# Patient Record
Sex: MALE | Race: WHITE | NOT HISPANIC OR LATINO | Employment: OTHER | URBAN - METROPOLITAN AREA
[De-identification: names, ages, dates, MRNs, and addresses within clinical notes are randomized per-mention and may not be internally consistent; named-entity substitution may affect disease eponyms.]

---

## 2017-01-18 RX ORDER — ATORVASTATIN CALCIUM 40 MG/1
TABLET, FILM COATED ORAL
Qty: 90 TABLET | Refills: 1 | Status: SHIPPED | OUTPATIENT
Start: 2017-01-18 | End: 2017-06-03 | Stop reason: SDUPTHER

## 2017-03-16 DIAGNOSIS — I10 HYPERTENSION GOAL BP (BLOOD PRESSURE) < 140/90: Chronic | ICD-10-CM

## 2017-03-16 RX ORDER — AMLODIPINE BESYLATE 5 MG/1
TABLET ORAL
Qty: 90 TABLET | Refills: 3 | Status: SHIPPED | OUTPATIENT
Start: 2017-03-16 | End: 2018-02-01 | Stop reason: SDUPTHER

## 2017-06-03 DIAGNOSIS — E78.5 HYPERLIPIDEMIA LDL GOAL <100: ICD-10-CM

## 2017-06-07 RX ORDER — ATORVASTATIN CALCIUM 40 MG/1
TABLET, FILM COATED ORAL
Qty: 90 TABLET | Refills: 1 | Status: SHIPPED | OUTPATIENT
Start: 2017-06-07 | End: 2017-11-30 | Stop reason: SDUPTHER

## 2017-06-07 RX ORDER — FENOFIBRATE 160 MG/1
TABLET ORAL
Qty: 45 TABLET | Refills: 1 | Status: SHIPPED | OUTPATIENT
Start: 2017-06-07 | End: 2017-11-30 | Stop reason: SDUPTHER

## 2017-06-20 ENCOUNTER — LAB VISIT (OUTPATIENT)
Dept: LAB | Facility: HOSPITAL | Age: 72
End: 2017-06-20
Attending: INTERNAL MEDICINE
Payer: MEDICARE

## 2017-06-20 DIAGNOSIS — E78.5 HYPERLIPIDEMIA: ICD-10-CM

## 2017-06-20 DIAGNOSIS — I25.10 CORONARY ARTERY DISEASE INVOLVING NATIVE CORONARY ARTERY OF NATIVE HEART WITHOUT ANGINA PECTORIS: Chronic | ICD-10-CM

## 2017-06-20 DIAGNOSIS — I10 HYPERTENSION GOAL BP (BLOOD PRESSURE) < 140/90: Chronic | ICD-10-CM

## 2017-06-20 DIAGNOSIS — N18.30 CKD (CHRONIC KIDNEY DISEASE) STAGE 3, GFR 30-59 ML/MIN: ICD-10-CM

## 2017-06-20 LAB
ALBUMIN SERPL BCP-MCNC: 3.7 G/DL
ALP SERPL-CCNC: 33 U/L
ALT SERPL W/O P-5'-P-CCNC: 26 U/L
ANION GAP SERPL CALC-SCNC: 7 MMOL/L
AST SERPL-CCNC: 22 U/L
BILIRUB SERPL-MCNC: 0.7 MG/DL
BUN SERPL-MCNC: 15 MG/DL
CALCIUM SERPL-MCNC: 9.3 MG/DL
CHLORIDE SERPL-SCNC: 105 MMOL/L
CHOLEST/HDLC SERPL: 3.1 {RATIO}
CO2 SERPL-SCNC: 28 MMOL/L
CREAT SERPL-MCNC: 1.4 MG/DL
EST. GFR  (AFRICAN AMERICAN): 58.4 ML/MIN/1.73 M^2
EST. GFR  (NON AFRICAN AMERICAN): 50.5 ML/MIN/1.73 M^2
GLUCOSE SERPL-MCNC: 93 MG/DL
HDL/CHOLESTEROL RATIO: 32.5 %
HDLC SERPL-MCNC: 151 MG/DL
HDLC SERPL-MCNC: 49 MG/DL
LDLC SERPL CALC-MCNC: 85 MG/DL
NONHDLC SERPL-MCNC: 102 MG/DL
POTASSIUM SERPL-SCNC: 4.5 MMOL/L
PROT SERPL-MCNC: 7.2 G/DL
SODIUM SERPL-SCNC: 140 MMOL/L
TRIGL SERPL-MCNC: 85 MG/DL

## 2017-06-20 PROCEDURE — 36415 COLL VENOUS BLD VENIPUNCTURE: CPT

## 2017-06-20 PROCEDURE — 80053 COMPREHEN METABOLIC PANEL: CPT

## 2017-06-20 PROCEDURE — 80061 LIPID PANEL: CPT

## 2017-06-27 ENCOUNTER — OFFICE VISIT (OUTPATIENT)
Dept: INTERNAL MEDICINE | Facility: CLINIC | Age: 72
End: 2017-06-27
Payer: MEDICARE

## 2017-06-27 ENCOUNTER — LAB VISIT (OUTPATIENT)
Dept: LAB | Facility: HOSPITAL | Age: 72
End: 2017-06-27
Attending: INTERNAL MEDICINE
Payer: MEDICARE

## 2017-06-27 VITALS
HEIGHT: 69 IN | WEIGHT: 242.5 LBS | DIASTOLIC BLOOD PRESSURE: 70 MMHG | SYSTOLIC BLOOD PRESSURE: 122 MMHG | HEART RATE: 62 BPM | TEMPERATURE: 98 F | BODY MASS INDEX: 35.92 KG/M2

## 2017-06-27 DIAGNOSIS — I10 HYPERTENSION GOAL BP (BLOOD PRESSURE) < 140/90: Primary | Chronic | ICD-10-CM

## 2017-06-27 DIAGNOSIS — Z12.5 SCREENING FOR PROSTATE CANCER: ICD-10-CM

## 2017-06-27 DIAGNOSIS — E66.01 SEVERE OBESITY (BMI 35.0-35.9 WITH COMORBIDITY): ICD-10-CM

## 2017-06-27 DIAGNOSIS — E78.5 HYPERLIPIDEMIA LDL GOAL <70: Chronic | ICD-10-CM

## 2017-06-27 DIAGNOSIS — I25.10 CORONARY ARTERY DISEASE INVOLVING NATIVE CORONARY ARTERY OF NATIVE HEART WITHOUT ANGINA PECTORIS: Chronic | ICD-10-CM

## 2017-06-27 DIAGNOSIS — R97.20 ELEVATED PSA: ICD-10-CM

## 2017-06-27 DIAGNOSIS — N18.30 CKD (CHRONIC KIDNEY DISEASE) STAGE 3, GFR 30-59 ML/MIN: Chronic | ICD-10-CM

## 2017-06-27 LAB — COMPLEXED PSA SERPL-MCNC: 6.7 NG/ML

## 2017-06-27 PROCEDURE — 99999 PR PBB SHADOW E&M-EST. PATIENT-LVL III: CPT | Mod: PBBFAC,,, | Performed by: INTERNAL MEDICINE

## 2017-06-27 PROCEDURE — 99499 UNLISTED E&M SERVICE: CPT | Mod: S$GLB,,, | Performed by: INTERNAL MEDICINE

## 2017-06-27 PROCEDURE — 1159F MED LIST DOCD IN RCRD: CPT | Mod: S$GLB,,, | Performed by: INTERNAL MEDICINE

## 2017-06-27 PROCEDURE — 84153 ASSAY OF PSA TOTAL: CPT

## 2017-06-27 PROCEDURE — 36415 COLL VENOUS BLD VENIPUNCTURE: CPT

## 2017-06-27 PROCEDURE — 99214 OFFICE O/P EST MOD 30 MIN: CPT | Mod: S$GLB,,, | Performed by: INTERNAL MEDICINE

## 2017-06-27 NOTE — PROGRESS NOTES
Subjective:       Patient ID: Filipe Agustin Jr. is a 71 y.o. male.    Chief Complaint: Follow-up    Filipe Agustin Jr.  71 y.o. White male    Patient presents with:  Follow-up    HPI: Here today to follow up on chronic conditions.  HTN--stable on amlodipine and metoprolol. He denies symptoms.   HLD--compliant with atorvastatin and fenofibrate.                  CHOL                     151                 06/20/2017                 HDL                      49                  06/20/2017                 LDLCALC                  85.0                06/20/2017                 TRIG                     85                  06/20/2017            CKD III--stable. Asymptomatic.   CAD--asymptomatic. Compliant with aspirin, atorvastatin, fenofibrate and metoprolol. He has not seen cardiology in several years and would like to.     His PSA was elevated in December. He was referred to urology but did not go. He denies symptoms.                      PSA                      6.3 (H)             12/20/2016              Past Medical History:  Acute coronary syndrome  9/24/2015: CKD (chronic kidney disease) stage 3, GFR 30-5*  Coronary artery disease  9/19/2014: History of coronary angioplasty  Hyperlipidemia  Hypertension  2008: Myocardial infarction  Tobacco dependence      Comment: resolved    Current Outpatient Prescriptions on File Prior to Visit:  amlodipine (NORVASC) 5 MG tablet, TAKE 1 TABLET ONE TIME DAILY, Disp: 90 tablet, Rfl: 3  aspirin (ECOTRIN) 81 MG EC tablet, Take 81 mg by mouth once daily., Disp: , Rfl:   atorvastatin (LIPITOR) 40 MG tablet, TAKE 1 TABLET EVERY DAY, Disp: 90 tablet, Rfl: 1  fenofibrate 160 MG Tab, TAKE 1 TABLET EVERY OTHER DAY, Disp: 45 tablet, Rfl: 1  furosemide (LASIX) 20 MG tablet, Take 1 tablet (20 mg total) by mouth once daily., Disp: 3 tablet, Rfl: 0  metoprolol tartrate (LOPRESSOR) 50 MG tablet, Take 1 tablet (50 mg total) by mouth 2 (two) times daily., Disp: 180 tablet, Rfl:  3    Allergies:  Review of patient's allergies indicates:   -- Paragoric -- Other (See Comments)    --  sneeze        Review of Systems   Constitutional: Negative for fever and unexpected weight change.   Respiratory: Negative for cough and shortness of breath.    Cardiovascular: Negative for chest pain and leg swelling.   Gastrointestinal: Negative for abdominal pain, constipation and diarrhea.   Genitourinary: Negative for difficulty urinating and dysuria.   Musculoskeletal: Negative for gait problem.   Neurological: Negative for dizziness and headaches.   Psychiatric/Behavioral: Positive for sleep disturbance (snoring).       Objective:      Physical Exam   Constitutional: He is oriented to person, place, and time. He appears well-developed and well-nourished. No distress.   Eyes: No scleral icterus.   Cardiovascular: Normal rate, regular rhythm and normal heart sounds.    Pulmonary/Chest: Effort normal and breath sounds normal. No respiratory distress.   Abdominal: Soft. Bowel sounds are normal.   Musculoskeletal: He exhibits no edema.   Neurological: He is alert and oriented to person, place, and time.   Skin: Skin is warm and dry.   Psychiatric: He has a normal mood and affect.   Vitals reviewed.      Assessment:       1. Hypertension goal BP (blood pressure) < 140/90    2. Hyperlipidemia LDL goal <70    3. CKD (chronic kidney disease) stage 3, GFR 30-59 ml/min    4. Coronary artery disease involving native coronary artery of native heart without angina pectoris    5. Elevated PSA    6. Screening for prostate cancer    7. Severe obesity (BMI 35.0-35.9 with comorbidity)        Plan:       Filipe was seen today for follow-up.    Diagnoses and all orders for this visit:    Hypertension goal BP (blood pressure) < 140/90  -     Continue current management    Hyperlipidemia LDL goal <70  -     Continue current management    CKD (chronic kidney disease) stage 3, GFR 30-59 ml/min  -     Monitor    Coronary artery  disease involving native coronary artery of native heart without angina pectoris  -     Schedule f/u with cardiology    Elevated PSA  -     Repeat PSA    Screening for prostate cancer  -     PSA, Screening; Future    Severe obesity (BMI 35.0-35.9 with comorbidity)  -     Lifestyle modifications    PSA today.     Labs and f/u in 6 months

## 2017-07-27 DIAGNOSIS — I10 BENIGN ESSENTIAL HTN: ICD-10-CM

## 2017-07-27 DIAGNOSIS — I25.10 CORONARY ARTERY DISEASE INVOLVING NATIVE CORONARY ARTERY OF NATIVE HEART WITHOUT ANGINA PECTORIS: Primary | ICD-10-CM

## 2017-07-28 ENCOUNTER — CLINICAL SUPPORT (OUTPATIENT)
Dept: CARDIOLOGY | Facility: CLINIC | Age: 72
End: 2017-07-28
Payer: MEDICARE

## 2017-07-28 ENCOUNTER — OFFICE VISIT (OUTPATIENT)
Dept: CARDIOLOGY | Facility: CLINIC | Age: 72
End: 2017-07-28
Payer: MEDICARE

## 2017-07-28 VITALS
SYSTOLIC BLOOD PRESSURE: 150 MMHG | HEART RATE: 60 BPM | DIASTOLIC BLOOD PRESSURE: 80 MMHG | WEIGHT: 238 LBS | BODY MASS INDEX: 35.25 KG/M2 | HEIGHT: 69 IN

## 2017-07-28 DIAGNOSIS — I25.10 CORONARY ARTERY DISEASE INVOLVING NATIVE CORONARY ARTERY OF NATIVE HEART WITHOUT ANGINA PECTORIS: Primary | Chronic | ICD-10-CM

## 2017-07-28 DIAGNOSIS — I10 HYPERTENSION GOAL BP (BLOOD PRESSURE) < 140/90: Chronic | ICD-10-CM

## 2017-07-28 DIAGNOSIS — E78.5 HYPERLIPIDEMIA LDL GOAL <70: Chronic | ICD-10-CM

## 2017-07-28 DIAGNOSIS — I25.10 CORONARY ARTERY DISEASE INVOLVING NATIVE CORONARY ARTERY OF NATIVE HEART WITHOUT ANGINA PECTORIS: ICD-10-CM

## 2017-07-28 DIAGNOSIS — I10 BENIGN ESSENTIAL HTN: ICD-10-CM

## 2017-07-28 PROCEDURE — 1126F AMNT PAIN NOTED NONE PRSNT: CPT | Mod: S$GLB,,, | Performed by: NUCLEAR MEDICINE

## 2017-07-28 PROCEDURE — 99999 PR PBB SHADOW E&M-EST. PATIENT-LVL III: CPT | Mod: PBBFAC,,, | Performed by: NUCLEAR MEDICINE

## 2017-07-28 PROCEDURE — 99214 OFFICE O/P EST MOD 30 MIN: CPT | Mod: S$GLB,,, | Performed by: NUCLEAR MEDICINE

## 2017-07-28 PROCEDURE — 1159F MED LIST DOCD IN RCRD: CPT | Mod: S$GLB,,, | Performed by: NUCLEAR MEDICINE

## 2017-07-28 PROCEDURE — 93000 ELECTROCARDIOGRAM COMPLETE: CPT | Mod: S$GLB,,, | Performed by: NUCLEAR MEDICINE

## 2017-07-28 PROCEDURE — 99499 UNLISTED E&M SERVICE: CPT | Mod: S$GLB,,, | Performed by: NUCLEAR MEDICINE

## 2017-07-28 RX ORDER — PNV NO.95/FERROUS FUM/FOLIC AC 28MG-0.8MG
100 TABLET ORAL DAILY
COMMUNITY

## 2017-07-28 RX ORDER — CHOLECALCIFEROL (VITAMIN D3) 25 MCG
2000 TABLET ORAL DAILY
COMMUNITY

## 2017-07-28 NOTE — PROGRESS NOTES
Subjective:   Patient ID:  Filipe Agustin Jr. is a 71 y.o. male who presents for follow-up of Coronary Artery Disease (re- establish care); Hypertension; and Hyperlipidemia      HPI 1- CHRONIC CAD- OLD MI  AND POST PCI RCA  2- ESSENTIAL HTN, AND  3- DYSLIPIDEMIA  DOING WELL. NO RECENT HOSPITALIZATIONS OR ED VISITS FOR ACS OR ADHF  NO RECURRENT ANGINA OR EQUIVALENT  NO UNUSUAL ALVARADO. NO ORTHOPNEA OR PND  NO EDEMA. NO CALVE TENDERNESS. NO INTERMITTENT CLAUDICATION  CARD MED - NO SIDE EFFECTS.  NO FOCAL CNS SYMPTOMS OR SIGNS TO SUGGEST TIA OR STROKE  ECG - TODAY- SR. OTHERWISE NORMAL    Review of Systems   Constitution: Negative for chills, fever, weakness, night sweats, weight gain and weight loss.   HENT: Negative for headaches and nosebleeds.    Eyes: Negative for blurred vision, double vision and visual disturbance.   Cardiovascular: Negative for chest pain, dyspnea on exertion, irregular heartbeat, leg swelling, orthopnea, palpitations, paroxysmal nocturnal dyspnea and syncope.   Respiratory: Negative for cough, hemoptysis and wheezing.    Endocrine: Negative for polydipsia and polyuria.   Hematologic/Lymphatic: Does not bruise/bleed easily.   Skin: Negative for rash.   Musculoskeletal: Negative for joint pain, joint swelling, muscle weakness and myalgias.   Gastrointestinal: Negative for abdominal pain, hematemesis, jaundice and melena.   Genitourinary: Negative for dysuria, hematuria and nocturia.   Neurological: Negative for dizziness, focal weakness and sensory change.   Psychiatric/Behavioral: Negative for depression. The patient does not have insomnia and is not nervous/anxious.      Family History   Problem Relation Age of Onset    Heart disease Father      MI    Cancer Brother     Heart disease Brother     Diabetes Neg Hx     Kidney disease Neg Hx     Stroke Neg Hx      Past Medical History:   Diagnosis Date    Acute coronary syndrome     CKD (chronic kidney disease) stage 3, GFR 30-59 ml/min  9/24/2015    Coronary artery disease     History of coronary angioplasty 9/19/2014    Hyperlipidemia     Hypertension     Myocardial infarction 2008    Tobacco dependence     resolved     Current Outpatient Prescriptions on File Prior to Visit   Medication Sig Dispense Refill    amlodipine (NORVASC) 5 MG tablet TAKE 1 TABLET ONE TIME DAILY 90 tablet 3    aspirin (ECOTRIN) 81 MG EC tablet Take 81 mg by mouth once daily.      atorvastatin (LIPITOR) 40 MG tablet TAKE 1 TABLET EVERY DAY 90 tablet 1    fenofibrate 160 MG Tab TAKE 1 TABLET EVERY OTHER DAY 45 tablet 1    [DISCONTINUED] metoprolol tartrate (LOPRESSOR) 50 MG tablet Take 1 tablet (50 mg total) by mouth 2 (two) times daily. 180 tablet 3    [DISCONTINUED] furosemide (LASIX) 20 MG tablet Take 1 tablet (20 mg total) by mouth once daily. 3 tablet 0     No current facility-administered medications on file prior to visit.      Review of patient's allergies indicates:   Allergen Reactions    Paragoric Other (See Comments)     sneeze       Objective:     Physical Exam   Constitutional: He is oriented to person, place, and time. He appears well-developed. No distress.   HENT:   Head: Normocephalic.   Eyes: Conjunctivae are normal. Pupils are equal, round, and reactive to light.   Neck: Neck supple. No JVD present. No thyromegaly present.   Cardiovascular: Normal rate, regular rhythm, normal heart sounds and intact distal pulses.  Exam reveals no gallop and no friction rub.    No murmur heard.  Pulses:       Carotid pulses are 2+ on the right side, and 2+ on the left side.       Radial pulses are 2+ on the right side, and 2+ on the left side.        Femoral pulses are 2+ on the right side, and 2+ on the left side.       Popliteal pulses are 2+ on the right side, and 2+ on the left side.        Dorsalis pedis pulses are 2+ on the right side, and 2+ on the left side.        Posterior tibial pulses are 2+ on the right side, and 2+ on the left side.    Pulmonary/Chest: Breath sounds normal. He has no wheezes. He has no rales. He exhibits no tenderness.   Abdominal: Soft. Bowel sounds are normal. He exhibits no mass. There is no hepatosplenomegaly. There is no tenderness.   Musculoskeletal: He exhibits no edema or tenderness.        Cervical back: Normal.        Thoracic back: Normal.        Lumbar back: Normal.   Lymphadenopathy:     He has no cervical adenopathy.     He has no axillary adenopathy.        Right: No supraclavicular adenopathy present.        Left: No supraclavicular adenopathy present.   Neurological: He is alert and oriented to person, place, and time. He has normal strength. No sensory deficit. Gait normal.   Skin: Skin is warm. No cyanosis. No pallor. Nails show no clubbing.   Psychiatric: He has a normal mood and affect. His speech is normal and behavior is normal. Cognition and memory are normal.       Assessment:     1. Coronary artery disease involving native coronary artery of native heart without angina pectoris    2. Hypertension goal BP (blood pressure) < 140/90    3. Hyperlipidemia LDL goal <70      STABLE CAD  NO CLINICAL EVIDENCE OF ADHF OR ARRHYTHMIAS  BP WELL CONTROLLED  RECENT LIPIDS-  REVIEWED- AT GOAL  CNS STATUS STABLE  CARD MED WELL TOLERATED  Plan:     Coronary artery disease involving native coronary artery of native heart without angina pectoris    Hypertension goal BP (blood pressure) < 140/90    Hyperlipidemia LDL goal <70      CONTINUE PRESENT CARD MANAGEMENT    RETURN IN 6 MONTHS.

## 2017-08-15 ENCOUNTER — PATIENT OUTREACH (OUTPATIENT)
Dept: ADMINISTRATIVE | Facility: HOSPITAL | Age: 72
End: 2017-08-15

## 2017-09-06 ENCOUNTER — LAB VISIT (OUTPATIENT)
Dept: LAB | Facility: HOSPITAL | Age: 72
End: 2017-09-06
Attending: UROLOGY
Payer: MEDICARE

## 2017-09-06 ENCOUNTER — OFFICE VISIT (OUTPATIENT)
Dept: UROLOGY | Facility: CLINIC | Age: 72
End: 2017-09-06
Payer: MEDICARE

## 2017-09-06 VITALS
BODY MASS INDEX: 35.45 KG/M2 | DIASTOLIC BLOOD PRESSURE: 83 MMHG | WEIGHT: 240.06 LBS | HEART RATE: 67 BPM | SYSTOLIC BLOOD PRESSURE: 148 MMHG

## 2017-09-06 DIAGNOSIS — Z12.5 ENCOUNTER FOR SCREENING FOR MALIGNANT NEOPLASM OF PROSTATE: ICD-10-CM

## 2017-09-06 DIAGNOSIS — R97.20 ELEVATED PSA: ICD-10-CM

## 2017-09-06 DIAGNOSIS — R97.20 ELEVATED PSA: Primary | ICD-10-CM

## 2017-09-06 LAB
BILIRUB SERPL-MCNC: NORMAL MG/DL
BLOOD URINE, POC: NORMAL
COLOR, POC UA: NORMAL
GLUCOSE UR QL STRIP: NORMAL
KETONES UR QL STRIP: NORMAL
LEUKOCYTE ESTERASE URINE, POC: NORMAL
NITRITE, POC UA: NORMAL
PH, POC UA: 6
PROTEIN, POC: NORMAL
SPECIFIC GRAVITY, POC UA: 1.01
UROBILINOGEN, POC UA: NORMAL

## 2017-09-06 PROCEDURE — 36415 COLL VENOUS BLD VENIPUNCTURE: CPT

## 2017-09-06 PROCEDURE — 99999 PR PBB SHADOW E&M-EST. PATIENT-LVL II: CPT | Mod: PBBFAC,,, | Performed by: UROLOGY

## 2017-09-06 PROCEDURE — 3008F BODY MASS INDEX DOCD: CPT | Mod: S$GLB,,, | Performed by: UROLOGY

## 2017-09-06 PROCEDURE — 1126F AMNT PAIN NOTED NONE PRSNT: CPT | Mod: S$GLB,,, | Performed by: UROLOGY

## 2017-09-06 PROCEDURE — 81002 URINALYSIS NONAUTO W/O SCOPE: CPT | Mod: S$GLB,,, | Performed by: UROLOGY

## 2017-09-06 PROCEDURE — 84153 ASSAY OF PSA TOTAL: CPT

## 2017-09-06 PROCEDURE — 3079F DIAST BP 80-89 MM HG: CPT | Mod: S$GLB,,, | Performed by: UROLOGY

## 2017-09-06 PROCEDURE — 1159F MED LIST DOCD IN RCRD: CPT | Mod: S$GLB,,, | Performed by: UROLOGY

## 2017-09-06 PROCEDURE — 3077F SYST BP >= 140 MM HG: CPT | Mod: S$GLB,,, | Performed by: UROLOGY

## 2017-09-06 PROCEDURE — 99499 UNLISTED E&M SERVICE: CPT | Mod: S$GLB,,, | Performed by: UROLOGY

## 2017-09-06 PROCEDURE — 99204 OFFICE O/P NEW MOD 45 MIN: CPT | Mod: 25,S$GLB,, | Performed by: UROLOGY

## 2017-09-06 RX ORDER — CIPROFLOXACIN 500 MG/1
500 TABLET ORAL EVERY 12 HOURS
Qty: 3 TABLET | Refills: 0 | Status: SHIPPED | OUTPATIENT
Start: 2017-09-06 | End: 2017-11-14

## 2017-09-06 RX ORDER — METOPROLOL SUCCINATE 50 MG/1
50 TABLET, EXTENDED RELEASE ORAL DAILY
COMMUNITY
End: 2017-11-30

## 2017-09-06 RX ORDER — AMOXICILLIN 500 MG
2 CAPSULE ORAL DAILY
COMMUNITY

## 2017-09-06 NOTE — PROGRESS NOTES
Chief Complaint: Elevated PSA    HPI:   9/6/17: 70 yo man referred for elevated PSA and review of T labs.  No abd/pelvic pain and no exac/rel factors.  No hematuria.  No urolithiasis.  No urinary bother except nocturia x2 sometimes.  No  history.  Normal sexual function.  T was checked out of curiosity no specific symptoms.  He used a T gel 10-20 years ago stopped and didn't feel it did anything.  Libido is normal he says.    Allergies:  Paragoric    Medications:  has a current medication list which includes the following prescription(s): amlodipine, aspirin, atorvastatin, cyanocobalamin, fenofibrate, fish oil-omega-3 fatty acids, metoprolol succinate, turmeric, and vitamin d.    Review of Systems:  General: No fever, chills, fatigability, or weight loss.  Skin: No rashes, itching, or changes in color or texture of skin.  Chest: Denies ALVARADO, cyanosis, wheezing, cough, and sputum production.  Abdomen: Appetite fine. No weight loss. Denies diarrhea, abdominal pain, hematemesis, or blood in stool.  Musculoskeletal: No joint stiffness or swelling. Denies back pain.  : As above.  All other review of systems negative.    PMH:   has a past medical history of Acute coronary syndrome; CKD (chronic kidney disease) stage 3, GFR 30-59 ml/min (9/24/2015); Coronary artery disease; History of coronary angioplasty (9/19/2014); Hyperlipidemia; Hypertension; Myocardial infarction (2008); and Tobacco dependence.    PSH:   has a past surgical history that includes Coronary stent placement (2008); Cardiac catheterization (2008); and Coronary angioplasty (2008).    FamHx: family history includes Cancer in his brother; Heart disease in his brother and father.    SocHx:  reports that he quit smoking about 9 years ago. He has a 40.00 pack-year smoking history. He has never used smokeless tobacco. He reports that he drinks about 1.8 oz of alcohol per week . He reports that he does not use drugs.      Physical Exam:  Vitals:    09/06/17  1338   BP: (!) 148/83   Pulse: 67     General: A&Ox3, no apparent distress, no deformities  Neck: No masses, normal thyroid  Lungs: normal inspiration, no use of accessory muscles  Heart: normal pulse, no arrhythmias  Abdomen: Soft, NT, ND, no masses, no hernias, no hepatosplenomegaly  Lymphatic: Neck and groin nodes negative  Skin: The skin is warm and dry. No jaundice.  Ext: No c/c/e.  : Test desc arturo, no abnormalities of epididymus. Penis normal, with normal penile and scrotal skin. Meatus normal. Normal rectal tone, no hemorrhoids. Prost 10 gm no nodules or masses appreciated. SV not palpable. Perineum and anus normal.    Labs/Studies:   Urinalysis performed in clinic, summary: UA normal exc tr blood  PSA    12/16: 6.3    6/17: 6.7    Impression/Plan:   1. Elevated PSA warrants biopsy.  Will send UA and if hematuria will add CT Urogram and cysto.  Cipro for biopsy.  2. T is normal (low normal but normal) and asymptomatic.  Cannot recommend T therapy.

## 2017-09-06 NOTE — LETTER
September 6, 2017      Jojo Duque DO  [address] [place] [address] Dr Meliza [address]Y [address]           [facility]  [address] [place] [address] [address]  [place] [address]  Phone: [phone]  Fax: [phone]          Patient: Filipe Agustin Jr.   MR Number: [id number]   YOB: 1945   Date of Visit: 9/6/2017       Dear Dr. Jojo Duque:    Thank you for referring Filipe Agustin to me for evaluation. Attached you will find relevant portions of my assessment and plan of care.    If you have questions, please do not hesitate to call me. I look forward to following Filipe Agustin along with you.    Sincerely,    Ashok Laird IV, MD    Enclosure  CC:  No Recipients    If you would like to receive this communication electronically, please contact [email or web address] or [phone] to request more information on [facility] Link access.    For providers and/or their staff who would like to refer a patient to [facility], please contact us through our one-stop-shop provider referral line, [facility], at [phone].    If you feel you have received this communication in error or would no longer like to receive these types of communications, please e-mail [email or web address]

## 2017-09-07 LAB — COMPLEXED PSA SERPL-MCNC: 7 NG/ML

## 2017-10-04 ENCOUNTER — OFFICE VISIT (OUTPATIENT)
Dept: INTERNAL MEDICINE | Facility: CLINIC | Age: 72
End: 2017-10-04
Payer: MEDICARE

## 2017-10-04 ENCOUNTER — TELEPHONE (OUTPATIENT)
Dept: UROLOGY | Facility: CLINIC | Age: 72
End: 2017-10-04

## 2017-10-04 VITALS
DIASTOLIC BLOOD PRESSURE: 78 MMHG | HEART RATE: 89 BPM | BODY MASS INDEX: 35.53 KG/M2 | OXYGEN SATURATION: 92 % | SYSTOLIC BLOOD PRESSURE: 136 MMHG | WEIGHT: 239.88 LBS | HEIGHT: 69 IN | TEMPERATURE: 97 F

## 2017-10-04 DIAGNOSIS — S60.511A CAT SCRATCH OF HAND, RIGHT, INITIAL ENCOUNTER: Primary | ICD-10-CM
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
## 2017-10-04 DIAGNOSIS — Z23 IMMUNIZATION DUE: ICD-10-CM

## 2017-10-04 PROCEDURE — 99213 OFFICE O/P EST LOW 20 MIN: CPT | Mod: S$GLB,,, | Performed by: INTERNAL MEDICINE

## 2017-10-04 PROCEDURE — 99999 PR PBB SHADOW E&M-EST. PATIENT-LVL III: CPT | Mod: PBBFAC,,, | Performed by: INTERNAL MEDICINE

## 2017-10-04 RX ORDER — AMOXICILLIN AND CLAVULANATE POTASSIUM 875; 125 MG/1; MG/1
1 TABLET, FILM COATED ORAL 2 TIMES DAILY
Qty: 20 TABLET | Refills: 0 | Status: SHIPPED | OUTPATIENT
Start: 2017-10-04 | End: 2017-10-14

## 2017-10-04 NOTE — MEDICAL/APP STUDENT
Subjective:       Patient ID: Filipe Agustin Jr. is a 71 y.o. male.    Chief Complaint: Hand Injury (is swollen and possible infected, also is having a procedure)    Animal Bite    Episode onset: 5 days ago. Incident location: At home with pet cat who is up to date with shots. There is an injury to the right hand. The pain is moderate. It is unlikely that a foreign body is present. Pertinent negatives include no chest pain, no nausea, no vomiting, no headaches, no weakness and no cough. He is right-handed. His tetanus status is UTD. He has been behaving normally. Recently, medical care has been given by the PCP.     Review of Systems   Constitutional: Negative for chills, diaphoresis and fever.   Respiratory: Negative for cough, chest tightness, shortness of breath and wheezing.    Cardiovascular: Negative for chest pain and palpitations.   Gastrointestinal: Negative for nausea and vomiting.   Skin: Positive for color change and wound.   Neurological: Negative for weakness and headaches.   Psychiatric/Behavioral: Negative for agitation and confusion.       Objective:      Physical Exam   Constitutional:   Elderly male in NAD     Cardiovascular: Normal heart sounds.    Pulmonary/Chest: Effort normal and breath sounds normal.   Musculoskeletal: He exhibits edema and tenderness.        Right hand: He exhibits decreased range of motion, tenderness and swelling.        Hands:  Vitals reviewed.      Assessment:       1. Cat scratch of hand, right, initial encounter    2. Immunization due        Plan:         Cat scratch of hand, right, initial encounter  -     amoxicillin-clavulanate 875-125mg (AUGMENTIN) 875-125 mg per tablet; Take 1 tablet by mouth 2 (two) times daily.  Dispense: 20 tablet; Refill: 0    Immunization due

## 2017-10-04 NOTE — TELEPHONE ENCOUNTER
----- Message from Tawanda Finnegan sent at 10/4/2017 12:25 PM CDT -----  Patient came to speak with departmental nurse regarding procedure on Friday. Could not wait any longer. Please call wife's cell # [phone] Josefina.  Thank you

## 2017-10-04 NOTE — PATIENT INSTRUCTIONS
Animal Bites and Scratches     Healthcare provider giving injection in man's arm.     Most bites and scratches from household pets are nothing to worry about. But some bites or scratches can be serious. Others may become infected or pose the risk of rabies. For that reason, it's best to seek medical treatment for all but the most minor bites.  Report severe animal bites to animal control or your local health department.   When to go to the emergency room (ER)  A bite that barely breaks the skin usually isn't cause for concern. But seek emergency medical care if you:  · Have a deep puncture wound or badly torn skin  · Have redness, swelling, or warmth near the wound  · Think you may have a broken bone or other serious injury  · The attack was unprovoked and you don't know the animal (rabies must be ruled out)  · Are bitten by a domestic cat or a wild animal, such as a skunk, raccoon, or bat  · Do not have a spleen or have a weak immune system  What to expect in the ER  · The wound will be carefully cleaned and inspected.  · X-rays may be taken if deep damage is suspected or to make sure a small piece of the animal's tooth is not left embedded in the wound.  · Although not common, infection can occur, especially if you have a cat bite, deep wound, or weak immune system. You may be given antibiotics to help prevent this.  · You may be given a tetanus shot if you haven't had one in the past 5 years. If rabies is a concern, you may be given shots to protect you.  · If serious tissue or joint damage has been done, you may be referred to a plastic or orthopedic surgeon.  Follow-up care  You will likely need to see your doctor within a day or two of receiving emergency medical treatment. In the meantime, watch for signs of infection. These include:  · Fever of 100.4°F (38°C) or higher, or as directed by your healthcare provider  · Swelling  · Redness  · Pus draining from the wound  Date Last Reviewed: 12/1/2016  ©  4558-4513 [employer]. [address] [place] [address], [place] [address]. All rights reserved. This information is not intended as a substitute for professional medical care. Always follow your healthcare professional's instructions.
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
## 2017-10-13 ENCOUNTER — OFFICE VISIT (OUTPATIENT)
Dept: INTERNAL MEDICINE | Facility: CLINIC | Age: 72
End: 2017-10-13
Payer: MEDICARE

## 2017-10-13 VITALS
WEIGHT: 240.31 LBS | HEART RATE: 65 BPM | BODY MASS INDEX: 35.59 KG/M2 | SYSTOLIC BLOOD PRESSURE: 134 MMHG | DIASTOLIC BLOOD PRESSURE: 82 MMHG | HEIGHT: 69 IN | TEMPERATURE: 98 F

## 2017-10-13 DIAGNOSIS — L03.90 CELLULITIS, UNSPECIFIED CELLULITIS SITE: Primary | ICD-10-CM

## 2017-10-13 PROCEDURE — 99213 OFFICE O/P EST LOW 20 MIN: CPT | Mod: S$GLB,,, | Performed by: PHYSICIAN ASSISTANT

## 2017-10-13 PROCEDURE — 99999 PR PBB SHADOW E&M-EST. PATIENT-LVL IV: CPT | Mod: PBBFAC,,, | Performed by: PHYSICIAN ASSISTANT

## 2017-10-13 NOTE — PROGRESS NOTES
Patient ID: Filipe Agutsin Jr. is a 71 y.o. male.     Chief Complaint: 1 wk f/u cat scratch on right hand     Patient is a 70 y/o male presenting for f/u of a cat scratch on the dorsal side of his right hand. Pt is on day nine of a ten day course of Augmentin. Pt reports that his hand is feeling much better. He states he now has very minimal pain and full range of motion. He has been cleaning the are with hydrogen peroxide. He denies fever, nausea, vomiting or chills.      He does report 3-4 episodes of diarrhea daily since starting the antibiotic.      Review of Systems   Constitutional: Negative for chills, diaphoresis and fever.   Respiratory: Negative for shortness of breath.    Cardiovascular: Negative for chest pain.   Musculoskeletal: Negative for myalgias.   Skin: Positive for wound. Negative for color change.   Neurological: Negative for weakness and numbness.       Objective:      Physical Exam   Constitutional:   Elderly male in NAD   Musculoskeletal: Normal range of motion.        Right hand: He exhibits tenderness. He exhibits normal range of motion. Normal sensation noted. Normal strength noted.        Hands:  Skin: No erythema.   Vitals reviewed.   Pts hand looks much improved from initial visit.      Assessment:    cellulitis    Plan:        - Complete last day of Augmentin. Start daily yogurt intake or probiotic. If diarrhea persist after completion then call back. If right hand does not continue to heal then return to clinic.       Electronically signed by Dami Webb at 10/13/2017 11:16 AM

## 2017-10-13 NOTE — MEDICAL/APP STUDENT
Subjective:       Patient ID: Filipe Agustin Jr. is a 71 y.o. male.    Chief Complaint: 1 wk f/u cat scratch on right hand    Patient is a 72 y/o male presenting for f/u of a cat scratch on the dorsal side of his right hand. Pt is on day nine of a ten day course of Augmentin. Pt reports that his hand is feeling much better. He states he now has very minimal pain and full range of motion. He has been cleaning the are with hydrogen peroxide. He denies fever, nausea, vomiting or chills.     He does report 3-4 episodes of diarrhea daily since starting the antibiotic.       Review of Systems   Constitutional: Negative for chills, diaphoresis and fever.   Respiratory: Negative for shortness of breath.    Cardiovascular: Negative for chest pain.   Musculoskeletal: Negative for myalgias.   Skin: Positive for wound. Negative for color change.   Neurological: Negative for weakness and numbness.       Objective:      Physical Exam   Constitutional:   Elderly male in NAD   Musculoskeletal: Normal range of motion.        Right hand: He exhibits tenderness. He exhibits normal range of motion. Normal sensation noted. Normal strength noted.        Hands:  Skin: No erythema.   Vitals reviewed.   Pts hand looks much improved from initial visit.      Assessment:       No diagnosis found.    Plan:        - Complete last day of Augmentin. Start daily yogurt intake or probiotic. If diarrhea persist after completion then call back. If right hand does not continue to heal then return to clinic.

## 2017-10-30 ENCOUNTER — OFFICE VISIT (OUTPATIENT)
Dept: UROLOGY | Facility: CLINIC | Age: 72
End: 2017-10-30
Payer: MEDICARE

## 2017-10-30 VITALS — HEIGHT: 69 IN | WEIGHT: 240 LBS | BODY MASS INDEX: 35.55 KG/M2

## 2017-10-30 DIAGNOSIS — R97.20 ELEVATED PSA: Primary | ICD-10-CM

## 2017-10-30 PROCEDURE — 88305 TISSUE EXAM BY PATHOLOGIST: CPT | Performed by: PATHOLOGY

## 2017-10-30 PROCEDURE — 99999 PR PBB SHADOW E&M-EST. PATIENT-LVL II: CPT | Mod: PBBFAC,,, | Performed by: UROLOGY

## 2017-10-30 PROCEDURE — 76872 US TRANSRECTAL: CPT | Mod: S$GLB,,, | Performed by: UROLOGY

## 2017-10-30 PROCEDURE — 55700 PR BIOPSY OF PROSTATE,NEEDLE/PUNCH: CPT | Mod: S$GLB,,, | Performed by: UROLOGY

## 2017-10-30 PROCEDURE — 76942 ECHO GUIDE FOR BIOPSY: CPT | Mod: 59,S$GLB,, | Performed by: UROLOGY

## 2017-10-30 PROCEDURE — 88305 TISSUE EXAM BY PATHOLOGIST: CPT | Mod: 26,,, | Performed by: PATHOLOGY

## 2017-10-30 PROCEDURE — 99499 UNLISTED E&M SERVICE: CPT | Mod: S$GLB,,, | Performed by: UROLOGY

## 2017-10-30 NOTE — PROGRESS NOTES
Chief Complaint: Elevated PSA    HPI:   10/30/17: TRUS/Bx today 27 gm  9/6/17: 70 yo man referred for elevated PSA and review of T labs.  No abd/pelvic pain and no exac/rel factors.  No hematuria.  No urolithiasis.  No urinary bother except nocturia x2 sometimes.  No  history.  Normal sexual function.  T was checked out of curiosity no specific symptoms.  He used a T gel 10-20 years ago stopped and didn't feel it did anything.  Libido is normal he says.    Allergies:  Paragoric    Medications:  has a current medication list which includes the following prescription(s): amlodipine, aspirin, atorvastatin, ciprofloxacin hcl, cyanocobalamin, fenofibrate, fish oil-omega-3 fatty acids, metoprolol succinate, turmeric, and vitamin d.    Review of Systems:  General: No fever, chills, fatigability, or weight loss.  Skin: No rashes, itching, or changes in color or texture of skin.  Chest: Denies ALVARADO, cyanosis, wheezing, cough, and sputum production.  Abdomen: Appetite fine. No weight loss. Denies diarrhea, abdominal pain, hematemesis, or blood in stool.  Musculoskeletal: No joint stiffness or swelling. Denies back pain.  : As above.  All other review of systems negative.    PMH:   has a past medical history of Acute coronary syndrome; CKD (chronic kidney disease) stage 3, GFR 30-59 ml/min (9/24/2015); Coronary artery disease; History of coronary angioplasty (9/19/2014); Hyperlipidemia; Hypertension; Myocardial infarction (2008); and Tobacco dependence.    PSH:   has a past surgical history that includes Coronary stent placement (2008); Cardiac catheterization (2008); and Coronary angioplasty (2008).    FamHx: family history includes Cancer in his brother; Heart disease in his brother and father.    SocHx:  reports that he quit smoking about 9 years ago. He has a 40.00 pack-year smoking history. He has never used smokeless tobacco. He reports that he drinks about 1.8 oz of alcohol per week . He reports that he does not use  drugs.      Physical Exam:  There were no vitals filed for this visit.  General: A&Ox3, no apparent distress, no deformities  Neck: No masses, normal thyroid  Lungs: normal inspiration, no use of accessory muscles  Heart: normal pulse, no arrhythmias  Abdomen: Soft, NT, ND, no masses, no hernias, no hepatosplenomegaly  Lymphatic: Neck and groin nodes negative  Skin: The skin is warm and dry. No jaundice.  Ext: No c/c/e.  : Test desc arturo, no abnormalities of epididymus. Penis normal, with normal penile and scrotal skin. Meatus normal. Normal rectal tone, no hemorrhoids. Prost 10 gm no nodules or masses appreciated. SV not palpable. Perineum and anus normal.    Labs/Studies:   Urinalysis performed in clinic, summary: UA normal exc tr blood  PSA    12/16: 6.3    6/17: 6.7    Procedure: (1) Transrectal Prostate Biopsy                      (2) Transrectal ultrasound of prostate                     (3) Ultrasound Guidance of Prostate Biopsy needles    Detail: After proper consents were obtained, the patient was prepped and draped in normal fashion in the left lateral decubitus position for TRUS/Bx.  5 ml of lidocaine jelly was instilled in the rectum.  The U/S with rectal probe was used to size the prostate.  A spinal needle was used and 5ml of 1% lidocaine was instilled on the side of the prostate at the base of the seminal vesicles.  Biopsy was then performed using an 18Ga biopsy needle directed at the base, mid and apex bilaterally for a total of 12 cores. Antibiotic prophylaxis was provided using oral ciprofloxacin.    Findings: The prostate is 36W, 33H, and 43L for volume of 27 grams, with no abnl apprec.    Impression/Plan:   1. RTC 10d to review results

## 2017-11-08 ENCOUNTER — PATIENT MESSAGE (OUTPATIENT)
Dept: RESEARCH | Facility: HOSPITAL | Age: 72
End: 2017-11-08

## 2017-11-14 ENCOUNTER — OFFICE VISIT (OUTPATIENT)
Dept: UROLOGY | Facility: CLINIC | Age: 72
End: 2017-11-14
Payer: MEDICARE

## 2017-11-14 VITALS
SYSTOLIC BLOOD PRESSURE: 168 MMHG | HEART RATE: 61 BPM | DIASTOLIC BLOOD PRESSURE: 95 MMHG | WEIGHT: 239 LBS | BODY MASS INDEX: 35.29 KG/M2

## 2017-11-14 DIAGNOSIS — C61 PROSTATE CANCER: Primary | ICD-10-CM

## 2017-11-14 DIAGNOSIS — I10 HYPERTENSION, UNSPECIFIED TYPE: ICD-10-CM

## 2017-11-14 LAB
BILIRUB SERPL-MCNC: NORMAL MG/DL
BLOOD URINE, POC: NORMAL
COLOR, POC UA: NORMAL
GLUCOSE UR QL STRIP: NORMAL
KETONES UR QL STRIP: NORMAL
LEUKOCYTE ESTERASE URINE, POC: NORMAL
NITRITE, POC UA: NORMAL
PH, POC UA: 7
PROTEIN, POC: NORMAL
SPECIFIC GRAVITY, POC UA: 1.01
UROBILINOGEN, POC UA: NORMAL

## 2017-11-14 PROCEDURE — 99999 PR PBB SHADOW E&M-EST. PATIENT-LVL II: CPT | Mod: PBBFAC,,, | Performed by: UROLOGY

## 2017-11-14 PROCEDURE — 99214 OFFICE O/P EST MOD 30 MIN: CPT | Mod: 25,S$GLB,, | Performed by: UROLOGY

## 2017-11-14 PROCEDURE — 81002 URINALYSIS NONAUTO W/O SCOPE: CPT | Mod: S$GLB,,, | Performed by: UROLOGY

## 2017-11-14 PROCEDURE — 99499 UNLISTED E&M SERVICE: CPT | Mod: S$GLB,,, | Performed by: UROLOGY

## 2017-11-14 NOTE — PROGRESS NOTES
Chief Complaint: T1c Prostate Cancer    HPI:   11/14/17: Biopsy shows Gl 3+3=6 in 8 cores mainly on the left side in 30-50% of the samples.  In the right apex it was 3+4=7 so there is a bit of Gl4 present.    10/30/17: TRUS/Bx today 27 gm  9/6/17: 70 yo man referred for elevated PSA and review of T labs.  No abd/pelvic pain and no exac/rel factors.  No hematuria.  No urolithiasis.  No urinary bother except nocturia x2 sometimes.  No  history.  Normal sexual function.  T was checked out of curiosity no specific symptoms.  He used a T gel 10-20 years ago stopped and didn't feel it did anything.  Libido is normal he says.    Allergies:  Paragoric    Medications:  has a current medication list which includes the following prescription(s): amlodipine, aspirin, atorvastatin, cyanocobalamin, fenofibrate, fish oil-omega-3 fatty acids, metoprolol succinate, turmeric, and vitamin d.    Review of Systems:  General: No fever, chills, fatigability, or weight loss.  Skin: No rashes, itching, or changes in color or texture of skin.  Chest: Denies ALVARADO, cyanosis, wheezing, cough, and sputum production.  Abdomen: Appetite fine. No weight loss. Denies diarrhea, abdominal pain, hematemesis, or blood in stool.  Musculoskeletal: No joint stiffness or swelling. Denies back pain.  : As above.  All other review of systems negative.    PMH:   has a past medical history of Acute coronary syndrome; CKD (chronic kidney disease) stage 3, GFR 30-59 ml/min (9/24/2015); Coronary artery disease; History of coronary angioplasty (9/19/2014); Hyperlipidemia; Hypertension; Myocardial infarction (2008); and Tobacco dependence.    PSH:   has a past surgical history that includes Coronary stent placement (2008); Cardiac catheterization (2008); and Coronary angioplasty (2008).    FamHx: family history includes Cancer in his brother; Heart disease in his brother and father.    SocHx:  reports that he quit smoking about 9 years ago. He has a 40.00  pack-year smoking history. He has never used smokeless tobacco. He reports that he drinks about 1.8 oz of alcohol per week . He reports that he does not use drugs.      Physical Exam:  Vitals:    11/14/17 1522   BP: (!) 168/95   Pulse: 61     General: A&Ox3, no apparent distress, no deformities  Neck: No masses, normal thyroid  Lungs: normal inspiration, no use of accessory muscles  Heart: normal pulse, no arrhythmias  Abdomen: Soft, NT, ND  Skin: The skin is warm and dry. No jaundice.  Ext: No c/c/e.  :   11/14/17: Test desc arturo, no abnormalities of epididymus. Penis normal, with normal penile and scrotal skin. Meatus normal. Normal rectal tone, no hemorrhoids. Prost 10 gm no nodules or masses appreciated. SV not palpable. Perineum and anus normal.    Labs/Studies:   Urinalysis performed in clinic, summary: UA normal exc tr blood  PSA    12/16: 6.3    6/17: 6.7    Impression/Plan:   1. Reviewed results.  Discussed options.  MRI and RTC 6 wks to discuss choices.  2. HTN: discussed and referred to PCP for further management.

## 2017-11-14 NOTE — PROGRESS NOTES
Order, demographics and clinicals faxed to [facility] for pending MRI that was requested by Dr. Laird.

## 2017-11-15 ENCOUNTER — TELEPHONE (OUTPATIENT)
Dept: UROLOGY | Facility: CLINIC | Age: 72
End: 2017-11-15

## 2017-11-15 NOTE — TELEPHONE ENCOUNTER
Attempted to contact pt to inform him that [facility] does not take his [employer] insurance through [facility] and that he would have to go to [place] to have the MRI done.  No answer.  Msg left on voice mail. Appt scheduled and mailed.

## 2017-11-30 DIAGNOSIS — E78.5 HYPERLIPIDEMIA LDL GOAL <100: ICD-10-CM

## 2017-11-30 RX ORDER — FENOFIBRATE 160 MG/1
TABLET ORAL
Qty: 45 TABLET | Refills: 2 | Status: SHIPPED | OUTPATIENT
Start: 2017-11-30 | End: 2018-08-13 | Stop reason: SDUPTHER

## 2017-11-30 RX ORDER — METOPROLOL TARTRATE 50 MG/1
TABLET ORAL
Qty: 180 TABLET | Refills: 2 | Status: SHIPPED | OUTPATIENT
Start: 2017-11-30 | End: 2018-10-20 | Stop reason: SDUPTHER

## 2017-11-30 RX ORDER — ATORVASTATIN CALCIUM 40 MG/1
TABLET, FILM COATED ORAL
Qty: 90 TABLET | Refills: 2 | Status: SHIPPED | OUTPATIENT
Start: 2017-11-30 | End: 2018-08-13 | Stop reason: SDUPTHER

## 2017-12-18 ENCOUNTER — LAB VISIT (OUTPATIENT)
Dept: LAB | Facility: HOSPITAL | Age: 72
End: 2017-12-18
Attending: UROLOGY
Payer: MEDICARE

## 2017-12-18 DIAGNOSIS — C61 PROSTATE CANCER: ICD-10-CM

## 2017-12-18 LAB
CREAT SERPL-MCNC: 1.3 MG/DL
EST. GFR  (AFRICAN AMERICAN): >60 ML/MIN/1.73 M^2
EST. GFR  (NON AFRICAN AMERICAN): 55 ML/MIN/1.73 M^2

## 2017-12-18 PROCEDURE — 36415 COLL VENOUS BLD VENIPUNCTURE: CPT

## 2017-12-18 PROCEDURE — 82565 ASSAY OF CREATININE: CPT

## 2017-12-27 ENCOUNTER — HOSPITAL ENCOUNTER (OUTPATIENT)
Dept: RADIOLOGY | Facility: HOSPITAL | Age: 72
Discharge: HOME | End: 2017-12-27
Attending: UROLOGY
Payer: MEDICARE

## 2017-12-27 PROCEDURE — 72197 MRI PELVIS W/O & W/DYE: CPT | Mod: 26,,, | Performed by: RADIOLOGY

## 2017-12-27 PROCEDURE — 72197 MRI PELVIS W/O & W/DYE: CPT | Mod: TC

## 2017-12-27 PROCEDURE — 25500020 PHARM REV CODE 255: Performed by: UROLOGY

## 2017-12-27 PROCEDURE — A9585 GADOBUTROL INJECTION: HCPCS | Performed by: UROLOGY

## 2017-12-27 RX ORDER — GADOBUTROL 604.72 MG/ML
10 INJECTION INTRAVENOUS
Status: COMPLETED | OUTPATIENT
Start: 2017-12-27 | End: 2017-12-27

## 2017-12-27 RX ADMIN — GADOBUTROL 10 ML: 604.72 INJECTION INTRAVENOUS at 12:12

## 2018-01-04 ENCOUNTER — OFFICE VISIT (OUTPATIENT)
Dept: UROLOGY | Facility: CLINIC | Age: 73
End: 2018-01-04
Payer: MEDICARE

## 2018-01-04 VITALS — WEIGHT: 238.56 LBS | HEART RATE: 72 BPM | HEIGHT: 69 IN | BODY MASS INDEX: 35.33 KG/M2

## 2018-01-04 DIAGNOSIS — C61 PROSTATE CANCER: Primary | ICD-10-CM

## 2018-01-04 LAB
BILIRUB SERPL-MCNC: NORMAL MG/DL
BLOOD URINE, POC: NORMAL
COLOR, POC UA: YELLOW
GLUCOSE UR QL STRIP: NORMAL
KETONES UR QL STRIP: NORMAL
LEUKOCYTE ESTERASE URINE, POC: NORMAL
NITRITE, POC UA: NORMAL
PH, POC UA: 5
PROTEIN, POC: NORMAL
SPECIFIC GRAVITY, POC UA: 1.02
UROBILINOGEN, POC UA: NORMAL

## 2018-01-04 PROCEDURE — 99499 UNLISTED E&M SERVICE: CPT | Mod: S$GLB,,, | Performed by: UROLOGY

## 2018-01-04 PROCEDURE — 81002 URINALYSIS NONAUTO W/O SCOPE: CPT | Mod: S$GLB,,, | Performed by: UROLOGY

## 2018-01-04 PROCEDURE — 99214 OFFICE O/P EST MOD 30 MIN: CPT | Mod: 25,S$GLB,, | Performed by: UROLOGY

## 2018-01-04 PROCEDURE — 99999 PR PBB SHADOW E&M-EST. PATIENT-LVL II: CPT | Mod: PBBFAC,,, | Performed by: UROLOGY

## 2018-01-04 NOTE — PROGRESS NOTES
Chief Complaint: T1c Prostate Cancer    HPI:   1/4/17: Had his MRI in [place], and has no extracapsular extension.  Reviewed options in detail.  Wary of RALP.  Considering XRT or brachy.  Long discussion.  11/14/17: Biopsy shows Gl 3+3=6 in 8 cores mainly on the left side in 30-50% of the samples.  In the right apex it was 3+4=7 so there is a bit of Gl4 present.    10/30/17: TRUS/Bx today 27 gm  9/6/17: 72 yo man referred for elevated PSA and review of T labs.  No abd/pelvic pain and no exac/rel factors.  No hematuria.  No urolithiasis.  No urinary bother except nocturia x2 sometimes.  No  history.  Normal sexual function.  T was checked out of curiosity no specific symptoms.  He used a T gel 10-20 years ago stopped and didn't feel it did anything.  Libido is normal he says.    Allergies:  Paragoric    Medications:  has a current medication list which includes the following prescription(s): amlodipine, aspirin, atorvastatin, cyanocobalamin, fenofibrate, fish oil-omega-3 fatty acids, metoprolol tartrate, turmeric, and vitamin d.    Review of Systems:  General: No fever, chills, fatigability, or weight loss.  Skin: No rashes, itching, or changes in color or texture of skin.  Chest: Denies ALVARADO, cyanosis, wheezing, cough, and sputum production.  Abdomen: Appetite fine. No weight loss. Denies diarrhea, abdominal pain, hematemesis, or blood in stool.  Musculoskeletal: No joint stiffness or swelling. Denies back pain.  : As above.  All other review of systems negative.    PMH:   has a past medical history of Acute coronary syndrome; CKD (chronic kidney disease) stage 3, GFR 30-59 ml/min (9/24/2015); Coronary artery disease; History of coronary angioplasty (9/19/2014); Hyperlipidemia; Hypertension; Myocardial infarction (2008); and Tobacco dependence.    PSH:   has a past surgical history that includes Coronary stent placement (2008); Cardiac catheterization (2008); and Coronary angioplasty (2008).    FamHx: family  history includes Cancer in his brother; Heart disease in his brother and father.    SocHx:  reports that he quit smoking about 9 years ago. He has a 40.00 pack-year smoking history. He has never used smokeless tobacco. He reports that he drinks about 1.8 oz of alcohol per week . He reports that he does not use drugs.      Physical Exam:  Vitals:    01/04/18 1321   Pulse: 72     General: A&Ox3, no apparent distress, no deformities  Neck: No masses, normal thyroid  Lungs: normal inspiration, no use of accessory muscles  Heart: normal pulse, no arrhythmias  Abdomen: Soft, NT, ND  Skin: The skin is warm and dry. No jaundice.  Ext: No c/c/e.  :   11/14/17: Test desc arturo, no abnormalities of epididymus. Penis normal, with normal penile and scrotal skin. Meatus normal. Normal rectal tone, no hemorrhoids. Prost 10 gm no nodules or masses appreciated. SV not palpable. Perineum and anus normal.    Labs/Studies:   Urinalysis performed in clinic, summary: UA normal exc tr blood  PSA    12/16: 6.3    6/17: 6.7    Impression/Plan:   1. Reviewed results.  Discussed options again.  Will refer to Dr. Flores and RTC 2 wks with his decisions.

## 2018-01-08 ENCOUNTER — INITIAL CONSULT (OUTPATIENT)
Dept: RADIATION ONCOLOGY | Facility: CLINIC | Age: 73
End: 2018-01-08
Payer: MEDICARE

## 2018-01-08 VITALS
BODY MASS INDEX: 35.85 KG/M2 | HEART RATE: 70 BPM | RESPIRATION RATE: 20 BRPM | HEIGHT: 69 IN | WEIGHT: 242.06 LBS | SYSTOLIC BLOOD PRESSURE: 156 MMHG | DIASTOLIC BLOOD PRESSURE: 82 MMHG | TEMPERATURE: 98 F

## 2018-01-08 DIAGNOSIS — C61 CANCER OF PROSTATE WITH INTERMEDIATE RECURRENCE RISK (STAGE T2B-C OR GLEASON 7 OR PSA 10-20): Primary | ICD-10-CM

## 2018-01-08 PROCEDURE — 99203 OFFICE O/P NEW LOW 30 MIN: CPT | Mod: S$GLB,,, | Performed by: RADIOLOGY

## 2018-01-08 PROCEDURE — 99499 UNLISTED E&M SERVICE: CPT | Mod: S$GLB,,, | Performed by: RADIOLOGY

## 2018-01-08 PROCEDURE — 99999 PR PBB SHADOW E&M-EST. PATIENT-LVL III: CPT | Mod: PBBFAC,,, | Performed by: RADIOLOGY

## 2018-01-08 NOTE — PROGRESS NOTES
HISTORY OF PRESENT ILLNESS:   This patient presents for discussion of treatment options for a recently diagnosed prostate cancer.     Mr. Agustin was initially referred to Dr. Laird in September of 2017 for evaluation of a persistently elevated PSA.  PSA in December of 2016 returned at 6.3 ng/ml.  Repeat PSA in June of 2017 remained elevated at 6.7 ng/ml and increased to 7.0 in September of 2017.  The patient had no urinary complaints.  Denies family history of prostate cancer.   WARD revealed a 30 gm prostate without palpable nodules or induration.  The patient underwent TRUS and biopsy of the prostate on 10/30/17.  The prostate measured 27 cc with no hypoechoic areas noted.  Biopsies from the Rt. apex revealed Glouster 7 (3+4) involving 10% of the specimen.  Biopsies from the Lt apex, Lt. mid gland and Lt. base revealed Jonathon 6 (3+3) disease involving 50% of the specimen from the Lt. mid gland and 30% of the specimens from the Lt. apex and Lt. base.  Further work up with MRI of the pelvis on on 12/27/17 revealed 26 cc prostate measuring 3.9 x 3.8 x 3.8 cm.  There was a 1.3 cm moderately hypointense T2 lesion at the Lt. apical/mid gland.  PIRADS 4.  There was no evidence of extracapsular extension.  The neurovascular bundles and seminal vesicles were unremarkable.  There was no adenopathy.  The patient now presents with his wife to discuss treatment options.      Today, the patient states he feels well.  Notes nocturia at twice per night.  Some trouble starting his urinary stream.  No complaints of dysuria or hematuria.  States he is occasionally sexually active.  Some difficulty maintaining an erection.        REVIEW OF SYSTEMS:   Review of Systems   Constitutional: Negative for chills, fever, malaise/fatigue and weight loss.   HENT: Negative for sinus pain and sore throat.    Respiratory: Negative for cough, sputum production and shortness of breath.    Cardiovascular: Negative for chest pain and palpitations.    Gastrointestinal: Negative for abdominal pain and diarrhea.   Genitourinary: Negative for dysuria, frequency, hematuria and urgency.         PAST MEDICAL HISTORY:  Past Medical History:   Diagnosis Date    Acute coronary syndrome     Cancer of prostate with intermediate recurrence risk (stage T2b-c or Jonathon 7 or PSA 10-20) 1/8/2018    CKD (chronic kidney disease) stage 3, GFR 30-59 ml/min 9/24/2015    Coronary artery disease     History of coronary angioplasty 9/19/2014    Hyperlipidemia     Hypertension     Myocardial infarction 2008    Tobacco dependence     resolved       PAST SURGICAL HISTORY:  Past Surgical History:   Procedure Laterality Date    CARDIAC CATHETERIZATION  2008    CORONARY ANGIOPLASTY  2008    acute PCI- RCA- RUI    CORONARY STENT PLACEMENT  2008       ALLERGIES:   Review of patient's allergies indicates:   Allergen Reactions    Paragoric Other (See Comments)     sneeze       MEDICATIONS:  Current Outpatient Prescriptions   Medication Sig    amlodipine (NORVASC) 5 MG tablet TAKE 1 TABLET ONE TIME DAILY    aspirin (ECOTRIN) 81 MG EC tablet Take 81 mg by mouth once daily.    atorvastatin (LIPITOR) 40 MG tablet TAKE 1 TABLET EVERY DAY    COLD-HOT PACK (ACE REUSABLE COLD COMPRESS MISC)     cyanocobalamin (VITAMIN B-12) 100 MCG tablet Take 100 mcg by mouth once daily.    fenofibrate 160 MG Tab TAKE 1 TABLET EVERY OTHER DAY    fish oil-omega-3 fatty acids 300-1,000 mg capsule Take 2 g by mouth once daily.    metoprolol tartrate (LOPRESSOR) 50 MG tablet TAKE 1 TABLET TWICE DAILY    TURMERIC, BULK, MISC by Misc.(Non-Drug; Combo Route) route.    vitamin D 1000 units Tab Take 1,000 Units by mouth once daily.     No current facility-administered medications for this visit.        SOCIAL HISTORY:  Social History     Social History    Marital status:      Spouse name: N/A    Number of children: N/A    Years of education: N/A     Occupational History    Not on file.      Social History Main Topics    Smoking status: Former Smoker     Packs/day: 2.00     Years: 20.00     Quit date: 2008    Smokeless tobacco: Never Used    Alcohol use 1.8 oz/week     3 Glasses of wine per week    Drug use: No    Sexual activity: Yes     Partners: Female     Other Topics Concern    Not on file     Social History Narrative    No narrative on file       FAMILY HISTORY:  Family History   Problem Relation Age of Onset    Heart disease Father      MI    Cancer Brother     Heart disease Brother     Diabetes Neg Hx     Kidney disease Neg Hx     Stroke Neg Hx          PHYSICAL EXAMINATION:  Vitals:    18 1240   BP: (!) 156/82   Pulse: 70   Resp: 20   Temp: 98.2 °F (36.8 °C)     Physical Exam   Constitutional: He is well-developed, well-nourished, and in no distress.   Neck: Normal range of motion. Neck supple.   Pulmonary/Chest: Effort normal. No respiratory distress.   Abdominal: Soft. He exhibits no distension.       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      ECO    I explained to the patient and his wife that has intermediate risk prostate cancer based upon his Justin score and volume of disease.  We discussed the implications of that classification.  We discussed the option of active surveillance.  Discussed the pros and cons of this particular approach.  The patient feels he would like to receive some form of active therapy.  This is reasonable based on his age, Jonathon score and volume of disease.  Discussed the options of surgical resection, external beam therapy using IMRT / IGRT techniques and prostate brachytherapy using Palladium 103 seeds.  Discussed in detail, the procedures, risks and benefits of radiotherapy. Discussed the acute and long term side effects of therapy including urinary hesitancy, increased nocturia and erectile dysfunction.  Discussed the pros and cons of each treatment approach.  After our discussion the patient  is leaning towards external beam therapy with IMRT / IGRT techniques.  Explained we would plan to have him return in a few weeks to undergo gold marker placement along with Space OAR placement per Dr. Laird followed by simulation.  Thank you for allowing us to participate in the care of this patient.     Psychosocial Distress screening score of Distress Score: 1 noted and reviewed. No intervention indicated.    I spent approximately 45 minutes reviewing the available records and evaluating the patient, out of which over 50% of the time was spent face to face with the patient in counseling and coordinating this patient's care.

## 2018-01-08 NOTE — LETTER
January 8, 2018      Ashok Laird IV, MD  [address]  [facility] [address]           [facility]  [address] [place] [id number]  Phone: [phone]  Fax: [phone]          Patient: Filipe Agustin Jr.   MR Number: [id number]   YOB: 1945   Date of Visit: 1/8/2018       Dear Dr. Ashok Laird IV:    Thank you for referring Filipe Agustin to me for evaluation. Attached you will find relevant portions of my assessment and plan of care.    If you have questions, please do not hesitate to call me. I look forward to following Filipe Agustin along with you.    Sincerely,    Gilmar Flores Jr., MD    Enclosure  CC:  No Recipients    If you would like to receive this communication electronically, please contact [email or web address] or [phone] to request more information on [employer] Link access.    For providers and/or their staff who would like to refer a patient to [facility], please contact us through our one-stop-shop provider referral line, [facility] [place], at [phone].    If you feel you have received this communication in error or would no longer like to receive these types of communications, please e-mail [email or web address]

## 2018-01-25 ENCOUNTER — OFFICE VISIT (OUTPATIENT)
Dept: UROLOGY | Facility: CLINIC | Age: 73
End: 2018-01-25
Payer: MEDICARE

## 2018-01-25 VITALS — HEIGHT: 69 IN | WEIGHT: 240.5 LBS | BODY MASS INDEX: 35.62 KG/M2

## 2018-01-25 DIAGNOSIS — C61 PROSTATE CANCER: Primary | ICD-10-CM

## 2018-01-25 LAB
BILIRUB SERPL-MCNC: NORMAL MG/DL
BLOOD URINE, POC: NORMAL
COLOR, POC UA: NORMAL
GLUCOSE UR QL STRIP: NORMAL
KETONES UR QL STRIP: NORMAL
LEUKOCYTE ESTERASE URINE, POC: NORMAL
NITRITE, POC UA: NORMAL
PH, POC UA: 5
PROTEIN, POC: NORMAL
SPECIFIC GRAVITY, POC UA: 1.02
UROBILINOGEN, POC UA: NORMAL

## 2018-01-25 PROCEDURE — 99499 UNLISTED E&M SERVICE: CPT | Mod: S$GLB,,, | Performed by: UROLOGY

## 2018-01-25 PROCEDURE — 99214 OFFICE O/P EST MOD 30 MIN: CPT | Mod: 25,S$GLB,, | Performed by: UROLOGY

## 2018-01-25 PROCEDURE — 99999 PR PBB SHADOW E&M-EST. PATIENT-LVL II: CPT | Mod: PBBFAC,,, | Performed by: UROLOGY

## 2018-01-25 PROCEDURE — 81002 URINALYSIS NONAUTO W/O SCOPE: CPT | Mod: S$GLB,,, | Performed by: UROLOGY

## 2018-01-25 RX ORDER — CIPROFLOXACIN 500 MG/1
500 TABLET ORAL EVERY 12 HOURS
Qty: 3 TABLET | Refills: 0 | Status: SHIPPED | OUTPATIENT
Start: 2018-01-25 | End: 2018-02-05

## 2018-01-25 NOTE — PROGRESS NOTES
Chief Complaint: T1c Prostate Cancer    HPI:   1/25/18: Reviewed all the reccs and pt concerns again.  He does not want to go to the OR for the SpaceOAR procedure.  Would rather just have office marker placement and no SpaceOAR.  We discussed that it might be possible to do it without anesthesia but that discomfort may demand it.  Fully reviewed indications for many combinations of therapy.  1/4/17: Had his MRI in [place], and has no extracapsular extension.  Reviewed options in detail.  Wary of RALP.  Considering XRT or brachy.  Long discussion.  11/14/17: Biopsy shows Gl 3+3=6 in 8 cores mainly on the left side in 30-50% of the samples.  In the right apex it was 3+4=7 so there is a bit of Gl4 present.    10/30/17: TRUS/Bx today 27 gm  9/6/17: 70 yo man referred for elevated PSA and review of T labs.  No abd/pelvic pain and no exac/rel factors.  No hematuria.  No urolithiasis.  No urinary bother except nocturia x2 sometimes.  No  history.  Normal sexual function.  T was checked out of curiosity no specific symptoms.  He used a T gel 10-20 years ago stopped and didn't feel it did anything.  Libido is normal he says.    Allergies:  Paragoric    Medications:  has a current medication list which includes the following prescription(s): amlodipine, aspirin, atorvastatin, cyanocobalamin, fenofibrate, fish oil-omega-3 fatty acids, metoprolol tartrate, turmeric, and vitamin d.    Review of Systems:  General: No fever, chills, fatigability, or weight loss.  Skin: No rashes, itching, or changes in color or texture of skin.  Chest: Denies ALVARADO, cyanosis, wheezing, cough, and sputum production.  Abdomen: Appetite fine. No weight loss. Denies diarrhea, abdominal pain, hematemesis, or blood in stool.  Musculoskeletal: No joint stiffness or swelling. Denies back pain.  : As above.  All other review of systems negative.    PMH:   has a past medical history of Acute coronary syndrome; Cancer of prostate with intermediate  recurrence risk (stage T2b-c or Jonathon 7 or PSA 10-20) (1/8/2018); CKD (chronic kidney disease) stage 3, GFR 30-59 ml/min (9/24/2015); Coronary artery disease; History of coronary angioplasty (9/19/2014); Hyperlipidemia; Hypertension; Myocardial infarction (2008); and Tobacco dependence.    PSH:   has a past surgical history that includes Coronary stent placement (2008); Cardiac catheterization (2008); and Coronary angioplasty (2008).    FamHx: family history includes Cancer in his brother; Heart disease in his brother and father.    SocHx:  reports that he quit smoking about 9 years ago. He has a 40.00 pack-year smoking history. He has never used smokeless tobacco. He reports that he drinks about 1.8 oz of alcohol per week . He reports that he does not use drugs.      Physical Exam:  There were no vitals filed for this visit.  General: A&Ox3, no apparent distress, no deformities  Neck: No masses, normal thyroid  Lungs: normal inspiration, no use of accessory muscles  Heart: normal pulse, no arrhythmias  Abdomen: Soft, NT, ND  Skin: The skin is warm and dry. No jaundice.  Ext: No c/c/e.  :   11/14/17: Test desc arturo, no abnormalities of epididymus. Penis normal, with normal penile and scrotal skin. Meatus normal. Normal rectal tone, no hemorrhoids. Prost 10 gm no nodules or masses appreciated. SV not palpable. Perineum and anus normal.    Labs/Studies:   Urinalysis performed in clinic, summary: UA normal exc tr blood  PSA    12/16: 6.3    6/17: 6.7    Impression/Plan:   1. Reviewed results.  Discussed options again.  Marker placement and refer back to Dr. Flores.  Cipro rx.

## 2018-02-01 ENCOUNTER — PES CALL (OUTPATIENT)
Dept: ADMINISTRATIVE | Facility: CLINIC | Age: 73
End: 2018-02-01

## 2018-02-01 ENCOUNTER — TELEPHONE (OUTPATIENT)
Dept: RADIATION ONCOLOGY | Facility: CLINIC | Age: 73
End: 2018-02-01

## 2018-02-01 DIAGNOSIS — I10 HYPERTENSION GOAL BP (BLOOD PRESSURE) < 140/90: Chronic | ICD-10-CM

## 2018-02-01 RX ORDER — AMLODIPINE BESYLATE 5 MG/1
TABLET ORAL
Qty: 90 TABLET | Refills: 2 | Status: SHIPPED | OUTPATIENT
Start: 2018-02-01 | End: 2018-10-22 | Stop reason: SDUPTHER

## 2018-02-01 NOTE — TELEPHONE ENCOUNTER
Pt/wife had question re: procedures on 3/1.  Explained process of prostate simulation prior to radiation to which they both verbalized understanding.  Had further financial questions for which I referred to Shanell.  Instructed to call with further questions until then.     ----- Message from Beverly Beard MA sent at 2/1/2018  1:58 PM CST -----  Contact: pt       ----- Message -----  From: Sumaya Tobin  Sent: 2/1/2018   1:35 PM  To: Sandra HERNANDEZ Jr Staff    Call pt regarding questions about upcoming appt.    [phone] (home)

## 2018-02-05 ENCOUNTER — OFFICE VISIT (OUTPATIENT)
Dept: INTERNAL MEDICINE | Facility: CLINIC | Age: 73
End: 2018-02-05
Payer: MEDICARE

## 2018-02-05 VITALS
OXYGEN SATURATION: 95 % | HEIGHT: 69 IN | DIASTOLIC BLOOD PRESSURE: 82 MMHG | BODY MASS INDEX: 35.75 KG/M2 | HEART RATE: 61 BPM | SYSTOLIC BLOOD PRESSURE: 136 MMHG | WEIGHT: 241.38 LBS

## 2018-02-05 DIAGNOSIS — E78.5 HYPERLIPIDEMIA, UNSPECIFIED HYPERLIPIDEMIA TYPE: ICD-10-CM

## 2018-02-05 DIAGNOSIS — I25.10 CORONARY ARTERY DISEASE INVOLVING NATIVE CORONARY ARTERY OF NATIVE HEART WITHOUT ANGINA PECTORIS: Chronic | ICD-10-CM

## 2018-02-05 DIAGNOSIS — N18.30 CKD (CHRONIC KIDNEY DISEASE) STAGE 3, GFR 30-59 ML/MIN: Chronic | ICD-10-CM

## 2018-02-05 DIAGNOSIS — I10 ESSENTIAL HYPERTENSION: ICD-10-CM

## 2018-02-05 DIAGNOSIS — Z00.00 ENCOUNTER FOR PREVENTIVE HEALTH EXAMINATION: Primary | ICD-10-CM

## 2018-02-05 DIAGNOSIS — C61 PROSTATE CANCER: ICD-10-CM

## 2018-02-05 DIAGNOSIS — E66.01 SEVERE OBESITY (BMI 35.0-39.9): ICD-10-CM

## 2018-02-05 PROCEDURE — G0439 PPPS, SUBSEQ VISIT: HCPCS | Mod: S$GLB,,, | Performed by: NURSE PRACTITIONER

## 2018-02-05 PROCEDURE — 99999 PR PBB SHADOW E&M-EST. PATIENT-LVL IV: CPT | Mod: PBBFAC,,, | Performed by: NURSE PRACTITIONER

## 2018-02-05 PROCEDURE — 99499 UNLISTED E&M SERVICE: CPT | Mod: S$GLB,,, | Performed by: NURSE PRACTITIONER

## 2018-02-05 NOTE — PROGRESS NOTES
"Filipe Agustin presented for a  Medicare AWV and comprehensive Health Risk Assessment today. The following components were reviewed and updated:    · Medical history  · Family History  · Social history  · Allergies and Current Medications  · Health Risk Assessment  · Health Maintenance  · Care Team     ** See Completed Assessments for Annual Wellness Visit within the encounter summary.**       The following assessments were completed:  · Living Situation  · CAGE  · Depression Screening  · Timed Get Up and Go  · Whisper Test  · Cognitive Function Screening  · Nutrition Screening  · ADL Screening  · PAQ Screening    Vitals:    02/05/18 1319   BP: 136/82   BP Location: Left arm   Patient Position: Sitting   BP Method: Medium (Manual)   Pulse: 61   SpO2: 95%   Weight: 109.5 kg (241 lb 6.5 oz)   Height: 5' 9" (1.753 m)     Body mass index is 35.65 kg/m².  Physical Exam   Constitutional: He is oriented to person, place, and time. He appears well-developed and well-nourished.   HENT:   Head: Normocephalic.   Cardiovascular: Normal rate, regular rhythm and normal heart sounds.    No murmur heard.  Pulmonary/Chest: Effort normal. No respiratory distress. He has decreased breath sounds. He has rales in the right lower field and the left lower field.   Abdominal: Soft. He exhibits no mass. There is no tenderness.   Musculoskeletal: Normal range of motion. He exhibits no edema.   Neurological: He is alert and oriented to person, place, and time. He exhibits normal muscle tone.   Skin: Skin is warm, dry and intact.   Psychiatric: He has a normal mood and affect. His speech is normal and behavior is normal.   Nursing note and vitals reviewed.        Diagnoses and health risks identified today and associated recommendations/orders:    1. Encounter for preventive health examination    2. Prostate cancer  Diagnosed in 2017.   He will start external beam radiation 3/1/2018  Continue current treatment plan as previously prescribed with " your urologist, Dr. Laird, and radiation oncologist, Dr. Flores.    3. Coronary artery disease involving native coronary artery of native heart without angina pectoris  Per patient Old MI 2008; S/P stent x1  Denies chest pains.   Stable. Continue current treatment plan as previously prescribed with your PCP and cardiologist, Dr. Camarillo.     4. Essential hypertension  Stable. Continue current treatment plan as previously prescribed with your PCP and cardiologist.     5. Hyperlipidemia, unspecified hyperlipidemia type  Stable. Continue current treatment plan as previously prescribed with your PCP and cardiologist.     6. CKD (chronic kidney disease) stage 3, GFR 30-59 ml/min  Discussed diagnosis. Advised to avoid NSAIDs.   Stable. Continue current treatment plan as previously prescribed with your PCP.     7. Severe obesity (BMI 35.0-39.9)  Encouraged healthy diet and exercise as tolerated to help bring BMI into normal range.   Continue current treatment plan as previously prescribed with your PCP.     8. Abnormal breath sounds noted on PE. Denies SOB, cough, wheeze, fever, or confusion. Advised to follow up with PCP for further evaluation. He expressed understanding.      Education, counseling, and referrals were provided as needed. After Visit Summary printed and given to patient which includes a list of additional screenings\tests needed.    Follow-up in about 1 year (around 2/5/2019) for HRA.    Mouna Chavarria NP

## 2018-02-05 NOTE — Clinical Note
Your patient was seen today for a HRA visit. Abnormalities (BOLDED) have been identified during this visit that may require additional testing and  follow up. I have included a copy of my visit note, please review the note and feel free to contact me with any questions.  Thank you for allowing me to participate in the care of your patients.  Mouna Chavarria NP

## 2018-02-07 ENCOUNTER — PATIENT MESSAGE (OUTPATIENT)
Dept: INTERNAL MEDICINE | Facility: CLINIC | Age: 73
End: 2018-02-07

## 2018-02-08 ENCOUNTER — TELEPHONE (OUTPATIENT)
Dept: INTERNAL MEDICINE | Facility: CLINIC | Age: 73
End: 2018-02-08

## 2018-02-08 DIAGNOSIS — I10 HYPERTENSION GOAL BP (BLOOD PRESSURE) < 140/90: Primary | ICD-10-CM

## 2018-02-13 ENCOUNTER — PATIENT OUTREACH (OUTPATIENT)
Dept: ADMINISTRATIVE | Facility: HOSPITAL | Age: 73
End: 2018-02-13

## 2018-02-16 ENCOUNTER — OFFICE VISIT (OUTPATIENT)
Dept: OPHTHALMOLOGY | Facility: CLINIC | Age: 73
End: 2018-02-16
Payer: MEDICARE

## 2018-02-16 DIAGNOSIS — H52.4 PRESBYOPIA: ICD-10-CM

## 2018-02-16 DIAGNOSIS — H25.013 CORTICAL AGE-RELATED CATARACT OF BOTH EYES: ICD-10-CM

## 2018-02-16 DIAGNOSIS — H25.13 NUCLEAR SCLEROSIS, BILATERAL: Primary | ICD-10-CM

## 2018-02-16 PROCEDURE — 92004 COMPRE OPH EXAM NEW PT 1/>: CPT | Mod: S$GLB,,, | Performed by: OPTOMETRIST

## 2018-02-16 PROCEDURE — 99999 PR PBB SHADOW E&M-EST. PATIENT-LVL I: CPT | Mod: PBBFAC,,, | Performed by: OPTOMETRIST

## 2018-02-16 PROCEDURE — 92015 DETERMINE REFRACTIVE STATE: CPT | Mod: S$GLB,,, | Performed by: OPTOMETRIST

## 2018-02-16 NOTE — PROGRESS NOTES
HPI     Pts last exam was 2/9/15 with DNL. PT c/o overall blurred va and wears   gls full time.   HPI    Any vision changes since last exam: no  Eye pain: no  Other ocular symptoms: no    Do you wear currently wear glasses or contacts? gls     Interested in contacts today? no    Do you plan on getting new glasses today? If needed      Last edited by Connie Mason MA on 2/16/2018  1:28 PM. (History)            Assessment /Plan     For exam results, see Encounter Report.    Nuclear sclerosis, bilateral  Cortical age-related cataract of both eyes  Surgery is not indicated at this time. Monitor 12 months.    Presbyopia  Eyeglass Final Rx     Eyeglass Final Rx       Sphere Cylinder Axis Dist VA Add    Right -0.25 +0.25 065 20/20-2 +2.50    Left +0.25 +1.00 080 20/25-2 +2.50    Expiration Date:  2/17/2019              RTC 1 yr for dilated eye exam or PRN if any problems.   Discussed above and answered questions.

## 2018-02-19 ENCOUNTER — LAB VISIT (OUTPATIENT)
Dept: LAB | Facility: HOSPITAL | Age: 73
End: 2018-02-19
Payer: MEDICARE

## 2018-02-19 DIAGNOSIS — I10 HYPERTENSION GOAL BP (BLOOD PRESSURE) < 140/90: ICD-10-CM

## 2018-02-19 LAB
ALBUMIN SERPL BCP-MCNC: 3.5 G/DL
ALP SERPL-CCNC: 29 U/L
ALT SERPL W/O P-5'-P-CCNC: 38 U/L
ANION GAP SERPL CALC-SCNC: 10 MMOL/L
AST SERPL-CCNC: 28 U/L
BILIRUB SERPL-MCNC: 0.6 MG/DL
BUN SERPL-MCNC: 11 MG/DL
CALCIUM SERPL-MCNC: 9 MG/DL
CHLORIDE SERPL-SCNC: 105 MMOL/L
CHOLEST SERPL-MCNC: 137 MG/DL
CHOLEST/HDLC SERPL: 2.8 {RATIO}
CO2 SERPL-SCNC: 26 MMOL/L
CREAT SERPL-MCNC: 1.3 MG/DL
EST. GFR  (AFRICAN AMERICAN): >60 ML/MIN/1.73 M^2
EST. GFR  (NON AFRICAN AMERICAN): 54.9 ML/MIN/1.73 M^2
GLUCOSE SERPL-MCNC: 92 MG/DL
HDLC SERPL-MCNC: 49 MG/DL
HDLC SERPL: 35.8 %
LDLC SERPL CALC-MCNC: 69.8 MG/DL
NONHDLC SERPL-MCNC: 88 MG/DL
POTASSIUM SERPL-SCNC: 4.4 MMOL/L
PROT SERPL-MCNC: 7.2 G/DL
SODIUM SERPL-SCNC: 141 MMOL/L
TRIGL SERPL-MCNC: 91 MG/DL

## 2018-02-19 PROCEDURE — 36415 COLL VENOUS BLD VENIPUNCTURE: CPT

## 2018-02-19 PROCEDURE — 80053 COMPREHEN METABOLIC PANEL: CPT

## 2018-02-19 PROCEDURE — 80061 LIPID PANEL: CPT

## 2018-02-26 ENCOUNTER — OFFICE VISIT (OUTPATIENT)
Dept: INTERNAL MEDICINE | Facility: CLINIC | Age: 73
End: 2018-02-26
Payer: MEDICARE

## 2018-02-26 VITALS
HEART RATE: 60 BPM | WEIGHT: 242.31 LBS | BODY MASS INDEX: 35.89 KG/M2 | HEIGHT: 69 IN | DIASTOLIC BLOOD PRESSURE: 84 MMHG | OXYGEN SATURATION: 95 % | TEMPERATURE: 97 F | SYSTOLIC BLOOD PRESSURE: 128 MMHG

## 2018-02-26 DIAGNOSIS — I25.10 CORONARY ARTERY DISEASE INVOLVING NATIVE CORONARY ARTERY OF NATIVE HEART WITHOUT ANGINA PECTORIS: Chronic | ICD-10-CM

## 2018-02-26 DIAGNOSIS — C61 PROSTATE CANCER: ICD-10-CM

## 2018-02-26 DIAGNOSIS — Z23 IMMUNIZATION DUE: ICD-10-CM

## 2018-02-26 DIAGNOSIS — N18.30 CKD (CHRONIC KIDNEY DISEASE) STAGE 3, GFR 30-59 ML/MIN: Chronic | ICD-10-CM

## 2018-02-26 DIAGNOSIS — E66.01 SEVERE OBESITY (BMI 35.0-35.9 WITH COMORBIDITY): ICD-10-CM

## 2018-02-26 DIAGNOSIS — E78.5 HYPERLIPIDEMIA LDL GOAL <70: Chronic | ICD-10-CM

## 2018-02-26 DIAGNOSIS — I10 HYPERTENSION GOAL BP (BLOOD PRESSURE) < 140/90: Primary | Chronic | ICD-10-CM

## 2018-02-26 PROCEDURE — 1126F AMNT PAIN NOTED NONE PRSNT: CPT | Mod: S$GLB,,, | Performed by: INTERNAL MEDICINE

## 2018-02-26 PROCEDURE — 99214 OFFICE O/P EST MOD 30 MIN: CPT | Mod: S$GLB,,, | Performed by: INTERNAL MEDICINE

## 2018-02-26 PROCEDURE — 1159F MED LIST DOCD IN RCRD: CPT | Mod: S$GLB,,, | Performed by: INTERNAL MEDICINE

## 2018-02-26 PROCEDURE — 3008F BODY MASS INDEX DOCD: CPT | Mod: S$GLB,,, | Performed by: INTERNAL MEDICINE

## 2018-02-26 PROCEDURE — 99499 UNLISTED E&M SERVICE: CPT | Mod: S$GLB,,, | Performed by: INTERNAL MEDICINE

## 2018-02-26 PROCEDURE — 99999 PR PBB SHADOW E&M-EST. PATIENT-LVL III: CPT | Mod: PBBFAC,,, | Performed by: INTERNAL MEDICINE

## 2018-02-26 PROCEDURE — G0009 ADMIN PNEUMOCOCCAL VACCINE: HCPCS | Mod: S$GLB,,, | Performed by: INTERNAL MEDICINE

## 2018-02-26 PROCEDURE — 90732 PPSV23 VACC 2 YRS+ SUBQ/IM: CPT | Mod: S$GLB,,, | Performed by: INTERNAL MEDICINE

## 2018-02-27 NOTE — PROGRESS NOTES
Subjective:       Patient ID: Filipe Agustin Jr. is a 72 y.o. male.    Chief Complaint: Follow-up    Filipe Agustin Jr.  72 y.o. White male    Patient presents with:  Follow-up    HPI: Here today to follow up on chronic conditions.  HTN--stable on amlodipine and metoprolol. He denies symptoms.   CKD III--stable. He denies taking NSAID's. He denies symptoms.   HLD--compliant with atorvastatin.                    LDLCALC                  69.8                02/19/2018            CAD--asymptomatic. He is compliant with aspirin, metoprolol and atorvastatin.   Prostate cancer--he is being managed by urology. He will have XRT soon.     Past Medical History:  Acute coronary syndrome  1/8/2018: Cancer of prostate with intermediate recurrenc*  9/24/2015: CKD (chronic kidney disease) stage 3, GFR 30-5*  Coronary artery disease  6/27/2017: Elevated PSA  9/19/2014: History of coronary angioplasty  Hyperlipidemia  Hypertension  2008: Myocardial infarction  6/6/2014: Obesity, Class II, BMI 35.0-39.9, with comorbi*  Polio      Comment: as a child  Tobacco dependence      Comment: resolved    Current Outpatient Prescriptions on File Prior to Visit:  amLODIPine (NORVASC) 5 MG tablet, TAKE 1 TABLET EVERY DAY, Disp: 90 tablet, Rfl: 2  aspirin (ECOTRIN) 81 MG EC tablet, Take 81 mg by mouth once daily., Disp: , Rfl:   atorvastatin (LIPITOR) 40 MG tablet, TAKE 1 TABLET EVERY DAY, Disp: 90 tablet, Rfl: 2  cyanocobalamin (VITAMIN B-12) 100 MCG tablet, Take 100 mcg by mouth once daily., Disp: , Rfl:   fenofibrate 160 MG Tab, TAKE 1 TABLET EVERY OTHER DAY, Disp: 45 tablet, Rfl: 2  fish oil-omega-3 fatty acids 300-1,000 mg capsule, Take 2 g by mouth once daily., Disp: , Rfl:   metoprolol tartrate (LOPRESSOR) 50 MG tablet, TAKE 1 TABLET TWICE DAILY, Disp: 180 tablet, Rfl: 2  TURMERIC, BULK, MISC, by Misc.(Non-Drug; Combo Route) route., Disp: , Rfl:   vitamin D 1000 units Tab, Take 1,000 Units by mouth once daily., Disp: , Rfl:      Allergies:  Review of patient's allergies indicates:   -- Paragoric -- Other (See Comments)    --  sneeze            Review of Systems   Constitutional: Negative for fever and unexpected weight change.   Respiratory: Negative for cough and shortness of breath.    Cardiovascular: Negative for chest pain and leg swelling.   Gastrointestinal: Negative for abdominal pain.   Genitourinary: Negative for decreased urine volume.   Musculoskeletal: Negative for gait problem.   Neurological: Negative for dizziness and headaches.       Objective:      Physical Exam   Constitutional: He is oriented to person, place, and time. He appears well-developed and well-nourished. No distress.   Eyes: No scleral icterus.   Cardiovascular: Normal rate, regular rhythm and normal heart sounds.    Pulmonary/Chest: Effort normal and breath sounds normal. No respiratory distress.   Abdominal: Soft. Bowel sounds are normal.   Neurological: He is alert and oriented to person, place, and time.   Skin: Skin is warm and dry.   Psychiatric: He has a normal mood and affect.   Vitals reviewed.      Assessment:       1. Hypertension goal BP (blood pressure) < 140/90    2. CKD (chronic kidney disease) stage 3, GFR 30-59 ml/min    3. Hyperlipidemia LDL goal <70    4. Coronary artery disease involving native coronary artery of native heart without angina pectoris    5. Prostate cancer    6. Severe obesity (BMI 35.0-35.9 with comorbidity)    7. Immunization due        Plan:       Filipe was seen today for follow-up.    Diagnoses and all orders for this visit:    Hypertension goal BP (blood pressure) < 140/90  -     Continue current management    CKD (chronic kidney disease) stage 3, GFR 30-59 ml/min  -     Advised to avoid NSAID's  -     Monitor    Hyperlipidemia LDL goal <70  -     Continue current management    Coronary artery disease involving native coronary artery of native heart without angina pectoris  -     Continue current  management    Prostate cancer  -     Management per urology    Severe obesity (BMI 35.0-35.9 with comorbidity)  -     Lifestyle modifications discussed    Immunization due  -     (In Office Administered) Pneumococcal Polysaccharide Vaccine (23 Valent) (SQ/IM)    Recommend colonoscopy.     Labs and F/U in 6 months and as needed.

## 2018-03-01 ENCOUNTER — OFFICE VISIT (OUTPATIENT)
Dept: UROLOGY | Facility: CLINIC | Age: 73
End: 2018-03-01
Payer: MEDICARE

## 2018-03-01 ENCOUNTER — HOSPITAL ENCOUNTER (OUTPATIENT)
Dept: RADIOLOGY | Facility: HOSPITAL | Age: 73
Discharge: HOME | End: 2018-03-01
Attending: RADIOLOGY
Payer: MEDICARE

## 2018-03-01 ENCOUNTER — TELEPHONE (OUTPATIENT)
Dept: RADIATION ONCOLOGY | Facility: CLINIC | Age: 73
End: 2018-03-01

## 2018-03-01 ENCOUNTER — HOSPITAL ENCOUNTER (OUTPATIENT)
Dept: RADIATION THERAPY | Facility: HOSPITAL | Age: 73
Discharge: HOME | End: 2018-03-01
Attending: RADIOLOGY
Payer: MEDICARE

## 2018-03-01 VITALS
HEART RATE: 68 BPM | WEIGHT: 235.88 LBS | HEIGHT: 69 IN | BODY MASS INDEX: 34.94 KG/M2 | DIASTOLIC BLOOD PRESSURE: 88 MMHG | SYSTOLIC BLOOD PRESSURE: 152 MMHG

## 2018-03-01 DIAGNOSIS — C61 PROSTATE CANCER: Primary | ICD-10-CM

## 2018-03-01 PROCEDURE — A4648 IMPLANTABLE TISSUE MARKER: HCPCS | Mod: S$GLB,,, | Performed by: UROLOGY

## 2018-03-01 PROCEDURE — 99999 PR PBB SHADOW E&M-EST. PATIENT-LVL II: CPT | Mod: PBBFAC,,, | Performed by: UROLOGY

## 2018-03-01 PROCEDURE — 77334 RADIATION TREATMENT AID(S): CPT | Mod: TC | Performed by: RADIOLOGY

## 2018-03-01 PROCEDURE — 55876 PLACE RT DEVICE/MARKER PROS: CPT | Mod: S$GLB,,, | Performed by: UROLOGY

## 2018-03-01 PROCEDURE — 76942 ECHO GUIDE FOR BIOPSY: CPT | Mod: 59,S$GLB,, | Performed by: UROLOGY

## 2018-03-01 PROCEDURE — 77290 THER RAD SIMULAJ FIELD CPLX: CPT | Mod: 26,,, | Performed by: RADIOLOGY

## 2018-03-01 PROCEDURE — 77334 RADIATION TREATMENT AID(S): CPT | Mod: 26,,, | Performed by: RADIOLOGY

## 2018-03-01 PROCEDURE — 77290 THER RAD SIMULAJ FIELD CPLX: CPT | Mod: TC | Performed by: RADIOLOGY

## 2018-03-01 PROCEDURE — 77014 HC CT GUIDANCE RADIATION THERAPY FLDS PLACEMENT: CPT | Mod: TC | Performed by: RADIOLOGY

## 2018-03-01 PROCEDURE — 77263 THER RADIOLOGY TX PLNG CPLX: CPT | Mod: ,,, | Performed by: RADIOLOGY

## 2018-03-01 PROCEDURE — 99499 UNLISTED E&M SERVICE: CPT | Mod: S$GLB,,, | Performed by: UROLOGY

## 2018-03-01 PROCEDURE — 76872 US TRANSRECTAL: CPT | Mod: S$GLB,,, | Performed by: UROLOGY

## 2018-03-01 NOTE — TELEPHONE ENCOUNTER
----- Message from Maribel Solis sent at 3/1/2018  9:00 AM CST -----  Contact: Pt  Pt calling in regards to he received a message stating that his appt for today was cancelled and he would like to know why.    Pt needs a call back as soon as possible due to he has another appt scheduled for today  Pt can be reached at [phone]  Return call to patient  appt verified

## 2018-03-06 PROCEDURE — 77301 RADIOTHERAPY DOSE PLAN IMRT: CPT | Mod: TC | Performed by: RADIOLOGY

## 2018-03-06 PROCEDURE — 77301 RADIOTHERAPY DOSE PLAN IMRT: CPT | Mod: 26,,, | Performed by: RADIOLOGY

## 2018-03-07 PROCEDURE — 77338 DESIGN MLC DEVICE FOR IMRT: CPT | Mod: TC | Performed by: RADIOLOGY

## 2018-03-07 PROCEDURE — 77338 DESIGN MLC DEVICE FOR IMRT: CPT | Mod: 26,,, | Performed by: RADIOLOGY

## 2018-03-07 PROCEDURE — 77300 RADIATION THERAPY DOSE PLAN: CPT | Mod: 26,,, | Performed by: RADIOLOGY

## 2018-03-07 PROCEDURE — 77300 RADIATION THERAPY DOSE PLAN: CPT | Mod: TC | Performed by: RADIOLOGY

## 2018-03-08 ENCOUNTER — DOCUMENTATION ONLY (OUTPATIENT)
Dept: RADIATION ONCOLOGY | Facility: CLINIC | Age: 73
End: 2018-03-08

## 2018-03-08 PROCEDURE — 77385 HC IMRT, SIMPLE: CPT | Performed by: RADIOLOGY

## 2018-03-08 PROCEDURE — 77014 HC CT GUIDANCE RADIATION THERAPY FLDS PLACEMENT: CPT | Mod: TC | Performed by: RADIOLOGY

## 2018-03-09 PROCEDURE — 77014 HC CT GUIDANCE RADIATION THERAPY FLDS PLACEMENT: CPT | Mod: TC | Performed by: RADIOLOGY

## 2018-03-09 PROCEDURE — 77385 HC IMRT, SIMPLE: CPT | Performed by: RADIOLOGY

## 2018-03-09 NOTE — PLAN OF CARE
Problem: Patient Care Overview  Goal: Plan of Care Review  Outcome: Ongoing (interventions implemented as appropriate)  Day 1 XRT to prostate. Pelvis handout given. Skin care and side effects reviewed. Contact phone number given.

## 2018-03-12 ENCOUNTER — SOCIAL WORK (OUTPATIENT)
Dept: HEMATOLOGY/ONCOLOGY | Facility: CLINIC | Age: 73
End: 2018-03-12

## 2018-03-12 PROCEDURE — 77385 HC IMRT, SIMPLE: CPT | Performed by: RADIOLOGY

## 2018-03-12 PROCEDURE — 77014 HC CT GUIDANCE RADIATION THERAPY FLDS PLACEMENT: CPT | Mod: TC | Performed by: RADIOLOGY

## 2018-03-12 NOTE — PROGRESS NOTES
SW met with pt in Rad/Onc after his treatment today. Pt reported feeling good and said he has been blessed. SW provided pt the checklist of potential needs/services/resources and her contact info. Pt indicated no needs at this time. SW will f/u as requested.

## 2018-03-13 ENCOUNTER — OFFICE VISIT (OUTPATIENT)
Dept: CARDIOLOGY | Facility: CLINIC | Age: 73
End: 2018-03-13
Payer: MEDICARE

## 2018-03-13 VITALS
SYSTOLIC BLOOD PRESSURE: 130 MMHG | BODY MASS INDEX: 35.07 KG/M2 | HEIGHT: 69 IN | WEIGHT: 236.75 LBS | HEART RATE: 64 BPM | DIASTOLIC BLOOD PRESSURE: 70 MMHG

## 2018-03-13 DIAGNOSIS — I10 HYPERTENSION GOAL BP (BLOOD PRESSURE) < 140/90: Chronic | ICD-10-CM

## 2018-03-13 DIAGNOSIS — I25.10 CORONARY ARTERY DISEASE INVOLVING NATIVE CORONARY ARTERY OF NATIVE HEART WITHOUT ANGINA PECTORIS: Primary | Chronic | ICD-10-CM

## 2018-03-13 DIAGNOSIS — E78.5 HYPERLIPIDEMIA LDL GOAL <70: Chronic | ICD-10-CM

## 2018-03-13 PROCEDURE — 77385 HC IMRT, SIMPLE: CPT | Performed by: RADIOLOGY

## 2018-03-13 PROCEDURE — 99499 UNLISTED E&M SERVICE: CPT | Mod: S$GLB,,, | Performed by: NUCLEAR MEDICINE

## 2018-03-13 PROCEDURE — 77014 HC CT GUIDANCE RADIATION THERAPY FLDS PLACEMENT: CPT | Mod: TC | Performed by: RADIOLOGY

## 2018-03-13 PROCEDURE — 99999 PR PBB SHADOW E&M-EST. PATIENT-LVL III: CPT | Mod: PBBFAC,,, | Performed by: NUCLEAR MEDICINE

## 2018-03-13 PROCEDURE — 3078F DIAST BP <80 MM HG: CPT | Mod: CPTII,S$GLB,, | Performed by: NUCLEAR MEDICINE

## 2018-03-13 PROCEDURE — 3075F SYST BP GE 130 - 139MM HG: CPT | Mod: CPTII,S$GLB,, | Performed by: NUCLEAR MEDICINE

## 2018-03-13 PROCEDURE — 99214 OFFICE O/P EST MOD 30 MIN: CPT | Mod: S$GLB,,, | Performed by: NUCLEAR MEDICINE

## 2018-03-13 NOTE — PROGRESS NOTES
Subjective:   Patient ID:  Filipe Agustin Jr. is a 72 y.o. male who presents for follow-up of Coronary Artery Disease; Hypertension; and Hyperlipidemia      HPI 1- CHRONIC CAD  2- ESSENTIAL HTN 3- DSYLIPIDEMIA  DOING WELL. NO RECURRENT ANGINA. NO UNUSUAL ALVARADO. NO ORTHOPNEA  OR PND  NO PALPITATIONS. NO NEAR SYNCOPE OR SYNCOPE  NO EDEMA. NO INTERMITTENT CLAUDICATION  NO ABDOMINAL PAIN  NO FOCAL CNS SYMPTOMS OR SIGNS TO SUGGEST TIA OR STROKE  CARD MED GOOD COMPLIANCE    Review of Systems   Constitution: Negative for chills, fever, weakness, night sweats, weight gain and weight loss.   HENT: Negative for nosebleeds.    Eyes: Negative for blurred vision, double vision and visual disturbance.   Cardiovascular: Negative for chest pain, dyspnea on exertion, irregular heartbeat, leg swelling, orthopnea, palpitations, paroxysmal nocturnal dyspnea and syncope.   Respiratory: Negative for cough, hemoptysis and wheezing.    Endocrine: Negative for polydipsia and polyuria.   Hematologic/Lymphatic: Does not bruise/bleed easily.   Skin: Negative for rash.   Musculoskeletal: Negative for joint pain, joint swelling, muscle weakness and myalgias.   Gastrointestinal: Negative for abdominal pain, hematemesis, jaundice and melena.   Genitourinary: Negative for dysuria, hematuria and nocturia.   Neurological: Negative for dizziness, focal weakness, headaches and sensory change.   Psychiatric/Behavioral: Negative for depression. The patient does not have insomnia and is not nervous/anxious.      Family History   Problem Relation Age of Onset    Heart disease Father      MI    Cancer Brother     Heart disease Brother     Diabetes Neg Hx     Kidney disease Neg Hx     Stroke Neg Hx      Past Medical History:   Diagnosis Date    Acute coronary syndrome     Cancer of prostate with intermediate recurrence risk (stage T2b-c or [place] 7 or PSA 10-20) 1/8/2018    CKD (chronic kidney disease) stage 3, GFR 30-59 ml/min 9/24/2015    Coronary  artery disease     Elevated PSA 6/27/2017    History of coronary angioplasty 9/19/2014    Hyperlipidemia     Hypertension     Myocardial infarction 2008    Obesity, Class II, BMI 35.0-39.9, with comorbidity (see actual BMI) 6/6/2014    Polio     as a child    Tobacco dependence     resolved     Current Outpatient Prescriptions on File Prior to Visit   Medication Sig Dispense Refill    amLODIPine (NORVASC) 5 MG tablet TAKE 1 TABLET EVERY DAY 90 tablet 2    aspirin (ECOTRIN) 81 MG EC tablet Take 81 mg by mouth once daily.      atorvastatin (LIPITOR) 40 MG tablet TAKE 1 TABLET EVERY DAY 90 tablet 2    cyanocobalamin (VITAMIN B-12) 100 MCG tablet Take 100 mcg by mouth once daily.      fenofibrate 160 MG Tab TAKE 1 TABLET EVERY OTHER DAY 45 tablet 2    fish oil-omega-3 fatty acids 300-1,000 mg capsule Take 2 g by mouth once daily.      metoprolol tartrate (LOPRESSOR) 50 MG tablet TAKE 1 TABLET TWICE DAILY 180 tablet 2    TURMERIC, BULK, MISC Take 1 capsule by mouth once daily.       vitamin D 1000 units Tab Take 2,000 Units by mouth once daily.        No current facility-administered medications on file prior to visit.      Review of patient's allergies indicates:   Allergen Reactions    Paragoric Other (See Comments)     sneeze       Objective:     Physical Exam   Constitutional: He is oriented to person, place, and time. He appears well-developed. No distress.   HENT:   Head: Normocephalic.   Eyes: Conjunctivae are normal. Pupils are equal, round, and reactive to light.   Neck: Neck supple. No JVD present. No thyromegaly present.   Cardiovascular: Normal rate, regular rhythm, normal heart sounds and intact distal pulses.  Exam reveals no gallop and no friction rub.    No murmur heard.  Pulses:       Carotid pulses are 2+ on the right side, and 2+ on the left side.       Radial pulses are 2+ on the right side, and 2+ on the left side.        Femoral pulses are 2+ on the right side, and 2+ on the left  side.       Popliteal pulses are 2+ on the right side, and 2+ on the left side.        Dorsalis pedis pulses are 2+ on the right side, and 2+ on the left side.        Posterior tibial pulses are 2+ on the right side, and 2+ on the left side.   Pulmonary/Chest: Breath sounds normal. He has no wheezes. He has no rales. He exhibits no tenderness.   Abdominal: Soft. Bowel sounds are normal. He exhibits no mass. There is no hepatosplenomegaly. There is no tenderness.   Musculoskeletal: He exhibits no edema or tenderness.        Cervical back: Normal.        Thoracic back: Normal.        Lumbar back: Normal.   Lymphadenopathy:     He has no cervical adenopathy.     He has no axillary adenopathy.        Right: No supraclavicular adenopathy present.        Left: No supraclavicular adenopathy present.   Neurological: He is alert and oriented to person, place, and time. He has normal strength. No sensory deficit. Gait normal.   Skin: Skin is warm. No cyanosis. No pallor. Nails show no clubbing.   Psychiatric: He has a normal mood and affect. His speech is normal and behavior is normal. Cognition and memory are normal.       Assessment:     1. Coronary artery disease involving native coronary artery of native heart without angina pectoris    2. Hypertension goal BP (blood pressure) < 140/90    3. Hyperlipidemia LDL goal <70        Plan:     Coronary artery disease involving native coronary artery of native heart without angina pectoris    Hypertension goal BP (blood pressure) < 140/90    Hyperlipidemia LDL goal <70

## 2018-03-14 PROCEDURE — 77014 HC CT GUIDANCE RADIATION THERAPY FLDS PLACEMENT: CPT | Mod: TC | Performed by: RADIOLOGY

## 2018-03-14 PROCEDURE — 77336 RADIATION PHYSICS CONSULT: CPT | Performed by: RADIOLOGY

## 2018-03-14 PROCEDURE — 77385 HC IMRT, SIMPLE: CPT | Performed by: RADIOLOGY

## 2018-03-15 PROCEDURE — 77014 HC CT GUIDANCE RADIATION THERAPY FLDS PLACEMENT: CPT | Mod: TC | Performed by: RADIOLOGY

## 2018-03-15 PROCEDURE — 77385 HC IMRT, SIMPLE: CPT | Performed by: RADIOLOGY

## 2018-03-16 ENCOUNTER — DOCUMENTATION ONLY (OUTPATIENT)
Dept: RADIATION ONCOLOGY | Facility: CLINIC | Age: 73
End: 2018-03-16

## 2018-03-16 PROCEDURE — 77014 HC CT GUIDANCE RADIATION THERAPY FLDS PLACEMENT: CPT | Mod: TC | Performed by: RADIOLOGY

## 2018-03-16 PROCEDURE — 77385 HC IMRT, SIMPLE: CPT | Performed by: RADIOLOGY

## 2018-03-16 NOTE — PLAN OF CARE
Problem: Patient Care Overview  Goal: Plan of Care Review  Outcome: Ongoing (interventions implemented as appropriate)  Day 7 xrt to prostate. Tolerating therapy well. No urinary complaints. Will continue to monitor.

## 2018-03-19 ENCOUNTER — DOCUMENTATION ONLY (OUTPATIENT)
Dept: RADIATION ONCOLOGY | Facility: CLINIC | Age: 73
End: 2018-03-19

## 2018-03-19 PROCEDURE — 77385 HC IMRT, SIMPLE: CPT | Performed by: RADIOLOGY

## 2018-03-19 PROCEDURE — 77014 HC CT GUIDANCE RADIATION THERAPY FLDS PLACEMENT: CPT | Mod: TC | Performed by: RADIOLOGY

## 2018-03-20 PROCEDURE — 77014 HC CT GUIDANCE RADIATION THERAPY FLDS PLACEMENT: CPT | Mod: TC | Performed by: RADIOLOGY

## 2018-03-20 PROCEDURE — 77385 HC IMRT, SIMPLE: CPT | Performed by: RADIOLOGY

## 2018-03-20 NOTE — PLAN OF CARE
Problem: Patient Care Overview  Goal: Plan of Care Review  Outcome: Ongoing (interventions implemented as appropriate)  Day 8 xrt to prostate. c/o increased bowel frequency. Recommend ibuprofen 600mg BID and immodium OTC. Will continue to monitor.

## 2018-03-21 PROCEDURE — 77014 HC CT GUIDANCE RADIATION THERAPY FLDS PLACEMENT: CPT | Mod: TC | Performed by: RADIOLOGY

## 2018-03-21 PROCEDURE — 77385 HC IMRT, SIMPLE: CPT | Performed by: RADIOLOGY

## 2018-03-22 PROCEDURE — 77338 DESIGN MLC DEVICE FOR IMRT: CPT | Mod: 26,,, | Performed by: RADIOLOGY

## 2018-03-22 PROCEDURE — 77300 RADIATION THERAPY DOSE PLAN: CPT | Mod: TC | Performed by: RADIOLOGY

## 2018-03-22 PROCEDURE — 77338 DESIGN MLC DEVICE FOR IMRT: CPT | Mod: TC | Performed by: RADIOLOGY

## 2018-03-22 PROCEDURE — 77300 RADIATION THERAPY DOSE PLAN: CPT | Mod: 26,,, | Performed by: RADIOLOGY

## 2018-03-22 PROCEDURE — 77385 HC IMRT, SIMPLE: CPT | Performed by: RADIOLOGY

## 2018-03-22 PROCEDURE — 77014 HC CT GUIDANCE RADIATION THERAPY FLDS PLACEMENT: CPT | Mod: TC | Performed by: RADIOLOGY

## 2018-03-23 PROCEDURE — 77385 HC IMRT, SIMPLE: CPT | Performed by: RADIOLOGY

## 2018-03-23 PROCEDURE — 77014 HC CT GUIDANCE RADIATION THERAPY FLDS PLACEMENT: CPT | Mod: TC | Performed by: RADIOLOGY

## 2018-03-26 ENCOUNTER — DOCUMENTATION ONLY (OUTPATIENT)
Dept: RADIATION ONCOLOGY | Facility: CLINIC | Age: 73
End: 2018-03-26

## 2018-03-26 PROCEDURE — 77014 HC CT GUIDANCE RADIATION THERAPY FLDS PLACEMENT: CPT | Mod: TC | Performed by: RADIOLOGY

## 2018-03-26 PROCEDURE — 77385 HC IMRT, SIMPLE: CPT | Performed by: RADIOLOGY

## 2018-03-26 NOTE — PLAN OF CARE
Problem: Patient Care Overview  Goal: Plan of Care Review  Outcome: Ongoing (interventions implemented as appropriate)  Day 13 xrt to prostate. Occasional dysuria & hematuria. No other complaints. Will continue to monitor

## 2018-03-27 PROCEDURE — 77385 HC IMRT, SIMPLE: CPT | Performed by: RADIOLOGY

## 2018-03-27 PROCEDURE — 77336 RADIATION PHYSICS CONSULT: CPT | Performed by: RADIOLOGY

## 2018-03-27 PROCEDURE — 77014 HC CT GUIDANCE RADIATION THERAPY FLDS PLACEMENT: CPT | Mod: TC | Performed by: RADIOLOGY

## 2018-03-28 ENCOUNTER — DOCUMENTATION ONLY (OUTPATIENT)
Dept: RADIATION ONCOLOGY | Facility: CLINIC | Age: 73
End: 2018-03-28

## 2018-03-28 PROCEDURE — 77014 HC CT GUIDANCE RADIATION THERAPY FLDS PLACEMENT: CPT | Mod: TC | Performed by: RADIOLOGY

## 2018-03-28 PROCEDURE — 77385 HC IMRT, SIMPLE: CPT | Performed by: RADIOLOGY

## 2018-03-28 NOTE — PLAN OF CARE
Problem: Patient Care Overview  Goal: Plan of Care Review  Outcome: Ongoing (interventions implemented as appropriate)  Day 15 xrt to prostate. C/o pain in testicles, difficulty starting urine, and urinary frequency. Mild dysuria. Resume taking advil. Will continue to monitor.

## 2018-03-29 PROCEDURE — 77014 HC CT GUIDANCE RADIATION THERAPY FLDS PLACEMENT: CPT | Mod: TC | Performed by: RADIOLOGY

## 2018-03-29 PROCEDURE — 77385 HC IMRT, SIMPLE: CPT | Performed by: RADIOLOGY

## 2018-04-02 ENCOUNTER — DOCUMENTATION ONLY (OUTPATIENT)
Dept: RADIATION ONCOLOGY | Facility: CLINIC | Age: 73
End: 2018-04-02

## 2018-04-02 ENCOUNTER — HOSPITAL ENCOUNTER (OUTPATIENT)
Dept: RADIATION THERAPY | Facility: HOSPITAL | Age: 73
Discharge: HOME | End: 2018-04-02
Attending: RADIOLOGY
Payer: MEDICARE

## 2018-04-02 DIAGNOSIS — C61 CANCER OF PROSTATE WITH INTERMEDIATE RECURRENCE RISK (STAGE T2B-C OR GLEASON 7 OR PSA 10-20): Primary | ICD-10-CM

## 2018-04-02 PROCEDURE — 77385 HC IMRT, SIMPLE: CPT | Performed by: RADIOLOGY

## 2018-04-02 PROCEDURE — 77014 HC CT GUIDANCE RADIATION THERAPY FLDS PLACEMENT: CPT | Mod: TC | Performed by: RADIOLOGY

## 2018-04-02 RX ORDER — METHYLPREDNISOLONE 4 MG/1
TABLET ORAL
Qty: 1 PACKAGE | Refills: 1 | Status: SHIPPED | OUTPATIENT
Start: 2018-04-02 | End: 2018-04-23

## 2018-04-02 NOTE — PLAN OF CARE
Problem: Patient Care Overview  Goal: Plan of Care Review  Outcome: Ongoing (interventions implemented as appropriate)  Day 17 xrt prostate. Notes some bowel fullness, no diarrhea. Medrol dose pack. Will continue to monitor.

## 2018-04-03 PROCEDURE — 77014 HC CT GUIDANCE RADIATION THERAPY FLDS PLACEMENT: CPT | Mod: TC | Performed by: RADIOLOGY

## 2018-04-03 PROCEDURE — 77385 HC IMRT, SIMPLE: CPT | Performed by: RADIOLOGY

## 2018-04-04 PROCEDURE — 77385 HC IMRT, SIMPLE: CPT | Performed by: RADIOLOGY

## 2018-04-04 PROCEDURE — 77014 HC CT GUIDANCE RADIATION THERAPY FLDS PLACEMENT: CPT | Mod: TC | Performed by: RADIOLOGY

## 2018-04-05 PROCEDURE — 77014 HC CT GUIDANCE RADIATION THERAPY FLDS PLACEMENT: CPT | Mod: TC | Performed by: RADIOLOGY

## 2018-04-05 PROCEDURE — 77385 HC IMRT, SIMPLE: CPT | Performed by: RADIOLOGY

## 2018-04-06 PROCEDURE — 77014 HC CT GUIDANCE RADIATION THERAPY FLDS PLACEMENT: CPT | Mod: TC | Performed by: RADIOLOGY

## 2018-04-06 PROCEDURE — 77385 HC IMRT, SIMPLE: CPT | Performed by: RADIOLOGY

## 2018-04-09 ENCOUNTER — DOCUMENTATION ONLY (OUTPATIENT)
Dept: RADIATION ONCOLOGY | Facility: CLINIC | Age: 73
End: 2018-04-09

## 2018-04-09 DIAGNOSIS — C61 CANCER OF PROSTATE WITH INTERMEDIATE RECURRENCE RISK (STAGE T2B-C OR GLEASON 7 OR PSA 10-20): Primary | ICD-10-CM

## 2018-04-09 PROCEDURE — 77385 HC IMRT, SIMPLE: CPT | Performed by: RADIOLOGY

## 2018-04-09 PROCEDURE — 77014 HC CT GUIDANCE RADIATION THERAPY FLDS PLACEMENT: CPT | Mod: TC | Performed by: RADIOLOGY

## 2018-04-09 RX ORDER — TAMSULOSIN HYDROCHLORIDE 0.4 MG/1
0.4 CAPSULE ORAL DAILY
Qty: 30 CAPSULE | Refills: 6 | Status: SHIPPED | OUTPATIENT
Start: 2018-04-09 | End: 2018-06-06 | Stop reason: SDUPTHER

## 2018-04-09 NOTE — PLAN OF CARE
Problem: Patient Care Overview  Goal: Plan of Care Review  Outcome: Ongoing (interventions implemented as appropriate)  Day 17 xrt to prostate. Doing well, bowel fullness but no diarrhea. Will start medrol dose pack. Will continue to monitor.

## 2018-04-10 PROCEDURE — 77385 HC IMRT, SIMPLE: CPT | Performed by: RADIOLOGY

## 2018-04-10 PROCEDURE — 77336 RADIATION PHYSICS CONSULT: CPT | Performed by: RADIOLOGY

## 2018-04-10 PROCEDURE — 77014 HC CT GUIDANCE RADIATION THERAPY FLDS PLACEMENT: CPT | Mod: TC | Performed by: RADIOLOGY

## 2018-04-11 PROCEDURE — 77014 HC CT GUIDANCE RADIATION THERAPY FLDS PLACEMENT: CPT | Mod: TC | Performed by: RADIOLOGY

## 2018-04-11 PROCEDURE — 77385 HC IMRT, SIMPLE: CPT | Performed by: RADIOLOGY

## 2018-04-12 PROCEDURE — 77014 HC CT GUIDANCE RADIATION THERAPY FLDS PLACEMENT: CPT | Mod: TC | Performed by: RADIOLOGY

## 2018-04-12 PROCEDURE — 77385 HC IMRT, SIMPLE: CPT | Performed by: RADIOLOGY

## 2018-04-13 PROCEDURE — 77014 HC CT GUIDANCE RADIATION THERAPY FLDS PLACEMENT: CPT | Mod: TC | Performed by: RADIOLOGY

## 2018-04-13 PROCEDURE — 77385 HC IMRT, SIMPLE: CPT | Performed by: RADIOLOGY

## 2018-04-17 PROCEDURE — 77014 HC CT GUIDANCE RADIATION THERAPY FLDS PLACEMENT: CPT | Mod: TC | Performed by: RADIOLOGY

## 2018-04-17 PROCEDURE — 77385 HC IMRT, SIMPLE: CPT | Performed by: RADIOLOGY

## 2018-04-18 ENCOUNTER — DOCUMENTATION ONLY (OUTPATIENT)
Dept: RADIATION ONCOLOGY | Facility: CLINIC | Age: 73
End: 2018-04-18

## 2018-04-18 PROCEDURE — 77385 HC IMRT, SIMPLE: CPT | Performed by: RADIOLOGY

## 2018-04-18 PROCEDURE — 77014 HC CT GUIDANCE RADIATION THERAPY FLDS PLACEMENT: CPT | Mod: TC | Performed by: RADIOLOGY

## 2018-04-18 NOTE — PLAN OF CARE
Problem: Patient Care Overview  Goal: Plan of Care Review  Outcome: Outcome(s) achieved Date Met: 04/18/18  Completed xrt today 4-18-18. Will make f/u appt.

## 2018-04-20 PROCEDURE — 77336 RADIATION PHYSICS CONSULT: CPT | Performed by: RADIOLOGY

## 2018-04-25 ENCOUNTER — TELEPHONE (OUTPATIENT)
Dept: RADIATION ONCOLOGY | Facility: CLINIC | Age: 73
End: 2018-04-25

## 2018-04-25 NOTE — TELEPHONE ENCOUNTER
Made 1 wk f/u call to see how pt was doing after completing radiation treatment. Pt states he is doing fine, no complaints. Informed pt to call if he had any issues or concerns. Pt verbalized understanding.

## 2018-05-31 ENCOUNTER — TELEPHONE (OUTPATIENT)
Dept: RADIATION ONCOLOGY | Facility: CLINIC | Age: 73
End: 2018-05-31

## 2018-05-31 NOTE — TELEPHONE ENCOUNTER
Made call to schedule PSA and f/u with Sandra. Pt stated PSA is already scheduled and he will continue to f/u with Dr Laird in urology. Informed patient to call if he had any questions or concerns. Pt verbalized understanding.

## 2018-06-01 ENCOUNTER — LAB VISIT (OUTPATIENT)
Dept: LAB | Facility: HOSPITAL | Age: 73
End: 2018-06-01
Attending: UROLOGY
Payer: MEDICARE

## 2018-06-01 DIAGNOSIS — C61 PROSTATE CANCER: ICD-10-CM

## 2018-06-01 LAB — COMPLEXED PSA SERPL-MCNC: 2.7 NG/ML

## 2018-06-01 PROCEDURE — 84153 ASSAY OF PSA TOTAL: CPT

## 2018-06-01 PROCEDURE — 36415 COLL VENOUS BLD VENIPUNCTURE: CPT

## 2018-06-06 ENCOUNTER — OFFICE VISIT (OUTPATIENT)
Dept: UROLOGY | Facility: CLINIC | Age: 73
End: 2018-06-06
Payer: MEDICARE

## 2018-06-06 VITALS — BODY MASS INDEX: 34.85 KG/M2 | WEIGHT: 236 LBS | SYSTOLIC BLOOD PRESSURE: 124 MMHG | DIASTOLIC BLOOD PRESSURE: 74 MMHG

## 2018-06-06 DIAGNOSIS — C61 CANCER OF PROSTATE WITH INTERMEDIATE RECURRENCE RISK (STAGE T2B-C OR GLEASON 7 OR PSA 10-20): ICD-10-CM

## 2018-06-06 DIAGNOSIS — C61 PROSTATE CANCER: Primary | ICD-10-CM

## 2018-06-06 DIAGNOSIS — R39.12 WEAK URINARY STREAM: ICD-10-CM

## 2018-06-06 LAB
BILIRUB SERPL-MCNC: NORMAL MG/DL
BLOOD URINE, POC: NORMAL
COLOR, POC UA: YELLOW
GLUCOSE UR QL STRIP: NORMAL
KETONES UR QL STRIP: NORMAL
LEUKOCYTE ESTERASE URINE, POC: NORMAL
NITRITE, POC UA: NORMAL
PH, POC UA: 6
PROTEIN, POC: NORMAL
SPECIFIC GRAVITY, POC UA: 1.02
UROBILINOGEN, POC UA: NORMAL

## 2018-06-06 PROCEDURE — 99214 OFFICE O/P EST MOD 30 MIN: CPT | Mod: 25,S$GLB,, | Performed by: UROLOGY

## 2018-06-06 PROCEDURE — 81002 URINALYSIS NONAUTO W/O SCOPE: CPT | Mod: S$GLB,,, | Performed by: UROLOGY

## 2018-06-06 PROCEDURE — 3074F SYST BP LT 130 MM HG: CPT | Mod: CPTII,S$GLB,, | Performed by: UROLOGY

## 2018-06-06 PROCEDURE — 99499 UNLISTED E&M SERVICE: CPT | Mod: HCNC,S$GLB,, | Performed by: UROLOGY

## 2018-06-06 PROCEDURE — 99999 PR PBB SHADOW E&M-EST. PATIENT-LVL II: CPT | Mod: PBBFAC,,, | Performed by: UROLOGY

## 2018-06-06 PROCEDURE — 3078F DIAST BP <80 MM HG: CPT | Mod: CPTII,S$GLB,, | Performed by: UROLOGY

## 2018-06-06 RX ORDER — TAMSULOSIN HYDROCHLORIDE 0.4 MG/1
0.4 CAPSULE ORAL DAILY
Qty: 30 CAPSULE | Refills: 11 | Status: SHIPPED | OUTPATIENT
Start: 2018-06-06 | End: 2018-10-22 | Stop reason: SDUPTHER

## 2018-06-06 NOTE — PROGRESS NOTES
Chief Complaint: T1c Prostate Cancer    HPI:   6/6/18: XRT complete.  Has been taking flomax and recently quit it.  Missed it when he held it.  Reviewed history in detail.  3/1/18: Fiducials placed today  1/25/18: Reviewed all the reccs and pt concerns again.  He does not want to go to the OR for the SpaceOAR procedure.  Would rather just have office marker placement and no SpaceOAR.  We discussed that it might be possible to do it without anesthesia but that discomfort may demand it.  Fully reviewed indications for many combinations of therapy.  1/4/17: Had his MRI in [place], and has no extracapsular extension.  Reviewed options in detail.  Wary of RALP.  Considering XRT or brachy.  Long discussion.  11/14/17: Biopsy shows Gl 3+3=6 in 8 cores mainly on the left side in 30-50% of the samples.  In the right apex it was 3+4=7 so there is a bit of Gl4 present.    10/30/17: TRUS/Bx today 27 gm  9/6/17: 72 yo man referred for elevated PSA and review of T labs.  No abd/pelvic pain and no exac/rel factors.  No hematuria.  No urolithiasis.  No urinary bother except nocturia x2 sometimes.  No  history.  Normal sexual function.  T was checked out of curiosity no specific symptoms.  He used a T gel 10-20 years ago stopped and didn't feel it did anything.  Libido is normal he says.    Allergies:  Paragoric    Medications:  has a current medication list which includes the following prescription(s): amlodipine, aspirin, atorvastatin, cyanocobalamin, fenofibrate, fish oil-omega-3 fatty acids, metoprolol tartrate, tamsulosin, turmeric, and vitamin d.    Review of Systems:  General: No fever, chills, fatigability, or weight loss.  Skin: No rashes, itching, or changes in color or texture of skin.  Chest: Denies ALVARADO, cyanosis, wheezing, cough, and sputum production.  Abdomen: Appetite fine. No weight loss. Denies diarrhea, abdominal pain, hematemesis, or blood in stool.  Musculoskeletal: No joint stiffness or swelling. Denies  back pain.  : As above.  All other review of systems negative.    PMH:   has a past medical history of Acute coronary syndrome; Cancer of prostate with intermediate recurrence risk (stage T2b-c or [place] 7 or PSA 10-20) (1/8/2018); CKD (chronic kidney disease) stage 3, GFR 30-59 ml/min (9/24/2015); Coronary artery disease; Elevated PSA (6/27/2017); History of coronary angioplasty (9/19/2014); Hyperlipidemia; Hypertension; Myocardial infarction (2008); Obesity, Class II, BMI 35.0-39.9, with comorbidity (see actual BMI) (6/6/2014); Polio; and Tobacco dependence.    PSH:   has a past surgical history that includes Coronary stent placement (2008); Cardiac catheterization (2008); and Coronary angioplasty (2008).    FamHx: family history includes Cancer in his brother; Heart disease in his brother and father.    SocHx:  reports that he quit smoking about 9 years ago. His smoking use included Cigarettes. He has a 40.00 pack-year smoking history. He has never used smokeless tobacco. He reports that he drinks alcohol. He reports that he does not use drugs.      Physical Exam:  Vitals:    06/06/18 1304   BP: 124/74     General: A&Ox3, no apparent distress, no deformities  Neck: No masses, normal thyroid  Lungs: normal inspiration, no use of accessory muscles  Heart: normal pulse, no arrhythmias  Abdomen: Soft, NT, ND  Skin: The skin is warm and dry. No jaundice.  Ext: No c/c/e.  :   11/14/17: Test desc arturo, no abnormalities of epididymus. Penis normal, with normal penile and scrotal skin. Meatus normal. Normal rectal tone, no hemorrhoids. Prost 10 gm no nodules or masses appreciated. SV not palpable. Perineum and anus normal.    Labs/Studies:   Urinalysis performed in clinic, summary: UA normal exc tr blood  PSA    12/16: 6.3    6/17: 6.7    6/18: 2.7    Impression/Plan:   1. PSA/RTC 3 mo.  Refill flomax.

## 2018-08-13 DIAGNOSIS — E78.5 HYPERLIPIDEMIA LDL GOAL <100: ICD-10-CM

## 2018-08-13 RX ORDER — FENOFIBRATE 160 MG/1
TABLET ORAL
Qty: 45 TABLET | Refills: 0 | Status: SHIPPED | OUTPATIENT
Start: 2018-08-13 | End: 2018-10-20 | Stop reason: SDUPTHER

## 2018-08-13 RX ORDER — ATORVASTATIN CALCIUM 40 MG/1
TABLET, FILM COATED ORAL
Qty: 90 TABLET | Refills: 0 | Status: SHIPPED | OUTPATIENT
Start: 2018-08-13 | End: 2018-10-20 | Stop reason: SDUPTHER

## 2018-08-27 ENCOUNTER — LAB VISIT (OUTPATIENT)
Dept: LAB | Facility: HOSPITAL | Age: 73
End: 2018-08-27
Attending: INTERNAL MEDICINE
Payer: MEDICARE

## 2018-08-27 DIAGNOSIS — I10 HYPERTENSION GOAL BP (BLOOD PRESSURE) < 140/90: ICD-10-CM

## 2018-08-27 LAB
ALBUMIN SERPL BCP-MCNC: 3.8 G/DL
ALP SERPL-CCNC: 33 U/L
ALT SERPL W/O P-5'-P-CCNC: 36 U/L
ANION GAP SERPL CALC-SCNC: 8 MMOL/L
AST SERPL-CCNC: 23 U/L
BILIRUB SERPL-MCNC: 0.8 MG/DL
BUN SERPL-MCNC: 10 MG/DL
CALCIUM SERPL-MCNC: 9.1 MG/DL
CHLORIDE SERPL-SCNC: 104 MMOL/L
CHOLEST SERPL-MCNC: 138 MG/DL
CHOLEST/HDLC SERPL: 2.5 {RATIO}
CO2 SERPL-SCNC: 27 MMOL/L
CREAT SERPL-MCNC: 1.3 MG/DL
EST. GFR  (AFRICAN AMERICAN): >60 ML/MIN/1.73 M^2
EST. GFR  (NON AFRICAN AMERICAN): 54.9 ML/MIN/1.73 M^2
GLUCOSE SERPL-MCNC: 92 MG/DL
HDLC SERPL-MCNC: 55 MG/DL
HDLC SERPL: 39.9 %
LDLC SERPL CALC-MCNC: 66.4 MG/DL
NONHDLC SERPL-MCNC: 83 MG/DL
POTASSIUM SERPL-SCNC: 4.1 MMOL/L
PROT SERPL-MCNC: 7.2 G/DL
SODIUM SERPL-SCNC: 139 MMOL/L
TRIGL SERPL-MCNC: 83 MG/DL

## 2018-08-27 PROCEDURE — 80053 COMPREHEN METABOLIC PANEL: CPT

## 2018-08-27 PROCEDURE — 80061 LIPID PANEL: CPT

## 2018-08-27 PROCEDURE — 36415 COLL VENOUS BLD VENIPUNCTURE: CPT

## 2018-09-06 ENCOUNTER — OFFICE VISIT (OUTPATIENT)
Dept: INTERNAL MEDICINE | Facility: CLINIC | Age: 73
End: 2018-09-06
Payer: MEDICARE

## 2018-09-06 VITALS
WEIGHT: 242.31 LBS | TEMPERATURE: 97 F | DIASTOLIC BLOOD PRESSURE: 82 MMHG | HEART RATE: 66 BPM | OXYGEN SATURATION: 97 % | BODY MASS INDEX: 35.89 KG/M2 | HEIGHT: 69 IN | SYSTOLIC BLOOD PRESSURE: 124 MMHG

## 2018-09-06 DIAGNOSIS — I25.10 CORONARY ARTERY DISEASE INVOLVING NATIVE CORONARY ARTERY OF NATIVE HEART WITHOUT ANGINA PECTORIS: Chronic | ICD-10-CM

## 2018-09-06 DIAGNOSIS — N18.30 CKD (CHRONIC KIDNEY DISEASE) STAGE 3, GFR 30-59 ML/MIN: Chronic | ICD-10-CM

## 2018-09-06 DIAGNOSIS — E66.01 SEVERE OBESITY (BMI 35.0-35.9 WITH COMORBIDITY): ICD-10-CM

## 2018-09-06 DIAGNOSIS — E78.5 HYPERLIPIDEMIA LDL GOAL <70: Chronic | ICD-10-CM

## 2018-09-06 DIAGNOSIS — I10 HYPERTENSION GOAL BP (BLOOD PRESSURE) < 140/90: Primary | Chronic | ICD-10-CM

## 2018-09-06 PROCEDURE — 99214 OFFICE O/P EST MOD 30 MIN: CPT | Mod: S$PBB,,, | Performed by: INTERNAL MEDICINE

## 2018-09-06 PROCEDURE — 99213 OFFICE O/P EST LOW 20 MIN: CPT | Mod: PBBFAC | Performed by: INTERNAL MEDICINE

## 2018-09-06 PROCEDURE — 3074F SYST BP LT 130 MM HG: CPT | Mod: CPTII,,, | Performed by: INTERNAL MEDICINE

## 2018-09-06 PROCEDURE — 3079F DIAST BP 80-89 MM HG: CPT | Mod: CPTII,,, | Performed by: INTERNAL MEDICINE

## 2018-09-06 PROCEDURE — 1101F PT FALLS ASSESS-DOCD LE1/YR: CPT | Mod: CPTII,,, | Performed by: INTERNAL MEDICINE

## 2018-09-06 PROCEDURE — 99999 PR PBB SHADOW E&M-EST. PATIENT-LVL III: CPT | Mod: PBBFAC,,, | Performed by: INTERNAL MEDICINE

## 2018-09-07 NOTE — PROGRESS NOTES
Subjective:       Patient ID: Filipe Agustin Jr. is a 72 y.o. male.    Chief Complaint: Follow-up    Filipe Agustin Jr.  72 y.o. White male    Patient presents with:  Follow-up    HPI: Here today to follow up on chronic conditions.  HTN--stable on amlodipine and metoprolol.   HLD--compliant with atorvastatin, fenofibrate and fish oil.                    CHOL                     138                 08/27/2018                 HDL                      55                  08/27/2018                 LDLCALC                  66.4                08/27/2018                 TRIG                     83                  08/27/2018            CKD III--stable. He denies taking NSAID's. He is not on an ACE inhibitor or an ARB. He denies symptoms.   CAD--asymptomatic. Compliant with aspirin, metoprolol and atorvastatin. He is managed by cardiology.         Past Medical History:  Acute coronary syndrome  1/8/2018: Cancer of prostate with intermediate recurrence risk (stage   T2b-c or [place] 7 or PSA 10-20)  9/24/2015: CKD (chronic kidney disease) stage 3, GFR 30-59 ml/min  Coronary artery disease  6/27/2017: Elevated PSA  9/19/2014: History of coronary angioplasty  Hyperlipidemia  Hypertension  2008: Myocardial infarction  6/6/2014: Obesity, Class II, BMI 35.0-39.9, with comorbidity (see   actual BMI)  Polio      Comment:  as a child  Tobacco dependence      Comment:  resolved    Current Outpatient Medications on File Prior to Visit:  amLODIPine (NORVASC) 5 MG tablet, TAKE 1 TABLET EVERY DAY, Disp: 90 tablet, Rfl: 2  aspirin (ECOTRIN) 81 MG EC tablet, Take 81 mg by mouth once daily., Disp: , Rfl:   atorvastatin (LIPITOR) 40 MG tablet, TAKE 1 TABLET EVERY DAY, Disp: 90 tablet, Rfl: 0  cyanocobalamin (VITAMIN B-12) 100 MCG tablet, Take 100 mcg by mouth once daily., Disp: , Rfl:   fenofibrate 160 MG Tab, TAKE 1 TABLET EVERY OTHER DAY, Disp: 45 tablet, Rfl: 0  fish oil-omega-3 fatty acids 300-1,000 mg capsule, Take 2 g by mouth once  daily., Disp: , Rfl:   metoprolol tartrate (LOPRESSOR) 50 MG tablet, TAKE 1 TABLET TWICE DAILY, Disp: 180 tablet, Rfl: 2  tamsulosin (FLOMAX) 0.4 mg Cp24, Take 1 capsule (0.4 mg total) by mouth once daily., Disp: 30 capsule, Rfl: 11  vitamin D 1000 units Tab, Take 2,000 Units by mouth once daily. , Disp: , Rfl:   TURMERIC, BULK, MISC, Take 1 capsule by mouth once daily. , Disp: , Rfl:     Allergies:  Review of patient's allergies indicates:   -- Paragoric -- Other (See Comments)    --  sneeze          Review of Systems   Constitutional: Negative for activity change and unexpected weight change.   Respiratory: Negative for shortness of breath.    Cardiovascular: Negative for chest pain and leg swelling.   Gastrointestinal: Negative for abdominal pain, constipation and diarrhea.   Genitourinary: Negative for dysuria and frequency.   Musculoskeletal: Negative for gait problem.   Neurological: Negative for dizziness and headaches.       Objective:      Physical Exam   Constitutional: He is oriented to person, place, and time. He appears well-developed and well-nourished. No distress.   Eyes: No scleral icterus.   Cardiovascular: Normal rate, regular rhythm and normal heart sounds.   Pulmonary/Chest: Effort normal and breath sounds normal. No respiratory distress. He has no wheezes. He has no rales.   Abdominal: Soft. Bowel sounds are normal.   Musculoskeletal: He exhibits no edema.   Neurological: He is alert and oriented to person, place, and time.   Skin: Skin is warm and dry.   Psychiatric: He has a normal mood and affect.   Vitals reviewed.      Assessment:       1. Hypertension goal BP (blood pressure) < 140/90    2. Hyperlipidemia LDL goal <70    3. CKD (chronic kidney disease) stage 3, GFR 30-59 ml/min    4. Coronary artery disease involving native coronary artery of native heart without angina pectoris    5. Severe obesity (BMI 35.0-35.9 with comorbidity)        Plan:       Filipe was seen today for  follow-up.    Diagnoses and all orders for this visit:    Hypertension goal BP (blood pressure) < 140/90  -     Continue current dose of amlodipine and metoprolol    Hyperlipidemia LDL goal <70  -     Continue current management    CKD (chronic kidney disease) stage 3, GFR 30-59 ml/min  -     Continue to monitor    Coronary artery disease involving native coronary artery of native heart without angina pectoris  -     F/U with cardiology    Severe obesity (BMI 35.0-35.9 with comorbidity)  -     Lifestyle modifications discussed    Recommend colon cancer screening. He has a Fit Kit at home and will submit.     Labs and F/U in 6 months and as needed.

## 2018-09-10 DIAGNOSIS — E78.2 MIXED HYPERLIPIDEMIA: ICD-10-CM

## 2018-09-10 DIAGNOSIS — I10 BENIGN ESSENTIAL HTN: Primary | ICD-10-CM

## 2018-09-11 ENCOUNTER — CLINICAL SUPPORT (OUTPATIENT)
Dept: CARDIOLOGY | Facility: CLINIC | Age: 73
End: 2018-09-11
Payer: MEDICARE

## 2018-09-11 ENCOUNTER — OFFICE VISIT (OUTPATIENT)
Dept: CARDIOLOGY | Facility: CLINIC | Age: 73
End: 2018-09-11
Payer: MEDICARE

## 2018-09-11 VITALS
HEART RATE: 60 BPM | WEIGHT: 239.19 LBS | SYSTOLIC BLOOD PRESSURE: 126 MMHG | DIASTOLIC BLOOD PRESSURE: 74 MMHG | HEIGHT: 69 IN | BODY MASS INDEX: 35.43 KG/M2

## 2018-09-11 DIAGNOSIS — E78.2 MIXED HYPERLIPIDEMIA: ICD-10-CM

## 2018-09-11 DIAGNOSIS — I25.10 CORONARY ARTERY DISEASE INVOLVING NATIVE CORONARY ARTERY OF NATIVE HEART WITHOUT ANGINA PECTORIS: Primary | Chronic | ICD-10-CM

## 2018-09-11 DIAGNOSIS — I10 HYPERTENSION GOAL BP (BLOOD PRESSURE) < 140/90: Chronic | ICD-10-CM

## 2018-09-11 DIAGNOSIS — E78.5 HYPERLIPIDEMIA LDL GOAL <70: Chronic | ICD-10-CM

## 2018-09-11 DIAGNOSIS — I10 BENIGN ESSENTIAL HTN: ICD-10-CM

## 2018-09-11 PROCEDURE — 93005 ELECTROCARDIOGRAM TRACING: CPT | Mod: PBBFAC | Performed by: INTERNAL MEDICINE

## 2018-09-11 PROCEDURE — 99214 OFFICE O/P EST MOD 30 MIN: CPT | Mod: S$PBB,,, | Performed by: NUCLEAR MEDICINE

## 2018-09-11 PROCEDURE — 1101F PT FALLS ASSESS-DOCD LE1/YR: CPT | Mod: CPTII,,, | Performed by: NUCLEAR MEDICINE

## 2018-09-11 PROCEDURE — 3078F DIAST BP <80 MM HG: CPT | Mod: CPTII,,, | Performed by: NUCLEAR MEDICINE

## 2018-09-11 PROCEDURE — 99999 PR PBB SHADOW E&M-EST. PATIENT-LVL III: CPT | Mod: PBBFAC,,, | Performed by: NUCLEAR MEDICINE

## 2018-09-11 PROCEDURE — 3074F SYST BP LT 130 MM HG: CPT | Mod: CPTII,,, | Performed by: NUCLEAR MEDICINE

## 2018-09-11 PROCEDURE — 99213 OFFICE O/P EST LOW 20 MIN: CPT | Mod: PBBFAC | Performed by: NUCLEAR MEDICINE

## 2018-09-11 PROCEDURE — 93010 ELECTROCARDIOGRAM REPORT: CPT | Mod: S$PBB,,, | Performed by: INTERNAL MEDICINE

## 2018-09-11 NOTE — PROGRESS NOTES
Subjective:   Patient ID:  Filipe Agustin Jr. is a 72 y.o. male who presents for evaluation of Coronary Artery Disease; Hypertension; and Hyperlipidemia      HPI1- CHRONIC CAD  2- ESSENTIAL HTN,  3- DYSLIPIDEMIA  DOING WELL.  RECENTLY DX CA OF PROSTATE- POST RX THERAPY  NO ANGINA OR EQUIVALENT  NO UNUSUAL ALVARADO. NO ORTHOPNEA OR PND  NO PALPITATIONS. NO NEAR SYNCOPE OR SYNCOPE  NO ABDOMINAL DISCOMFORT.  NO EDEMA. NO CALVE TENDERNESS  NO FOCAL CNS SYMPTOMS OR SIGNS TO SUGGEST TIA OR STROKE  ECG TODAY- SR, WNL    Review of Systems   Constitution: Negative for chills, fever, weakness, night sweats, weight gain and weight loss.   HENT: Negative for nosebleeds.    Eyes: Negative for blurred vision, double vision and visual disturbance.   Cardiovascular: Negative for chest pain, dyspnea on exertion, irregular heartbeat, leg swelling, orthopnea, palpitations, paroxysmal nocturnal dyspnea and syncope.   Respiratory: Negative for cough, hemoptysis and wheezing.    Endocrine: Negative for polydipsia and polyuria.   Hematologic/Lymphatic: Does not bruise/bleed easily.   Skin: Negative for rash.   Musculoskeletal: Negative for joint pain, joint swelling, muscle weakness and myalgias.   Gastrointestinal: Negative for abdominal pain, hematemesis, jaundice and melena.   Genitourinary: Negative for dysuria, hematuria and nocturia.   Neurological: Negative for dizziness, focal weakness, headaches and sensory change.   Psychiatric/Behavioral: Negative for depression. The patient does not have insomnia and is not nervous/anxious.          Objective:     Physical Exam   Constitutional: He is oriented to person, place, and time. He appears well-developed. No distress.   HENT:   Head: Normocephalic.   Eyes: Conjunctivae are normal. Pupils are equal, round, and reactive to light.   Neck: Neck supple. No JVD present. No thyromegaly present.   Cardiovascular: Normal rate, regular rhythm, normal heart sounds and intact distal pulses. Exam  reveals no gallop and no friction rub.   No murmur heard.  Pulses:       Carotid pulses are 2+ on the right side, and 2+ on the left side.       Radial pulses are 2+ on the right side, and 2+ on the left side.        Femoral pulses are 2+ on the right side, and 2+ on the left side.       Popliteal pulses are 2+ on the right side, and 2+ on the left side.        Dorsalis pedis pulses are 2+ on the right side, and 2+ on the left side.        Posterior tibial pulses are 2+ on the right side, and 2+ on the left side.   Pulmonary/Chest: Breath sounds normal. He has no wheezes. He has no rales. He exhibits no tenderness.   Abdominal: Soft. Bowel sounds are normal. He exhibits no mass. There is no hepatosplenomegaly. There is no tenderness.   Musculoskeletal: He exhibits no edema or tenderness.        Cervical back: Normal.        Thoracic back: Normal.        Lumbar back: Normal.   Lymphadenopathy:     He has no cervical adenopathy.     He has no axillary adenopathy.        Right: No supraclavicular adenopathy present.        Left: No supraclavicular adenopathy present.   Neurological: He is alert and oriented to person, place, and time. He has normal strength. No sensory deficit. Gait normal.   Skin: Skin is warm. No cyanosis. No pallor. Nails show no clubbing.   Psychiatric: He has a normal mood and affect. His speech is normal and behavior is normal. Cognition and memory are normal.       Assessment:     1. Coronary artery disease involving native coronary artery of native heart without angina pectoris    2. Hypertension goal BP (blood pressure) < 140/90    3. Hyperlipidemia LDL goal <70      STABLE CV STATUS- NO ACTIVE MYOCARDIAL ISCHEMIA  NO ARRHYTHMIAS.   NO ADHF  BP WELL CONTROLLED  CNS STATUS STABLE  CARD MED WELL TOLERATED  Plan:     1- CONTINUE PRESENT CARD MANAGEMENT    2- RETURN IN 6 MONTHS.

## 2018-10-15 ENCOUNTER — LAB VISIT (OUTPATIENT)
Dept: LAB | Facility: HOSPITAL | Age: 73
End: 2018-10-15
Attending: UROLOGY
Payer: MEDICARE

## 2018-10-15 DIAGNOSIS — C61 PROSTATE CANCER: ICD-10-CM

## 2018-10-15 DIAGNOSIS — C61 CANCER OF PROSTATE WITH INTERMEDIATE RECURRENCE RISK (STAGE T2B-C OR GLEASON 7 OR PSA 10-20): ICD-10-CM

## 2018-10-15 DIAGNOSIS — R39.12 WEAK URINARY STREAM: ICD-10-CM

## 2018-10-15 LAB — COMPLEXED PSA SERPL-MCNC: 0.58 NG/ML

## 2018-10-15 PROCEDURE — 84153 ASSAY OF PSA TOTAL: CPT

## 2018-10-15 PROCEDURE — 36415 COLL VENOUS BLD VENIPUNCTURE: CPT

## 2018-10-20 DIAGNOSIS — E78.5 HYPERLIPIDEMIA LDL GOAL <100: ICD-10-CM

## 2018-10-22 ENCOUNTER — OFFICE VISIT (OUTPATIENT)
Dept: UROLOGY | Facility: CLINIC | Age: 73
End: 2018-10-22
Payer: MEDICARE

## 2018-10-22 VITALS
HEART RATE: 65 BPM | DIASTOLIC BLOOD PRESSURE: 88 MMHG | HEIGHT: 69 IN | WEIGHT: 238.13 LBS | SYSTOLIC BLOOD PRESSURE: 160 MMHG | BODY MASS INDEX: 35.27 KG/M2

## 2018-10-22 DIAGNOSIS — N32.81 OAB (OVERACTIVE BLADDER): ICD-10-CM

## 2018-10-22 DIAGNOSIS — C61 PROSTATE CANCER: Primary | ICD-10-CM

## 2018-10-22 DIAGNOSIS — C61 CANCER OF PROSTATE WITH INTERMEDIATE RECURRENCE RISK (STAGE T2B-C OR GLEASON 7 OR PSA 10-20): ICD-10-CM

## 2018-10-22 DIAGNOSIS — I10 HYPERTENSION GOAL BP (BLOOD PRESSURE) < 140/90: Chronic | ICD-10-CM

## 2018-10-22 LAB
BILIRUB SERPL-MCNC: NORMAL MG/DL
BLOOD URINE, POC: 50
COLOR, POC UA: NORMAL
GLUCOSE UR QL STRIP: NORMAL
KETONES UR QL STRIP: NORMAL
LEUKOCYTE ESTERASE URINE, POC: NORMAL
NITRITE, POC UA: NORMAL
PH, POC UA: 5
PROTEIN, POC: NORMAL
SPECIFIC GRAVITY, POC UA: 1.02
UROBILINOGEN, POC UA: NORMAL

## 2018-10-22 PROCEDURE — 99214 OFFICE O/P EST MOD 30 MIN: CPT | Mod: S$PBB,,, | Performed by: UROLOGY

## 2018-10-22 PROCEDURE — 99213 OFFICE O/P EST LOW 20 MIN: CPT | Mod: PBBFAC | Performed by: UROLOGY

## 2018-10-22 PROCEDURE — 3079F DIAST BP 80-89 MM HG: CPT | Mod: CPTII,,, | Performed by: UROLOGY

## 2018-10-22 PROCEDURE — 81002 URINALYSIS NONAUTO W/O SCOPE: CPT | Mod: PBBFAC | Performed by: UROLOGY

## 2018-10-22 PROCEDURE — 99999 PR PBB SHADOW E&M-EST. PATIENT-LVL III: CPT | Mod: PBBFAC,,, | Performed by: UROLOGY

## 2018-10-22 PROCEDURE — 3077F SYST BP >= 140 MM HG: CPT | Mod: CPTII,,, | Performed by: UROLOGY

## 2018-10-22 PROCEDURE — 1100F PTFALLS ASSESS-DOCD GE2>/YR: CPT | Mod: CPTII,,, | Performed by: UROLOGY

## 2018-10-22 PROCEDURE — 3288F FALL RISK ASSESSMENT DOCD: CPT | Mod: CPTII,,, | Performed by: UROLOGY

## 2018-10-22 RX ORDER — TAMSULOSIN HYDROCHLORIDE 0.4 MG/1
0.4 CAPSULE ORAL DAILY
Qty: 30 CAPSULE | Refills: 11 | Status: SHIPPED | OUTPATIENT
Start: 2018-10-22 | End: 2019-04-01 | Stop reason: SDUPTHER

## 2018-10-22 RX ORDER — METOPROLOL TARTRATE 50 MG/1
TABLET ORAL
Qty: 180 TABLET | Refills: 2 | Status: SHIPPED | OUTPATIENT
Start: 2018-10-22 | End: 2019-08-14 | Stop reason: SDUPTHER

## 2018-10-22 RX ORDER — FENOFIBRATE 160 MG/1
TABLET ORAL
Qty: 45 TABLET | Refills: 1 | Status: SHIPPED | OUTPATIENT
Start: 2018-10-22 | End: 2019-05-11 | Stop reason: SDUPTHER

## 2018-10-22 RX ORDER — ATORVASTATIN CALCIUM 40 MG/1
TABLET, FILM COATED ORAL
Qty: 90 TABLET | Refills: 1 | Status: SHIPPED | OUTPATIENT
Start: 2018-10-22 | End: 2019-05-11 | Stop reason: SDUPTHER

## 2018-10-22 RX ORDER — AMLODIPINE BESYLATE 5 MG/1
TABLET ORAL
Qty: 90 TABLET | Refills: 2 | Status: ON HOLD | OUTPATIENT
Start: 2018-10-22 | End: 2018-12-25 | Stop reason: HOSPADM

## 2018-10-22 NOTE — PROGRESS NOTES
Chief Complaint: T1c Prostate Cancer    HPI:   10/22/18: Is taking the flomax finds it helps but will stop it after this month is done.  Sudden urgency.  Having some bowel urgency for BM with double trips.  6/6/18: XRT complete.  Has been taking flomax and recently quit it.  Missed it when he held it.  Reviewed history in detail.  3/1/18: Fiducials placed today  1/25/18: Reviewed all the reccs and pt concerns again.  He does not want to go to the OR for the SpaceOAR procedure.  Would rather just have office marker placement and no SpaceOAR.  We discussed that it might be possible to do it without anesthesia but that discomfort may demand it.  Fully reviewed indications for many combinations of therapy.  1/4/17: Had his MRI in [place], and has no extracapsular extension.  Reviewed options in detail.  Wary of RALP.  Considering XRT or brachy.  Long discussion.  11/14/17: Biopsy shows Gl 3+3=6 in 8 cores mainly on the left side in 30-50% of the samples.  In the right apex it was 3+4=7 so there is a bit of Gl4 present.    10/30/17: TRUS/Bx today 27 gm  9/6/17: 72 yo man referred for elevated PSA and review of T labs.  No abd/pelvic pain and no exac/rel factors.  No hematuria.  No urolithiasis.  No urinary bother except nocturia x2 sometimes.  No  history.  Normal sexual function.  T was checked out of curiosity no specific symptoms.  He used a T gel 10-20 years ago stopped and didn't feel it did anything.  Libido is normal he says.    Allergies:  Paragoric    Medications:  has a current medication list which includes the following prescription(s): amlodipine, aspirin, atorvastatin, cyanocobalamin, fenofibrate, fish oil-omega-3 fatty acids, influenza, metoprolol tartrate, tamsulosin, and vitamin d.    Review of Systems:  General: No fever, chills, fatigability, or weight loss.  Skin: No rashes, itching, or changes in color or texture of skin.  Chest: Denies ALVARADO, cyanosis, wheezing, cough, and sputum  production.  Abdomen: Appetite fine. No weight loss. Denies diarrhea, abdominal pain, hematemesis, or blood in stool.  Musculoskeletal: No joint stiffness or swelling. Denies back pain.  : As above.  All other review of systems negative.    PMH:   has a past medical history of Acute coronary syndrome, Cancer of prostate with intermediate recurrence risk (stage T2b-c or [place] 7 or PSA 10-20) (1/8/2018), CKD (chronic kidney disease) stage 3, GFR 30-59 ml/min (9/24/2015), Coronary artery disease, Elevated PSA (6/27/2017), History of coronary angioplasty (9/19/2014), Hyperlipidemia, Hypertension, Myocardial infarction (2008), Obesity, Class II, BMI 35.0-39.9, with comorbidity (see actual BMI) (6/6/2014), Polio, and Tobacco dependence.    PSH:   has a past surgical history that includes Coronary stent placement (2008); Cardiac catheterization (2008); and Coronary angioplasty (2008).    FamHx: family history includes Cancer in his brother; Heart disease in his brother and father.    SocHx:  reports that he quit smoking about 10 years ago. His smoking use included cigarettes. He has a 40.00 pack-year smoking history. he has never used smokeless tobacco. He reports that he drinks alcohol. He reports that he does not use drugs.      Physical Exam:  Vitals:    10/22/18 1527   BP: (!) 160/88   Pulse: 65     General: A&Ox3, no apparent distress, no deformities  Neck: No masses, normal thyroid  Lungs: normal inspiration, no use of accessory muscles  Heart: normal pulse, no arrhythmias  Abdomen: Soft, NT, ND  Skin: The skin is warm and dry. No jaundice.  Ext: No c/c/e.  :   11/14/17: Test desc arturo, no abnormalities of epididymus. Penis normal, with normal penile and scrotal skin. Meatus normal. Normal rectal tone, no hemorrhoids. Prost 10 gm no nodules or masses appreciated. SV not palpable. Perineum and anus normal.    Labs/Studies:   Urinalysis performed in clinic, summary: UA normal exc tr blood  PSA    12/16: 6.3     6/17: 6.7    6/18: 2.7   10/18: 0.58    Impression/Plan:   1. PSA/RTC 3/6 mo RTC 6 mo.  Flomax is optional.  2. HTN: discussed and referred to PCP for further management.

## 2018-12-05 LAB — HEMOCCULT STL QL IA: NEGATIVE

## 2018-12-15 ENCOUNTER — HOSPITAL ENCOUNTER (INPATIENT)
Facility: HOSPITAL | Age: 73
LOS: 10 days | Discharge: HOME HEALTH | DRG: 234 | End: 2018-12-25
Attending: FAMILY MEDICINE | Admitting: INTERNAL MEDICINE
Payer: MEDICARE

## 2018-12-15 DIAGNOSIS — Z95.1 S/P CABG X 4: Primary | ICD-10-CM

## 2018-12-15 DIAGNOSIS — E66.01 MORBID (SEVERE) OBESITY DUE TO EXCESS CALORIES: ICD-10-CM

## 2018-12-15 DIAGNOSIS — I25.10 CAD (CORONARY ARTERY DISEASE): ICD-10-CM

## 2018-12-15 DIAGNOSIS — I21.3 ST ELEVATION MYOCARDIAL INFARCTION (STEMI), UNSPECIFIED ARTERY: ICD-10-CM

## 2018-12-15 DIAGNOSIS — I10 HYPERTENSION GOAL BP (BLOOD PRESSURE) < 140/90: Chronic | ICD-10-CM

## 2018-12-15 DIAGNOSIS — I21.3 ACUTE ST ELEVATION MYOCARDIAL INFARCTION (STEMI): ICD-10-CM

## 2018-12-15 DIAGNOSIS — C61 CANCER OF PROSTATE WITH INTERMEDIATE RECURRENCE RISK (STAGE T2B-C OR GLEASON 7 OR PSA 10-20): Chronic | ICD-10-CM

## 2018-12-15 DIAGNOSIS — I25.10 CORONARY ARTERY DISEASE INVOLVING NATIVE CORONARY ARTERY OF NATIVE HEART WITHOUT ANGINA PECTORIS: Chronic | ICD-10-CM

## 2018-12-15 DIAGNOSIS — I21.3 STEMI (ST ELEVATION MYOCARDIAL INFARCTION): ICD-10-CM

## 2018-12-15 DIAGNOSIS — I21.19 ST ELEVATION MYOCARDIAL INFARCTION (STEMI) INVOLVING OTHER CORONARY ARTERY OF INFERIOR WALL: ICD-10-CM

## 2018-12-15 DIAGNOSIS — J98.11 ATELECTASIS, LEFT: ICD-10-CM

## 2018-12-15 DIAGNOSIS — I25.110 CORONARY ARTERY DISEASE INVOLVING NATIVE CORONARY ARTERY OF NATIVE HEART WITH UNSTABLE ANGINA PECTORIS: Chronic | ICD-10-CM

## 2018-12-15 DIAGNOSIS — E66.01 SEVERE OBESITY WITH BODY MASS INDEX (BMI) OF 35.0 TO 39.9 WITH SERIOUS COMORBIDITY: Chronic | ICD-10-CM

## 2018-12-15 DIAGNOSIS — I21.01: ICD-10-CM

## 2018-12-15 DIAGNOSIS — Z01.810 PRE-OPERATIVE CARDIOVASCULAR EXAMINATION: ICD-10-CM

## 2018-12-15 DIAGNOSIS — N18.30 CKD (CHRONIC KIDNEY DISEASE) STAGE 3, GFR 30-59 ML/MIN: Chronic | ICD-10-CM

## 2018-12-15 DIAGNOSIS — Z99.11 ON MECHANICALLY ASSISTED VENTILATION: ICD-10-CM

## 2018-12-15 DIAGNOSIS — Z98.890 POST-OPERATIVE STATE: ICD-10-CM

## 2018-12-15 PROBLEM — I21.9 HEART ATTACK: Status: ACTIVE | Noted: 2018-12-15

## 2018-12-15 LAB
ALBUMIN SERPL BCP-MCNC: 4.1 G/DL
ALP SERPL-CCNC: 31 U/L
ALT SERPL W/O P-5'-P-CCNC: 34 U/L
ANION GAP SERPL CALC-SCNC: 9 MMOL/L
APTT BLDCRRT: 29.2 SEC
APTT BLDCRRT: 31.5 SEC
AST SERPL-CCNC: 30 U/L
BASOPHILS # BLD AUTO: 0.04 K/UL
BASOPHILS NFR BLD: 0.7 %
BILIRUB SERPL-MCNC: 0.7 MG/DL
BILIRUB UR QL STRIP: NEGATIVE
BNP SERPL-MCNC: 40 PG/ML
BUN SERPL-MCNC: 12 MG/DL
CALCIUM SERPL-MCNC: 10.3 MG/DL
CHLORIDE SERPL-SCNC: 104 MMOL/L
CLARITY UR: CLEAR
CO2 SERPL-SCNC: 28 MMOL/L
COLOR UR: YELLOW
CREAT SERPL-MCNC: 1.6 MG/DL
DIFFERENTIAL METHOD: ABNORMAL
EOSINOPHIL # BLD AUTO: 0.1 K/UL
EOSINOPHIL NFR BLD: 2.1 %
ERYTHROCYTE [DISTWIDTH] IN BLOOD BY AUTOMATED COUNT: 14.8 %
EST. GFR  (AFRICAN AMERICAN): 49 ML/MIN/1.73 M^2
EST. GFR  (NON AFRICAN AMERICAN): 43 ML/MIN/1.73 M^2
GLUCOSE SERPL-MCNC: 119 MG/DL
GLUCOSE UR QL STRIP: NEGATIVE
HCT VFR BLD AUTO: 46.9 %
HGB BLD-MCNC: 15.4 G/DL
HGB UR QL STRIP: ABNORMAL
INR PPP: 1
KETONES UR QL STRIP: NEGATIVE
LEUKOCYTE ESTERASE UR QL STRIP: NEGATIVE
LYMPHOCYTES # BLD AUTO: 1.3 K/UL
LYMPHOCYTES NFR BLD: 22.9 %
MCH RBC QN AUTO: 30.4 PG
MCHC RBC AUTO-ENTMCNC: 32.8 G/DL
MCV RBC AUTO: 93 FL
MICROSCOPIC COMMENT: NORMAL
MONOCYTES # BLD AUTO: 0.3 K/UL
MONOCYTES NFR BLD: 4.9 %
NEUTROPHILS # BLD AUTO: 3.9 K/UL
NEUTROPHILS NFR BLD: 69.4 %
NITRITE UR QL STRIP: NEGATIVE
PH UR STRIP: 7 [PH] (ref 5–8)
PLATELET # BLD AUTO: 237 K/UL
PMV BLD AUTO: 11.5 FL
POC ACTIVATED CLOTTING TIME K: 147 SEC (ref 74–137)
POTASSIUM SERPL-SCNC: 3.9 MMOL/L
PROT SERPL-MCNC: 8 G/DL
PROT UR QL STRIP: NEGATIVE
PROTHROMBIN TIME: 10.4 SEC
RBC # BLD AUTO: 5.06 M/UL
RBC #/AREA URNS HPF: 4 /HPF (ref 0–4)
SAMPLE: ABNORMAL
SODIUM SERPL-SCNC: 141 MMOL/L
SP GR UR STRIP: 1.01 (ref 1–1.03)
TROPONIN I SERPL DL<=0.01 NG/ML-MCNC: 1.38 NG/ML
URN SPEC COLLECT METH UR: ABNORMAL
UROBILINOGEN UR STRIP-ACNC: NEGATIVE EU/DL
WBC # BLD AUTO: 5.68 K/UL

## 2018-12-15 PROCEDURE — 63600175 PHARM REV CODE 636 W HCPCS: Mod: HCNC

## 2018-12-15 PROCEDURE — 63600175 PHARM REV CODE 636 W HCPCS: Mod: HCNC | Performed by: FAMILY MEDICINE

## 2018-12-15 PROCEDURE — 80053 COMPREHEN METABOLIC PANEL: CPT | Mod: HCNC

## 2018-12-15 PROCEDURE — 86920 COMPATIBILITY TEST SPIN: CPT | Mod: HCNC

## 2018-12-15 PROCEDURE — 27000221 HC OXYGEN, UP TO 24 HOURS: Mod: HCNC

## 2018-12-15 PROCEDURE — 99291 CRITICAL CARE FIRST HOUR: CPT | Mod: HCNC,,, | Performed by: NURSE PRACTITIONER

## 2018-12-15 PROCEDURE — 85025 COMPLETE CBC W/AUTO DIFF WBC: CPT | Mod: HCNC

## 2018-12-15 PROCEDURE — 25500020 PHARM REV CODE 255: Mod: HCNC

## 2018-12-15 PROCEDURE — 20000000 HC ICU ROOM: Mod: HCNC

## 2018-12-15 PROCEDURE — 93010 ELECTROCARDIOGRAM REPORT: CPT | Mod: HCNC,,, | Performed by: INTERNAL MEDICINE

## 2018-12-15 PROCEDURE — 63600175 PHARM REV CODE 636 W HCPCS: Mod: JG,HCNC

## 2018-12-15 PROCEDURE — 93306 2D ECHO WITH COLOR FLOW DOPPLER: ICD-10-PCS | Mod: 26,HCNC,, | Performed by: INTERNAL MEDICINE

## 2018-12-15 PROCEDURE — 99291 CRITICAL CARE FIRST HOUR: CPT | Mod: 25,HCNC

## 2018-12-15 PROCEDURE — 25000003 PHARM REV CODE 250: Mod: HCNC

## 2018-12-15 PROCEDURE — 99291 PR CRITICAL CARE, E/M 30-74 MINUTES: ICD-10-PCS | Mod: HCNC,,, | Performed by: NURSE PRACTITIONER

## 2018-12-15 PROCEDURE — 99284 PR EMERGENCY DEPT VISIT,LEVEL IV: ICD-10-PCS | Mod: 25,HCNC,, | Performed by: INTERNAL MEDICINE

## 2018-12-15 PROCEDURE — 81000 URINALYSIS NONAUTO W/SCOPE: CPT | Mod: HCNC

## 2018-12-15 PROCEDURE — 83880 ASSAY OF NATRIURETIC PEPTIDE: CPT | Mod: HCNC

## 2018-12-15 PROCEDURE — 63600175 PHARM REV CODE 636 W HCPCS: Mod: HCNC | Performed by: NURSE PRACTITIONER

## 2018-12-15 PROCEDURE — 93306 TTE W/DOPPLER COMPLETE: CPT | Mod: HCNC

## 2018-12-15 PROCEDURE — C1887 CATHETER, GUIDING: HCPCS | Mod: HCNC

## 2018-12-15 PROCEDURE — 99152 MOD SED SAME PHYS/QHP 5/>YRS: CPT | Mod: HCNC,,, | Performed by: INTERNAL MEDICINE

## 2018-12-15 PROCEDURE — 99152 CATH LAB PROCEDURE: ICD-10-PCS | Mod: HCNC,,, | Performed by: INTERNAL MEDICINE

## 2018-12-15 PROCEDURE — 25000003 PHARM REV CODE 250: Mod: HCNC | Performed by: NURSE PRACTITIONER

## 2018-12-15 PROCEDURE — 93458 CATH LAB PROCEDURE: ICD-10-PCS | Mod: 26,HCNC,, | Performed by: INTERNAL MEDICINE

## 2018-12-15 PROCEDURE — 93306 TTE W/DOPPLER COMPLETE: CPT | Mod: 26,HCNC,, | Performed by: INTERNAL MEDICINE

## 2018-12-15 PROCEDURE — 93458 L HRT ARTERY/VENTRICLE ANGIO: CPT | Mod: 26,HCNC,, | Performed by: INTERNAL MEDICINE

## 2018-12-15 PROCEDURE — 25000003 PHARM REV CODE 250: Mod: HCNC | Performed by: INTERNAL MEDICINE

## 2018-12-15 PROCEDURE — 85610 PROTHROMBIN TIME: CPT | Mod: HCNC

## 2018-12-15 PROCEDURE — 99284 EMERGENCY DEPT VISIT MOD MDM: CPT | Mod: 25,HCNC,, | Performed by: INTERNAL MEDICINE

## 2018-12-15 PROCEDURE — 85730 THROMBOPLASTIN TIME PARTIAL: CPT | Mod: 91,HCNC

## 2018-12-15 PROCEDURE — 36415 COLL VENOUS BLD VENIPUNCTURE: CPT | Mod: HCNC

## 2018-12-15 PROCEDURE — 93010 EKG 12-LEAD: ICD-10-PCS | Mod: HCNC,,, | Performed by: INTERNAL MEDICINE

## 2018-12-15 PROCEDURE — 85730 THROMBOPLASTIN TIME PARTIAL: CPT | Mod: HCNC

## 2018-12-15 PROCEDURE — 84484 ASSAY OF TROPONIN QUANT: CPT | Mod: HCNC

## 2018-12-15 RX ORDER — HEPARIN SODIUM,PORCINE/D5W 25000/250
12 INTRAVENOUS SOLUTION INTRAVENOUS CONTINUOUS
Status: DISPENSED | OUTPATIENT
Start: 2018-12-15 | End: 2018-12-18

## 2018-12-15 RX ORDER — NITROGLYCERIN 0.4 MG/1
0.4 TABLET SUBLINGUAL
Status: COMPLETED | OUTPATIENT
Start: 2018-12-15 | End: 2018-12-15

## 2018-12-15 RX ORDER — TIROFIBAN HYDROCHLORIDE 50 UG/ML
25 INJECTION INTRAVENOUS ONCE
Status: DISCONTINUED | OUTPATIENT
Start: 2018-12-15 | End: 2018-12-15

## 2018-12-15 RX ORDER — METOPROLOL TARTRATE 25 MG/1
25 TABLET, FILM COATED ORAL 2 TIMES DAILY
Status: DISCONTINUED | OUTPATIENT
Start: 2018-12-15 | End: 2018-12-18

## 2018-12-15 RX ORDER — ONDANSETRON 2 MG/ML
4 INJECTION INTRAMUSCULAR; INTRAVENOUS
Status: ACTIVE | OUTPATIENT
Start: 2018-12-15 | End: 2018-12-15

## 2018-12-15 RX ORDER — DOCUSATE SODIUM 100 MG/1
100 CAPSULE, LIQUID FILLED ORAL DAILY
Status: DISCONTINUED | OUTPATIENT
Start: 2018-12-15 | End: 2018-12-18

## 2018-12-15 RX ORDER — TAMSULOSIN HYDROCHLORIDE 0.4 MG/1
0.4 CAPSULE ORAL DAILY
Status: DISCONTINUED | OUTPATIENT
Start: 2018-12-15 | End: 2018-12-18

## 2018-12-15 RX ORDER — ASPIRIN 325 MG
325 TABLET ORAL DAILY
Status: DISCONTINUED | OUTPATIENT
Start: 2018-12-15 | End: 2018-12-18

## 2018-12-15 RX ORDER — NITROGLYCERIN 0.4 MG/1
0.4 TABLET SUBLINGUAL EVERY 5 MIN PRN
Status: DISCONTINUED | OUTPATIENT
Start: 2018-12-15 | End: 2018-12-16

## 2018-12-15 RX ORDER — BISACODYL 10 MG
10 SUPPOSITORY, RECTAL RECTAL DAILY PRN
Status: DISCONTINUED | OUTPATIENT
Start: 2018-12-15 | End: 2018-12-18

## 2018-12-15 RX ORDER — ACETAMINOPHEN 325 MG/1
650 TABLET ORAL EVERY 4 HOURS PRN
Status: DISCONTINUED | OUTPATIENT
Start: 2018-12-15 | End: 2018-12-16

## 2018-12-15 RX ORDER — FAMOTIDINE 20 MG/1
20 TABLET, FILM COATED ORAL DAILY
Status: DISCONTINUED | OUTPATIENT
Start: 2018-12-15 | End: 2018-12-18

## 2018-12-15 RX ORDER — ATROPINE SULFATE 0.1 MG/ML
0.5 INJECTION INTRAVENOUS
Status: DISCONTINUED | OUTPATIENT
Start: 2018-12-15 | End: 2018-12-16

## 2018-12-15 RX ORDER — ATORVASTATIN CALCIUM 10 MG/1
10 TABLET, FILM COATED ORAL DAILY
Status: DISCONTINUED | OUTPATIENT
Start: 2018-12-15 | End: 2018-12-18

## 2018-12-15 RX ORDER — SODIUM CHLORIDE 9 MG/ML
INJECTION, SOLUTION INTRAVENOUS CONTINUOUS
Status: DISCONTINUED | OUTPATIENT
Start: 2018-12-15 | End: 2018-12-15

## 2018-12-15 RX ORDER — SODIUM CHLORIDE 9 MG/ML
INJECTION, SOLUTION INTRAVENOUS CONTINUOUS
Status: ACTIVE | OUTPATIENT
Start: 2018-12-15 | End: 2018-12-15

## 2018-12-15 RX ORDER — HEPARIN SODIUM 5000 [USP'U]/ML
5000 INJECTION, SOLUTION INTRAVENOUS; SUBCUTANEOUS
Status: ACTIVE | OUTPATIENT
Start: 2018-12-15 | End: 2018-12-15

## 2018-12-15 RX ADMIN — SODIUM CHLORIDE: 0.9 INJECTION, SOLUTION INTRAVENOUS at 11:12

## 2018-12-15 RX ADMIN — METOPROLOL TARTRATE 25 MG: 25 TABLET ORAL at 09:12

## 2018-12-15 RX ADMIN — NITROGLYCERIN 0.4 MG: 0.4 TABLET, ORALLY DISINTEGRATING SUBLINGUAL at 09:12

## 2018-12-15 RX ADMIN — ASPIRIN 325 MG ORAL TABLET 325 MG: 325 PILL ORAL at 12:12

## 2018-12-15 RX ADMIN — HEPARIN SODIUM AND DEXTROSE 12 UNITS/KG/HR: 10000; 5 INJECTION INTRAVENOUS at 12:12

## 2018-12-15 RX ADMIN — ATORVASTATIN CALCIUM 10 MG: 10 TABLET, FILM COATED ORAL at 12:12

## 2018-12-15 RX ADMIN — DOCUSATE SODIUM 100 MG: 100 CAPSULE, LIQUID FILLED ORAL at 12:12

## 2018-12-15 RX ADMIN — FAMOTIDINE 20 MG: 20 TABLET ORAL at 12:12

## 2018-12-15 RX ADMIN — METOPROLOL TARTRATE 25 MG: 25 TABLET ORAL at 12:12

## 2018-12-15 RX ADMIN — SODIUM CHLORIDE: 0.9 INJECTION, SOLUTION INTRAVENOUS at 07:12

## 2018-12-15 NOTE — HPI
Mr. Agustin is a 72 year old male with PMHx of CAD s/p coronary stenting at [facility] per patient report, HTN, HLP who presented to [facility] ED due to chest pain which started abruptly this AM. Initial EKG revealed ST elevated in inferior leads, CODE STEMI activated at that time. Patient received IV heparin bolus and NTG SL x 1 and repeat EKG which continued to reveal ST elevation in inferior leads. Patient transported to cath lab for emergent LHC per Dr. Monroe. IV aggrastat bolus per Dr. Monroe's recommendations. TTE pending. Further recs to follow. Labs pending.

## 2018-12-15 NOTE — H&P
H & P / Consult/ progress note reviewed , pt examined and no changes noted    Jose Armando Monroe MD

## 2018-12-15 NOTE — H&P
[facility]  Cardiology  History and Physical     Patient Name: Filipe Agustin Jr.  MRN: [id number]  Admission Date: 12/15/2018  Code Status: No Order   Attending Provider: No att. providers found   Primary Care Physician: Jojo Duque DO  Principal Problem:<principal problem not specified>    Patient information was obtained from patient, spouse/SO, past medical records and ER records.     Subjective:     Chief Complaint:  Chest pain     HPI:  Mr. Agustin is a 72 year old male with PMHx of CAD s/p coronary stenting at [facility] per patient report, HTN, HLP who presented to [facility] ED due to chest pain which started abruptly this AM. Initial EKG revealed ST elevated in inferior leads, CODE STEMI activated at that time. Patient received IV heparin bolus and NTG SL x 1 and repeat EKG which continued to reveal ST elevation in inferior leads. Patient transported to cath lab for emergent LHC per Dr. Monroe. IV aggrastat bolus per Dr. Monroe's recommendations. TTE pending. Further recs to follow. Labs pending.     Past Medical History:   Diagnosis Date    Acute coronary syndrome     Cancer of prostate with intermediate recurrence risk (stage T2b-c or [place] 7 or PSA 10-20) 1/8/2018    CKD (chronic kidney disease) stage 3, GFR 30-59 ml/min 9/24/2015    Coronary artery disease     Elevated PSA 6/27/2017    History of coronary angioplasty 9/19/2014    Hyperlipidemia     Hypertension     Myocardial infarction 2008    Obesity, Class II, BMI 35.0-39.9, with comorbidity (see actual BMI) 6/6/2014    Polio     as a child    Tobacco dependence     resolved       Past Surgical History:   Procedure Laterality Date    CARDIAC CATHETERIZATION  2008    CORONARY ANGIOPLASTY  2008    acute PCI- RCA- RUI    CORONARY STENT PLACEMENT  2008       Review of patient's allergies indicates:   Allergen Reactions    Paragoric Other (See Comments)     sneeze       No current facility-administered medications on file prior to  encounter.      Current Outpatient Medications on File Prior to Encounter   Medication Sig    amLODIPine (NORVASC) 5 MG tablet TAKE 1 TABLET EVERY DAY    aspirin (ECOTRIN) 81 MG EC tablet Take 81 mg by mouth once daily.    atorvastatin (LIPITOR) 40 MG tablet TAKE 1 TABLET EVERY DAY    cyanocobalamin (VITAMIN B-12) 100 MCG tablet Take 100 mcg by mouth once daily.    fenofibrate 160 MG Tab TAKE 1 TABLET EVERY OTHER DAY    fish oil-omega-3 fatty acids 300-1,000 mg capsule Take 2 g by mouth once daily.    metoprolol tartrate (LOPRESSOR) 50 MG tablet TAKE 1 TABLET TWICE DAILY    tamsulosin (FLOMAX) 0.4 mg Cap Take 1 capsule (0.4 mg total) by mouth once daily.    vitamin D 1000 units Tab Take 2,000 Units by mouth once daily.     influenza (FLUZONE HIGH-DOSE) 180 mcg/0.5 mL vaccine ADMINISTERED BY [facility]     Family History     Problem Relation (Age of Onset)    Cancer Brother    Heart disease Father, Brother        Tobacco Use    Smoking status: Former Smoker     Packs/day: 2.00     Years: 20.00     Pack years: 40.00     Types: Cigarettes     Last attempt to quit: 7/12/2008     Years since quitting: 10.4    Smokeless tobacco: Never Used   Substance and Sexual Activity    Alcohol use: Yes     Comment: rarely    Drug use: No    Sexual activity: Not Currently     Partners: Female     Review of Systems   Constitution: Negative for weakness and malaise/fatigue.   HENT: Negative for hearing loss and hoarse voice.    Eyes: Negative for blurred vision and visual disturbance.   Cardiovascular: Positive for chest pain. Negative for claudication, dyspnea on exertion, irregular heartbeat, leg swelling, near-syncope, orthopnea, palpitations, paroxysmal nocturnal dyspnea and syncope.   Respiratory: Negative for cough, hemoptysis, shortness of breath, sleep disturbances due to breathing, snoring and wheezing.    Endocrine: Negative for cold intolerance and heat intolerance.   Hematologic/Lymphatic: Does not bruise/bleed  easily.   Skin: Negative for color change, dry skin and nail changes.   Musculoskeletal: Positive for arthritis. Negative for back pain, joint pain and myalgias.   Gastrointestinal: Positive for nausea and vomiting. Negative for bloating, abdominal pain and constipation.   Genitourinary: Negative for dysuria, flank pain, hematuria and hesitancy.   Neurological: Negative for headaches, light-headedness, loss of balance, numbness and paresthesias.   Psychiatric/Behavioral: Negative for altered mental status.   Allergic/Immunologic: Negative for environmental allergies.     Objective:     Vital Signs (Most Recent):  Temp: 98.9 °F (37.2 °C) (12/15/18 0857)  Pulse: 62 (12/15/18 0942)  Resp: (!) 21 (12/15/18 0942)  BP: 130/64 (12/15/18 0942)  SpO2: 99 % (12/15/18 0942) Vital Signs (24h Range):  Temp:  [98.9 °F (37.2 °C)] 98.9 °F (37.2 °C)  Pulse:  [61-67] 62  Resp:  [18-23] 21  SpO2:  [98 %-100 %] 99 %  BP: (130-136)/(64-83) 130/64     Weight: 113.4 kg (250 lb)  Body mass index is 36.92 kg/m².    SpO2: 99 %  O2 Device (Oxygen Therapy): nasal cannula    No intake or output data in the 24 hours ending 12/15/18 0954    Lines/Drains/Airways     Peripheral Intravenous Line                 Peripheral IV - Single Lumen 12/27/17 1120 Left Hand 352 days         Peripheral IV - Single Lumen 12/15/18 0934 Right Antecubital less than 1 day         Peripheral IV - Single Lumen 12/15/18 0943 Left Forearm less than 1 day         Peripheral IV - Single Lumen 12/15/18 Left Hand less than 1 day                Physical Exam   Constitutional: He is oriented to person, place, and time. He appears well-developed and well-nourished. He appears distressed.   HENT:   Head: Normocephalic and atraumatic.   Eyes: Pupils are equal, round, and reactive to light.   Neck: Normal range of motion and full passive range of motion without pain. Neck supple. No JVD present.   Cardiovascular: Regular rhythm, S1 normal, S2 normal and intact distal pulses.  Bradycardia present. PMI is not displaced. Exam reveals no distant heart sounds.   No murmur heard.  Pulses:       Radial pulses are 2+ on the right side, and 2+ on the left side.        Dorsalis pedis pulses are 2+ on the right side, and 2+ on the left side.   NTG SL x 1 dose given in Ed  No BB due to bradycardia in ED   Pulmonary/Chest: Effort normal and breath sounds normal. No accessory muscle usage. No respiratory distress. He has no decreased breath sounds. He has no wheezes. He has no rales.   Abdominal: Soft. Bowel sounds are normal. He exhibits no distension. There is no tenderness.   obese   Musculoskeletal: Normal range of motion. He exhibits no edema.        Right ankle: He exhibits no swelling.        Left ankle: He exhibits no swelling.   Neurological: He is alert and oriented to person, place, and time.   Skin: Skin is warm and dry. He is not diaphoretic. No cyanosis. Nails show no clubbing.   Psychiatric: He has a normal mood and affect. His speech is normal and behavior is normal. Judgment and thought content normal. Cognition and memory are normal.   Nursing note and vitals reviewed.      Significant Labs:   All pertinent lab results from the last 24 hours have been reviewed. and   Recent Lab Results       12/15/18  0919        Albumin 4.1     Alkaline Phosphatase 31     ALT 34     Anion Gap 9     AST 30     Baso # 0.04     Basophil% 0.7     Total Bilirubin 0.7  Comment:  For infants and newborns, interpretation of results should be based  on gestational age, weight and in agreement with clinical  observations.  Premature Infant recommended reference ranges:  Up to 24 hours.............<8.0 mg/dL  Up to 48 hours............<12.0 mg/dL  3-5 days..................<15.0 mg/dL  6-29 days.................<15.0 mg/dL       BUN, Bld 12     Calcium 10.3     Chloride 104     CO2 28     Creatinine 1.6     Differential Method Automated     eGFR if  49     eGFR if non   43  Comment:  Calculation used to obtain the estimated glomerular filtration  rate (eGFR) is the CKD-EPI equation.        Eos # 0.1     Eosinophil% 2.1     Glucose 119     Gran # (ANC) 3.9     Gran% 69.4     Hematocrit 46.9     Hemoglobin 15.4     Lymph # 1.3     Lymph% 22.9     MCH 30.4     MCHC 32.8     MCV 93     Mono # 0.3     Mono% 4.9     MPV 11.5     Platelets 237     Potassium 3.9     Total Protein 8.0     RBC 5.06     RDW 14.8     Sodium 141     WBC 5.68           Significant Imaging: Echocardiogram:   2D echo with color flow doppler:   Results for orders placed or performed in visit on 10/13/14   2D echo with color flow doppler   Result Value Ref Range    QEF 58 55 - 65    Diastolic Dysfunction No     Est. PA Systolic Pressure 30.25     Tricuspid Valve Regurgitation MILD     and X-Ray: CXR: X-Ray Chest 1 View (CXR): No results found for this visit on 12/15/18. and X-Ray Chest PA and Lateral (CXR): No results found for this visit on 12/15/18.    Assessment and Plan:     Acute ST elevation myocardial infarction (STEMI)    Patient presented to [facility] due to chest pain  EKG revealed inferior ST elevation  NTG SL x 1 given in ED  Repeat EKG revealed persistently elevation in inferior leads  IV heparin 5,000 units given in ED  No BB due to Bradycardia in ED  TTE pending  FLP pending  Patient taken to Cath Lab for emergent LHC per Dr. Monroe.  Aggrastat bolus and Infusion to follow  Further recs to follow           VTE Risk Mitigation (From admission, onward)        Ordered     heparin (porcine) injection 5,000 Units  ED 1 Time      12/15/18 0931          Kamryn Sherman NP  Cardiology   [facility]

## 2018-12-15 NOTE — ASSESSMENT & PLAN NOTE
Patient presented to [facility] due to chest pain  EKG revealed inferior ST elevation  NTG SL x 1 given in ED  Repeat EKG revealed persistently elevation in inferior leads  IV heparin 5,000 units given in ED  No BB due to Bradycardia in ED  TTE pending  FLP pending  Patient taken to Cath Lab for emergent LHC per Dr. Monroe.  Aggrastat bolus and Infusion to follow  Further recs to follow

## 2018-12-15 NOTE — PLAN OF CARE
Problem: Adult Inpatient Plan of Care  Goal: Plan of Care Review  Outcome: Ongoing (interventions implemented as appropriate)  Received pt from CVL via bed, AAOx3, VSS, no c/o discomfort. Post heart cath instructions given to patient. Patient ate lunch well. Pt and family awaiting CVT surgeon to discuss options. Cath site WNL. POC discussed.

## 2018-12-15 NOTE — HPI
Mr Agustin is a 71 yo obese WM with a PMH of CKD3, CAD (stent placed 2008), Prostate CA (post XRT), HTN, and HLD.  He admits to malaise and ALVARADO progressive over last several weeks.  Today he noted acute midsternal, non-radiating CP which onset gradually when pt woke up at 0700 this AM. Pt describes the pain as a pressure.  Sxs are intermittent and moderate. No modifying factors. Associated sxs include nausea. He has not had a heart cath since stent placement in 2008. Pt reports his care is followed by Dr. Figueroa (Cardiology). Pt denies any fever, chills, diaphoresis, SOB, cough, palpitations, BLE edema/pain, emesis, extremity weakness/numbness, dizziness, and all other sxs. EKG via EMS was normal. Pt took 325mg ASA prior to EMS arrival. No further complaints or concerns.  In ED abnormal EKG and troponin 1.3.  CODE stemi called and taken emergently for LHC revealing diffuse CAD.  Admitted to ICU post LHC and CVT surgery consulted.  EF 45% w/ LHC.

## 2018-12-15 NOTE — SUBJECTIVE & OBJECTIVE
Past Medical History:   Diagnosis Date    Acute coronary syndrome     Cancer of prostate with intermediate recurrence risk (stage T2b-c or [place] 7 or PSA 10-20) 1/8/2018    CKD (chronic kidney disease) stage 3, GFR 30-59 ml/min 9/24/2015    Coronary artery disease     Elevated PSA 6/27/2017    History of coronary angioplasty 9/19/2014    Hyperlipidemia     Hypertension     Myocardial infarction 2008    Obesity, Class II, BMI 35.0-39.9, with comorbidity (see actual BMI) 6/6/2014    Polio     as a child    Tobacco dependence     resolved       Past Surgical History:   Procedure Laterality Date    CARDIAC CATHETERIZATION  2008    CORONARY ANGIOPLASTY  2008    acute PCI- RCA- RUI    CORONARY STENT PLACEMENT  2008       Review of patient's allergies indicates:   Allergen Reactions    Paragoric Other (See Comments)     sneeze       Family History     Problem Relation (Age of Onset)    Cancer Brother    Heart disease Father, Brother        Tobacco Use    Smoking status: Former Smoker     Packs/day: 2.00     Years: 20.00     Pack years: 40.00     Types: Cigarettes     Last attempt to quit: 7/12/2008     Years since quitting: 10.4    Smokeless tobacco: Never Used   Substance and Sexual Activity    Alcohol use: Yes     Comment: rarely    Drug use: No    Sexual activity: Not Currently     Partners: Female         Review of Systems   Constitutional: Positive for fatigue. Negative for appetite change and diaphoresis.   HENT: Negative for congestion.    Eyes: Negative for discharge.   Respiratory: Negative for cough, chest tightness, shortness of breath and wheezing.    Cardiovascular: Negative for chest pain, palpitations and leg swelling.   Gastrointestinal: Negative for abdominal distention, abdominal pain, blood in stool, nausea and vomiting.   Endocrine: Negative for polydipsia and polyuria.   Genitourinary: Negative for difficulty urinating.   Musculoskeletal: Negative for arthralgias.   Skin:  Negative for color change.   Allergic/Immunologic: Negative for immunocompromised state.   Neurological: Negative for dizziness, weakness and headaches.   Hematological: Does not bruise/bleed easily.   Psychiatric/Behavioral: Negative for agitation, behavioral problems and confusion. The patient is nervous/anxious.      Objective:     Vital Signs (Most Recent):  Temp: 98.3 °F (36.8 °C) (12/15/18 1115)  Pulse: 65 (12/15/18 0944)  Resp: (!) 21 (12/15/18 0944)  BP: 130/64 (12/15/18 0944)  SpO2: 99 % (12/15/18 0944) Vital Signs (24h Range):  Temp:  [98.3 °F (36.8 °C)-98.9 °F (37.2 °C)] 98.3 °F (36.8 °C)  Pulse:  [61-67] 65  Resp:  [18-23] 21  SpO2:  [97 %-100 %] 99 %  BP: (130-146)/(64-83) 130/64     Weight: 113.4 kg (250 lb)  Body mass index is 36.92 kg/m².    No intake or output data in the 24 hours ending 12/15/18 1221    Physical Exam   Constitutional: He is oriented to person, place, and time. Vital signs are normal. He appears well-developed and well-nourished. He is cooperative.  Non-toxic appearance. He does not have a sickly appearance. He does not appear ill. No distress. He is not intubated.   HENT:   Head: Normocephalic and atraumatic.   Mouth/Throat: Oropharynx is clear and moist and mucous membranes are normal.   Eyes: EOM and lids are normal. Pupils are equal, round, and reactive to light.   Neck: Trachea normal and full passive range of motion without pain. Carotid bruit is not present.   Cardiovascular: Regular rhythm. Bradycardia present. Exam reveals distant heart sounds.   Pulses:       Radial pulses are 2+ on the right side, and 2+ on the left side.        Dorsalis pedis pulses are 1+ on the right side, and 1+ on the left side.   Pulmonary/Chest: Effort normal and breath sounds normal. No accessory muscle usage. He is not intubated. No respiratory distress.   Abdominal: Soft. He exhibits no distension. Bowel sounds are decreased. There is no tenderness.   Obese    Musculoskeletal: Normal range of  motion.        Right foot: There is no deformity.        Left foot: There is no deformity.   No edema   Lymphadenopathy:     He has no cervical adenopathy.   Neurological: He is alert and oriented to person, place, and time.   Skin: Skin is warm, dry and intact. Capillary refill takes less than 2 seconds. No rash noted. No cyanosis.   Psychiatric: He has a normal mood and affect. His speech is normal and behavior is normal. Judgment and thought content normal. Cognition and memory are normal.       Vents:  Oxygen Concentration (%): 28 (12/15/18 0938)    Lines/Drains/Airways     Peripheral Intravenous Line                 Peripheral IV - Single Lumen 12/27/17 1120 Left Hand 353 days         Peripheral IV - Single Lumen 12/15/18 0934 Right Antecubital less than 1 day         Peripheral IV - Single Lumen 12/15/18 0943 Left Forearm less than 1 day         Peripheral IV - Single Lumen 12/15/18 Left Hand less than 1 day                Significant Labs:    CBC/Anemia Profile:  Recent Labs   Lab 12/15/18  0919   WBC 5.68   HGB 15.4   HCT 46.9      MCV 93   RDW 14.8*        Chemistries:  Recent Labs   Lab 12/15/18  0919      K 3.9      CO2 28   BUN 12   CREATININE 1.6*   CALCIUM 10.3   ALBUMIN 4.1   PROT 8.0   BILITOT 0.7   ALKPHOS 31*   ALT 34   AST 30       Troponin:   Recent Labs   Lab 12/15/18  0919   TROPONINI 1.379*     All pertinent labs within the past 24 hours have been reviewed.    Significant Imaging:   CXR: I have reviewed all pertinent results/findings within the past 24 hours and my personal findings are:  no acute pulm process noted

## 2018-12-15 NOTE — CONSULTS
[facility] -   Critical Care Medicine  Consult Note    Patient Name: Filipe Agustin Jr.  MRN: [id number]  Admission Date: 12/15/2018  Hospital Length of Stay: 0 days  Code Status: No Order  Attending Physician: Jose Armando Monroe MD   Primary Care Provider: Jojo Duque DO   Principal Problem: Acute ST elevation myocardial infarction (STEMI)      Subjective:     HPI:  Mr Agustin is a 73 yo obese WM with a PMH of CKD3, CAD (stent placed 2008), Prostate CA (post XRT), HTN, and HLD.  He admits to malaise and ALVARADO progressive over last several weeks.  Today he noted acute midsternal, non-radiating CP which onset gradually when pt woke up at 0700 this AM. Pt describes the pain as a pressure.  Sxs are intermittent and moderate. No modifying factors. Associated sxs include nausea. He has not had a heart cath since stent placement in 2008. Pt reports his care is followed by Dr. Figueroa (Cardiology). Pt denies any fever, chills, diaphoresis, SOB, cough, palpitations, BLE edema/pain, emesis, extremity weakness/numbness, dizziness, and all other sxs. EKG via EMS was normal. Pt took 325mg ASA prior to EMS arrival. No further complaints or concerns.  In ED abnormal EKG and troponin 1.3.  CODE stemi called and taken emergently for LHC revealing diffuse CAD.  Admitted to ICU post LHC and CVT surgery consulted.  EF 45% w/ LHC.         Hospital/ICU Course:  12/15 - Admitted to ICU post LHC, sheath removed in lab and no CP.      Past Medical History:   Diagnosis Date    Acute coronary syndrome     Cancer of prostate with intermediate recurrence risk (stage T2b-c or [place] 7 or PSA 10-20) 1/8/2018    CKD (chronic kidney disease) stage 3, GFR 30-59 ml/min 9/24/2015    Coronary artery disease     Elevated PSA 6/27/2017    History of coronary angioplasty 9/19/2014    Hyperlipidemia     Hypertension     Myocardial infarction 2008    Obesity, Class II, BMI 35.0-39.9, with comorbidity (see actual BMI) 6/6/2014     Polio     as a child    Tobacco dependence     resolved       Past Surgical History:   Procedure Laterality Date    CARDIAC CATHETERIZATION  2008    CORONARY ANGIOPLASTY  2008    acute PCI- RCA- RUI    CORONARY STENT PLACEMENT  2008       Review of patient's allergies indicates:   Allergen Reactions    Paragoric Other (See Comments)     sneeze       Family History     Problem Relation (Age of Onset)    Cancer Brother    Heart disease Father, Brother        Tobacco Use    Smoking status: Former Smoker     Packs/day: 2.00     Years: 20.00     Pack years: 40.00     Types: Cigarettes     Last attempt to quit: 7/12/2008     Years since quitting: 10.4    Smokeless tobacco: Never Used   Substance and Sexual Activity    Alcohol use: Yes     Comment: rarely    Drug use: No    Sexual activity: Not Currently     Partners: Female         Review of Systems   Constitutional: Positive for fatigue. Negative for appetite change and diaphoresis.   HENT: Negative for congestion.    Eyes: Negative for discharge.   Respiratory: Negative for cough, chest tightness, shortness of breath and wheezing.    Cardiovascular: Negative for chest pain, palpitations and leg swelling.   Gastrointestinal: Negative for abdominal distention, abdominal pain, blood in stool, nausea and vomiting.   Endocrine: Negative for polydipsia and polyuria.   Genitourinary: Negative for difficulty urinating.   Musculoskeletal: Negative for arthralgias.   Skin: Negative for color change.   Allergic/Immunologic: Negative for immunocompromised state.   Neurological: Negative for dizziness, weakness and headaches.   Hematological: Does not bruise/bleed easily.   Psychiatric/Behavioral: Negative for agitation, behavioral problems and confusion. The patient is nervous/anxious.      Objective:     Vital Signs (Most Recent):  Temp: 98.3 °F (36.8 °C) (12/15/18 1115)  Pulse: 65 (12/15/18 0944)  Resp: (!) 21 (12/15/18 0944)  BP: 130/64 (12/15/18 0944)  SpO2: 99 %  (12/15/18 0944) Vital Signs (24h Range):  Temp:  [98.3 °F (36.8 °C)-98.9 °F (37.2 °C)] 98.3 °F (36.8 °C)  Pulse:  [61-67] 65  Resp:  [18-23] 21  SpO2:  [97 %-100 %] 99 %  BP: (130-146)/(64-83) 130/64     Weight: 113.4 kg (250 lb)  Body mass index is 36.92 kg/m².    No intake or output data in the 24 hours ending 12/15/18 1221    Physical Exam   Constitutional: He is oriented to person, place, and time. Vital signs are normal. He appears well-developed and well-nourished. He is cooperative.  Non-toxic appearance. He does not have a sickly appearance. He does not appear ill. No distress. He is not intubated.   HENT:   Head: Normocephalic and atraumatic.   Mouth/Throat: Oropharynx is clear and moist and mucous membranes are normal.   Eyes: EOM and lids are normal. Pupils are equal, round, and reactive to light.   Neck: Trachea normal and full passive range of motion without pain. Carotid bruit is not present.   Cardiovascular: Regular rhythm. Bradycardia present. Exam reveals distant heart sounds.   Pulses:       Radial pulses are 2+ on the right side, and 2+ on the left side.        Dorsalis pedis pulses are 1+ on the right side, and 1+ on the left side.   Pulmonary/Chest: Effort normal and breath sounds normal. No accessory muscle usage. He is not intubated. No respiratory distress.   Abdominal: Soft. He exhibits no distension. Bowel sounds are decreased. There is no tenderness.   Obese    Musculoskeletal: Normal range of motion.        Right foot: There is no deformity.        Left foot: There is no deformity.   No edema   Lymphadenopathy:     He has no cervical adenopathy.   Neurological: He is alert and oriented to person, place, and time.   Skin: Skin is warm, dry and intact. Capillary refill takes less than 2 seconds. No rash noted. No cyanosis.   Psychiatric: He has a normal mood and affect. His speech is normal and behavior is normal. Judgment and thought content normal. Cognition and memory are normal.        Vents:  Oxygen Concentration (%): 28 (12/15/18 0938)    Lines/Drains/Airways     Peripheral Intravenous Line                 Peripheral IV - Single Lumen 12/27/17 1120 Left Hand 353 days         Peripheral IV - Single Lumen 12/15/18 0934 Right Antecubital less than 1 day         Peripheral IV - Single Lumen 12/15/18 0943 Left Forearm less than 1 day         Peripheral IV - Single Lumen 12/15/18 Left Hand less than 1 day                Significant Labs:    CBC/Anemia Profile:  Recent Labs   Lab 12/15/18  0919   WBC 5.68   HGB 15.4   HCT 46.9      MCV 93   RDW 14.8*        Chemistries:  Recent Labs   Lab 12/15/18  0919      K 3.9      CO2 28   BUN 12   CREATININE 1.6*   CALCIUM 10.3   ALBUMIN 4.1   PROT 8.0   BILITOT 0.7   ALKPHOS 31*   ALT 34   AST 30       Troponin:   Recent Labs   Lab 12/15/18  0919   TROPONINI 1.379*     All pertinent labs within the past 24 hours have been reviewed.    Significant Imaging:   CXR: I have reviewed all pertinent results/findings within the past 24 hours and my personal findings are:  no acute pulm process noted      ABG  No results for input(s): PH, PO2, PCO2, HCO3, BE in the last 168 hours.  Assessment/Plan:     Cardiac/Vascular   * Acute ST elevation myocardial infarction (STEMI)    Cards following  CVT surgery consult pending  Cont Lopressor, ASA, and statin  Cont Heparin infusion  ICU cardiac monitoring overnight  PRN SL NTG     Hypertension goal BP (blood pressure) < 140/90    Cont Lopressor  Add home Norvasc if needed     Renal/   CKD (chronic kidney disease) stage 3, GFR 30-59 ml/min    Cont IVFs post LHC contrast   BMP in AM     Oncology   Cancer of prostate with intermediate recurrence risk (stage T2b-c or [place] 7 or PSA 10-20)    Outpt follow up with Urology     Other   Severe obesity with body mass index (BMI) of 35.0 to 39.9 with serious comorbidity    Encouraged weight loss        Preventive Measures and Monitoring:   Stress Ulcer:  Pepcid  Nutrition: Cardiac diet  Glucose control: stable  Bowel prophylaxis: Colace and PRN Dulcolax  DVT prophylaxis: Heparin infusion  Hx CAD on B-Blocker: Lopressor  Head of Bed/Reposition: Elevate HOB and turn Q1-2 hours   Early Mobility: bed rest today  Code Status: Full  Pneumonia Vaccine: UTD  Flu Vaccine: UTD    Counseling/Consultation:I have discussed the care of this patient in detail with the bedside nursing staff and Dr. Noriega and Dr. Monroe    Critical Care Time: 48 minutes  Critical secondary to Patient has a condition that poses threat to life and bodily function: Acute Myocardial Infarction     Critical care was time spent personally by me on the following activities: development of treatment plan with patient or surrogate and bedside caregivers, discussions with consultants, evaluation of patient's response to treatment, examination of patient, ordering and performing treatments and interventions, ordering and review of laboratory studies, ordering and review of radiographic studies, pulse oximetry, re-evaluation of patient's condition. This critical care time did not overlap with that of any other provider or involve time for any procedures.    Thank you for your consult. I will follow-up with patient. Please contact us if you have any additional questions.     Aron Rosado NP  Critical Care Medicine  [facility] [place]

## 2018-12-15 NOTE — SUBJECTIVE & OBJECTIVE
No current facility-administered medications on file prior to encounter.      Current Outpatient Medications on File Prior to Encounter   Medication Sig    amLODIPine (NORVASC) 5 MG tablet TAKE 1 TABLET EVERY DAY    aspirin (ECOTRIN) 81 MG EC tablet Take 81 mg by mouth once daily.    atorvastatin (LIPITOR) 40 MG tablet TAKE 1 TABLET EVERY DAY    cyanocobalamin (VITAMIN B-12) 100 MCG tablet Take 100 mcg by mouth once daily.    fenofibrate 160 MG Tab TAKE 1 TABLET EVERY OTHER DAY    fish oil-omega-3 fatty acids 300-1,000 mg capsule Take 2 g by mouth once daily.    metoprolol tartrate (LOPRESSOR) 50 MG tablet TAKE 1 TABLET TWICE DAILY    tamsulosin (FLOMAX) 0.4 mg Cap Take 1 capsule (0.4 mg total) by mouth once daily.    vitamin D 1000 units Tab Take 2,000 Units by mouth once daily.     influenza (FLUZONE HIGH-DOSE) 180 mcg/0.5 mL vaccine ADMINISTERED BY [facility]       Review of patient's allergies indicates:   Allergen Reactions    Paragoric Other (See Comments)     sneeze       Past Medical History:   Diagnosis Date    Acute coronary syndrome     Cancer of prostate with intermediate recurrence risk (stage T2b-c or [place] 7 or PSA 10-20) 1/8/2018    CKD (chronic kidney disease) stage 3, GFR 30-59 ml/min 9/24/2015    Coronary artery disease     Elevated PSA 6/27/2017    History of coronary angioplasty 9/19/2014    Hyperlipidemia     Hypertension     Myocardial infarction 2008    Obesity, Class II, BMI 35.0-39.9, with comorbidity (see actual BMI) 6/6/2014    Polio     as a child    Tobacco dependence     resolved     Past Surgical History:   Procedure Laterality Date    CARDIAC CATHETERIZATION  2008    CORONARY ANGIOPLASTY  2008    acute PCI- RCA- RUI    CORONARY STENT PLACEMENT  2008     Family History     Problem Relation (Age of Onset)    Cancer Brother    Heart disease Father, Brother        Tobacco Use    Smoking status: Former Smoker     Packs/day: 2.00     Years: 20.00     Pack years: 40.00      Types: Cigarettes     Last attempt to quit: 7/12/2008     Years since quitting: 10.4    Smokeless tobacco: Never Used   Substance and Sexual Activity    Alcohol use: Yes     Comment: rarely    Drug use: No    Sexual activity: Not Currently     Partners: Female     Review of Systems   Constitutional: Positive for diaphoresis. Negative for activity change, appetite change, fatigue and fever.   HENT: Negative for congestion and hearing loss.    Eyes: Negative for visual disturbance.   Respiratory: Positive for chest tightness and shortness of breath. Negative for cough.    Cardiovascular: Positive for chest pain. Negative for palpitations and leg swelling.   Gastrointestinal: Negative for abdominal pain, blood in stool, constipation and diarrhea.   Endocrine: Positive for polyuria.   Genitourinary: Negative for dysuria and hematuria.   Musculoskeletal: Positive for joint swelling. Negative for back pain.   Allergic/Immunologic: Negative for environmental allergies and food allergies.   Neurological: Negative for dizziness and light-headedness.   Hematological: Does not bruise/bleed easily.   Psychiatric/Behavioral: Negative for agitation and dysphoric mood.     Objective:     Vital Signs (Most Recent):  Temp: 98.3 °F (36.8 °C) (12/15/18 1500)  Pulse: 62 (12/15/18 1630)  Resp: 15 (12/15/18 1630)  BP: 127/63 (12/15/18 1630)  SpO2: 100 % (12/15/18 1630) Vital Signs (24h Range):  Temp:  [98.3 °F (36.8 °C)-98.9 °F (37.2 °C)] 98.3 °F (36.8 °C)  Pulse:  [57-72] 62  Resp:  [14-23] 15  SpO2:  [97 %-100 %] 100 %  BP: ()/(60-83) 127/63     Weight: 113.4 kg (250 lb)  Body mass index is 36.92 kg/m².    SpO2: 100 %  O2 Device (Oxygen Therapy): nasal cannula     Intake/Output - Last 3 Shifts       12/13 0700 - 12/14 0659 12/14 0700 - 12/15 0659 12/15 0700 - 12/16 0659    P.O.   400    Total Intake(mL/kg)   400 (3.5)    Urine (mL/kg/hr)   725    Total Output   725    Net   -325                  Lines/Drains/Airways      Peripheral Intravenous Line                 Peripheral IV - Single Lumen 12/27/17 1120 Left Hand 353 days         Peripheral IV - Single Lumen 12/15/18 0934 Right Antecubital less than 1 day         Peripheral IV - Single Lumen 12/15/18 0943 Left Forearm less than 1 day         Peripheral IV - Single Lumen 12/15/18 Left Hand less than 1 day                 STS Risk Score: Risk of Mortality: 1.923%   Renal Failure: 2.884%   Permanent Stroke: 0.954%   Prolonged Ventilation: 9.082%   DSW Infection: 0.225%   Reoperation: 2.066%   Morbidity or Mortality: 13.575% S  hort Length of Stay: 45.582%   Long Length of Stay: 4.445%      Physical Exam   Constitutional: He is oriented to person, place, and time. He appears well-developed and well-nourished. No distress.   HENT:   Head: Normocephalic and atraumatic.   Mouth/Throat: Oropharynx is clear and moist.   Eyes: EOM are normal. Pupils are equal, round, and reactive to light.   Neck: No JVD present.   Cardiovascular: Normal rate, regular rhythm and normal heart sounds.   Pulmonary/Chest: Effort normal and breath sounds normal.   Abdominal: Soft. Bowel sounds are normal.   Musculoskeletal: He exhibits no edema or deformity.   Neurological: He is alert and oriented to person, place, and time.   Skin: Skin is warm and dry. He is not diaphoretic.   Psychiatric: He has a normal mood and affect. His behavior is normal.       Significant Labs:  All pertinent labs from the last 24 hours have been reviewed.    Significant Diagnostics:  I have reviewed all pertinent imaging results/findings within the past 24 hours.

## 2018-12-15 NOTE — ED NOTES
Pt c/o onset of chest pain 0730 this morning with some sweating and nausea with no vomiting. Rated 6-7/10. Patient points to epigastric region when asked where the pain was. Patient denies SOB and says that he does not feel any of these symptoms now.    Patient moved to ED room 9, patient assisted onto stretcher and changed into a gown. Patient placed on cardiac monitor, continuous pulse oximetry and automatic blood pressure cuff. Bed placed in low locked position, side rails up x 2, call light is within reach of patient or family, orientation to room and explanation of wait provided to family and patient, alarms set and turned on for monitor and pulse ox, awaiting MD evaluation and orders, will continue to monitor.    Patient identifies self as Filipe Agustin Jr.      LOC: The patient is awake, alert and aware of environment with an appropriate affect, the patient is oriented x 3 and speaking appropriately.  APPEARANCE: Patient resting comfortably and in no acute distress, patient is clean and well groomed, patient's clothing is properly fastened.  SKIN: The skin is warm and dry, color consistent with ethnicity, patient has normal skin turgor and moist mucus membranes, skin intact, no breakdown or bruising noted.  MUSCULOSKELETAL: Patient moving all extremities well, no obvious swelling or deformities noted.  RESPIRATORY: Airway is open and patent, respirations are spontaneous, patient has a normal effort and rate, no accessory muscle use noted.  CARDIAC: Patient has a normal rate and rhythm, no periphreal edema noted, capillary refill < 3 seconds.  ABDOMEN: Soft and non tender to palpation, no distention noted.  NEUROLOGIC: PERRL, eyes open spontaneously, behavior appropriate to situation, follows commands, facial expression symmetrical, bilateral hand grasp equal and even, purposeful motor response noted, normal sensation in all extremities when touched with a finger.

## 2018-12-15 NOTE — ED PROVIDER NOTES
SCRIBE #1 NOTE: I, Nimo Li, am scribing for, and in the presence of, Aruna Terry MD. I have scribed the entire note.      History      Chief Complaint   Patient presents with    Chest Pain     pt c/o chest pain with associated nausea upon waking; took ASA 325mg at home prior to EMS arrival, symptoms currently resolved       Review of patient's allergies indicates:   Allergen Reactions    Paragoric Other (See Comments)     sneeze        HPI   HPI    12/15/2018, 8:56 AM   History obtained from the patient      History of Present Illness: Filipe Agustin Jr. is a 72 y.o. male patient with a PMHx of ACS, CKD, CAD, HLD, HTN, MI, PSHx of cardiac stent placement who presents to the Emergency Department for midsternal, non-radiating CP which onset gradually when pt woke up at 0700 this AM. Pt describes the pain as a pressure.Sxs are intermittent and moderate. No modifying factors. Associated sxs include nausea. Pt reports he had a cardiac stent placed approximately 10 years ago. He has not had a heart cath since then. Pt reports his care is followed by Dr. Figueroa (Cardiology). Pt denies any fever, chills, diaphoresis, SOB, cough, palpitations, BLE edema/pain, emesis, extremity weakness/numbness, dizziness, and all other sxs. EKG via EMS was normal. Pt took 325mg ASA prior to EMS arrival. No further complaints or concerns.       Arrival mode: EMS    PCP: Jojo Duque DO       Past Medical History:  Past Medical History:   Diagnosis Date    Acute coronary syndrome     Cancer of prostate with intermediate recurrence risk (stage T2b-c or Britt 7 or PSA 10-20) 1/8/2018    CKD (chronic kidney disease) stage 3, GFR 30-59 ml/min 9/24/2015    Coronary artery disease     Elevated PSA 6/27/2017    History of coronary angioplasty 9/19/2014    Hyperlipidemia     Hypertension     Myocardial infarction 2008    Obesity, Class II, BMI 35.0-39.9, with comorbidity (see actual BMI) 6/6/2014    Polio     as a child     Tobacco dependence     resolved       Past Surgical History:  Past Surgical History:   Procedure Laterality Date    CARDIAC CATHETERIZATION  2008    CORONARY ANGIOPLASTY  2008    acute PCI- RCA- RUI    CORONARY STENT PLACEMENT  2008         Family History:  Family History   Problem Relation Age of Onset    Heart disease Father         MI    Cancer Brother     Heart disease Brother     Diabetes Neg Hx     Kidney disease Neg Hx     Stroke Neg Hx        Social History:  Social History     Tobacco Use    Smoking status: Former Smoker     Packs/day: 2.00     Years: 20.00     Pack years: 40.00     Types: Cigarettes     Last attempt to quit: 7/12/2008     Years since quitting: 10.4    Smokeless tobacco: Never Used   Substance and Sexual Activity    Alcohol use: Yes     Comment: rarely    Drug use: No    Sexual activity: Not Currently     Partners: Female       ROS   Review of Systems   Constitutional: Negative for chills, diaphoresis and fever.   HENT: Negative for congestion and sore throat.    Eyes: Negative for visual disturbance.   Respiratory: Negative for cough and shortness of breath.    Cardiovascular: Positive for chest pain. Negative for palpitations and leg swelling.   Gastrointestinal: Positive for nausea. Negative for vomiting.   Genitourinary: Negative for dysuria.   Musculoskeletal: Negative for back pain and myalgias.   Skin: Negative for rash.   Neurological: Negative for dizziness, weakness and numbness.   Hematological: Does not bruise/bleed easily.   All other systems reviewed and are negative.    Physical Exam      Initial Vitals [12/15/18 0857]   BP Pulse Resp Temp SpO2   136/83 67 18 98.9 °F (37.2 °C) 98 %      MAP       --          Physical Exam  Nursing Notes and Vital Signs Reviewed.  Constitutional: Patient is in no acute distress. Well-developed and well-nourished.  Head: Atraumatic. Normocephalic.  Eyes: PERRL. EOM intact. Conjunctivae are not pale. No scleral icterus.  ENT:  Mucous membranes are moist. Oropharynx is clear and symmetric.    Neck: Supple. Full ROM. No lymphadenopathy.  Cardiovascular: Regular rate. Regular rhythm. No murmurs, rubs, or gallops. Distal pulses are 2+ and symmetric.  Pulmonary/Chest: No respiratory distress. Clear to auscultation bilaterally. No wheezing or rales.  Abdominal: Soft and non-distended.  There is no tenderness.  No rebound, guarding, or rigidity.   Musculoskeletal: Moves all extremities. No obvious deformities. No edema. No calf tenderness.  Skin: Warm and dry.  Neurological:  Alert, awake, and appropriate.  Normal speech.  No acute focal neurological deficits are appreciated.  Psychiatric: Normal affect. Good eye contact. Appropriate in content.    ED Course    Critical Care  Date/Time: 12/15/2018 9:48 AM  Performed by: Aruna Terry MD  Authorized by: Aruna Terry MD   Direct patient critical care time: 15 minutes  Additional history critical care time: 5 minutes  Ordering / reviewing critical care time: 5 minutes  Documentation critical care time: 5 minutes  Consulting other physicians critical care time: 5 minutes  Total critical care time (exclusive of procedural time) : 35 minutes  Critical care time was exclusive of separately billable procedures and treating other patients and teaching time.  Critical care was necessary to treat or prevent imminent or life-threatening deterioration of the following conditions: STEMI.  Critical care was time spent personally by me on the following activities: blood draw for specimens, development of treatment plan with patient or surrogate, discussions with consultants, interpretation of cardiac output measurements, evaluation of patient's response to treatment, examination of patient, obtaining history from patient or surrogate, ordering and performing treatments and interventions, ordering and review of laboratory studies, ordering and review of radiographic studies, pulse oximetry,  "re-evaluation of patient's condition and review of old charts.        ED Vital Signs:  Vitals:    12/15/18 0857 12/15/18 0920 12/15/18 0938 12/15/18 0942   BP: 136/83 (!) 146/75  130/64   Pulse: 67 61 61 62   Resp: 18 20 (!) 23 (!) 21   Temp: 98.9 °F (37.2 °C)      TempSrc: Oral      SpO2: 98% 97% 100% 99%   Weight: 113.4 kg (250 lb)      Height: 5' 9" (1.753 m)       12/15/18 0944 12/15/18 1115 12/15/18 1130 12/15/18 1200   BP: 130/64 127/63 135/82 136/79   Pulse: 65 65 64 66   Resp: (!) 21 20 17 19   Temp:  98.3 °F (36.8 °C)     TempSrc:  Oral     SpO2: 99% 99% 99% 100%   Weight:       Height:        12/15/18 1220 12/15/18 1225 12/15/18 1235   BP:   (!) 154/68   Pulse: (!) 58 61 60   Resp: 15 18 18   Temp:      TempSrc:      SpO2: 99% 100% 100%   Weight:      Height:          Abnormal Lab Results:  Labs Reviewed   CBC W/ AUTO DIFFERENTIAL - Abnormal; Notable for the following components:       Result Value    RDW 14.8 (*)     All other components within normal limits        All Lab Results:  Results for orders placed or performed during the hospital encounter of 12/15/18   CBC auto differential   Result Value Ref Range    WBC 5.68 3.90 - 12.70 K/uL    RBC 5.06 4.60 - 6.20 M/uL    Hemoglobin 15.4 14.0 - 18.0 g/dL    Hematocrit 46.9 40.0 - 54.0 %    MCV 93 82 - 98 fL    MCH 30.4 27.0 - 31.0 pg    MCHC 32.8 32.0 - 36.0 g/dL    RDW 14.8 (H) 11.5 - 14.5 %    Platelets 237 150 - 350 K/uL    MPV 11.5 9.2 - 12.9 fL    Gran # (ANC) 3.9 1.8 - 7.7 K/uL    Lymph # 1.3 1.0 - 4.8 K/uL    Mono # 0.3 0.3 - 1.0 K/uL    Eos # 0.1 0.0 - 0.5 K/uL    Baso # 0.04 0.00 - 0.20 K/uL    Gran% 69.4 38.0 - 73.0 %    Lymph% 22.9 18.0 - 48.0 %    Mono% 4.9 4.0 - 15.0 %    Eosinophil% 2.1 0.0 - 8.0 %    Basophil% 0.7 0.0 - 1.9 %    Differential Method Automated    Comprehensive metabolic panel   Result Value Ref Range    Sodium 141 136 - 145 mmol/L    Potassium 3.9 3.5 - 5.1 mmol/L    Chloride 104 95 - 110 mmol/L    CO2 28 23 - 29 mmol/L    " Glucose 119 (H) 70 - 110 mg/dL    BUN, Bld 12 8 - 23 mg/dL    Creatinine 1.6 (H) 0.5 - 1.4 mg/dL    Calcium 10.3 8.7 - 10.5 mg/dL    Total Protein 8.0 6.0 - 8.4 g/dL    Albumin 4.1 3.5 - 5.2 g/dL    Total Bilirubin 0.7 0.1 - 1.0 mg/dL    Alkaline Phosphatase 31 (L) 55 - 135 U/L    AST 30 10 - 40 U/L    ALT 34 10 - 44 U/L    Anion Gap 9 8 - 16 mmol/L    eGFR if African American 49 (A) >60 mL/min/1.73 m^2    eGFR if non African American 43 (A) >60 mL/min/1.73 m^2   Urinalysis - Clean Catch   Result Value Ref Range    Specimen UA Urine, Clean Catch     Color, UA Yellow Yellow, Straw, Jelena    Appearance, UA Clear Clear    pH, UA 7.0 5.0 - 8.0    Specific Gravity, UA 1.010 1.005 - 1.030    Protein, UA Negative Negative    Glucose, UA Negative Negative    Ketones, UA Negative Negative    Bilirubin (UA) Negative Negative    Occult Blood UA 1+ (A) Negative    Nitrite, UA Negative Negative    Urobilinogen, UA Negative <2.0 EU/dL    Leukocytes, UA Negative Negative   Troponin I   Result Value Ref Range    Troponin I 1.379 (H) 0.000 - 0.026 ng/mL   B-Type natriuretic peptide (BNP)   Result Value Ref Range    BNP 40 0 - 99 pg/mL   APTT   Result Value Ref Range    aPTT 29.2 21.0 - 32.0 sec   Protime-INR   Result Value Ref Range    Prothrombin Time 10.4 9.0 - 12.5 sec    INR 1.0 0.8 - 1.2   Urinalysis Microscopic   Result Value Ref Range    RBC, UA 4 0 - 4 /hpf    Microscopic Comment SEE COMMENT        Imaging Results:  Imaging Results          IR Heart Cath Images (In process)                X-Ray Chest AP Portable (Final result)  Result time 12/15/18 09:31:47    Final result by Jimmy Logan MD (12/15/18 09:31:47)                 Impression:      No acute findings.  Aortic atherosclerosis.      Electronically signed by: Jimmy Logan MD  Date:    12/15/2018  Time:    09:31             Narrative:    EXAMINATION:  XR CHEST AP PORTABLE    CLINICAL HISTORY:  Acute chest pain, Chest  Pain;    COMPARISON:  None    FINDINGS:  Heart size is normal.  Mild aortic atherosclerotic type calcification.  The lung fields are clear. No acute cardiopulmonary infiltrate.                               The EKG was ordered, reviewed, and independently interpreted by the ED provider.  Interpretation time: 0926  Rate: 63 BPM  Rhythm: normal sinus rhythm  Interpretation: ST elevation, consider inferior injury or acute infarct. ACUTE MI/STEMI. Consider right ventricular involvement in acute inferior infarct         The Emergency Provider reviewed the vital signs and test results, which are outlined above.    ED Discussion     9:29 AM: Code STEMI called at this time.     9:37 AM: Discussed case with Dr. Monroe (Cardiology) at this time who has reviewed pt's EKG. Who recommends SL NTG and a repeat EKG.   The EKG was ordered, reviewed, and independently interpreted by the ED provider.  Interpretation time: 0941  Rate: 62 BPM  Rhythm: Sinus rhythm with PAC's   Interpretation: ST elevation consider inferior injury or acute infarct. ACUTE MI/STEMI. Consider R ventricular involvement in acute inferior infarct    9:39 AM: Dr. Monroe notified of pt's repeat EKG. Pt will be going to cath lab. Pt will be admitted to the ICU after heart cath.  Admitting Service: Cardiology  Admitting Physician: Dr. Monroe    9:46 AM: Pt to cath lab at this time.     ED Medication(s):  Medications   heparin (porcine) injection 5,000 Units (5,000 Units Intravenous Not Given 12/15/18 0945)   ondansetron injection 4 mg (4 mg Intravenous Not Given 12/15/18 1000)   acetaminophen tablet 650 mg (not administered)   atropine injection 0.5 mg (not administered)   nitroGLYCERIN SL tablet 0.4 mg (not administered)   0.9%  NaCl infusion ( Intravenous Verify Only 12/15/18 1200)   metoprolol tartrate (LOPRESSOR) tablet 25 mg (25 mg Oral Given 12/15/18 1226)   aspirin tablet 325 mg (325 mg Oral Given 12/15/18 1226)   atorvastatin tablet 10 mg (10 mg Oral  Given 12/15/18 1226)   heparin 25,000 units in dextrose 5% 250 mL (100 units/mL) infusion LOW INTENSITY nomogram - [facility] (12 Units/kg/hr × 87.8 kg (Adjusted) Intravenous New Bag 12/15/18 1227)   heparin 25,000 units in dextrose 5% (100 units/ml) IV bolus from bag - ADDITIONAL PRN BOLUS - 60 units/kg (max bolus 4000 units) (not administered)   heparin 25,000 units in dextrose 5% (100 units/ml) IV bolus from bag - ADDITIONAL PRN BOLUS - 30 units/kg (max bolus 4000 units) (not administered)   famotidine tablet 20 mg (20 mg Oral Given 12/15/18 1252)   tamsulosin 24 hr capsule 0.4 mg (0.4 mg Oral Not Given 12/15/18 1330)   docusate sodium capsule 100 mg (100 mg Oral Given 12/15/18 1252)   bisacodyl suppository 10 mg (not administered)   nitroGLYCERIN SL tablet 0.4 mg (0.4 mg Sublingual Given 12/15/18 0940)             Medical Decision Making    Medical Decision Making:   Clinical Tests:   Lab Tests: Ordered and Reviewed  Radiological Study: Ordered and Reviewed  Medical Tests: Reviewed and Ordered           Scribe Attestation:   Scribe #1: I performed the above scribed service and the documentation accurately describes the services I performed. I attest to the accuracy of the note.    Attending:   Physician Attestation Statement for Scribe #1: I, Aruna Terry MD, personally performed the services described in this documentation, as scribed by Nimo Li, in my presence, and it is both accurate and complete.          Clinical Impression       ICD-10-CM ICD-9-CM   1. ST elevation myocardial infarction (STEMI) involving other coronary artery of inferior wall I21.19 410.40   2. STEMI (ST elevation myocardial infarction) I21.3 410.90   3. Coronary artery disease involving native coronary artery of native heart without angina pectoris I25.10 414.01   4. Acute ST elevation myocardial infarction (STEMI) involving left main coronary artery I21.01 410.10   5. Cancer of prostate with intermediate recurrence risk (stage T2b-c  or Cucumber 7 or PSA 10-20) C61 185   6. CKD (chronic kidney disease) stage 3, GFR 30-59 ml/min N18.3 585.3   7. Hypertension goal BP (blood pressure) < 140/90 I10 401.9   8. Severe obesity with body mass index (BMI) of 35.0 to 39.9 with serious comorbidity E66.01 278.01       Disposition:   Disposition: Admitted  Condition: Critical         Aruna Terry MD  12/16/18 0616

## 2018-12-15 NOTE — ASSESSMENT & PLAN NOTE
Cards following  CVT surgery consult pending  Cont Lopressor, ASA, and statin  Cont Heparin infusion  ICU cardiac monitoring overnight  PRN SL NTG

## 2018-12-15 NOTE — BRIEF OP NOTE
<[facility]  Surgery Department  Operative Note    SUMMARY     Date of Procedure: 12/15/2018     Procedure: Procedure(s) (LRB):  CATHETERIZATION, HEART, LEFT (Left)     Surgeon(s) and Role:     * Jose Armando Monroe MD - Primary    Assisting Surgeon: None    Pre-Operative Diagnosis: ST elevation myocardial infarction (STEMI), unspecified artery [I21.3]    Post-Operative Diagnosis: Post-Op Diagnosis Codes:     * ST elevation myocardial infarction (STEMI), unspecified artery [I21.3]    Anesthesia: RN IV Sedation    Technical Procedures Used: [facility]    Description of the Findings of the Procedure: [facility], MULTIVESSEL CAD, EF=45%, RECOMMEND CABG    Significant Surgical Tasks Conducted by the Assistant(s), if Applicable: NONE    Complications: No    Estimated Blood Loss (EBL): < 50 cc           Implants: * No implants in log *    Specimens:   Specimen (12h ago, onward)    None                  Condition: Good    Disposition: PACU - hemodynamically stable.    Attestation: I was present and scrubbed for the entire procedure.

## 2018-12-15 NOTE — SUBJECTIVE & OBJECTIVE
Past Medical History:   Diagnosis Date    Acute coronary syndrome     Cancer of prostate with intermediate recurrence risk (stage T2b-c or Jonathon 7 or PSA 10-20) 1/8/2018    CKD (chronic kidney disease) stage 3, GFR 30-59 ml/min 9/24/2015    Coronary artery disease     Elevated PSA 6/27/2017    History of coronary angioplasty 9/19/2014    Hyperlipidemia     Hypertension     Myocardial infarction 2008    Obesity, Class II, BMI 35.0-39.9, with comorbidity (see actual BMI) 6/6/2014    Polio     as a child    Tobacco dependence     resolved       Past Surgical History:   Procedure Laterality Date    CARDIAC CATHETERIZATION  2008    CORONARY ANGIOPLASTY  2008    acute PCI- RCA- RUI    CORONARY STENT PLACEMENT  2008       Review of patient's allergies indicates:   Allergen Reactions    Paragoric Other (See Comments)     sneeze       No current facility-administered medications on file prior to encounter.      Current Outpatient Medications on File Prior to Encounter   Medication Sig    amLODIPine (NORVASC) 5 MG tablet TAKE 1 TABLET EVERY DAY    aspirin (ECOTRIN) 81 MG EC tablet Take 81 mg by mouth once daily.    atorvastatin (LIPITOR) 40 MG tablet TAKE 1 TABLET EVERY DAY    cyanocobalamin (VITAMIN B-12) 100 MCG tablet Take 100 mcg by mouth once daily.    fenofibrate 160 MG Tab TAKE 1 TABLET EVERY OTHER DAY    fish oil-omega-3 fatty acids 300-1,000 mg capsule Take 2 g by mouth once daily.    metoprolol tartrate (LOPRESSOR) 50 MG tablet TAKE 1 TABLET TWICE DAILY    tamsulosin (FLOMAX) 0.4 mg Cap Take 1 capsule (0.4 mg total) by mouth once daily.    vitamin D 1000 units Tab Take 2,000 Units by mouth once daily.     influenza (FLUZONE HIGH-DOSE) 180 mcg/0.5 mL vaccine ADMINISTERED BY [facility]     Family History     Problem Relation (Age of Onset)    Cancer Brother    Heart disease Father, Brother        Tobacco Use    Smoking status: Former Smoker     Packs/day: 2.00     Years: 20.00     Pack years:  40.00     Types: Cigarettes     Last attempt to quit: 7/12/2008     Years since quitting: 10.4    Smokeless tobacco: Never Used   Substance and Sexual Activity    Alcohol use: Yes     Comment: rarely    Drug use: No    Sexual activity: Not Currently     Partners: Female     Review of Systems   Constitution: Negative for weakness and malaise/fatigue.   HENT: Negative for hearing loss and hoarse voice.    Eyes: Negative for blurred vision and visual disturbance.   Cardiovascular: Positive for chest pain. Negative for claudication, dyspnea on exertion, irregular heartbeat, leg swelling, near-syncope, orthopnea, palpitations, paroxysmal nocturnal dyspnea and syncope.   Respiratory: Negative for cough, hemoptysis, shortness of breath, sleep disturbances due to breathing, snoring and wheezing.    Endocrine: Negative for cold intolerance and heat intolerance.   Hematologic/Lymphatic: Does not bruise/bleed easily.   Skin: Negative for color change, dry skin and nail changes.   Musculoskeletal: Positive for arthritis. Negative for back pain, joint pain and myalgias.   Gastrointestinal: Positive for nausea and vomiting. Negative for bloating, abdominal pain and constipation.   Genitourinary: Negative for dysuria, flank pain, hematuria and hesitancy.   Neurological: Negative for headaches, light-headedness, loss of balance, numbness and paresthesias.   Psychiatric/Behavioral: Negative for altered mental status.   Allergic/Immunologic: Negative for environmental allergies.     Objective:     Vital Signs (Most Recent):  Temp: 98.9 °F (37.2 °C) (12/15/18 0857)  Pulse: 62 (12/15/18 0942)  Resp: (!) 21 (12/15/18 0942)  BP: 130/64 (12/15/18 0942)  SpO2: 99 % (12/15/18 0942) Vital Signs (24h Range):  Temp:  [98.9 °F (37.2 °C)] 98.9 °F (37.2 °C)  Pulse:  [61-67] 62  Resp:  [18-23] 21  SpO2:  [98 %-100 %] 99 %  BP: (130-136)/(64-83) 130/64     Weight: 113.4 kg (250 lb)  Body mass index is 36.92 kg/m².    SpO2: 99 %  O2 Device  (Oxygen Therapy): nasal cannula    No intake or output data in the 24 hours ending 12/15/18 0954    Lines/Drains/Airways     Peripheral Intravenous Line                 Peripheral IV - Single Lumen 12/27/17 1120 Left Hand 352 days         Peripheral IV - Single Lumen 12/15/18 0934 Right Antecubital less than 1 day         Peripheral IV - Single Lumen 12/15/18 0943 Left Forearm less than 1 day         Peripheral IV - Single Lumen 12/15/18 Left Hand less than 1 day                Physical Exam   Constitutional: He is oriented to person, place, and time. He appears well-developed and well-nourished. He appears distressed.   HENT:   Head: Normocephalic and atraumatic.   Eyes: Pupils are equal, round, and reactive to light.   Neck: Normal range of motion and full passive range of motion without pain. Neck supple. No JVD present.   Cardiovascular: Regular rhythm, S1 normal, S2 normal and intact distal pulses. Bradycardia present. PMI is not displaced. Exam reveals no distant heart sounds.   No murmur heard.  Pulses:       Radial pulses are 2+ on the right side, and 2+ on the left side.        Dorsalis pedis pulses are 2+ on the right side, and 2+ on the left side.   NTG SL x 1 dose given in Ed  No BB due to bradycardia in ED   Pulmonary/Chest: Effort normal and breath sounds normal. No accessory muscle usage. No respiratory distress. He has no decreased breath sounds. He has no wheezes. He has no rales.   Abdominal: Soft. Bowel sounds are normal. He exhibits no distension. There is no tenderness.   obese   Musculoskeletal: Normal range of motion. He exhibits no edema.        Right ankle: He exhibits no swelling.        Left ankle: He exhibits no swelling.   Neurological: He is alert and oriented to person, place, and time.   Skin: Skin is warm and dry. He is not diaphoretic. No cyanosis. Nails show no clubbing.   Psychiatric: He has a normal mood and affect. His speech is normal and behavior is normal. Judgment and  thought content normal. Cognition and memory are normal.   Nursing note and vitals reviewed.      Significant Labs:   All pertinent lab results from the last 24 hours have been reviewed. and   Recent Lab Results       12/15/18  0919        Albumin 4.1     Alkaline Phosphatase 31     ALT 34     Anion Gap 9     AST 30     Baso # 0.04     Basophil% 0.7     Total Bilirubin 0.7  Comment:  For infants and newborns, interpretation of results should be based  on gestational age, weight and in agreement with clinical  observations.  Premature Infant recommended reference ranges:  Up to 24 hours.............<8.0 mg/dL  Up to 48 hours............<12.0 mg/dL  3-5 days..................<15.0 mg/dL  6-29 days.................<15.0 mg/dL       BUN, Bld 12     Calcium 10.3     Chloride 104     CO2 28     Creatinine 1.6     Differential Method Automated     eGFR if  49     eGFR if non  43  Comment:  Calculation used to obtain the estimated glomerular filtration  rate (eGFR) is the CKD-EPI equation.        Eos # 0.1     Eosinophil% 2.1     Glucose 119     Gran # (ANC) 3.9     Gran% 69.4     Hematocrit 46.9     Hemoglobin 15.4     Lymph # 1.3     Lymph% 22.9     MCH 30.4     MCHC 32.8     MCV 93     Mono # 0.3     Mono% 4.9     MPV 11.5     Platelets 237     Potassium 3.9     Total Protein 8.0     RBC 5.06     RDW 14.8     Sodium 141     WBC 5.68           Significant Imaging: Echocardiogram:   2D echo with color flow doppler:   Results for orders placed or performed in visit on 10/13/14   2D echo with color flow doppler   Result Value Ref Range    QEF 58 55 - 65    Diastolic Dysfunction No     Est. PA Systolic Pressure 30.25     Tricuspid Valve Regurgitation MILD     and X-Ray: CXR: X-Ray Chest 1 View (CXR): No results found for this visit on 12/15/18. and X-Ray Chest PA and Lateral (CXR): No results found for this visit on 12/15/18.

## 2018-12-16 LAB
ANION GAP SERPL CALC-SCNC: 7 MMOL/L
APTT BLDCRRT: 43.3 SEC
APTT BLDCRRT: 51.3 SEC
BASOPHILS # BLD AUTO: 0.04 K/UL
BASOPHILS # BLD AUTO: 0.05 K/UL
BASOPHILS NFR BLD: 0.6 %
BASOPHILS NFR BLD: 0.7 %
BUN SERPL-MCNC: 12 MG/DL
CALCIUM SERPL-MCNC: 9 MG/DL
CHLORIDE SERPL-SCNC: 105 MMOL/L
CO2 SERPL-SCNC: 27 MMOL/L
CORONARY STENOSIS: ABNORMAL
CREAT SERPL-MCNC: 1.3 MG/DL
DIASTOLIC DYSFUNCTION: NO
DIFFERENTIAL METHOD: ABNORMAL
DIFFERENTIAL METHOD: ABNORMAL
EOSINOPHIL # BLD AUTO: 0.1 K/UL
EOSINOPHIL # BLD AUTO: 0.1 K/UL
EOSINOPHIL NFR BLD: 1.4 %
EOSINOPHIL NFR BLD: 1.6 %
ERYTHROCYTE [DISTWIDTH] IN BLOOD BY AUTOMATED COUNT: 14.8 %
ERYTHROCYTE [DISTWIDTH] IN BLOOD BY AUTOMATED COUNT: 14.9 %
EST. GFR  (AFRICAN AMERICAN): >60 ML/MIN/1.73 M^2
EST. GFR  (NON AFRICAN AMERICAN): 55 ML/MIN/1.73 M^2
GLUCOSE SERPL-MCNC: 91 MG/DL
HCT VFR BLD AUTO: 40.5 %
HCT VFR BLD AUTO: 42.3 %
HGB BLD-MCNC: 13 G/DL
HGB BLD-MCNC: 13.4 G/DL
LYMPHOCYTES # BLD AUTO: 1.7 K/UL
LYMPHOCYTES # BLD AUTO: 1.8 K/UL
LYMPHOCYTES NFR BLD: 24.9 %
LYMPHOCYTES NFR BLD: 25.2 %
MCH RBC QN AUTO: 29.5 PG
MCH RBC QN AUTO: 29.8 PG
MCHC RBC AUTO-ENTMCNC: 31.7 G/DL
MCHC RBC AUTO-ENTMCNC: 32.1 G/DL
MCV RBC AUTO: 93 FL
MCV RBC AUTO: 93 FL
MONOCYTES # BLD AUTO: 0.5 K/UL
MONOCYTES # BLD AUTO: 0.6 K/UL
MONOCYTES NFR BLD: 7.4 %
MONOCYTES NFR BLD: 7.6 %
NEUTROPHILS # BLD AUTO: 4.5 K/UL
NEUTROPHILS # BLD AUTO: 4.8 K/UL
NEUTROPHILS NFR BLD: 65.1 %
NEUTROPHILS NFR BLD: 65.5 %
PLATELET # BLD AUTO: 205 K/UL
PLATELET # BLD AUTO: 206 K/UL
PMV BLD AUTO: 10.7 FL
PMV BLD AUTO: 11.4 FL
POTASSIUM SERPL-SCNC: 4.2 MMOL/L
RBC # BLD AUTO: 4.36 M/UL
RBC # BLD AUTO: 4.55 M/UL
RETIRED EF AND QEF - SEE NOTES: 60 (ref 55–65)
SODIUM SERPL-SCNC: 139 MMOL/L
WBC # BLD AUTO: 6.87 K/UL
WBC # BLD AUTO: 7.27 K/UL

## 2018-12-16 PROCEDURE — 99232 PR SUBSEQUENT HOSPITAL CARE,LEVL II: ICD-10-PCS | Mod: HCNC,,, | Performed by: INTERNAL MEDICINE

## 2018-12-16 PROCEDURE — 63600175 PHARM REV CODE 636 W HCPCS: Mod: HCNC | Performed by: NURSE PRACTITIONER

## 2018-12-16 PROCEDURE — 93010 EKG 12-LEAD: ICD-10-PCS | Mod: HCNC,,, | Performed by: INTERNAL MEDICINE

## 2018-12-16 PROCEDURE — 93010 ELECTROCARDIOGRAM REPORT: CPT | Mod: HCNC,,, | Performed by: INTERNAL MEDICINE

## 2018-12-16 PROCEDURE — 25000003 PHARM REV CODE 250: Mod: HCNC | Performed by: NURSE PRACTITIONER

## 2018-12-16 PROCEDURE — 85730 THROMBOPLASTIN TIME PARTIAL: CPT | Mod: HCNC

## 2018-12-16 PROCEDURE — 85730 THROMBOPLASTIN TIME PARTIAL: CPT | Mod: 91,HCNC

## 2018-12-16 PROCEDURE — 25000003 PHARM REV CODE 250: Mod: HCNC | Performed by: INTERNAL MEDICINE

## 2018-12-16 PROCEDURE — 21400001 HC TELEMETRY ROOM: Mod: HCNC

## 2018-12-16 PROCEDURE — 85025 COMPLETE CBC W/AUTO DIFF WBC: CPT | Mod: 91,HCNC

## 2018-12-16 PROCEDURE — 80048 BASIC METABOLIC PNL TOTAL CA: CPT | Mod: HCNC

## 2018-12-16 PROCEDURE — 36415 COLL VENOUS BLD VENIPUNCTURE: CPT | Mod: HCNC

## 2018-12-16 PROCEDURE — 99232 SBSQ HOSP IP/OBS MODERATE 35: CPT | Mod: HCNC,,, | Performed by: INTERNAL MEDICINE

## 2018-12-16 PROCEDURE — 93005 ELECTROCARDIOGRAM TRACING: CPT | Mod: HCNC

## 2018-12-16 RX ORDER — LOSARTAN POTASSIUM 25 MG/1
25 TABLET ORAL DAILY
Status: DISCONTINUED | OUTPATIENT
Start: 2018-12-16 | End: 2018-12-18

## 2018-12-16 RX ADMIN — METOPROLOL TARTRATE 25 MG: 25 TABLET ORAL at 09:12

## 2018-12-16 RX ADMIN — HEPARIN SODIUM AND DEXTROSE 15 UNITS/KG/HR: 10000; 5 INJECTION INTRAVENOUS at 02:12

## 2018-12-16 RX ADMIN — HEPARIN SODIUM AND DEXTROSE 19 UNITS/KG/HR: 10000; 5 INJECTION INTRAVENOUS at 07:12

## 2018-12-16 RX ADMIN — ASPIRIN 325 MG ORAL TABLET 325 MG: 325 PILL ORAL at 09:12

## 2018-12-16 RX ADMIN — ATORVASTATIN CALCIUM 10 MG: 10 TABLET, FILM COATED ORAL at 09:12

## 2018-12-16 RX ADMIN — FAMOTIDINE 20 MG: 20 TABLET ORAL at 09:12

## 2018-12-16 RX ADMIN — LOSARTAN POTASSIUM 25 MG: 25 TABLET, FILM COATED ORAL at 11:12

## 2018-12-16 RX ADMIN — TAMSULOSIN HYDROCHLORIDE 0.4 MG: 0.4 CAPSULE ORAL at 09:12

## 2018-12-16 RX ADMIN — DOCUSATE SODIUM 100 MG: 100 CAPSULE, LIQUID FILLED ORAL at 09:12

## 2018-12-16 NOTE — HPI
The patient is a 72-year-old male with a history of CAD status post stent placement in 2008, chronic kidney disease, hypertension, prostate cancer status post radiation treatment and hyperlipidemia who presented to the hospital with episode of midsternal chest pain that started this morning while patient was at rest.  The pain was associated diaphoresis and nausea.  The patient denies fever or chills, cough, PND or orthopnea, ankle swelling, palpitations, dizziness or lightheadedness.  The patient is currently pain free.  The patient was taken to the cardiac catheterization which showed severe 3 vessel coronary artery disease.

## 2018-12-16 NOTE — PROGRESS NOTES
[facility]  Cardiology  Progress Note    Patient Name: Filipe Agustin Jr.  MRN: [id number]  Admission Date: 12/15/2018  Hospital Length of Stay: 1 days  Code Status: Full Code   Attending Physician: Jose Armando Monroe MD   Primary Care Physician: Jojo Duque DO  Expected Discharge Date:   Principal Problem:Acute ST elevation myocardial infarction (STEMI)    Subjective:   HPI  Mr. Agustin is a 72 year old male with PMHx of CAD s/p coronary stenting at [facility] per patient report, HTN, HLP who presented to [facility] ED due to chest pain which started abruptly this AM. Initial EKG revealed ST elevated in inferior leads, CODE STEMI activated at that time. Patient received IV heparin bolus and NTG SL x 1 and repeat EKG which continued to reveal ST elevation in inferior leads. Patient transported to cath lab for emergent LHC per Dr. Monroe. IV aggrastat bolus per Dr. Monroe's recommendations. TTE pending. Further recs to follow. Labs pending.     Hospital Course:   12/16/18-Patient seen and examined in ICU, sitting in bedside chair. Denies chest pain or shortness of breath today on exam. IV heparin gtt in progress. Pending CABG on 12/18 per CVT. Labs reviewed, stable.     Interval History: no acute issues o/n, IV heparin gtt infusing.     Review of Systems   Constitution: Negative for weakness and malaise/fatigue.   HENT: Negative for hearing loss and hoarse voice.    Eyes: Negative for blurred vision and visual disturbance.   Cardiovascular: Positive for chest pain (resolved). Negative for claudication, dyspnea on exertion, irregular heartbeat, leg swelling, near-syncope, orthopnea, palpitations, paroxysmal nocturnal dyspnea and syncope.   Respiratory: Negative for cough, hemoptysis, shortness of breath, sleep disturbances due to breathing, snoring and wheezing.    Endocrine: Negative for cold intolerance and heat intolerance.   Hematologic/Lymphatic: Bruises/bleeds easily (IV heparin gtt).   Skin: Negative for  color change, dry skin and nail changes.   Musculoskeletal: Positive for arthritis. Negative for back pain, joint pain and myalgias.   Gastrointestinal: Positive for nausea (resolved) and vomiting (resolved). Negative for bloating, abdominal pain and constipation.   Genitourinary: Negative for dysuria, flank pain, hematuria and hesitancy.   Neurological: Negative for headaches, light-headedness, loss of balance, numbness and paresthesias.   Psychiatric/Behavioral: Negative for altered mental status.   Allergic/Immunologic: Negative for environmental allergies.     Objective:     Vital Signs (Most Recent):  Temp: 98.5 °F (36.9 °C) (12/16/18 0305)  Pulse: 65 (12/16/18 0853)  Resp: 20 (12/16/18 0853)  BP: (!) 141/64 (12/16/18 0630)  SpO2: 95 % (12/16/18 0853) Vital Signs (24h Range):  Temp:  [98.3 °F (36.8 °C)-99.3 °F (37.4 °C)] 98.5 °F (36.9 °C)  Pulse:  [52-73] 65  Resp:  [11-26] 20  SpO2:  [95 %-100 %] 95 %  BP: ()/() 141/64     Weight: 110.3 kg (243 lb 2.7 oz)  Body mass index is 35.91 kg/m².     SpO2: 95 %  O2 Device (Oxygen Therapy): room air      Intake/Output Summary (Last 24 hours) at 12/16/2018 0949  Last data filed at 12/16/2018 0605  Gross per 24 hour   Intake 2393.2 ml   Output 1225 ml   Net 1168.2 ml       Lines/Drains/Airways     Peripheral Intravenous Line                 Peripheral IV - Single Lumen 12/27/17 1120 Left Hand 353 days         Peripheral IV - Single Lumen 12/15/18 0934 Right Antecubital 1 day         Peripheral IV - Single Lumen 12/15/18 0943 Left Forearm 1 day                Physical Exam   Constitutional: He is oriented to person, place, and time. He appears well-developed and well-nourished. No distress.   HENT:   Head: Normocephalic and atraumatic.   Eyes: Pupils are equal, round, and reactive to light.   Neck: Normal range of motion and full passive range of motion without pain. Neck supple. No JVD present.   Cardiovascular: Normal rate, regular rhythm, S1 normal, S2  normal and intact distal pulses. PMI is not displaced. Exam reveals no distant heart sounds.   No murmur heard.  Pulses:       Radial pulses are 2+ on the right side, and 2+ on the left side.        Dorsalis pedis pulses are 2+ on the right side, and 2+ on the left side.   Right groin access site C/D/I, no hematoma or ecchymosis. No drainage noted    Right femoral pulse +2.   Pulmonary/Chest: Effort normal and breath sounds normal. No accessory muscle usage. No respiratory distress. He has no decreased breath sounds. He has no wheezes. He has no rales.   Abdominal: Soft. Bowel sounds are normal. He exhibits no distension. There is no tenderness.   obese   Musculoskeletal: Normal range of motion. He exhibits no edema.        Right ankle: He exhibits no swelling.        Left ankle: He exhibits no swelling.   Neurological: He is alert and oriented to person, place, and time.   Skin: Skin is warm and dry. He is not diaphoretic. No cyanosis. Nails show no clubbing.   Psychiatric: He has a normal mood and affect. His speech is normal and behavior is normal. Judgment and thought content normal. Cognition and memory are normal.   Nursing note and vitals reviewed.      Significant Labs:   ABG: No results for input(s): PH, PCO2, HCO3, POCSATURATED, BE in the last 48 hours., BMP:   Recent Labs   Lab 12/15/18  0919 12/16/18  0402   * 91    139   K 3.9 4.2    105   CO2 28 27   BUN 12 12   CREATININE 1.6* 1.3   CALCIUM 10.3 9.0   , CMP   Recent Labs   Lab 12/15/18  0919 12/16/18  0402    139   K 3.9 4.2    105   CO2 28 27   * 91   BUN 12 12   CREATININE 1.6* 1.3   CALCIUM 10.3 9.0   PROT 8.0  --    ALBUMIN 4.1  --    BILITOT 0.7  --    ALKPHOS 31*  --    AST 30  --    ALT 34  --    ANIONGAP 9 7*   ESTGFRAFRICA 49* >60   EGFRNONAA 43* 55*   , CBC   Recent Labs   Lab 12/15/18  0919 12/16/18  0136 12/16/18  0402   WBC 5.68 7.27 6.87   HGB 15.4 13.0* 13.4*   HCT 46.9 40.5 42.3    206 205    , Lipid Panel No results for input(s): CHOL, HDL, LDLCALC, TRIG, CHOLHDL in the last 48 hours., Troponin   Recent Labs   Lab 12/15/18  0919   TROPONINI 1.379*    and All pertinent lab results from the last 24 hours have been reviewed.    Significant Imaging: Echocardiogram:   2D echo with color flow doppler:   Results for orders placed or performed during the hospital encounter of 12/15/18   2D echo with color flow doppler   Result Value Ref Range    QEF 60 55 - 65    Diastolic Dysfunction No     and X-Ray: CXR: X-Ray Chest 1 View (CXR): No results found for this visit on 12/15/18. and X-Ray Chest PA and Lateral (CXR): No results found for this visit on 12/15/18.    Assessment and Plan:       * Acute ST elevation myocardial infarction (STEMI)    [place] [facility] [place] to [place]  EKG revealed inferior ST elevation  NTG SL x 1 given in ED  Repeat EKG revealed persistently elevation in inferior leads  IV heparin 5,000 units given in ED  No BB due to Bradycardia in ED  TTE pending  FLP pending  Patient taken to Cath Lab for emergent LHC per Dr. Monroe.  Aggrastat bolus and Infusion to follow  Further recs to follow    12/16  -CABG recommended, CVT consulted and plan for CABG on 12/18  -Continue IV heparin gtt, ASA, Statin, BB  -Start low dose ARB today  -Transfer to telemetry today  -No chest pain on exam today.   -FLP not drawn, will reorder for AM  -ECHO revealed EF 60-65%, -WMA's, normal Bi-V function.       CKD (chronic kidney disease) stage 3, GFR 30-59 ml/min    Cr 1.3 today  Monitor     Hypertension goal BP (blood pressure) < 140/90    On medical therapy  Continue BB  Start ARB today     Coronary artery disease involving native coronary artery of native heart with unstable angina pectoris    No chest pain on exam  Continue IV heparin gtt, ASA, Statin, BB  Start low dose ARB today  Pending CABG on 12/18 per CVT  OK to transfer to Tele today on IV heparin gtt     Severe obesity with body mass index  (BMI) of 35.0 to 39.9 with serious comorbidity    Encourage weight loss         VTE Risk Mitigation (From admission, onward)        Ordered     heparin 25,000 units in dextrose 5% 250 mL (100 units/mL) infusion LOW INTENSITY nomogram - [facility]  Continuous      12/15/18 1121     heparin 25,000 units in dextrose 5% (100 units/ml) IV bolus from bag - ADDITIONAL PRN BOLUS - 60 units/kg (max bolus 4000 units)  As needed (PRN)      12/15/18 1121     heparin 25,000 units in dextrose 5% (100 units/ml) IV bolus from bag - ADDITIONAL PRN BOLUS - 30 units/kg (max bolus 4000 units)  As needed (PRN)      12/15/18 1121     heparin (porcine) injection 5,000 Units  ED 1 Time      12/15/18 0931          BARRINGTON Arambula  Cardiology  [facility]

## 2018-12-16 NOTE — PLAN OF CARE
Problem: Fall Injury Risk  Goal: Absence of Fall and Fall-Related Injury  Outcome: Ongoing (interventions implemented as appropriate)  Dx STEMI  POC Instructed on fall prevention. Call bell within reach. Bed in low and locked position. 2/4 siderails in place. Nonskid footwear being utilized. Urinal at bedside. Clean and clear environment maintained. No pain intervention at this time. Patient awaiting CABG on Tuesday. Questions answered and patient accepting of POC. Heparin drip is therapeutic and continuously infusing.

## 2018-12-16 NOTE — CONSULTS
[facility]   Cardiothoracic Surgery  Consult Note    Patient Name: Filipe Agustin Jr.  MRN: [id number]  Admission Date: 12/15/2018  Attending Physician: Jose Armando Monroe MD  Referring Provider: Self, Aaareferral    Patient information was obtained from patient, spouse/SO and ER records.     Consults  Subjective:     Principal Problem: Acute ST elevation myocardial infarction (STEMI)    History of Present Illness: The patient is a 72-year-old male with a history of CAD status post stent placement in 2008, chronic kidney disease, hypertension, prostate cancer status post radiation treatment and hyperlipidemia who presented to the hospital with episode of midsternal chest pain that started this morning while patient was at rest.  The pain was associated diaphoresis and nausea.  The patient denies fever or chills, cough, PND or orthopnea, ankle swelling, palpitations, dizziness or lightheadedness.  The patient is currently pain free.  The patient was taken to the cardiac catheterization which showed severe 3 vessel coronary artery disease.    No current facility-administered medications on file prior to encounter.      Current Outpatient Medications on File Prior to Encounter   Medication Sig    amLODIPine (NORVASC) 5 MG tablet TAKE 1 TABLET EVERY DAY    aspirin (ECOTRIN) 81 MG EC tablet Take 81 mg by mouth once daily.    atorvastatin (LIPITOR) 40 MG tablet TAKE 1 TABLET EVERY DAY    cyanocobalamin (VITAMIN B-12) 100 MCG tablet Take 100 mcg by mouth once daily.    fenofibrate 160 MG Tab TAKE 1 TABLET EVERY OTHER DAY    fish oil-omega-3 fatty acids 300-1,000 mg capsule Take 2 g by mouth once daily.    metoprolol tartrate (LOPRESSOR) 50 MG tablet TAKE 1 TABLET TWICE DAILY    tamsulosin (FLOMAX) 0.4 mg Cap Take 1 capsule (0.4 mg total) by mouth once daily.    vitamin D 1000 units Tab Take 2,000 Units by mouth once daily.     influenza (FLUZONE HIGH-DOSE) 180 mcg/0.5 mL vaccine ADMINISTERED BY [facility]        Review of patient's allergies indicates:   Allergen Reactions    Paragoric Other (See Comments)     sneeze       Past Medical History:   Diagnosis Date    Acute coronary syndrome     Cancer of prostate with intermediate recurrence risk (stage T2b-c or Jonathon 7 or PSA 10-20) 1/8/2018    CKD (chronic kidney disease) stage 3, GFR 30-59 ml/min 9/24/2015    Coronary artery disease     Elevated PSA 6/27/2017    History of coronary angioplasty 9/19/2014    Hyperlipidemia     Hypertension     Myocardial infarction 2008    Obesity, Class II, BMI 35.0-39.9, with comorbidity (see actual BMI) 6/6/2014    Polio     as a child    Tobacco dependence     resolved     Past Surgical History:   Procedure Laterality Date    CARDIAC CATHETERIZATION  2008    CORONARY ANGIOPLASTY  2008    acute PCI- RCA- RUI    CORONARY STENT PLACEMENT  2008     Family History     Problem Relation (Age of Onset)    Cancer Brother    Heart disease Father, Brother        Tobacco Use    Smoking status: Former Smoker     Packs/day: 2.00     Years: 20.00     Pack years: 40.00     Types: Cigarettes     Last attempt to quit: 7/12/2008     Years since quitting: 10.4    Smokeless tobacco: Never Used   Substance and Sexual Activity    Alcohol use: Yes     Comment: rarely    Drug use: No    Sexual activity: Not Currently     Partners: Female     Review of Systems   Constitutional: Positive for diaphoresis. Negative for activity change, appetite change, fatigue and fever.   HENT: Negative for congestion and hearing loss.    Eyes: Negative for visual disturbance.   Respiratory: Positive for chest tightness and shortness of breath. Negative for cough.    Cardiovascular: Positive for chest pain. Negative for palpitations and leg swelling.   Gastrointestinal: Negative for abdominal pain, blood in stool, constipation and diarrhea.   Endocrine: Positive for polyuria.   Genitourinary: Negative for dysuria and hematuria.   Musculoskeletal:  Positive for joint swelling. Negative for back pain.   Allergic/Immunologic: Negative for environmental allergies and food allergies.   Neurological: Negative for dizziness and light-headedness.   Hematological: Does not bruise/bleed easily.   Psychiatric/Behavioral: Negative for agitation and dysphoric mood.     Objective:     Vital Signs (Most Recent):  Temp: 98.3 °F (36.8 °C) (12/15/18 1500)  Pulse: 62 (12/15/18 1630)  Resp: 15 (12/15/18 1630)  BP: 127/63 (12/15/18 1630)  SpO2: 100 % (12/15/18 1630) Vital Signs (24h Range):  Temp:  [98.3 °F (36.8 °C)-98.9 °F (37.2 °C)] 98.3 °F (36.8 °C)  Pulse:  [57-72] 62  Resp:  [14-23] 15  SpO2:  [97 %-100 %] 100 %  BP: ()/(60-83) 127/63     Weight: 113.4 kg (250 lb)  Body mass index is 36.92 kg/m².    SpO2: 100 %  O2 Device (Oxygen Therapy): nasal cannula     Intake/Output - Last 3 Shifts       12/13 0700 - 12/14 0659 12/14 0700 - 12/15 0659 12/15 0700 - 12/16 0659    P.O.   400    Total Intake(mL/kg)   400 (3.5)    Urine (mL/kg/hr)   725    Total Output   725    Net   -325                  Lines/Drains/Airways     Peripheral Intravenous Line                 Peripheral IV - Single Lumen 12/27/17 1120 Left Hand 353 days         Peripheral IV - Single Lumen 12/15/18 0934 Right Antecubital less than 1 day         Peripheral IV - Single Lumen 12/15/18 0943 Left Forearm less than 1 day         Peripheral IV - Single Lumen 12/15/18 Left Hand less than 1 day                 STS Risk Score: Risk of Mortality: 1.923%   Renal Failure: 2.884%   Permanent Stroke: 0.954%   Prolonged Ventilation: 9.082%   DSW Infection: 0.225%   Reoperation: 2.066%   Morbidity or Mortality: 13.575% S  hort Length of Stay: 45.582%   Long Length of Stay: 4.445%      Physical Exam   Constitutional: He is oriented to person, place, and time. He appears well-developed and well-nourished. No distress.   HENT:   Head: Normocephalic and atraumatic.   Mouth/Throat: Oropharynx is clear and moist.   Eyes: EOM  are normal. Pupils are equal, round, and reactive to light.   Neck: No JVD present.   Cardiovascular: Normal rate, regular rhythm and normal heart sounds.   Pulmonary/Chest: Effort normal and breath sounds normal.   Abdominal: Soft. Bowel sounds are normal.   Musculoskeletal: He exhibits no edema or deformity.   Neurological: He is alert and oriented to person, place, and time.   Skin: Skin is warm and dry. He is not diaphoretic.   Psychiatric: He has a normal mood and affect. His behavior is normal.       Significant Labs:  All pertinent labs from the last 24 hours have been reviewed.    Significant Diagnostics:  I have reviewed all pertinent imaging results/findings within the past 24 hours.    Assessment/Plan:     NYHA Score: NYHA III: marked limitation of physical activity, comfortable at rest    Coronary artery disease involving native coronary artery of native heart with unstable angina pectoris    The patient is a 72-year-old male with history of CAD status post stent placement in the past, hypertension, hyperlipidemia who presented with acute onset of chest pain.  Patient's workup included cardiac catheterization which showed severe multivessel coronary artery.  The risk and benefits of surgery was discussed with the patient and his wife.  The patient understands and has agreed to proceed with surgery which has been scheduled for 12/18/2018.         Thank you for your consult. I will follow-up with patient. Please contact us if you have any additional questions.    Rg Johnson MD  Cardiothoracic Surgery  [facility]

## 2018-12-16 NOTE — PLAN OF CARE
Sw met with patient at bedside to complete assessment. No family present at time of visit. Patient is scheduled for CABG on 12/18. Sw will need to reassess needs after procedure. Pt reports he has an excellent support system to assist him once discharged. Transitional Care Folder, Discharge Planning Begins on Admission pamphlet, [facility] Bedside Delivery pamphlet, Advance Directive information given to patient along with the contact information given.Instructed patient or family to call with any questions or concerns.     Pt's pcp is Jojo Duque DO. Pt uses     [facility] - [place], [place] - [address] [place] [address]  [address],  [place] [address]  [facility] [address]  Phone: [phone] Fax: [phone]    [facility] [address] [place] [address]  Phone: [phone] Fax: [phone]    [facility] - [facility] [address] [facility]  [address] [facility] [address]  Phone: [phone] Fax: [phone]       12/16/18 1331   Discharge Assessment   Assessment Type Discharge Planning Assessment   Confirmed/corrected address and phone number on facesheet? Yes   Assessment information obtained from? Patient   Communicated expected length of stay with patient/caregiver yes   Prior to hospitilization cognitive status: Alert/Oriented   Prior to hospitalization functional status: Independent   Current cognitive status: Alert/Oriented   Current Functional Status: Independent   Facility Arrived From: Home    Lives With spouse   Able to Return to Prior Arrangements yes   Is patient able to care for self after discharge? Unable to determine at this time (comments)   Who are your caregiver(s) and their phone number(s)? Josefina Agustin, spouse, [phone]   Patient's perception of discharge disposition home or selfcare   Readmission Within the Last 30 Days no previous admission in last 30 days   Patient currently  being followed by outpatient case management? No   Patient currently receives any other outside agency services? No   Equipment Currently Used at Home none   Do you have any problems affording any of your prescribed medications? TBD   Is the patient taking medications as prescribed? yes   Does the patient have transportation home? Yes   Transportation Anticipated family or friend will provide   Does the patient receive services at the [facility]? No   Discharge Plan A Home with family   Discharge Plan B Home with family   Patient/Family in Agreement with Plan yes

## 2018-12-16 NOTE — PROGRESS NOTES
Pt transferred from icu. Alert and oriented x 3. Cooperative with care. Denies chest pain. Pulses palpable. Pt with 02 in place. No resp distress. Pt on heparin drip . Sign off with second nurse. Iv site wnl. Oriented to room and call light. Water, phone and call light in reach. Urinal at bedside. Instructed to call for assist oob to br when needed due to increase weakness. Verbalized understanding. Friend at bedside.

## 2018-12-16 NOTE — HOSPITAL COURSE
12/16/18-Patient seen and examined in ICU, sitting in bedside chair. Denies chest pain or shortness of breath today on exam. IV heparin gtt in progress. Pending CABG on 12/18 per CVT. Labs reviewed, stable.     12/17/18-Patient seen and examined today, resting in bed. No acute events overnight. Denies chest pain or SOB. Labs stable. CABG planned for AM.    12/18/18-Patient seen and examined today, s/p CABG x 4. Hemodynamically stable.     12/19/18-Patient seen and examined today, POD # 1 s/p CABG x 4. Feels ok. Complains of fatigue and incisional soreness. Labs reviewed. Creatinine 1.7. H and H low.     12/20/18-Patient seen and examined today, POD # 2 s/p CABG x 4. Feels fatigued and weak. Pain fairly well controlled. No SOB. Labs reviewed. Creatinine bumped to 1.9. H and H remains low (7.4/22).    12/21/18-Patient seen and examined today, POD # 3 s/p CABG x 4. Feels ok. Still complains of fatigue. No chest pain or SOB. Being transfused. Creatinine 2.0.    12/22/18-Patient seen and examined today, POD # 4 s/p CABG x 4. Feeling ok. Still weak. Denies pain. Labs stable. Needs to ambulate.     12/23/18-Patient seen and examined today, POD # 5 s/p CABG x 4. Still complains of fatigue and weakness. No chest pain or SOB. Ambulated yesterday. Labs stable.     12/24/18 -  Patient seen and examined today, POD # 6 s/p CABG x 4. Mild fatigue, No chest pain or SOB. Walking with PT/OT.     12/25/18 - pt stable ready for DC per Dr. Johnson, no CP/SOB;

## 2018-12-16 NOTE — ASSESSMENT & PLAN NOTE
The patient is a 72-year-old male with history of CAD status post stent placement in the past, hypertension, hyperlipidemia who presented with acute onset of chest pain.  Patient's workup included cardiac catheterization which showed severe multivessel coronary artery.  The risk and benefits of surgery was discussed with the patient and his wife.  The patient understands and has agreed to proceed with surgery which has been scheduled for 12/18/2018.

## 2018-12-16 NOTE — ASSESSMENT & PLAN NOTE
Patient presented to [facility] due to [facility]  EKG revealed inferior ST elevation  NTG SL x 1 given in ED  Repeat EKG revealed persistently elevation in inferior leads  IV heparin 5,000 units given in ED  No BB due to Bradycardia in ED  TTE pending  FLP pending  Patient taken to Cath Lab for emergent LHC per Dr. Monroe.  Aggrastat bolus and Infusion to follow  Further recs to follow    12/16  -CABG recommended, CVT consulted and plan for CABG on 12/18  -Continue IV heparin gtt, ASA, Statin, BB  -Start low dose ARB today  -Transfer to telemetry today  -No chest pain on exam today.   -FLP not drawn, will reorder for AM  -ECHO revealed EF 60-65%, -WMA's, normal Bi-V function.

## 2018-12-16 NOTE — ASSESSMENT & PLAN NOTE
No chest pain on exam  Continue IV heparin gtt, ASA, Statin, BB  Start low dose ARB today  Pending CABG on 12/18 per CVT  OK to transfer to Tele today on IV heparin gtt

## 2018-12-16 NOTE — NURSING
Pt transported via wheelchair to room 233 on remote tele monitor, accompanied by this RN and friend of pt; pt able to ambulate from bed to wheelchair and back without distress; VSS; NADN; bedside report given to JOHANNA Cheema and JOHANNA Yoder.

## 2018-12-16 NOTE — SUBJECTIVE & OBJECTIVE
Interval History: no acute issues o/n, IV heparin gtt infusing.     Review of Systems   Constitution: Negative for weakness and malaise/fatigue.   HENT: Negative for hearing loss and hoarse voice.    Eyes: Negative for blurred vision and visual disturbance.   Cardiovascular: Positive for chest pain (resolved). Negative for claudication, dyspnea on exertion, irregular heartbeat, leg swelling, near-syncope, orthopnea, palpitations, paroxysmal nocturnal dyspnea and syncope.   Respiratory: Negative for cough, hemoptysis, shortness of breath, sleep disturbances due to breathing, snoring and wheezing.    Endocrine: Negative for cold intolerance and heat intolerance.   Hematologic/Lymphatic: Bruises/bleeds easily (IV heparin gtt).   Skin: Negative for color change, dry skin and nail changes.   Musculoskeletal: Positive for arthritis. Negative for back pain, joint pain and myalgias.   Gastrointestinal: Positive for nausea (resolved) and vomiting (resolved). Negative for bloating, abdominal pain and constipation.   Genitourinary: Negative for dysuria, flank pain, hematuria and hesitancy.   Neurological: Negative for headaches, light-headedness, loss of balance, numbness and paresthesias.   Psychiatric/Behavioral: Negative for altered mental status.   Allergic/Immunologic: Negative for environmental allergies.     Objective:     Vital Signs (Most Recent):  Temp: 98.5 °F (36.9 °C) (12/16/18 0305)  Pulse: 65 (12/16/18 0853)  Resp: 20 (12/16/18 0853)  BP: (!) 141/64 (12/16/18 0630)  SpO2: 95 % (12/16/18 0853) Vital Signs (24h Range):  Temp:  [98.3 °F (36.8 °C)-99.3 °F (37.4 °C)] 98.5 °F (36.9 °C)  Pulse:  [52-73] 65  Resp:  [11-26] 20  SpO2:  [95 %-100 %] 95 %  BP: ()/() 141/64     Weight: 110.3 kg (243 lb 2.7 oz)  Body mass index is 35.91 kg/m².     SpO2: 95 %  O2 Device (Oxygen Therapy): room air      Intake/Output Summary (Last 24 hours) at 12/16/2018 0962  Last data filed at 12/16/2018 0605  Gross per 24 hour    Intake 2393.2 ml   Output 1225 ml   Net 1168.2 ml       Lines/Drains/Airways     Peripheral Intravenous Line                 Peripheral IV - Single Lumen 12/27/17 1120 Left Hand 353 days         Peripheral IV - Single Lumen 12/15/18 0934 Right Antecubital 1 day         Peripheral IV - Single Lumen 12/15/18 0943 Left Forearm 1 day                Physical Exam   Constitutional: He is oriented to person, place, and time. He appears well-developed and well-nourished. No distress.   HENT:   Head: Normocephalic and atraumatic.   Eyes: Pupils are equal, round, and reactive to light.   Neck: Normal range of motion and full passive range of motion without pain. Neck supple. No JVD present.   Cardiovascular: Normal rate, regular rhythm, S1 normal, S2 normal and intact distal pulses. PMI is not displaced. Exam reveals no distant heart sounds.   No murmur heard.  Pulses:       Radial pulses are 2+ on the right side, and 2+ on the left side.        Dorsalis pedis pulses are 2+ on the right side, and 2+ on the left side.   Right groin access site C/D/I, no hematoma or ecchymosis. No drainage noted    Right femoral pulse +2.   Pulmonary/Chest: Effort normal and breath sounds normal. No accessory muscle usage. No respiratory distress. He has no decreased breath sounds. He has no wheezes. He has no rales.   Abdominal: Soft. Bowel sounds are normal. He exhibits no distension. There is no tenderness.   obese   Musculoskeletal: Normal range of motion. He exhibits no edema.        Right ankle: He exhibits no swelling.        Left ankle: He exhibits no swelling.   Neurological: He is alert and oriented to person, place, and time.   Skin: Skin is warm and dry. He is not diaphoretic. No cyanosis. Nails show no clubbing.   Psychiatric: He has a normal mood and affect. His speech is normal and behavior is normal. Judgment and thought content normal. Cognition and memory are normal.   Nursing note and vitals reviewed.      Significant Labs:    ABG: No results for input(s): PH, PCO2, HCO3, POCSATURATED, BE in the last 48 hours., BMP:   Recent Labs   Lab 12/15/18  0919 12/16/18  0402   * 91    139   K 3.9 4.2    105   CO2 28 27   BUN 12 12   CREATININE 1.6* 1.3   CALCIUM 10.3 9.0   , CMP   Recent Labs   Lab 12/15/18  0919 12/16/18  0402    139   K 3.9 4.2    105   CO2 28 27   * 91   BUN 12 12   CREATININE 1.6* 1.3   CALCIUM 10.3 9.0   PROT 8.0  --    ALBUMIN 4.1  --    BILITOT 0.7  --    ALKPHOS 31*  --    AST 30  --    ALT 34  --    ANIONGAP 9 7*   ESTGFRAFRICA 49* >60   EGFRNONAA 43* 55*   , CBC   Recent Labs   Lab 12/15/18  0919 12/16/18  0136 12/16/18  0402   WBC 5.68 7.27 6.87   HGB 15.4 13.0* 13.4*   HCT 46.9 40.5 42.3    206 205   , Lipid Panel No results for input(s): CHOL, HDL, LDLCALC, TRIG, CHOLHDL in the last 48 hours., Troponin   Recent Labs   Lab 12/15/18  0919   TROPONINI 1.379*    and All pertinent lab results from the last 24 hours have been reviewed.    Significant Imaging: Echocardiogram:   2D echo with color flow doppler:   Results for orders placed or performed during the hospital encounter of 12/15/18   2D echo with color flow doppler   Result Value Ref Range    QEF 60 55 - 65    Diastolic Dysfunction No     and X-Ray: CXR: X-Ray Chest 1 View (CXR): No results found for this visit on 12/15/18. and X-Ray Chest PA and Lateral (CXR): No results found for this visit on 12/15/18.

## 2018-12-17 ENCOUNTER — ANESTHESIA EVENT (OUTPATIENT)
Dept: SURGERY | Facility: HOSPITAL | Age: 73
DRG: 234 | End: 2018-12-17
Payer: MEDICARE

## 2018-12-17 LAB
ABO + RH BLD: NORMAL
ALBUMIN SERPL BCP-MCNC: 3.2 G/DL
ALP SERPL-CCNC: 25 U/L
ALT SERPL W/O P-5'-P-CCNC: 26 U/L
ANION GAP SERPL CALC-SCNC: 7 MMOL/L
APTT BLDCRRT: 60.6 SEC
AST SERPL-CCNC: 39 U/L
BACTERIA #/AREA URNS HPF: NORMAL /HPF
BASOPHILS # BLD AUTO: 0.02 K/UL
BASOPHILS NFR BLD: 0.4 %
BILIRUB SERPL-MCNC: 0.6 MG/DL
BILIRUB UR QL STRIP: NEGATIVE
BLD GP AB SCN CELLS X3 SERPL QL: NORMAL
BUN SERPL-MCNC: 14 MG/DL
CALCIUM SERPL-MCNC: 9 MG/DL
CHLORIDE SERPL-SCNC: 103 MMOL/L
CHOLEST SERPL-MCNC: 132 MG/DL
CHOLEST/HDLC SERPL: 2.7 {RATIO}
CLARITY UR: CLEAR
CO2 SERPL-SCNC: 27 MMOL/L
COLOR UR: YELLOW
CREAT SERPL-MCNC: 1.3 MG/DL
DIFFERENTIAL METHOD: ABNORMAL
EOSINOPHIL # BLD AUTO: 0.1 K/UL
EOSINOPHIL NFR BLD: 2 %
ERYTHROCYTE [DISTWIDTH] IN BLOOD BY AUTOMATED COUNT: 14.8 %
EST. GFR  (AFRICAN AMERICAN): >60 ML/MIN/1.73 M^2
EST. GFR  (NON AFRICAN AMERICAN): 55 ML/MIN/1.73 M^2
ESTIMATED AVG GLUCOSE: 108 MG/DL
GLUCOSE SERPL-MCNC: 97 MG/DL
GLUCOSE UR QL STRIP: NEGATIVE
HBA1C MFR BLD HPLC: 5.4 %
HCT VFR BLD AUTO: 38.8 %
HDLC SERPL-MCNC: 49 MG/DL
HDLC SERPL: 37.1 %
HGB BLD-MCNC: 12.6 G/DL
HGB UR QL STRIP: ABNORMAL
INR PPP: 0.9
KETONES UR QL STRIP: NEGATIVE
LDLC SERPL CALC-MCNC: 61.6 MG/DL
LEUKOCYTE ESTERASE UR QL STRIP: NEGATIVE
LYMPHOCYTES # BLD AUTO: 1.4 K/UL
LYMPHOCYTES NFR BLD: 26.3 %
MCH RBC QN AUTO: 29.6 PG
MCHC RBC AUTO-ENTMCNC: 32.5 G/DL
MCV RBC AUTO: 91 FL
MICROSCOPIC COMMENT: NORMAL
MONOCYTES # BLD AUTO: 0.5 K/UL
MONOCYTES NFR BLD: 8.8 %
NEUTROPHILS # BLD AUTO: 3.4 K/UL
NEUTROPHILS NFR BLD: 62.5 %
NITRITE UR QL STRIP: NEGATIVE
NONHDLC SERPL-MCNC: 83 MG/DL
PH UR STRIP: 6 [PH] (ref 5–8)
PLATELET # BLD AUTO: 203 K/UL
PMV BLD AUTO: 11.7 FL
POTASSIUM SERPL-SCNC: 3.7 MMOL/L
PREALB SERPL-MCNC: 15 MG/DL
PROT SERPL-MCNC: 6.3 G/DL
PROT UR QL STRIP: NEGATIVE
PROTHROMBIN TIME: 10.3 SEC
RBC # BLD AUTO: 4.25 M/UL
RBC #/AREA URNS HPF: 3 /HPF (ref 0–4)
SODIUM SERPL-SCNC: 137 MMOL/L
SP GR UR STRIP: 1.01 (ref 1–1.03)
SQUAMOUS #/AREA URNS HPF: 0 /HPF
TRIGL SERPL-MCNC: 107 MG/DL
URN SPEC COLLECT METH UR: ABNORMAL
UROBILINOGEN UR STRIP-ACNC: NEGATIVE EU/DL
WBC # BLD AUTO: 5.44 K/UL

## 2018-12-17 PROCEDURE — 85610 PROTHROMBIN TIME: CPT | Mod: HCNC

## 2018-12-17 PROCEDURE — 99231 PR SUBSEQUENT HOSPITAL CARE,LEVL I: ICD-10-PCS | Mod: HCNC,,, | Performed by: INTERNAL MEDICINE

## 2018-12-17 PROCEDURE — 93010 ELECTROCARDIOGRAM REPORT: CPT | Mod: HCNC,,, | Performed by: INTERNAL MEDICINE

## 2018-12-17 PROCEDURE — 84134 ASSAY OF PREALBUMIN: CPT | Mod: HCNC

## 2018-12-17 PROCEDURE — 25000003 PHARM REV CODE 250: Mod: HCNC | Performed by: NURSE PRACTITIONER

## 2018-12-17 PROCEDURE — 21400001 HC TELEMETRY ROOM: Mod: HCNC

## 2018-12-17 PROCEDURE — 63600175 PHARM REV CODE 636 W HCPCS: Mod: HCNC | Performed by: NURSE PRACTITIONER

## 2018-12-17 PROCEDURE — 80061 LIPID PANEL: CPT | Mod: HCNC

## 2018-12-17 PROCEDURE — 25000003 PHARM REV CODE 250: Mod: HCNC | Performed by: INTERNAL MEDICINE

## 2018-12-17 PROCEDURE — 99900035 HC TECH TIME PER 15 MIN (STAT): Mod: HCNC

## 2018-12-17 PROCEDURE — 93010 EKG 12-LEAD: ICD-10-PCS | Mod: HCNC,,, | Performed by: INTERNAL MEDICINE

## 2018-12-17 PROCEDURE — 99231 SBSQ HOSP IP/OBS SF/LOW 25: CPT | Mod: HCNC,,, | Performed by: INTERNAL MEDICINE

## 2018-12-17 PROCEDURE — 83036 HEMOGLOBIN GLYCOSYLATED A1C: CPT | Mod: HCNC

## 2018-12-17 PROCEDURE — 81000 URINALYSIS NONAUTO W/SCOPE: CPT | Mod: HCNC

## 2018-12-17 PROCEDURE — 85025 COMPLETE CBC W/AUTO DIFF WBC: CPT | Mod: HCNC

## 2018-12-17 PROCEDURE — 85730 THROMBOPLASTIN TIME PARTIAL: CPT | Mod: HCNC

## 2018-12-17 PROCEDURE — 86850 RBC ANTIBODY SCREEN: CPT | Mod: HCNC

## 2018-12-17 PROCEDURE — 94010 BREATHING CAPACITY TEST: CPT | Mod: HCNC

## 2018-12-17 PROCEDURE — 80053 COMPREHEN METABOLIC PANEL: CPT | Mod: HCNC

## 2018-12-17 PROCEDURE — 94799 UNLISTED PULMONARY SVC/PX: CPT | Mod: HCNC

## 2018-12-17 PROCEDURE — 36415 COLL VENOUS BLD VENIPUNCTURE: CPT | Mod: HCNC

## 2018-12-17 RX ORDER — METOPROLOL TARTRATE 25 MG/1
25 TABLET, FILM COATED ORAL
Status: DISCONTINUED | OUTPATIENT
Start: 2018-12-17 | End: 2018-12-18 | Stop reason: HOSPADM

## 2018-12-17 RX ORDER — CHLORHEXIDINE GLUCONATE ORAL RINSE 1.2 MG/ML
10 SOLUTION DENTAL
Status: DISCONTINUED | OUTPATIENT
Start: 2018-12-17 | End: 2018-12-18 | Stop reason: HOSPADM

## 2018-12-17 RX ADMIN — LOSARTAN POTASSIUM 25 MG: 25 TABLET, FILM COATED ORAL at 08:12

## 2018-12-17 RX ADMIN — ASPIRIN 325 MG ORAL TABLET 325 MG: 325 PILL ORAL at 08:12

## 2018-12-17 RX ADMIN — FAMOTIDINE 20 MG: 20 TABLET ORAL at 08:12

## 2018-12-17 RX ADMIN — METOPROLOL TARTRATE 25 MG: 25 TABLET ORAL at 08:12

## 2018-12-17 RX ADMIN — METOPROLOL TARTRATE 25 MG: 25 TABLET ORAL at 09:12

## 2018-12-17 RX ADMIN — TAMSULOSIN HYDROCHLORIDE 0.4 MG: 0.4 CAPSULE ORAL at 08:12

## 2018-12-17 RX ADMIN — ATORVASTATIN CALCIUM 10 MG: 10 TABLET, FILM COATED ORAL at 08:12

## 2018-12-17 RX ADMIN — DOCUSATE SODIUM 100 MG: 100 CAPSULE, LIQUID FILLED ORAL at 08:12

## 2018-12-17 RX ADMIN — HEPARIN SODIUM AND DEXTROSE 19 UNITS/KG/HR: 10000; 5 INJECTION INTRAVENOUS at 10:12

## 2018-12-17 NOTE — SUBJECTIVE & OBJECTIVE
Review of Systems   Constitution: Negative.   HENT: Negative.    Eyes: Negative.    Cardiovascular: Negative.    Respiratory: Negative.    Endocrine: Negative.    Hematologic/Lymphatic: Negative.    Skin: Negative.    Musculoskeletal: Negative.    Gastrointestinal: Negative.    Genitourinary: Negative.    Neurological: Negative.    Psychiatric/Behavioral: Negative.    Allergic/Immunologic: Negative.      Objective:     Vital Signs (Most Recent):  Temp: 98.4 °F (36.9 °C) (12/17/18 1134)  Pulse: 66 (12/17/18 1134)  Resp: 18 (12/17/18 1134)  BP: 105/62 (12/17/18 1134)  SpO2: 95 % (12/17/18 1134) Vital Signs (24h Range):  Temp:  [98.2 °F (36.8 °C)-99.1 °F (37.3 °C)] 98.4 °F (36.9 °C)  Pulse:  [64-76] 66  Resp:  [16-18] 18  SpO2:  [93 %-96 %] 95 %  BP: (105-143)/(60-76) 105/62     Weight: 110.3 kg (243 lb 2.7 oz)  Body mass index is 35.91 kg/m².     SpO2: 95 %  O2 Device (Oxygen Therapy): room air      Intake/Output Summary (Last 24 hours) at 12/17/2018 1220  Last data filed at 12/17/2018 0800  Gross per 24 hour   Intake 1020.48 ml   Output 1175 ml   Net -154.52 ml       Lines/Drains/Airways     Peripheral Intravenous Line                 Peripheral IV - Single Lumen 12/15/18 0934 Right Antecubital 2 days         Peripheral IV - Single Lumen 12/15/18 0943 Left Forearm 2 days                Physical Exam   Constitutional: He is oriented to person, place, and time. He appears well-developed and well-nourished. No distress.   HENT:   Head: Normocephalic and atraumatic.   Eyes: Pupils are equal, round, and reactive to light. Right eye exhibits no discharge. Left eye exhibits no discharge.   Neck: Neck supple. No thyromegaly present.   Cardiovascular: Normal rate, regular rhythm, S1 normal, S2 normal and normal heart sounds.   No murmur heard.  Pulmonary/Chest: Effort normal and breath sounds normal. No respiratory distress. He has no wheezes. He has no rales.   Abdominal: Soft. He exhibits no distension. There is no  tenderness. There is no rebound.   Musculoskeletal: He exhibits no edema.   Neurological: He is alert and oriented to person, place, and time.   Skin: Skin is warm and dry. He is not diaphoretic. No erythema.   Psychiatric: He has a normal mood and affect. His behavior is normal. Thought content normal.   Nursing note and vitals reviewed.      Significant Labs:   CMP   Recent Labs   Lab 12/16/18  0402 12/17/18  0506    137   K 4.2 3.7    103   CO2 27 27   GLU 91 97   BUN 12 14   CREATININE 1.3 1.3   CALCIUM 9.0 9.0   PROT  --  6.3   ALBUMIN  --  3.2*   BILITOT  --  0.6   ALKPHOS  --  25*   AST  --  39   ALT  --  26   ANIONGAP 7* 7*   ESTGFRAFRICA >60 >60   EGFRNONAA 55* 55*   , CBC   Recent Labs   Lab 12/16/18  0136 12/16/18  0402 12/17/18  0506   WBC 7.27 6.87 5.44   HGB 13.0* 13.4* 12.6*   HCT 40.5 42.3 38.8*    205 203   , Troponin No results for input(s): TROPONINI in the last 48 hours. and All pertinent lab results from the last 24 hours have been reviewed.    Significant Imaging: Echocardiogram:   2D echo with color flow doppler:   Results for orders placed or performed during the hospital encounter of 12/15/18   2D echo with color flow doppler   Result Value Ref Range    QEF 60 55 - 65    Diastolic Dysfunction No     and X-Ray: CXR: X-Ray Chest 1 View (CXR): No results found for this visit on 12/15/18. and X-Ray Chest PA and Lateral (CXR): No results found for this visit on 12/15/18.

## 2018-12-17 NOTE — PROGRESS NOTES
[facility]  Cardiology  Progress Note    Patient Name: Filipe Agustin Jr.  MRN: [id number]  Admission Date: 12/15/2018  Hospital Length of Stay: 2 days  Code Status: Full Code   Attending Physician: Jose Armando Monroe MD   Primary Care Physician: Jojo Duque DO  Expected Discharge Date:   Principal Problem:Acute ST elevation myocardial infarction (STEMI)    Subjective:   HPI:  Mr. Agustin is a 72 year old male with PMHx of CAD s/p coronary stenting at [facility] per patient report, HTN, HLP who presented to [facility] ED due to chest pain which started abruptly this AM. Initial EKG revealed ST elevated in inferior leads, CODE STEMI activated at that time. Patient received IV heparin bolus and NTG SL x 1 and repeat EKG which continued to reveal ST elevation in inferior leads. Patient transported to cath lab for emergent LHC per Dr. Monroe. IV aggrastat bolus per Dr. Monroe's recommendations. TTE pending. Further recs to follow. Labs pending.     Hospital Course:   12/16/18-Patient seen and examined in ICU, sitting in bedside chair. Denies chest pain or shortness of breath today on exam. IV heparin gtt in progress. Pending CABG on 12/18 per CVT. Labs reviewed, stable.     12/17/18-Patient seen and examined today, resting in bed. No acute events overnight. Denies chest pain or SOB. Labs stable. CABG planned for AM.        Review of Systems   Constitution: Negative.   HENT: Negative.    Eyes: Negative.    Cardiovascular: Negative.    Respiratory: Negative.    Endocrine: Negative.    Hematologic/Lymphatic: Negative.    Skin: Negative.    Musculoskeletal: Negative.    Gastrointestinal: Negative.    Genitourinary: Negative.    Neurological: Negative.    Psychiatric/Behavioral: Negative.    Allergic/Immunologic: Negative.      Objective:     Vital Signs (Most Recent):  Temp: 98.4 °F (36.9 °C) (12/17/18 1134)  Pulse: 66 (12/17/18 1134)  Resp: 18 (12/17/18 1134)  BP: 105/62 (12/17/18 1134)  SpO2: 95 % (12/17/18 1134)  Vital Signs (24h Range):  Temp:  [98.2 °F (36.8 °C)-99.1 °F (37.3 °C)] 98.4 °F (36.9 °C)  Pulse:  [64-76] 66  Resp:  [16-18] 18  SpO2:  [93 %-96 %] 95 %  BP: (105-143)/(60-76) 105/62     Weight: 110.3 kg (243 lb 2.7 oz)  Body mass index is 35.91 kg/m².     SpO2: 95 %  O2 Device (Oxygen Therapy): room air      Intake/Output Summary (Last 24 hours) at 12/17/2018 1220  Last data filed at 12/17/2018 0800  Gross per 24 hour   Intake 1020.48 ml   Output 1175 ml   Net -154.52 ml       Lines/Drains/Airways     Peripheral Intravenous Line                 Peripheral IV - Single Lumen 12/15/18 0934 Right Antecubital 2 days         Peripheral IV - Single Lumen 12/15/18 0943 Left Forearm 2 days                Physical Exam   Constitutional: He is oriented to person, place, and time. He appears well-developed and well-nourished. No distress.   HENT:   Head: Normocephalic and atraumatic.   Eyes: Pupils are equal, round, and reactive to light. Right eye exhibits no discharge. Left eye exhibits no discharge.   Neck: Neck supple. No thyromegaly present.   Cardiovascular: Normal rate, regular rhythm, S1 normal, S2 normal and normal heart sounds.   No murmur heard.  Pulmonary/Chest: Effort normal and breath sounds normal. No respiratory distress. He has no wheezes. He has no rales.   Abdominal: Soft. He exhibits no distension. There is no tenderness. There is no rebound.   Musculoskeletal: He exhibits no edema.   Neurological: He is alert and oriented to person, place, and time.   Skin: Skin is warm and dry. He is not diaphoretic. No erythema.   Psychiatric: He has a normal mood and affect. His behavior is normal. Thought content normal.   Nursing note and vitals reviewed.      Significant Labs:   CMP   Recent Labs   Lab 12/16/18  0402 12/17/18  0506    137   K 4.2 3.7    103   CO2 27 27   GLU 91 97   BUN 12 14   CREATININE 1.3 1.3   CALCIUM 9.0 9.0   PROT  --  6.3   ALBUMIN  --  3.2*   BILITOT  --  0.6   ALKPHOS  --  25*    AST  --  39   ALT  --  26   ANIONGAP 7* 7*   ESTGFRAFRICA >60 >60   EGFRNONAA 55* 55*   , CBC   Recent Labs   Lab 12/16/18  0136 12/16/18  0402 12/17/18  0506   WBC 7.27 6.87 5.44   HGB 13.0* 13.4* 12.6*   HCT 40.5 42.3 38.8*    205 203   , Troponin No results for input(s): TROPONINI in the last 48 hours. and All pertinent lab results from the last 24 hours have been reviewed.    Significant Imaging: Echocardiogram:   2D echo with color flow doppler:   Results for orders placed or performed during the hospital encounter of 12/15/18   2D echo with color flow doppler   Result Value Ref Range    QEF 60 55 - 65    Diastolic Dysfunction No     and X-Ray: CXR: X-Ray Chest 1 View (CXR): No results found for this visit on 12/15/18. and X-Ray Chest PA and Lateral (CXR): No results found for this visit on 12/15/18.    Assessment and Plan:   Patient who presents with acute inferior STEMI s/p LHC that showed multivessel CAD. Stable overnight. Continue same meds. CABG planned for AM.    * Acute ST elevation myocardial infarction (STEMI)    Patient presented to [facility]- due to chest pain  EKG revealed inferior ST elevation  NTG SL x 1 given in ED  Repeat EKG revealed persistently elevation in inferior leads  IV heparin 5,000 units given in ED  No BB due to Bradycardia in ED  TTE pending  FLP pending  Patient taken to Cath Lab for emergent LHC per Dr. Monroe.  Aggrastat bolus and Infusion to follow  Further recs to follow    12/16  -CABG recommended, CVT consulted and plan for CABG on 12/18  -Continue IV heparin gtt, ASA, Statin, BB  -Start low dose ARB today  -Transfer to telemetry today  -No chest pain on exam today.   -FLP not drawn, will reorder for AM  -ECHO revealed EF 60-65%, -WMA's, normal Bi-V function.    12/17/18  -Stable overnight  -No chest pain or SOB  -Continue IV heparin  -Continue ASA, BB, statin, ARB  -CABG planned for AM       CKD (chronic kidney disease) stage 3, GFR 30-59 ml/min    -Cr 1.3  today  -Monitor     Hypertension goal BP (blood pressure) < 140/90    On medical therapy  Continue BB  Start ARB today     Coronary artery disease involving native coronary artery of native heart with unstable angina pectoris    -See plan under STEMI     Severe obesity with body mass index (BMI) of 35.0 to 39.9 with serious comorbidity    -Encourage weight loss         VTE Risk Mitigation (From admission, onward)        Ordered     heparin 25,000 units in dextrose 5% 250 mL (100 units/mL) infusion LOW INTENSITY nomogram - [facility]  Continuous      12/15/18 1121     heparin 25,000 units in dextrose 5% (100 units/ml) IV bolus from bag - ADDITIONAL PRN BOLUS - 60 units/kg (max bolus 4000 units)  As needed (PRN)      12/15/18 1121     heparin 25,000 units in dextrose 5% (100 units/ml) IV bolus from bag - ADDITIONAL PRN BOLUS - 30 units/kg (max bolus 4000 units)  As needed (PRN)      12/15/18 1121     heparin (porcine) injection 5,000 Units  ED 1 Time      12/15/18 0931          hCeryl Farrell PA-C  Cardiology  [facility]    Chart reviewed. Dr. Romo examined patient and agrees with plan as outlined above.

## 2018-12-17 NOTE — PLAN OF CARE
Problem: Adult Inpatient Plan of Care  Goal: Plan of Care Review  Outcome: Ongoing (interventions implemented as appropriate)  POC reviewed with patient. Pt verbalized understanding  Pt remains free of injuries and falls; fall precaution in place.   No complaints of pain.  NR on tele monitor.   IV infusing Heparin at 19 units/kg.   No other c/o at this time.  On room air.  AAO x4; resting at this time.   Bed low, side rails x2, call light in reach, personal belongings at bedside, urinal in reach.   Reminded to call for assistance.  Repositioned independently.  Hourly rounding complete. Will continue to monitor.

## 2018-12-17 NOTE — ASSESSMENT & PLAN NOTE
Patient presented to [facility] due to chest pain  EKG revealed inferior ST elevation  NTG SL x 1 given in ED  Repeat EKG revealed persistently elevation in inferior leads  IV heparin 5,000 units given in ED  No BB due to Bradycardia in ED  TTE pending  FLP pending  Patient taken to Cath Lab for emergent LHC per Dr. Monroe.  Aggrastat bolus and Infusion to follow  Further recs to follow    12/16  -CABG recommended, CVT consulted and plan for CABG on 12/18  -Continue IV heparin gtt, ASA, Statin, BB  -Start low dose ARB today  -Transfer to telemetry today  -No chest pain on exam today.   -FLP not drawn, will reorder for AM  -ECHO revealed EF 60-65%, -WMA's, normal Bi-V function.    12/17/18  -Stable overnight  -No chest pain or SOB  -Continue IV heparin  -Continue ASA, BB, statin, ARB  -CABG planned for AM

## 2018-12-17 NOTE — ANESTHESIA PREPROCEDURE EVALUATION
12/17/2018  Filipe Agustin Jr. is a 72 y.o., male.    Anesthesia Evaluation    I have reviewed the Patient Summary Reports.    I have reviewed the Nursing Notes.   I have reviewed the Medications.     Review of Systems  Anesthesia Hx:   Denies Personal Hx of Anesthesia complications.   Social:  Former Smoker, Alcohol Use Quit 2008   Hematology/Oncology:        Current/Recent Cancer. ( prostate CA)   Cardiovascular:   Hypertension Valvular problems/Murmurs (TR? per history, none on recent echo) Past MI (STEMI) CAD  CABG/stent (Stent)  Angina hyperlipidemia ALVARADO ECG has been reviewed.  Cardiovascular Symptoms: Angina  Coronary Artery Disease: Hx of Myocardial Infarction, MI is acute = within 1 week, STEMI 12/15/18 [facility]  Diagnostic:          Patient has a right dominant coronary artery.        - Left Main Coronary Artery:             The LM is normal. There is DENNIS 3 flow.     - Left Anterior Descending Artery:             The mid LAD has a 75% stenosis. There is DENNIS 3 flow.     - Left Circumflex Artery:             The distal LCX is occluded (100). There is DENNIS 0 flow. Mid subtotaled     - Right Coronary Artery:             The proximal RCA has a 90% stenosis. There is DENNIS 3 flow. mid -distal 90%     - Common Femoral Artery:             The right CFA is normal. Denies Congestive Heart Failure (CHF)  12/15/18 echo  CONCLUSIONS     1 - Concentric hypertrophy.     2 - No wall motion abnormalities.     3 - Normal left ventricular systolic function (EF 60-65%).     4 - Normal left ventricular diastolic function.     5 - Normal right ventricular systolic function .    Renal/:   Chronic Renal Disease (CKD 3, 30-59GFR), CRI    Hepatic/GI:  Hepatic/GI Normal    Neurological:  Neurology Normal polio       Physical Exam  General:  Obesity    Airway/Jaw/Neck:  Airway Findings: Mouth Opening: Normal Tongue: Large  General  Airway Assessment: Adult  Mallampati: II  TM Distance: 4 - 6 cm      Dental:  Dental Findings:   Chest/Lungs:  Chest/Lungs Findings: Clear to auscultation     Heart/Vascular:  Heart Findings: Rate: Normal        Mental Status:  Mental Status Findings:  Alert and Oriented         Anesthesia Plan  Type of Anesthesia, risks & benefits discussed:  Anesthesia Type:  general  Patient's Preference:   Intra-op Monitoring Plan: central line, [place], arterial line, cardiac output and standard ASA monitors  Intra-op Monitoring Plan Comments:   Post Op Pain Control Plan: multimodal analgesia and per primary service following discharge from PACU  Post Op Pain Control Plan Comments:   Induction:   IV  Beta Blocker:  Patient is on a Beta-Blocker and has received one dose within the past 24 hours (No further documentation required).       Informed Consent: Patient understands risks and agrees with Anesthesia plan.  Questions answered. Anesthesia consent signed with patient.  ASA Score: 4     Day of Surgery Review of History & Physical:  There are no significant changes.          Ready For Surgery From Anesthesia Perspective.

## 2018-12-18 ENCOUNTER — ANESTHESIA (OUTPATIENT)
Dept: SURGERY | Facility: HOSPITAL | Age: 73
DRG: 234 | End: 2018-12-18
Payer: MEDICARE

## 2018-12-18 PROBLEM — Z95.1 S/P CABG X 4: Status: ACTIVE | Noted: 2018-12-15

## 2018-12-18 PROBLEM — Z99.11 ON MECHANICALLY ASSISTED VENTILATION: Status: ACTIVE | Noted: 2018-12-18

## 2018-12-18 LAB
ALBUMIN SERPL BCP-MCNC: 3.2 G/DL
ALLENS TEST: ABNORMAL
ALLENS TEST: ABNORMAL
ALP SERPL-CCNC: 26 U/L
ALT SERPL W/O P-5'-P-CCNC: 29 U/L
ANION GAP SERPL CALC-SCNC: 12 MMOL/L
ANION GAP SERPL CALC-SCNC: 17 MMOL/L
ANION GAP SERPL CALC-SCNC: 8 MMOL/L
APTT BLDCRRT: 22.1 SEC
APTT BLDCRRT: 23.1 SEC
APTT BLDCRRT: 23.8 SEC
AST SERPL-CCNC: 34 U/L
BASOPHILS # BLD AUTO: 0.01 K/UL
BASOPHILS # BLD AUTO: 0.01 K/UL
BASOPHILS # BLD AUTO: 0.03 K/UL
BASOPHILS NFR BLD: 0.1 %
BASOPHILS NFR BLD: 0.1 %
BASOPHILS NFR BLD: 0.6 %
BILIRUB SERPL-MCNC: 0.7 MG/DL
BUN SERPL-MCNC: 12 MG/DL
BUN SERPL-MCNC: 13 MG/DL
BUN SERPL-MCNC: 14 MG/DL
CALCIUM SERPL-MCNC: 7.9 MG/DL
CALCIUM SERPL-MCNC: 8 MG/DL
CALCIUM SERPL-MCNC: 9.2 MG/DL
CHLORIDE SERPL-SCNC: 104 MMOL/L
CHLORIDE SERPL-SCNC: 106 MMOL/L
CHLORIDE SERPL-SCNC: 106 MMOL/L
CO2 SERPL-SCNC: 17 MMOL/L
CO2 SERPL-SCNC: 21 MMOL/L
CO2 SERPL-SCNC: 26 MMOL/L
CREAT SERPL-MCNC: 1.3 MG/DL
CREAT SERPL-MCNC: 1.3 MG/DL
CREAT SERPL-MCNC: 1.5 MG/DL
DELSYS: ABNORMAL
DELSYS: ABNORMAL
DIFFERENTIAL METHOD: ABNORMAL
EOSINOPHIL # BLD AUTO: 0 K/UL
EOSINOPHIL # BLD AUTO: 0 K/UL
EOSINOPHIL # BLD AUTO: 0.1 K/UL
EOSINOPHIL NFR BLD: 0 %
EOSINOPHIL NFR BLD: 0.3 %
EOSINOPHIL NFR BLD: 2.6 %
ERYTHROCYTE [DISTWIDTH] IN BLOOD BY AUTOMATED COUNT: 13.9 %
ERYTHROCYTE [DISTWIDTH] IN BLOOD BY AUTOMATED COUNT: 13.9 %
ERYTHROCYTE [DISTWIDTH] IN BLOOD BY AUTOMATED COUNT: 14 %
ERYTHROCYTE [DISTWIDTH] IN BLOOD BY AUTOMATED COUNT: 14.4 %
ERYTHROCYTE [SEDIMENTATION RATE] IN BLOOD BY WESTERGREN METHOD: 20 MM/H
EST. GFR  (AFRICAN AMERICAN): 53 ML/MIN/1.73 M^2
EST. GFR  (AFRICAN AMERICAN): >60 ML/MIN/1.73 M^2
EST. GFR  (AFRICAN AMERICAN): >60 ML/MIN/1.73 M^2
EST. GFR  (NON AFRICAN AMERICAN): 46 ML/MIN/1.73 M^2
EST. GFR  (NON AFRICAN AMERICAN): 55 ML/MIN/1.73 M^2
EST. GFR  (NON AFRICAN AMERICAN): 55 ML/MIN/1.73 M^2
FIBRINOGEN PPP-MCNC: 156 MG/DL
FIBRINOGEN PPP-MCNC: 162 MG/DL
FIBRINOGEN PPP-MCNC: 206 MG/DL
FIO2: 100
FIO2: 35
GLUCOSE SERPL-MCNC: 116 MG/DL (ref 70–110)
GLUCOSE SERPL-MCNC: 128 MG/DL (ref 70–110)
GLUCOSE SERPL-MCNC: 130 MG/DL (ref 70–110)
GLUCOSE SERPL-MCNC: 139 MG/DL (ref 70–110)
GLUCOSE SERPL-MCNC: 146 MG/DL (ref 70–110)
GLUCOSE SERPL-MCNC: 156 MG/DL (ref 70–110)
GLUCOSE SERPL-MCNC: 159 MG/DL
GLUCOSE SERPL-MCNC: 214 MG/DL
GLUCOSE SERPL-MCNC: 94 MG/DL
GLUCOSE SERPL-MCNC: 97 MG/DL (ref 70–110)
HCO3 UR-SCNC: 17.7 MMOL/L (ref 24–28)
HCO3 UR-SCNC: 22.8 MMOL/L (ref 24–28)
HCO3 UR-SCNC: 24.1 MMOL/L (ref 24–28)
HCO3 UR-SCNC: 25.3 MMOL/L (ref 24–28)
HCO3 UR-SCNC: 25.7 MMOL/L (ref 24–28)
HCO3 UR-SCNC: 26.4 MMOL/L (ref 24–28)
HCO3 UR-SCNC: 26.7 MMOL/L (ref 24–28)
HCO3 UR-SCNC: 28.3 MMOL/L (ref 24–28)
HCT VFR BLD AUTO: 25.6 %
HCT VFR BLD AUTO: 25.8 %
HCT VFR BLD AUTO: 25.9 %
HCT VFR BLD AUTO: 39 %
HCT VFR BLD CALC: 20 %PCV (ref 36–54)
HCT VFR BLD CALC: 21 %PCV (ref 36–54)
HCT VFR BLD CALC: 22 %PCV (ref 36–54)
HCT VFR BLD CALC: 22 %PCV (ref 36–54)
HCT VFR BLD CALC: 24 %PCV (ref 36–54)
HCT VFR BLD CALC: 33 %PCV (ref 36–54)
HCT VFR BLD CALC: 38 %PCV (ref 36–54)
HGB BLD-MCNC: 12.5 G/DL
HGB BLD-MCNC: 8.2 G/DL
HGB BLD-MCNC: 8.2 G/DL
HGB BLD-MCNC: 8.3 G/DL
INR PPP: 1
INR PPP: 1.1
INR PPP: 1.1
LYMPHOCYTES # BLD AUTO: 0.6 K/UL
LYMPHOCYTES # BLD AUTO: 1 K/UL
LYMPHOCYTES # BLD AUTO: 1.2 K/UL
LYMPHOCYTES NFR BLD: 23.1 %
LYMPHOCYTES NFR BLD: 7.7 %
LYMPHOCYTES NFR BLD: 9.2 %
MAGNESIUM SERPL-MCNC: 4.1 MG/DL
MAGNESIUM SERPL-MCNC: 5.1 MG/DL
MCH RBC QN AUTO: 29.8 PG
MCH RBC QN AUTO: 29.9 PG
MCH RBC QN AUTO: 30.4 PG
MCH RBC QN AUTO: 30.4 PG
MCHC RBC AUTO-ENTMCNC: 31.7 G/DL
MCHC RBC AUTO-ENTMCNC: 32 G/DL
MCHC RBC AUTO-ENTMCNC: 32.1 G/DL
MCHC RBC AUTO-ENTMCNC: 32.2 G/DL
MCV RBC AUTO: 93 FL
MCV RBC AUTO: 95 FL
MODE: ABNORMAL
MODE: ABNORMAL
MONOCYTES # BLD AUTO: 0.5 K/UL
MONOCYTES # BLD AUTO: 0.5 K/UL
MONOCYTES # BLD AUTO: 0.9 K/UL
MONOCYTES NFR BLD: 6.7 %
MONOCYTES NFR BLD: 7 %
MONOCYTES NFR BLD: 9.9 %
NEUTROPHILS # BLD AUTO: 10.6 K/UL
NEUTROPHILS # BLD AUTO: 3.4 K/UL
NEUTROPHILS # BLD AUTO: 5.7 K/UL
NEUTROPHILS NFR BLD: 63.8 %
NEUTROPHILS NFR BLD: 83.7 %
NEUTROPHILS NFR BLD: 85.2 %
PCO2 BLDA: 34.7 MMHG (ref 35–45)
PCO2 BLDA: 40.8 MMHG (ref 35–45)
PCO2 BLDA: 40.8 MMHG (ref 35–45)
PCO2 BLDA: 42.5 MMHG (ref 35–45)
PCO2 BLDA: 42.8 MMHG (ref 35–45)
PCO2 BLDA: 43.1 MMHG (ref 35–45)
PCO2 BLDA: 43.9 MMHG (ref 35–45)
PCO2 BLDA: 46.4 MMHG (ref 35–45)
PEEP: 5
PEEP: 5
PH SMN: 7.32 [PH] (ref 7.35–7.45)
PH SMN: 7.36 [PH] (ref 7.35–7.45)
PH SMN: 7.37 [PH] (ref 7.35–7.45)
PH SMN: 7.38 [PH] (ref 7.35–7.45)
PH SMN: 7.4 [PH] (ref 7.35–7.45)
PH SMN: 7.42 [PH] (ref 7.35–7.45)
PLATELET # BLD AUTO: 104 K/UL
PLATELET # BLD AUTO: 106 K/UL
PLATELET # BLD AUTO: 174 K/UL
PLATELET # BLD AUTO: 210 K/UL
PLATELET BLD QL SMEAR: NORMAL
PMV BLD AUTO: 11.3 FL
PMV BLD AUTO: 11.5 FL
PMV BLD AUTO: 11.5 FL
PMV BLD AUTO: 11.8 FL
PO2 BLDA: 123 MMHG (ref 80–100)
PO2 BLDA: 177 MMHG (ref 80–100)
PO2 BLDA: 270 MMHG (ref 80–100)
PO2 BLDA: 381 MMHG (ref 80–100)
PO2 BLDA: 407 MMHG (ref 80–100)
PO2 BLDA: 43 MMHG (ref 40–60)
PO2 BLDA: 80 MMHG (ref 80–100)
PO2 BLDA: 82 MMHG (ref 80–100)
POC ACTIVATED CLOTTING TIME K: 109 SEC (ref 74–137)
POC ACTIVATED CLOTTING TIME K: 125 SEC (ref 74–137)
POC ACTIVATED CLOTTING TIME K: 307 SEC (ref 74–137)
POC ACTIVATED CLOTTING TIME K: 384 SEC (ref 74–137)
POC ACTIVATED CLOTTING TIME K: 428 SEC (ref 74–137)
POC ACTIVATED CLOTTING TIME K: 444 SEC (ref 74–137)
POC ACTIVATED CLOTTING TIME K: 499 SEC (ref 74–137)
POC ACTIVATED CLOTTING TIME K: 510 SEC (ref 74–137)
POC ACTIVATED CLOTTING TIME K: 521 SEC (ref 74–137)
POC ACTIVATED CLOTTING TIME K: 560 SEC (ref 74–137)
POC BE: -1 MMOL/L
POC BE: -3 MMOL/L
POC BE: -8 MMOL/L
POC BE: 0 MMOL/L
POC BE: 1 MMOL/L
POC BE: 1 MMOL/L
POC BE: 2 MMOL/L
POC BE: 4 MMOL/L
POC IONIZED CALCIUM: 0.98 MMOL/L (ref 1.06–1.42)
POC IONIZED CALCIUM: 0.99 MMOL/L (ref 1.06–1.42)
POC IONIZED CALCIUM: 1.05 MMOL/L (ref 1.06–1.42)
POC IONIZED CALCIUM: 1.06 MMOL/L (ref 1.06–1.42)
POC IONIZED CALCIUM: 1.15 MMOL/L (ref 1.06–1.42)
POC IONIZED CALCIUM: 1.16 MMOL/L (ref 1.06–1.42)
POC IONIZED CALCIUM: 1.19 MMOL/L (ref 1.06–1.42)
POC SATURATED O2: 100 % (ref 95–100)
POC SATURATED O2: 78 % (ref 95–100)
POC SATURATED O2: 95 % (ref 95–100)
POC SATURATED O2: 96 % (ref 95–100)
POC SATURATED O2: 99 % (ref 95–100)
POCT GLUCOSE: 164 MG/DL (ref 70–110)
POCT GLUCOSE: 179 MG/DL (ref 70–110)
POCT GLUCOSE: 185 MG/DL (ref 70–110)
POCT GLUCOSE: 187 MG/DL (ref 70–110)
POCT GLUCOSE: 191 MG/DL (ref 70–110)
POCT GLUCOSE: 194 MG/DL (ref 70–110)
POCT GLUCOSE: 204 MG/DL (ref 70–110)
POCT GLUCOSE: 204 MG/DL (ref 70–110)
POCT GLUCOSE: 208 MG/DL (ref 70–110)
POCT GLUCOSE: 213 MG/DL (ref 70–110)
POTASSIUM BLD-SCNC: 3.6 MMOL/L (ref 3.5–5.1)
POTASSIUM BLD-SCNC: 4 MMOL/L (ref 3.5–5.1)
POTASSIUM BLD-SCNC: 4.1 MMOL/L (ref 3.5–5.1)
POTASSIUM BLD-SCNC: 4.3 MMOL/L (ref 3.5–5.1)
POTASSIUM BLD-SCNC: 5.3 MMOL/L (ref 3.5–5.1)
POTASSIUM BLD-SCNC: 5.4 MMOL/L (ref 3.5–5.1)
POTASSIUM BLD-SCNC: 6 MMOL/L (ref 3.5–5.1)
POTASSIUM SERPL-SCNC: 3.7 MMOL/L
POTASSIUM SERPL-SCNC: 4 MMOL/L
POTASSIUM SERPL-SCNC: 4.1 MMOL/L
PROT SERPL-MCNC: 6.5 G/DL
PROTHROMBIN TIME: 11.2 SEC
PROTHROMBIN TIME: 11.4 SEC
PROTHROMBIN TIME: 12.4 SEC
PS: 10
RBC # BLD AUTO: 2.7 M/UL
RBC # BLD AUTO: 2.73 M/UL
RBC # BLD AUTO: 2.74 M/UL
RBC # BLD AUTO: 4.19 M/UL
SAMPLE: ABNORMAL
SAMPLE: NORMAL
SAMPLE: NORMAL
SITE: ABNORMAL
SITE: ABNORMAL
SODIUM BLD-SCNC: 134 MMOL/L (ref 136–145)
SODIUM BLD-SCNC: 135 MMOL/L (ref 136–145)
SODIUM BLD-SCNC: 136 MMOL/L (ref 136–145)
SODIUM BLD-SCNC: 139 MMOL/L (ref 136–145)
SODIUM BLD-SCNC: 139 MMOL/L (ref 136–145)
SODIUM BLD-SCNC: 140 MMOL/L (ref 136–145)
SODIUM BLD-SCNC: 141 MMOL/L (ref 136–145)
SODIUM SERPL-SCNC: 138 MMOL/L
SODIUM SERPL-SCNC: 139 MMOL/L
SODIUM SERPL-SCNC: 140 MMOL/L
SPONT RATE: 22
VT: 400
WBC # BLD AUTO: 10.19 K/UL
WBC # BLD AUTO: 12.42 K/UL
WBC # BLD AUTO: 5.33 K/UL
WBC # BLD AUTO: 6.84 K/UL

## 2018-12-18 PROCEDURE — 63600175 PHARM REV CODE 636 W HCPCS: Mod: HCNC | Performed by: NURSE ANESTHETIST, CERTIFIED REGISTERED

## 2018-12-18 PROCEDURE — 27100025 HC TUBING, SET FLUID WARMER: Mod: HCNC | Performed by: NURSE ANESTHETIST, CERTIFIED REGISTERED

## 2018-12-18 PROCEDURE — 99291 CRITICAL CARE FIRST HOUR: CPT | Mod: HCNC,,, | Performed by: NURSE PRACTITIONER

## 2018-12-18 PROCEDURE — 82803 BLOOD GASES ANY COMBINATION: CPT | Mod: HCNC

## 2018-12-18 PROCEDURE — P9045 ALBUMIN (HUMAN), 5%, 250 ML: HCPCS | Mod: JG,HCNC | Performed by: NURSE ANESTHETIST, CERTIFIED REGISTERED

## 2018-12-18 PROCEDURE — 93005 ELECTROCARDIOGRAM TRACING: CPT | Mod: HCNC

## 2018-12-18 PROCEDURE — 27000221 HC OXYGEN, UP TO 24 HOURS: Mod: HCNC

## 2018-12-18 PROCEDURE — 85384 FIBRINOGEN ACTIVITY: CPT | Mod: HCNC

## 2018-12-18 PROCEDURE — S0017 INJECTION, AMINOCAPROIC ACID: HCPCS | Mod: HCNC

## 2018-12-18 PROCEDURE — 37000009 HC ANESTHESIA EA ADD 15 MINS: Mod: HCNC | Performed by: THORACIC SURGERY (CARDIOTHORACIC VASCULAR SURGERY)

## 2018-12-18 PROCEDURE — 99291 PR CRITICAL CARE, E/M 30-74 MINUTES: ICD-10-PCS | Mod: HCNC,,, | Performed by: NURSE PRACTITIONER

## 2018-12-18 PROCEDURE — 99233 PR SUBSEQUENT HOSPITAL CARE,LEVL III: ICD-10-PCS | Mod: HCNC,,, | Performed by: INTERNAL MEDICINE

## 2018-12-18 PROCEDURE — P9045 ALBUMIN (HUMAN), 5%, 250 ML: HCPCS | Mod: JG,HCNC | Performed by: THORACIC SURGERY (CARDIOTHORACIC VASCULAR SURGERY)

## 2018-12-18 PROCEDURE — 25000003 PHARM REV CODE 250: Mod: HCNC | Performed by: NURSE ANESTHETIST, CERTIFIED REGISTERED

## 2018-12-18 PROCEDURE — 27200667 HC PACEMAKER, TEMPORARY MONITORING, PER SHIFT: Mod: HCNC

## 2018-12-18 PROCEDURE — C1729 CATH, DRAINAGE: HCPCS | Mod: HCNC | Performed by: THORACIC SURGERY (CARDIOTHORACIC VASCULAR SURGERY)

## 2018-12-18 PROCEDURE — 84295 ASSAY OF SERUM SODIUM: CPT | Mod: HCNC

## 2018-12-18 PROCEDURE — 63600175 PHARM REV CODE 636 W HCPCS: Mod: HCNC | Performed by: THORACIC SURGERY (CARDIOTHORACIC VASCULAR SURGERY)

## 2018-12-18 PROCEDURE — 71000028 HC RECOVERY HIGH ACUITY/ PER HOUR: Mod: HCNC

## 2018-12-18 PROCEDURE — 33533 PR CABG, ARTERIAL, SINGLE: ICD-10-PCS | Mod: HCNC,,, | Performed by: THORACIC SURGERY (CARDIOTHORACIC VASCULAR SURGERY)

## 2018-12-18 PROCEDURE — 63600175 PHARM REV CODE 636 W HCPCS: Mod: HCNC

## 2018-12-18 PROCEDURE — 85730 THROMBOPLASTIN TIME PARTIAL: CPT | Mod: 91,HCNC

## 2018-12-18 PROCEDURE — 85347 COAGULATION TIME ACTIVATED: CPT | Mod: HCNC

## 2018-12-18 PROCEDURE — 25000003 PHARM REV CODE 250: Mod: HCNC | Performed by: THORACIC SURGERY (CARDIOTHORACIC VASCULAR SURGERY)

## 2018-12-18 PROCEDURE — C9290 INJ, BUPIVACAINE LIPOSOME: HCPCS | Mod: HCNC | Performed by: THORACIC SURGERY (CARDIOTHORACIC VASCULAR SURGERY)

## 2018-12-18 PROCEDURE — 25000003 PHARM REV CODE 250: Mod: HCNC

## 2018-12-18 PROCEDURE — 83735 ASSAY OF MAGNESIUM: CPT | Mod: HCNC

## 2018-12-18 PROCEDURE — 33519 PR CABG, ARTERY-VEIN, THREE: ICD-10-PCS | Mod: HCNC,,, | Performed by: THORACIC SURGERY (CARDIOTHORACIC VASCULAR SURGERY)

## 2018-12-18 PROCEDURE — 33508 ENDOSCOPIC VEIN HARVEST: CPT | Mod: HCNC,,, | Performed by: THORACIC SURGERY (CARDIOTHORACIC VASCULAR SURGERY)

## 2018-12-18 PROCEDURE — 27800505 HC CATH, RADIAL ARTERY KIT: Mod: HCNC | Performed by: NURSE ANESTHETIST, CERTIFIED REGISTERED

## 2018-12-18 PROCEDURE — 37799 UNLISTED PX VASCULAR SURGERY: CPT | Mod: HCNC

## 2018-12-18 PROCEDURE — S0017 INJECTION, AMINOCAPROIC ACID: HCPCS | Mod: HCNC | Performed by: NURSE ANESTHETIST, CERTIFIED REGISTERED

## 2018-12-18 PROCEDURE — 80053 COMPREHEN METABOLIC PANEL: CPT | Mod: HCNC

## 2018-12-18 PROCEDURE — 85025 COMPLETE CBC W/AUTO DIFF WBC: CPT | Mod: 91,HCNC

## 2018-12-18 PROCEDURE — P9016 RBC LEUKOCYTES REDUCED: HCPCS | Mod: HCNC

## 2018-12-18 PROCEDURE — 85610 PROTHROMBIN TIME: CPT | Mod: 91,HCNC

## 2018-12-18 PROCEDURE — 99233 SBSQ HOSP IP/OBS HIGH 50: CPT | Mod: HCNC,,, | Performed by: INTERNAL MEDICINE

## 2018-12-18 PROCEDURE — 33533 CABG ARTERIAL SINGLE: CPT | Mod: HCNC,,, | Performed by: THORACIC SURGERY (CARDIOTHORACIC VASCULAR SURGERY)

## 2018-12-18 PROCEDURE — 80048 BASIC METABOLIC PNL TOTAL CA: CPT | Mod: HCNC

## 2018-12-18 PROCEDURE — 82330 ASSAY OF CALCIUM: CPT | Mod: HCNC

## 2018-12-18 PROCEDURE — 36000713 HC OR TIME LEV V EA ADD 15 MIN: Mod: HCNC | Performed by: THORACIC SURGERY (CARDIOTHORACIC VASCULAR SURGERY)

## 2018-12-18 PROCEDURE — 25000242 PHARM REV CODE 250 ALT 637 W/ HCPCS: Mod: HCNC | Performed by: THORACIC SURGERY (CARDIOTHORACIC VASCULAR SURGERY)

## 2018-12-18 PROCEDURE — 85027 COMPLETE CBC AUTOMATED: CPT | Mod: HCNC

## 2018-12-18 PROCEDURE — 85384 FIBRINOGEN ACTIVITY: CPT | Mod: 91,HCNC

## 2018-12-18 PROCEDURE — 85610 PROTHROMBIN TIME: CPT | Mod: HCNC

## 2018-12-18 PROCEDURE — 33508 PR ENDOSCOPY W/VIDEO-ASST VEIN HARVEST,CABG: ICD-10-PCS | Mod: HCNC,,, | Performed by: THORACIC SURGERY (CARDIOTHORACIC VASCULAR SURGERY)

## 2018-12-18 PROCEDURE — 33519 CABG ARTERY-VEIN THREE: CPT | Mod: HCNC,,, | Performed by: THORACIC SURGERY (CARDIOTHORACIC VASCULAR SURGERY)

## 2018-12-18 PROCEDURE — 37000008 HC ANESTHESIA 1ST 15 MINUTES: Mod: HCNC | Performed by: THORACIC SURGERY (CARDIOTHORACIC VASCULAR SURGERY)

## 2018-12-18 PROCEDURE — 85014 HEMATOCRIT: CPT | Mod: HCNC

## 2018-12-18 PROCEDURE — 20000000 HC ICU ROOM: Mod: HCNC

## 2018-12-18 PROCEDURE — 27201423 OPTIME MED/SURG SUP & DEVICES STERILE SUPPLY: Mod: HCNC | Performed by: THORACIC SURGERY (CARDIOTHORACIC VASCULAR SURGERY)

## 2018-12-18 PROCEDURE — 84132 ASSAY OF SERUM POTASSIUM: CPT | Mod: HCNC

## 2018-12-18 PROCEDURE — P9047 ALBUMIN (HUMAN), 25%, 50ML: HCPCS | Mod: JG,HCNC

## 2018-12-18 PROCEDURE — 36000712 HC OR TIME LEV V 1ST 15 MIN: Mod: HCNC | Performed by: THORACIC SURGERY (CARDIOTHORACIC VASCULAR SURGERY)

## 2018-12-18 PROCEDURE — 94002 VENT MGMT INPAT INIT DAY: CPT | Mod: HCNC

## 2018-12-18 PROCEDURE — 94640 AIRWAY INHALATION TREATMENT: CPT | Mod: HCNC

## 2018-12-18 PROCEDURE — 99900035 HC TECH TIME PER 15 MIN (STAT): Mod: HCNC

## 2018-12-18 PROCEDURE — 85730 THROMBOPLASTIN TIME PARTIAL: CPT | Mod: HCNC

## 2018-12-18 RX ORDER — DEXTROSE, SODIUM CHLORIDE, SODIUM LACTATE, POTASSIUM CHLORIDE, AND CALCIUM CHLORIDE 5; .6; .31; .03; .02 G/100ML; G/100ML; G/100ML; G/100ML; G/100ML
INJECTION, SOLUTION INTRAVENOUS CONTINUOUS
Status: DISCONTINUED | OUTPATIENT
Start: 2018-12-18 | End: 2018-12-19

## 2018-12-18 RX ORDER — CALCIUM CHLORIDE IN 0.9 % NACL 1 G/100 ML
1 INTRAVENOUS SOLUTION, PIGGYBACK (ML) INTRAVENOUS ONCE
Status: DISCONTINUED | OUTPATIENT
Start: 2018-12-18 | End: 2018-12-18

## 2018-12-18 RX ORDER — AMINOCAPROIC ACID 250 MG/ML
INJECTION, SOLUTION INTRAVENOUS
Status: DISCONTINUED | OUTPATIENT
Start: 2018-12-18 | End: 2018-12-18

## 2018-12-18 RX ORDER — OXYCODONE HYDROCHLORIDE 5 MG/1
5 TABLET ORAL EVERY 4 HOURS PRN
Status: DISCONTINUED | OUTPATIENT
Start: 2018-12-18 | End: 2018-12-25 | Stop reason: HOSPADM

## 2018-12-18 RX ORDER — METOPROLOL TARTRATE 25 MG/1
25 TABLET, FILM COATED ORAL 2 TIMES DAILY
Status: DISCONTINUED | OUTPATIENT
Start: 2018-12-19 | End: 2018-12-20

## 2018-12-18 RX ORDER — ONDANSETRON 2 MG/ML
INJECTION INTRAMUSCULAR; INTRAVENOUS
Status: DISCONTINUED | OUTPATIENT
Start: 2018-12-18 | End: 2018-12-18

## 2018-12-18 RX ORDER — PROTAMINE SULFATE 10 MG/ML
INJECTION, SOLUTION INTRAVENOUS
Status: DISCONTINUED | OUTPATIENT
Start: 2018-12-18 | End: 2018-12-18

## 2018-12-18 RX ORDER — POTASSIUM CHLORIDE 20 MEQ/1
20 TABLET, EXTENDED RELEASE ORAL EVERY 12 HOURS
Status: DISCONTINUED | OUTPATIENT
Start: 2018-12-19 | End: 2018-12-19

## 2018-12-18 RX ORDER — NOREPINEPHRINE BITARTRATE 1 MG/ML
INJECTION, SOLUTION INTRAVENOUS
Status: DISCONTINUED | OUTPATIENT
Start: 2018-12-18 | End: 2018-12-18

## 2018-12-18 RX ORDER — FENTANYL CITRATE 50 UG/ML
25 INJECTION, SOLUTION INTRAMUSCULAR; INTRAVENOUS
Status: DISCONTINUED | OUTPATIENT
Start: 2018-12-18 | End: 2018-12-21

## 2018-12-18 RX ORDER — HEPARIN SODIUM 1000 [USP'U]/ML
INJECTION, SOLUTION INTRAVENOUS; SUBCUTANEOUS
Status: DISCONTINUED | OUTPATIENT
Start: 2018-12-18 | End: 2018-12-18 | Stop reason: HOSPADM

## 2018-12-18 RX ORDER — PAPAVERINE HYDROCHLORIDE 30 MG/ML
INJECTION INTRAMUSCULAR; INTRAVENOUS
Status: DISCONTINUED | OUTPATIENT
Start: 2018-12-18 | End: 2018-12-18 | Stop reason: HOSPADM

## 2018-12-18 RX ORDER — ALBUMIN HUMAN 50 G/1000ML
SOLUTION INTRAVENOUS CONTINUOUS PRN
Status: DISCONTINUED | OUTPATIENT
Start: 2018-12-18 | End: 2018-12-18

## 2018-12-18 RX ORDER — POTASSIUM CHLORIDE 14.9 MG/ML
60 INJECTION INTRAVENOUS
Status: DISCONTINUED | OUTPATIENT
Start: 2018-12-18 | End: 2018-12-21

## 2018-12-18 RX ORDER — HEPARIN SODIUM 1000 [USP'U]/ML
INJECTION, SOLUTION INTRAVENOUS; SUBCUTANEOUS
Status: DISCONTINUED | OUTPATIENT
Start: 2018-12-18 | End: 2018-12-18

## 2018-12-18 RX ORDER — ALBUMIN HUMAN 50 G/1000ML
250 SOLUTION INTRAVENOUS
Status: DISCONTINUED | OUTPATIENT
Start: 2018-12-18 | End: 2018-12-21

## 2018-12-18 RX ORDER — ACETAMINOPHEN 10 MG/ML
INJECTION, SOLUTION INTRAVENOUS
Status: DISCONTINUED | OUTPATIENT
Start: 2018-12-18 | End: 2018-12-18

## 2018-12-18 RX ORDER — POLYETHYLENE GLYCOL 3350 17 G/17G
17 POWDER, FOR SOLUTION ORAL DAILY
Status: DISCONTINUED | OUTPATIENT
Start: 2018-12-19 | End: 2018-12-25 | Stop reason: HOSPADM

## 2018-12-18 RX ORDER — SODIUM CHLORIDE, SODIUM LACTATE, POTASSIUM CHLORIDE, CALCIUM CHLORIDE 600; 310; 30; 20 MG/100ML; MG/100ML; MG/100ML; MG/100ML
500 INJECTION, SOLUTION INTRAVENOUS
Status: DISCONTINUED | OUTPATIENT
Start: 2018-12-18 | End: 2018-12-21

## 2018-12-18 RX ORDER — PROPOFOL 10 MG/ML
VIAL (ML) INTRAVENOUS
Status: DISCONTINUED | OUTPATIENT
Start: 2018-12-18 | End: 2018-12-18

## 2018-12-18 RX ORDER — OXYCODONE HYDROCHLORIDE 5 MG/1
10 TABLET ORAL EVERY 4 HOURS PRN
Status: DISCONTINUED | OUTPATIENT
Start: 2018-12-18 | End: 2018-12-23

## 2018-12-18 RX ORDER — SODIUM CHLORIDE, SODIUM LACTATE, POTASSIUM CHLORIDE, CALCIUM CHLORIDE 600; 310; 30; 20 MG/100ML; MG/100ML; MG/100ML; MG/100ML
INJECTION, SOLUTION INTRAVENOUS CONTINUOUS PRN
Status: DISCONTINUED | OUTPATIENT
Start: 2018-12-18 | End: 2018-12-18

## 2018-12-18 RX ORDER — CEFAZOLIN SODIUM 1 G/3ML
INJECTION, POWDER, FOR SOLUTION INTRAMUSCULAR; INTRAVENOUS
Status: DISCONTINUED | OUTPATIENT
Start: 2018-12-18 | End: 2018-12-18

## 2018-12-18 RX ORDER — CEFAZOLIN SODIUM 2 G/50ML
2 SOLUTION INTRAVENOUS
Status: COMPLETED | OUTPATIENT
Start: 2018-12-18 | End: 2018-12-19

## 2018-12-18 RX ORDER — CHLORHEXIDINE GLUCONATE ORAL RINSE 1.2 MG/ML
10 SOLUTION DENTAL 2 TIMES DAILY
Status: COMPLETED | OUTPATIENT
Start: 2018-12-18 | End: 2018-12-23

## 2018-12-18 RX ORDER — FUROSEMIDE 10 MG/ML
40 INJECTION INTRAMUSCULAR; INTRAVENOUS 2 TIMES DAILY
Status: DISCONTINUED | OUTPATIENT
Start: 2018-12-19 | End: 2018-12-19

## 2018-12-18 RX ORDER — FENTANYL CITRATE 50 UG/ML
INJECTION, SOLUTION INTRAMUSCULAR; INTRAVENOUS
Status: DISCONTINUED | OUTPATIENT
Start: 2018-12-18 | End: 2018-12-18

## 2018-12-18 RX ORDER — POLYETHYLENE GLYCOL 3350 17 G/17G
17 POWDER, FOR SOLUTION ORAL DAILY
Status: DISCONTINUED | OUTPATIENT
Start: 2018-12-18 | End: 2018-12-18

## 2018-12-18 RX ORDER — CLOPIDOGREL BISULFATE 75 MG/1
75 TABLET ORAL DAILY
Status: DISCONTINUED | OUTPATIENT
Start: 2018-12-19 | End: 2018-12-25 | Stop reason: HOSPADM

## 2018-12-18 RX ORDER — DOCUSATE SODIUM 100 MG/1
100 CAPSULE, LIQUID FILLED ORAL 2 TIMES DAILY
Status: DISCONTINUED | OUTPATIENT
Start: 2018-12-19 | End: 2018-12-25 | Stop reason: HOSPADM

## 2018-12-18 RX ORDER — ALBUTEROL SULFATE 0.83 MG/ML
2.5 SOLUTION RESPIRATORY (INHALATION) EVERY 4 HOURS PRN
Status: DISCONTINUED | OUTPATIENT
Start: 2018-12-18 | End: 2018-12-25 | Stop reason: HOSPADM

## 2018-12-18 RX ORDER — PANTOPRAZOLE SODIUM 40 MG/10ML
40 INJECTION, POWDER, LYOPHILIZED, FOR SOLUTION INTRAVENOUS DAILY
Status: DISCONTINUED | OUTPATIENT
Start: 2018-12-19 | End: 2018-12-23

## 2018-12-18 RX ORDER — MIDAZOLAM HYDROCHLORIDE 1 MG/ML
INJECTION, SOLUTION INTRAMUSCULAR; INTRAVENOUS
Status: DISCONTINUED | OUTPATIENT
Start: 2018-12-18 | End: 2018-12-18

## 2018-12-18 RX ORDER — METOCLOPRAMIDE HYDROCHLORIDE 5 MG/ML
5 INJECTION INTRAMUSCULAR; INTRAVENOUS EVERY 6 HOURS PRN
Status: DISCONTINUED | OUTPATIENT
Start: 2018-12-18 | End: 2018-12-25 | Stop reason: HOSPADM

## 2018-12-18 RX ORDER — PHENYLEPHRINE HYDROCHLORIDE 10 MG/ML
INJECTION INTRAVENOUS
Status: DISCONTINUED | OUTPATIENT
Start: 2018-12-18 | End: 2018-12-18

## 2018-12-18 RX ORDER — ROCURONIUM BROMIDE 10 MG/ML
INJECTION, SOLUTION INTRAVENOUS
Status: DISCONTINUED | OUTPATIENT
Start: 2018-12-18 | End: 2018-12-18

## 2018-12-18 RX ORDER — NICARDIPINE HYDROCHLORIDE 0.2 MG/ML
5 INJECTION INTRAVENOUS CONTINUOUS
Status: DISCONTINUED | OUTPATIENT
Start: 2018-12-18 | End: 2018-12-21

## 2018-12-18 RX ORDER — LIDOCAINE HCL/PF 100 MG/5ML
SYRINGE (ML) INTRAVENOUS
Status: DISCONTINUED | OUTPATIENT
Start: 2018-12-18 | End: 2018-12-18

## 2018-12-18 RX ORDER — IPRATROPIUM BROMIDE AND ALBUTEROL SULFATE 2.5; .5 MG/3ML; MG/3ML
3 SOLUTION RESPIRATORY (INHALATION) EVERY 4 HOURS
Status: COMPLETED | OUTPATIENT
Start: 2018-12-18 | End: 2018-12-19

## 2018-12-18 RX ORDER — NOREPINEPHRINE BITARTRATE/D5W 4MG/250ML
0.02 PLASTIC BAG, INJECTION (ML) INTRAVENOUS CONTINUOUS
Status: DISCONTINUED | OUTPATIENT
Start: 2018-12-18 | End: 2018-12-21

## 2018-12-18 RX ORDER — DEXMEDETOMIDINE HYDROCHLORIDE 4 UG/ML
0.2 INJECTION, SOLUTION INTRAVENOUS CONTINUOUS
Status: DISCONTINUED | OUTPATIENT
Start: 2018-12-18 | End: 2018-12-21

## 2018-12-18 RX ORDER — ONDANSETRON 2 MG/ML
4 INJECTION INTRAMUSCULAR; INTRAVENOUS EVERY 12 HOURS PRN
Status: DISCONTINUED | OUTPATIENT
Start: 2018-12-18 | End: 2018-12-25 | Stop reason: HOSPADM

## 2018-12-18 RX ORDER — CALCIUM CHLORIDE IN 0.9 % NACL 1 G/100 ML
1 INTRAVENOUS SOLUTION, PIGGYBACK (ML) INTRAVENOUS ONCE
Status: COMPLETED | OUTPATIENT
Start: 2018-12-18 | End: 2018-12-18

## 2018-12-18 RX ORDER — POTASSIUM CHLORIDE 14.9 MG/ML
20 INJECTION INTRAVENOUS
Status: DISCONTINUED | OUTPATIENT
Start: 2018-12-18 | End: 2018-12-21

## 2018-12-18 RX ORDER — FENTANYL CITRATE 50 UG/ML
50 INJECTION, SOLUTION INTRAMUSCULAR; INTRAVENOUS
Status: DISCONTINUED | OUTPATIENT
Start: 2018-12-18 | End: 2018-12-21

## 2018-12-18 RX ORDER — MAGNESIUM SULFATE 1 G/100ML
1 INJECTION INTRAVENOUS
Status: DISCONTINUED | OUTPATIENT
Start: 2018-12-18 | End: 2018-12-21

## 2018-12-18 RX ORDER — ASPIRIN 81 MG/1
81 TABLET ORAL DAILY
Status: DISCONTINUED | OUTPATIENT
Start: 2018-12-19 | End: 2018-12-25 | Stop reason: HOSPADM

## 2018-12-18 RX ORDER — POTASSIUM CHLORIDE 29.8 MG/ML
40 INJECTION INTRAVENOUS
Status: DISCONTINUED | OUTPATIENT
Start: 2018-12-18 | End: 2018-12-21

## 2018-12-18 RX ADMIN — VASOPRESSIN 0.4 UNITS/MIN: 20 INJECTION INTRAVENOUS at 09:12

## 2018-12-18 RX ADMIN — ALBUMIN HUMAN 250 ML: 0.05 INJECTION, SOLUTION INTRAVENOUS at 04:12

## 2018-12-18 RX ADMIN — NICARDIPINE HYDROCHLORIDE 5 MG/HR: 0.2 INJECTION, SOLUTION INTRAVENOUS at 02:12

## 2018-12-18 RX ADMIN — CEFAZOLIN SODIUM 2 G: 2 SOLUTION INTRAVENOUS at 08:12

## 2018-12-18 RX ADMIN — ALBUMIN HUMAN 250 ML: 0.05 INJECTION, SOLUTION INTRAVENOUS at 07:12

## 2018-12-18 RX ADMIN — NOREPINEPHRINE BITARTRATE 8 MCG: 1 INJECTION, SOLUTION, CONCENTRATE INTRAVENOUS at 09:12

## 2018-12-18 RX ADMIN — DEXMEDETOMIDINE HYDROCHLORIDE 1.4 MCG/KG/HR: 4 INJECTION, SOLUTION INTRAVENOUS at 01:12

## 2018-12-18 RX ADMIN — VASOPRESSIN 0.01 UNITS/MIN: 20 INJECTION INTRAVENOUS at 01:12

## 2018-12-18 RX ADMIN — MIDAZOLAM 1 MG: 1 INJECTION INTRAMUSCULAR; INTRAVENOUS at 09:12

## 2018-12-18 RX ADMIN — ROCURONIUM BROMIDE 100 MG: 10 INJECTION, SOLUTION INTRAVENOUS at 07:12

## 2018-12-18 RX ADMIN — IPRATROPIUM BROMIDE AND ALBUTEROL SULFATE 3 ML: .5; 3 SOLUTION RESPIRATORY (INHALATION) at 07:12

## 2018-12-18 RX ADMIN — AMINOCAPROIC ACID 5 G: 250 INJECTION, SOLUTION INTRAVENOUS at 08:12

## 2018-12-18 RX ADMIN — HEPARIN SODIUM 10000 UNITS: 1000 INJECTION, SOLUTION INTRAVENOUS; SUBCUTANEOUS at 08:12

## 2018-12-18 RX ADMIN — BUPIVACAINE 266 MG: 13.3 INJECTION, SUSPENSION, LIPOSOMAL INFILTRATION at 07:12

## 2018-12-18 RX ADMIN — CEFAZOLIN 2 G: 1 INJECTION, POWDER, FOR SOLUTION INTRAMUSCULAR; INTRAVENOUS at 07:12

## 2018-12-18 RX ADMIN — ROCURONIUM BROMIDE 50 MG: 10 INJECTION, SOLUTION INTRAVENOUS at 08:12

## 2018-12-18 RX ADMIN — EPINEPHRINE 0.02 MCG/KG/MIN: 1 INJECTION PARENTERAL at 11:12

## 2018-12-18 RX ADMIN — ACETAMINOPHEN 1000 MG: 10 INJECTION, SOLUTION INTRAVENOUS at 12:12

## 2018-12-18 RX ADMIN — ROCURONIUM BROMIDE 25 MG: 10 INJECTION, SOLUTION INTRAVENOUS at 11:12

## 2018-12-18 RX ADMIN — CHLORHEXIDINE GLUCONATE 10 ML: 1.2 RINSE ORAL at 08:12

## 2018-12-18 RX ADMIN — DEXTROSE, SODIUM CHLORIDE, SODIUM LACTATE, POTASSIUM CHLORIDE, AND CALCIUM CHLORIDE: 5; .6; .31; .03; .02 INJECTION, SOLUTION INTRAVENOUS at 02:12

## 2018-12-18 RX ADMIN — CALCIUM CHLORIDE 1 G: 100 INJECTION, SOLUTION INTRAVENOUS at 11:12

## 2018-12-18 RX ADMIN — ALBUMIN (HUMAN): 12.5 SOLUTION INTRAVENOUS at 11:12

## 2018-12-18 RX ADMIN — HEPARIN SODIUM 42400 UNITS: 1000 INJECTION, SOLUTION INTRAVENOUS; SUBCUTANEOUS at 08:12

## 2018-12-18 RX ADMIN — HEPARIN SODIUM 5000 UNITS: 1000 INJECTION, SOLUTION INTRAVENOUS; SUBCUTANEOUS at 09:12

## 2018-12-18 RX ADMIN — FENTANYL CITRATE 200 MCG: 50 INJECTION, SOLUTION INTRAMUSCULAR; INTRAVENOUS at 07:12

## 2018-12-18 RX ADMIN — SODIUM CHLORIDE, SODIUM LACTATE, POTASSIUM CHLORIDE, AND CALCIUM CHLORIDE: 600; 310; 30; 20 INJECTION, SOLUTION INTRAVENOUS at 12:12

## 2018-12-18 RX ADMIN — CEFAZOLIN 2 G: 1 INJECTION, POWDER, FOR SOLUTION INTRAMUSCULAR; INTRAVENOUS at 12:12

## 2018-12-18 RX ADMIN — POTASSIUM CHLORIDE 20 MEQ: 14.9 INJECTION, SOLUTION INTRAVENOUS at 06:12

## 2018-12-18 RX ADMIN — CALCIUM CHLORIDE 1 G: 100 INJECTION, SOLUTION INTRAVENOUS at 08:12

## 2018-12-18 RX ADMIN — FENTANYL CITRATE 100 MCG: 50 INJECTION, SOLUTION INTRAMUSCULAR; INTRAVENOUS at 08:12

## 2018-12-18 RX ADMIN — PHENYLEPHRINE HYDROCHLORIDE 100 MCG: 10 INJECTION INTRAVENOUS at 09:12

## 2018-12-18 RX ADMIN — VANCOMYCIN HYDROCHLORIDE 1500 G: 1 INJECTION, POWDER, LYOPHILIZED, FOR SOLUTION INTRAVENOUS at 07:12

## 2018-12-18 RX ADMIN — MIDAZOLAM 2 MG: 1 INJECTION INTRAMUSCULAR; INTRAVENOUS at 07:12

## 2018-12-18 RX ADMIN — LIDOCAINE HYDROCHLORIDE 100 MG: 20 INJECTION, SOLUTION INTRAVENOUS at 07:12

## 2018-12-18 RX ADMIN — SODIUM CHLORIDE, SODIUM LACTATE, POTASSIUM CHLORIDE, AND CALCIUM CHLORIDE: 600; 310; 30; 20 INJECTION, SOLUTION INTRAVENOUS at 09:12

## 2018-12-18 RX ADMIN — FENTANYL CITRATE 25 MCG: 50 INJECTION INTRAMUSCULAR; INTRAVENOUS at 04:12

## 2018-12-18 RX ADMIN — ROCURONIUM BROMIDE 25 MG: 10 INJECTION, SOLUTION INTRAVENOUS at 10:12

## 2018-12-18 RX ADMIN — DEXMEDETOMIDINE HYDROCHLORIDE 0.5 MCG/KG/HR: 4 INJECTION, SOLUTION INTRAVENOUS at 03:12

## 2018-12-18 RX ADMIN — CALCIUM CHLORIDE 1 G: 100 INJECTION, SOLUTION INTRAVENOUS at 05:12

## 2018-12-18 RX ADMIN — HEPARIN SODIUM 15000 UNITS: 1000 INJECTION, SOLUTION INTRAVENOUS; SUBCUTANEOUS at 08:12

## 2018-12-18 RX ADMIN — METOCLOPRAMIDE 5 MG: 5 INJECTION, SOLUTION INTRAMUSCULAR; INTRAVENOUS at 08:12

## 2018-12-18 RX ADMIN — SODIUM CHLORIDE, SODIUM LACTATE, POTASSIUM CHLORIDE, AND CALCIUM CHLORIDE: 600; 310; 30; 20 INJECTION, SOLUTION INTRAVENOUS at 07:12

## 2018-12-18 RX ADMIN — SODIUM CHLORIDE 1 UNITS/HR: 9 INJECTION, SOLUTION INTRAVENOUS at 10:12

## 2018-12-18 RX ADMIN — PROTAMINE SULFATE 450 MG: 10 INJECTION, SOLUTION INTRAVENOUS at 11:12

## 2018-12-18 RX ADMIN — SODIUM CHLORIDE, SODIUM LACTATE, POTASSIUM CHLORIDE, AND CALCIUM CHLORIDE 500 ML: .6; .31; .03; .02 INJECTION, SOLUTION INTRAVENOUS at 04:12

## 2018-12-18 RX ADMIN — EPINEPHRINE 0.02 MCG/KG/MIN: 1 INJECTION INTRAMUSCULAR; INTRAVENOUS; SUBCUTANEOUS at 01:12

## 2018-12-18 RX ADMIN — AMINOCAPROIC ACID 5 G: 250 INJECTION, SOLUTION INTRAVENOUS at 12:12

## 2018-12-18 RX ADMIN — IPRATROPIUM BROMIDE AND ALBUTEROL SULFATE 3 ML: .5; 3 SOLUTION RESPIRATORY (INHALATION) at 05:12

## 2018-12-18 RX ADMIN — PROPOFOL 70 MG: 10 INJECTION, EMULSION INTRAVENOUS at 07:12

## 2018-12-18 RX ADMIN — FENTANYL CITRATE 50 MCG: 50 INJECTION, SOLUTION INTRAMUSCULAR; INTRAVENOUS at 07:12

## 2018-12-18 RX ADMIN — MIDAZOLAM 1 MG: 1 INJECTION INTRAMUSCULAR; INTRAVENOUS at 11:12

## 2018-12-18 RX ADMIN — SODIUM CHLORIDE 1 UNITS/HR: 9 INJECTION, SOLUTION INTRAVENOUS at 01:12

## 2018-12-18 RX ADMIN — VASOPRESSIN 0.04 UNITS/MIN: 20 INJECTION INTRAVENOUS at 06:12

## 2018-12-18 RX ADMIN — ONDANSETRON 4 MG: 2 INJECTION INTRAMUSCULAR; INTRAVENOUS at 05:12

## 2018-12-18 RX ADMIN — ALBUMIN (HUMAN): 12.5 SOLUTION INTRAVENOUS at 12:12

## 2018-12-18 RX ADMIN — OXYCODONE HYDROCHLORIDE 10 MG: 5 TABLET ORAL at 10:12

## 2018-12-18 RX ADMIN — SODIUM CHLORIDE, SODIUM LACTATE, POTASSIUM CHLORIDE, AND CALCIUM CHLORIDE: 600; 310; 30; 20 INJECTION, SOLUTION INTRAVENOUS at 06:12

## 2018-12-18 RX ADMIN — FENTANYL CITRATE 50 MCG: 50 INJECTION INTRAMUSCULAR; INTRAVENOUS at 07:12

## 2018-12-18 NOTE — ASSESSMENT & PLAN NOTE
Per CVT surgery  Monitor chest tube output  Post op ICU hemodynamic monitoring  Encourage OOB, C&DB and IS use post extubation

## 2018-12-18 NOTE — ANESTHESIA PROCEDURE NOTES
Intubation    Diagnosis: CABG  Patient location during procedure: done in OR  Procedure start time: 12/18/2018 7:13 AM  Timeout: 12/18/2018 7:10 AM  Procedure end time: 12/18/2018 7:13 AM  Staffing  Resident/CRNA: RUTH ANN Olsen  Performed: resident/CRNA   Anesthesiologist was present at the time of the procedure.  Preanesthetic Checklist  Completed: patient identified, site marked, surgical consent, pre-op evaluation, timeout performed, IV checked, risks and benefits discussed, monitors and equipment checked and anesthesia consent given  Intubation  Indication: surgery  Pre-oxygenation. Induction: intravenous, mask ventilation: easy mask.  Intubation: postinduction, laryngoscopy direct, [place] 2.  Endotracheal Tube: oral, 8.0 mm ID, cuffed (inflated to minimal occlusive pressure)  Attempts: 1, Grade I - full view of cords  Complicating Factors: obesity  Tube secured at 22 cm at the lips.  Findings post-intubation: bilateral breath sounds, positive ETCO2, atraumatic / condition of teeth unchanged  Position Confirmation: auscultation  Eye Care: taped closed

## 2018-12-18 NOTE — PROGRESS NOTES
[facility]  Cardiology  Progress Note    Patient Name: Filipe Agustin Jr.  MRN: [id number]  Admission Date: 12/15/2018  Hospital Length of Stay: 3 days  Code Status: Full Code   Attending Physician: Rg Johnson MD   Primary Care Physician: Jojo Duque DO  Expected Discharge Date:   Principal Problem:Coronary artery disease involving native coronary artery of native heart with unstable angina pectoris    Subjective:   HPI:  Mr. Agustin is a 72 year old male with PMHx of CAD s/p coronary stenting at [facility] per patient report, HTN, HLP who presented to [facility] ED due to chest pain which started abruptly this AM. Initial EKG revealed ST elevated in inferior leads, CODE STEMI activated at that time. Patient received IV heparin bolus and NTG SL x 1 and repeat EKG which continued to reveal ST elevation in inferior leads. Patient transported to cath lab for emergent LHC per Dr. Monroe. IV aggrastat bolus per Dr. Monroe's recommendations. TTE pending. Further recs to follow. Labs pending.     Hospital Course:   12/16/18-Patient seen and examined in ICU, sitting in bedside chair. Denies chest pain or shortness of breath today on exam. IV heparin gtt in progress. Pending CABG on 12/18 per CVT. Labs reviewed, stable.     12/17/18-Patient seen and examined today, resting in bed. No acute events overnight. Denies chest pain or SOB. Labs stable. CABG planned for AM.    12/18/18-Patient seen and examined today, s/p CABG x 4. Hemodynamically stable.         Review of Systems   Unable to perform ROS: intubated     Objective:     Vital Signs (Most Recent):  Temp: 97.7 °F (36.5 °C) (12/18/18 1500)  Pulse: 91 (12/18/18 1500)  Resp: (!) 3 (12/18/18 1500)  BP: (!) 105/59 (12/18/18 1500)  SpO2: 100 % (12/18/18 1500) Vital Signs (24h Range):  Temp:  [97.3 °F (36.3 °C)-98.8 °F (37.1 °C)] 97.7 °F (36.5 °C)  Pulse:  [63-91] 91  Resp:  [0-18] 3  SpO2:  [92 %-100 %] 100 %  BP: ()/(57-81) 105/59  Arterial Line BP:  ()/(50-76) 96/62     Weight: 106.7 kg (235 lb 3.7 oz)  Body mass index is 34.74 kg/m².     SpO2: 100 %  O2 Device (Oxygen Therapy): ventilator      Intake/Output Summary (Last 24 hours) at 12/18/2018 1511  Last data filed at 12/18/2018 1500  Gross per 24 hour   Intake 4260.99 ml   Output 725 ml   Net 3535.99 ml       Lines/Drains/Airways     Central Venous Catheter Line                 Introducer 12/18/18  right internal jugular less than 1 day         Pulmonary Artery Catheter Assessment  12/18/18 0730 less than 1 day          Drain                 Closed/Suction Drain 12/18/18 1003 Right Leg Bulb 19 Fr. less than 1 day         Closed/Suction Drain 12/18/18 1004 Right Leg Bulb 19 Fr. less than 1 day         Closed/Suction Drain 12/18/18 1005 Left Leg Bulb 19 Fr. less than 1 day         Urethral Catheter 12/18/18 0730 Latex;Straight-tip;Temperature probe 16 Fr. less than 1 day          Airway                 Airway - Non-Surgical 12/18/18 0713 Other (Comment) less than 1 day          Arterial Line                 Arterial Line 12/18/18 1325 Right Radial less than 1 day          Peripheral Intravenous Line                 Peripheral IV - Single Lumen 12/15/18 0934 Right Antecubital 3 days         Peripheral IV - Single Lumen 12/18/18 0408 Anterior;Right Upper Arm less than 1 day                Physical Exam   Constitutional: He appears well-developed and well-nourished.   Intubated   HENT:   Head: Normocephalic and atraumatic.   Neck: Neck supple.   Cardiovascular: Normal rate, regular rhythm, S1 normal and S2 normal.   No murmur heard.  Pulmonary/Chest: Effort normal.   Coarse BS    CABG site C/D/I; no bleeding erythema or drainage   Abdominal: Soft. He exhibits no distension.   Neurological:   Sedated, intubated     Skin: Skin is warm and dry.   SVG sites C/D/I; dressed; DANNY drains in place   Nursing note and vitals reviewed.      Significant Labs:   CMP   Recent Labs   Lab 12/17/18  0506 12/18/18  0504  12/18/18  1335    138 139   K 3.7 3.7 4.1    104 106   CO2 27 26 21*   GLU 97 94 159*   BUN 14 14 12   CREATININE 1.3 1.3 1.3   CALCIUM 9.0 9.2 8.0*   PROT 6.3 6.5  --    ALBUMIN 3.2* 3.2*  --    BILITOT 0.6 0.7  --    ALKPHOS 25* 26*  --    AST 39 34  --    ALT 26 29  --    ANIONGAP 7* 8 12   ESTGFRAFRICA >60 >60 >60   EGFRNONAA 55* 55* 55*   , CBC   Recent Labs   Lab 12/18/18  0504  12/18/18  1209  12/18/18  1334   WBC 5.33  --  10.19  --  6.84   HGB 12.5*  --  8.2*  --  8.3*   HCT 39.0*   < > 25.6*   < > 25.8*     --  104*  --  106*    < > = values in this interval not displayed.   , Troponin No results for input(s): TROPONINI in the last 48 hours. and All pertinent lab results from the last 24 hours have been reviewed.    Significant Imaging: Echocardiogram:   2D echo with color flow doppler:   Results for orders placed or performed during the hospital encounter of 12/15/18   2D echo with color flow doppler   Result Value Ref Range    QEF 60 55 - 65    Diastolic Dysfunction No      Assessment and Plan:   Patient who presents with STEMI s/p LHC that showed multivessel CAD. Recovering s/p CABG x 4. Continue post-op mgmt. ASA, Plavix, statin, BB. Will add ACEI/ARB Pending clinical course.    * Coronary artery disease involving native coronary artery of native heart with unstable angina pectoris post ACS    -See plan under STEMI     S/P CABG x 4    Patient presented to [facility] due to chest pain  EKG revealed inferior ST elevation  NTG SL x 1 given in ED  Repeat EKG revealed persistently elevation in inferior leads  IV heparin 5,000 units given in ED  No BB due to Bradycardia in ED  TTE pending  FLP pending  Patient taken to Cath Lab for emergent LHC per Dr. Monroe.  Aggrastat bolus and Infusion to follow  Further recs to follow    12/16  -CABG recommended, CVT consulted and plan for CABG on 12/18  -Continue IV heparin gtt, ASA, Statin, BB  -Start low dose ARB today  -Transfer to telemetry  today  -No chest pain on exam today.   -FLP not drawn, will reorder for AM  -ECHO revealed EF 60-65%, -WMA's, normal Bi-V function.    12/17/18  -Stable overnight  -No chest pain or SOB  -Continue IV heparin  -Continue ASA, BB, statin, ARB  -CABG planned for AM    12/18/18  -s/p CABG x 4  -Hemodynamically stable  -Continue current post-op care  -ASA, Plavix, statin, BB       CKD (chronic kidney disease) stage 3, GFR 30-59 ml/min    -Monitor     Hypertension goal BP (blood pressure) < 140/90    On medical therapy  Continue BB  Start ARB today     Severe obesity with body mass index (BMI) of 35.0 to 39.9 with serious comorbidity    -Encourage weight loss         VTE Risk Mitigation (From admission, onward)        Ordered     Place NARCISA hose  Until discontinued      12/18/18 1249     Place sequential compression device  Until discontinued      12/18/18 1249     heparin 25,000 units in dextrose 5% 250 mL (100 units/mL) infusion LOW INTENSITY nomogram - [facility]  Continuous      12/15/18 1121     heparin (porcine) injection 5,000 Units  ED 1 Time      12/15/18 0931          Cheryl Farrell PA-C  Cardiology  [facility]    Chart reviewed. Dr. Romo examined patient and agrees with plan as outlined above.

## 2018-12-18 NOTE — ANESTHESIA PROCEDURE NOTES
[place] Rinku Line    Diagnosis: CAD  Patient location during procedure: done in OR  Procedure start time: 12/18/2018 7:30 AM  Procedure end time: 12/18/2018 7:45 AM  Staffing  Performed: anesthesiologist   Anesthesiologist was present at the time of the procedure.  Preanesthetic Checklist  Completed: patient identified, site marked, surgical consent, pre-op evaluation, timeout performed, IV checked, risks and benefits discussed, monitors and equipment checked and anesthesia consent given  [place] Rinku Line  Skin Prep: chlorhexidine gluconate  Local Infiltration: none  Location: right,  internal jugular vein  Vessel Caliber: large, patent, compressibility normal  Coaxial introducer size: 9 [place] double lumen.   Device: CCO/Oximetric Catheter  Catheter Size: 7.5 Fr  Catheter placement by yes. Heme positive aspiration all ports. PAC floated with balloon up not wedgedSterile sheath used  Locked at: 46 cm.Insertion Attempts: 1  Indication: intravenous therapy, hemodynamic monitoring  Ultrasound Guidance  Needle advanced into vessel with real time Ultrasound guidance.  Guidewire confirmed in vessel.  Sterile sheath used.  Assessment  Central Line Bundle Protocol followed. Hand hygiene before procedure, surgical cap worn, surgical mask worn, sterile surgical gloves worn, large sterile drape used.  Verification: ultrasound  Dressing: secured with tape and tegaderm and sutured in place and taped  Patient: Tolerated Well

## 2018-12-18 NOTE — PROGRESS NOTES
Dr Johnson called and updated on pt status. Notified of all current hemodynamic numbers, extubation time, and low MAPs after attempted interventions. New orders for Calcium Chloride. Will continue to monitor.

## 2018-12-18 NOTE — ANESTHESIA PROCEDURE NOTES
ИРИНА    Diagnosis: CAD  Patient location during procedure: OR  Procedure start time: 12/18/2018 8:30 AM  Exam type: Final  Staffing  Performed: anesthesiologist   Preanesthetic Checklist  Completed: patient identified, surgical consent, pre-op evaluation, timeout performed, risks and benefits discussed, monitors and equipment checked, anesthesia consent given, oxygen available, suction available, hand hygiene performed and patient being monitored  Setup & Induction  Probe Insertion: easy  Exam: complete  Exam     Left Heart  Left Atruim: normal    Left Ventricle: cm, normal (men 4.2-5.9; women 3.8-5.2)    LVAD  Estimated Ejection Fraction: > 55% normal            Right Heart  Right Ventricle: normal  Right Ventricle Function: normal  Right Atria: cm and normal    Intra Atrial Septum  PFO: no shunt by color flow doppler  no IAS aneurysm  no lipomatous hypertrophy      Right Ventricle  Size: normal    Aortic Valve:  Stenosis: none  Morphology: trileaflet and mild  Regurgitation: trivial central on short axis view     Mitral Valve:  Morphology:normal  Jet Description: mild    Tricuspid Valve:  Morphology: normal    Pulmonic Valve:  Regurgitation(color flow): not seen    Great Vessels  Ascending Aorta Atherosclerosis: 3=sessile dz (3-5mm)  Aortic Arch Atherosclerosis: 3=sessile dz (3-5mm)  IABP: no  Descending Aorta Atherosclerosis: 3=sessile dz (3-5mm)  Aorta    Descending aorta IABP: no    Effusions  Effusions: none    Summary  Findings discussed with surgeon.    Other Findings   Post CAB: LV/RV function normal.

## 2018-12-18 NOTE — PLAN OF CARE
Problem: Adult Inpatient Plan of Care  Goal: Plan of Care Review  Outcome: Ongoing (interventions implemented as appropriate)  POC Fall instructions given. Call bell within reach. Bed in low and locked position. Clean and clear environment maintained. Heparin drip continued. No changes. Preop CABG teaching completed using videos and ICU nurse. B carotid US, EKG, lab work, and IS teaching completed.

## 2018-12-18 NOTE — TRANSFER OF CARE
"Anesthesia Transfer of Care Note    Patient: Filipe Agustin Jr.    Procedure(s) Performed: Procedure(s) (LRB):  CORONARY ARTERY BYPASS GRAFT (CABG) (N/A)  ECHOCARDIOGRAM,TRANSESOPHAGEAL (N/A)  SURGICAL PROCUREMENT, VEIN, ENDOSCOPIC (Bilateral)    Anesthesia PACU Handoff    Last vitals:   Visit Vitals  /69 (BP Location: Left arm, Patient Position: Lying)   Pulse 66   Temp 36.9 °C (98.4 °F) (Oral)   Resp 16   Ht 5' 9" (1.753 m)   Wt 106.7 kg (235 lb 3.7 oz)   SpO2 95%   BMI 34.74 kg/m²     "

## 2018-12-18 NOTE — PROGRESS NOTES
[facility]   Critical Care Medicine  Progress Note    Patient Name: Filipe Agustin Jr.  MRN: [id number]  Admission Date: 12/15/2018  Hospital Length of Stay: 3 days  Code Status: Full Code  Attending Provider: Rg Johnson MD  Primary Care Provider: Jojo Duque DO   Principal Problem: Coronary artery disease involving native coronary artery of native heart with unstable angina pectoris    Subjective:     HPI:  Mr Agustin is a 71 yo obese WM with a PMH of CKD3, CAD (stent placed 2008), Prostate CA (post XRT), HTN, and HLD.  He admits to malaise and ALVARADO progressive over last several weeks.  Today he noted acute midsternal, non-radiating CP which onset gradually when pt woke up at 0700 this AM. Pt describes the pain as a pressure.  Sxs are intermittent and moderate. No modifying factors. Associated sxs include nausea. He has not had a heart cath since stent placement in 2008. Pt reports his care is followed by Dr. Figueroa (Cardiology). Pt denies any fever, chills, diaphoresis, SOB, cough, palpitations, BLE edema/pain, emesis, extremity weakness/numbness, dizziness, and all other sxs. EKG via EMS was normal. Pt took 325mg ASA prior to EMS arrival. No further complaints or concerns.  In ED abnormal EKG and troponin 1.3.  CODE stemi called and taken emergently for LHC revealing diffuse CAD.  Admitted to ICU post LHC and CVT surgery consulted.  EF 45% w/ LHC.         Hospital/ICU Course:  12/15 - Admitted to ICU post LHC, sheath removed in lab and no CP.    12/18 - CVT consulted on patient and all agreed to proceed with CABG. Today taken to OR undergoing 4-vessel CABG with no reported complications.  Admitted to ICU post op from OR on [facility] ventilation.    Review of Systems   Unable to perform ROS: Intubated       Objective:     Vital Signs (Most Recent):  Temp: 98.4 °F (36.9 °C) (12/18/18 0336)  Pulse: 91 (12/18/18 1341)  Resp: (!) 7 (12/18/18 1341)  BP: 115/69 (12/18/18 0336)  SpO2: 100 % (12/18/18  1341) Vital Signs (24h Range):  Temp:  [97.3 °F (36.3 °C)-98.8 °F (37.1 °C)] 98.4 °F (36.9 °C)  Pulse:  [63-91] 91  Resp:  [7-18] 7  SpO2:  [92 %-100 %] 100 %  BP: (111-131)/(64-78) 115/69     Weight: 106.7 kg (235 lb 3.7 oz)  Body mass index is 34.74 kg/m².      Intake/Output Summary (Last 24 hours) at 12/18/2018 1345  Last data filed at 12/18/2018 1326  Gross per 24 hour   Intake 4376.82 ml   Output 805 ml   Net 3571.82 ml       Physical Exam   Constitutional: He appears well-developed and well-nourished.  Non-toxic appearance. He does not have a sickly appearance. He does not appear ill. No distress. He is sedated and intubated.   HENT:   Head: Normocephalic and atraumatic.   Mouth/Throat: Oropharynx is clear and moist and mucous membranes are normal.   Eyes: Lids are normal. Pupils are equal, round, and reactive to light.   Neck: Trachea normal. Neck supple. Carotid bruit is not present.       Cardiovascular: Regular rhythm. Bradycardia present.   Pulses:       Radial pulses are 2+ on the right side, and 2+ on the left side.        Dorsalis pedis pulses are 1+ on the right side, and 1+ on the left side.   paced   Pulmonary/Chest: Effort normal and breath sounds normal. No accessory muscle usage. He is intubated. No respiratory distress.       Abdominal: Soft. He exhibits no distension. Bowel sounds are absent. There is no tenderness.   Obese    Genitourinary: Penis normal.   Genitourinary Comments: Tavera in place   Musculoskeletal:        Right foot: There is no deformity.        Left foot: There is no deformity.   No edema   Lymphadenopathy:     He has no cervical adenopathy.   Neurological: He is unresponsive.   Unresponsive from sedation/anesthesia post op   Skin: Skin is warm and dry. Capillary refill takes less than 2 seconds. No rash noted. No cyanosis.            Vents:  Vent Mode: A/C (12/18/18 1341)  Ventilator Initiated: Yes (12/18/18 1341)  Set Rate: 20 bmp (12/18/18 1341)  Vt Set: 400 mL (12/18/18  1341)  Pressure Support: 0 cmH20 (12/18/18 1341)  PEEP/CPAP: 5 cmH20 (12/18/18 1341)  Oxygen Concentration (%): 100 (12/18/18 1341)  Peak Airway Pressure: 20 cmH2O (12/18/18 1341)  Plateau Pressure: 0 cmH20 (12/18/18 1341)  Total Ve: 8.93 mL (12/18/18 1341)  F/VT Ratio<105 (RSBI): (!) 15.73 (12/18/18 1341)    Lines/Drains/Airways     Central Venous Catheter Line                 Pulmonary Artery Catheter Assessment  12/18/18 0730 less than 1 day          Drain                 Closed/Suction Drain 12/18/18 1003 Right Leg Bulb 19 Fr. less than 1 day         Closed/Suction Drain 12/18/18 1004 Right Leg Bulb 19 Fr. less than 1 day         Closed/Suction Drain 12/18/18 1005 Left Leg Bulb 19 Fr. less than 1 day         Urethral Catheter 12/18/18 0730 Latex;Straight-tip;Temperature probe 16 Fr. less than 1 day          Airway                 Airway - Non-Surgical 12/18/18 0713 Other (Comment) less than 1 day          Peripheral Intravenous Line                 Peripheral IV - Single Lumen 12/15/18 0934 Right Antecubital 3 days         Peripheral IV - Single Lumen 12/18/18 0408 Anterior;Right Upper Arm less than 1 day                Significant Labs:    CBC/Anemia Profile:  Recent Labs   Lab 12/17/18  0506 12/18/18  0504  12/18/18  1139 12/18/18  1213 12/18/18  1328   WBC 5.44 5.33  --   --   --   --    HGB 12.6* 12.5*  --   --   --   --    HCT 38.8* 39.0*   < > 20* 21* 22*    210  --   --   --   --    MCV 91 93  --   --   --   --    RDW 14.8* 14.4  --   --   --   --     < > = values in this interval not displayed.        Chemistries:  Recent Labs   Lab 12/17/18  0506 12/18/18  0504    138   K 3.7 3.7    104   CO2 27 26   BUN 14 14   CREATININE 1.3 1.3   CALCIUM 9.0 9.2   ALBUMIN 3.2* 3.2*   PROT 6.3 6.5   BILITOT 0.6 0.7   ALKPHOS 25* 26*   ALT 26 29   AST 39 34       All pertinent labs within the past 24 hours have been reviewed.          [facility]  Recent Labs   Lab 12/18/18  1328   PH 7.356   PO2 123*   PCO2  40.8   HCO3 22.8*   BE -3     Assessment/Plan:     Pulmonary   On mechanically assisted ventilation    Vent wean per CVT surgery  SAT/SBT later this evening for extubation     Cardiac/Vascular   * Coronary artery disease involving native coronary artery of native heart with unstable angina pectoris post ACS    Per Cards  Cont ASA, Lopressor and Plavix  ICU Cardiac monitoring  S/P CABG X 4 vessels     Hypertension goal BP (blood pressure) < 140/90    Cont Lopressor  Cardene infusion if needed post op     S/P CABG x 4    Per CVT surgery  Monitor chest tube output  Post op ICU hemodynamic monitoring  Encourage OOB, C&DB and IS use post extubation     Renal/   CKD (chronic kidney disease) stage 3, GFR 30-59 ml/min    Cont IVFs post CABG  BMP in AM     Oncology   Cancer of prostate with intermediate recurrence risk (stage T2b-c or Jonathon 7 or PSA 10-20)    Outpt follow up with Urology     Other   Severe obesity with body mass index (BMI) of 35.0 to 39.9 with serious comorbidity    Encouraged weight loss       Preventive Measures and Monitoring:   Stress Ulcer: PPI  Nutrition: NPO post op  Glucose control: insulin infusion if needed  Bowel prophylaxis: Miralax  DVT prophylaxis: SCDs  Hx CAD on B-Blocker: Lopressor  Head of Bed/Reposition: Elevate HOB and turn Q1-2 hours   Early Mobility: bed rest today  Vent/OG Day: #1  Right Radial Arterial Line Day: #1  Right IJ PA catheter Day: #1  Chest tubes X 3 Day: #1  Tavera Day: #1  DANNY Drain Day: #1  Code Status: Full  Pneumonia Vaccine: UTD  Flu Vaccine: UTD     Counseling/Consultation:I have discussed the care of this patient in detail with the bedside nursing staff and Dr. Robles and Dr. Johnson    Critical Care Time: 46 minutes  Critical secondary to Patient has a condition that poses threat to life and bodily function: Post op [facility] Ventilation      Critical care was time spent personally by me on the following activities: development of treatment plan with patient or  surrogate and bedside caregivers, discussions with consultants, evaluation of patient's response to treatment, examination of patient, ordering and performing treatments and interventions, ordering and review of laboratory studies, ordering and review of radiographic studies, pulse oximetry, re-evaluation of patient's condition. This critical care time did not overlap with that of any other provider or involve time for any procedures.     Aron Rosado, NP  Critical Care Medicine  [facility]

## 2018-12-18 NOTE — ANESTHESIA POSTPROCEDURE EVALUATION
"Anesthesia Post Evaluation    Patient: Filipe Agustin Jr.    Procedure(s) Performed: Procedure(s) (LRB):  CORONARY ARTERY BYPASS GRAFT (CABG) (N/A)  ECHOCARDIOGRAM,TRANSESOPHAGEAL (N/A)  SURGICAL PROCUREMENT, VEIN, ENDOSCOPIC (Bilateral)    Final Anesthesia Type: general  Patient location during evaluation: ICU  Patient participation: No - Unable to Participate, Other Reason (see comments)  Level of consciousness: sedated  Post-procedure vital signs: reviewed and stable  Pain management: adequate  Airway patency: patent  PONV status at discharge: No PONV  Anesthetic complications: no      Cardiovascular status: blood pressure returned to baseline and hemodynamically stable  Respiratory status: ventilator  Hydration status: euvolemic  Follow-up not needed.        Visit Vitals  /69 (BP Location: Left arm, Patient Position: Lying)   Pulse 66   Temp 36.9 °C (98.4 °F) (Oral)   Resp 16   Ht 5' 9" (1.753 m)   Wt 106.7 kg (235 lb 3.7 oz)   SpO2 95%   BMI 34.74 kg/m²       Pain/Sheron Score: No Data Recorded      "

## 2018-12-18 NOTE — SUBJECTIVE & OBJECTIVE
Review of Systems   Unable to perform ROS: Intubated       Objective:     Vital Signs (Most Recent):  Temp: 98.4 °F (36.9 °C) (12/18/18 0336)  Pulse: 91 (12/18/18 1341)  Resp: (!) 7 (12/18/18 1341)  BP: 115/69 (12/18/18 0336)  SpO2: 100 % (12/18/18 1341) Vital Signs (24h Range):  Temp:  [97.3 °F (36.3 °C)-98.8 °F (37.1 °C)] 98.4 °F (36.9 °C)  Pulse:  [63-91] 91  Resp:  [7-18] 7  SpO2:  [92 %-100 %] 100 %  BP: (111-131)/(64-78) 115/69     Weight: 106.7 kg (235 lb 3.7 oz)  Body mass index is 34.74 kg/m².      Intake/Output Summary (Last 24 hours) at 12/18/2018 1345  Last data filed at 12/18/2018 1326  Gross per 24 hour   Intake 4376.82 ml   Output 805 ml   Net 3571.82 ml       Physical Exam   Constitutional: He appears well-developed and well-nourished.  Non-toxic appearance. He does not have a sickly appearance. He does not appear ill. No distress. He is sedated and intubated.   HENT:   Head: Normocephalic and atraumatic.   Mouth/Throat: Oropharynx is clear and moist and mucous membranes are normal.   Eyes: Lids are normal. Pupils are equal, round, and reactive to light.   Neck: Trachea normal. Neck supple. Carotid bruit is not present.       Cardiovascular: Regular rhythm. Bradycardia present.   Pulses:       Radial pulses are 2+ on the right side, and 2+ on the left side.        Dorsalis pedis pulses are 1+ on the right side, and 1+ on the left side.   paced   Pulmonary/Chest: Effort normal and breath sounds normal. No accessory muscle usage. He is intubated. No respiratory distress.       Abdominal: Soft. He exhibits no distension. Bowel sounds are absent. There is no tenderness.   Obese    Genitourinary: Penis normal.   Genitourinary Comments: Tavera in place   Musculoskeletal:        Right foot: There is no deformity.        Left foot: There is no deformity.   No edema   Lymphadenopathy:     He has no cervical adenopathy.   Neurological: He is unresponsive.   Unresponsive from sedation/anesthesia post op   Skin:  Skin is warm and dry. Capillary refill takes less than 2 seconds. No rash noted. No cyanosis.            Vents:  Vent Mode: A/C (12/18/18 1341)  Ventilator Initiated: Yes (12/18/18 1341)  Set Rate: 20 bmp (12/18/18 1341)  Vt Set: 400 mL (12/18/18 1341)  Pressure Support: 0 cmH20 (12/18/18 1341)  PEEP/CPAP: 5 cmH20 (12/18/18 1341)  Oxygen Concentration (%): 100 (12/18/18 1341)  Peak Airway Pressure: 20 cmH2O (12/18/18 1341)  Plateau Pressure: 0 cmH20 (12/18/18 1341)  Total Ve: 8.93 mL (12/18/18 1341)  F/VT Ratio<105 (RSBI): (!) 15.73 (12/18/18 1341)    Lines/Drains/Airways     Central Venous Catheter Line                 Pulmonary Artery Catheter Assessment  12/18/18 0730 less than 1 day          Drain                 Closed/Suction Drain 12/18/18 1003 Right Leg Bulb 19 Fr. less than 1 day         Closed/Suction Drain 12/18/18 1004 Right Leg Bulb 19 Fr. less than 1 day         Closed/Suction Drain 12/18/18 1005 Left Leg Bulb 19 Fr. less than 1 day         Urethral Catheter 12/18/18 0730 Latex;Straight-tip;Temperature probe 16 Fr. less than 1 day          Airway                 Airway - Non-Surgical 12/18/18 0713 Other (Comment) less than 1 day          Peripheral Intravenous Line                 Peripheral IV - Single Lumen 12/15/18 0934 Right Antecubital 3 days         Peripheral IV - Single Lumen 12/18/18 0408 Anterior;Right Upper Arm less than 1 day                Significant Labs:    CBC/Anemia Profile:  Recent Labs   Lab 12/17/18  0506 12/18/18  0504  12/18/18  1139 12/18/18  1213 12/18/18  1328   WBC 5.44 5.33  --   --   --   --    HGB 12.6* 12.5*  --   --   --   --    HCT 38.8* 39.0*   < > 20* 21* 22*    210  --   --   --   --    MCV 91 93  --   --   --   --    RDW 14.8* 14.4  --   --   --   --     < > = values in this interval not displayed.        Chemistries:  Recent Labs   Lab 12/17/18  0506 12/18/18  0504    138   K 3.7 3.7    104   CO2 27 26   BUN 14 14   CREATININE 1.3 1.3   CALCIUM  9.0 9.2   ALBUMIN 3.2* 3.2*   PROT 6.3 6.5   BILITOT 0.6 0.7   ALKPHOS 25* 26*   ALT 26 29   AST 39 34       All pertinent labs within the past 24 hours have been reviewed.

## 2018-12-18 NOTE — SUBJECTIVE & OBJECTIVE
Review of Systems   Unable to perform ROS: intubated     Objective:     Vital Signs (Most Recent):  Temp: 97.7 °F (36.5 °C) (12/18/18 1500)  Pulse: 91 (12/18/18 1500)  Resp: (!) 3 (12/18/18 1500)  BP: (!) 105/59 (12/18/18 1500)  SpO2: 100 % (12/18/18 1500) Vital Signs (24h Range):  Temp:  [97.3 °F (36.3 °C)-98.8 °F (37.1 °C)] 97.7 °F (36.5 °C)  Pulse:  [63-91] 91  Resp:  [0-18] 3  SpO2:  [92 %-100 %] 100 %  BP: ()/(57-81) 105/59  Arterial Line BP: ()/(50-76) 96/62     Weight: 106.7 kg (235 lb 3.7 oz)  Body mass index is 34.74 kg/m².     SpO2: 100 %  O2 Device (Oxygen Therapy): ventilator      Intake/Output Summary (Last 24 hours) at 12/18/2018 1511  Last data filed at 12/18/2018 1500  Gross per 24 hour   Intake 4260.99 ml   Output 725 ml   Net 3535.99 ml       Lines/Drains/Airways     Central Venous Catheter Line                 Introducer 12/18/18  right internal jugular less than 1 day         Pulmonary Artery Catheter Assessment  12/18/18 0730 less than 1 day          Drain                 Closed/Suction Drain 12/18/18 1003 Right Leg Bulb 19 Fr. less than 1 day         Closed/Suction Drain 12/18/18 1004 Right Leg Bulb 19 Fr. less than 1 day         Closed/Suction Drain 12/18/18 1005 Left Leg Bulb 19 Fr. less than 1 day         Urethral Catheter 12/18/18 0730 Latex;Straight-tip;Temperature probe 16 Fr. less than 1 day          Airway                 Airway - Non-Surgical 12/18/18 0713 Other (Comment) less than 1 day          Arterial Line                 Arterial Line 12/18/18 1325 Right Radial less than 1 day          Peripheral Intravenous Line                 Peripheral IV - Single Lumen 12/15/18 0934 Right Antecubital 3 days         Peripheral IV - Single Lumen 12/18/18 0408 Anterior;Right Upper Arm less than 1 day                Physical Exam   Constitutional: He appears well-developed and well-nourished.   Intubated   HENT:   Head: Normocephalic and atraumatic.   Neck: Neck supple.    Cardiovascular: Normal rate, regular rhythm, S1 normal and S2 normal.   No murmur heard.  Pulmonary/Chest: Effort normal.   Coarse BS    CABG site C/D/I; no bleeding erythema or drainage   Abdominal: Soft. He exhibits no distension.   Neurological:   Sedated, intubated     Skin: Skin is warm and dry.   SVG sites C/D/I; dressed; DANNY drains in place   Nursing note and vitals reviewed.      Significant Labs:   CMP   Recent Labs   Lab 12/17/18  0506 12/18/18  0504 12/18/18  1335    138 139   K 3.7 3.7 4.1    104 106   CO2 27 26 21*   GLU 97 94 159*   BUN 14 14 12   CREATININE 1.3 1.3 1.3   CALCIUM 9.0 9.2 8.0*   PROT 6.3 6.5  --    ALBUMIN 3.2* 3.2*  --    BILITOT 0.6 0.7  --    ALKPHOS 25* 26*  --    AST 39 34  --    ALT 26 29  --    ANIONGAP 7* 8 12   ESTGFRAFRICA >60 >60 >60   EGFRNONAA 55* 55* 55*   , CBC   Recent Labs   Lab 12/18/18  0504  12/18/18  1209  12/18/18  1334   WBC 5.33  --  10.19  --  6.84   HGB 12.5*  --  8.2*  --  8.3*   HCT 39.0*   < > 25.6*   < > 25.8*     --  104*  --  106*    < > = values in this interval not displayed.   , Troponin No results for input(s): TROPONINI in the last 48 hours. and All pertinent lab results from the last 24 hours have been reviewed.    Significant Imaging: Echocardiogram:   2D echo with color flow doppler:   Results for orders placed or performed during the hospital encounter of 12/15/18   2D echo with color flow doppler   Result Value Ref Range    QEF 60 55 - 65    Diastolic Dysfunction No

## 2018-12-18 NOTE — OP NOTE
[facility] -   Cardiothoracic Surgery  Operative Note    SUMMARY     Date of Procedure: 12/18/2018     Procedure: Procedure(s) (LRB):  CORONARY ARTERY BYPASS GRAFT (CABG) (N/A)  ECHOCARDIOGRAM,TRANSESOPHAGEAL (N/A)  SURGICAL PROCUREMENT, VEIN, ENDOSCOPIC (Bilateral)     Coronary artery bypass grafting x4 with LIMA to LAD and reverse saphenous vein graft to the PDA, distal OM and diagonal    Surgeon(s) and Role:     * Rg Johnson MD - Primary    Assisting Surgeon: None    Pre-Operative Diagnosis: Coronary artery disease involving native coronary artery of native heart with unstable angina pectoris [I25.110]    Post-Operative Diagnosis: Post-Op Diagnosis Codes:     * Coronary artery disease involving native coronary artery of native heart with unstable angina pectoris [I25.110]    Anesthesia: General    Technical Procedures Used:  The patient was prepped and draped sterilely.  A median sternotomy incision was then made which was carried down sharply to the sternum was encountered.  The sternum was then opened with a sternal saw.  The left arm of the sternum was elevated with a mammary retractor.  The left internal mammary artery was then dissected down as a pedicle.  While this was being performed a 2nd member of the team was harvesting the greater saphenous vein from the patient's right lower extremities.  This vein was found to be in adequate for bypass below the knee.  Therefore attention was drawn to the left leg where the greater saphenous vein was then harvested from the patient's left thigh.  The patient was given heparin the pericardium was then opened pericardial sutures were placed. An epiaortic ultrasound was then performed. The ascending aorta was found to be free of intraluminal atherosclerotic plaques.  The aortic cannula pursestring was then placed in the distal ascending aorta.  The venous pursestring was placed in the right atrial appendage.  And the retrograde cardioplegia cannula  pursestring was placed in the free wall of the right atrium.  And the antegrade cardioplegia cannula was placed in the mid ascending aorta.  Therapeutic ACT was then confirmed.  The patient was then cannulated with a 21 Tamazight aortic cannula.  A 3 way venous cannula was placed in the IVC.  The retrograde cardioplegia cannula was then placed in the coronary sinus.  And the antegrade cannula was placed in the mid ascending aorta.  The patient was then started on cardiopulmonary bypass.  The aorta was then crossclamped.  The patient's heart was then arrested with antegrade warm blood cardioplegia.  Throughout the procedure the patient's heart was perfused continuously with retrograde cold blood.  This was supplemented intermittently with antegrade cold blood cardioplegia.  Attention was 1st drawn to the PDA which was identified and opened. The distal anastomosis was then for performed to the PDA as an end-to-side anastomosis using 7 0 continuous Prolene sutures.  Next attention was drawn to the aorta where a 4.4 punch aortotomy was made the proximal anastomosis of the vein graft was then performed to the aorta using 6 0 continuous Prolene sutures.  Next attention was drawn to the distal OM branch which was identified and opened the distal anastomosis of the vein graft was then performed as an end-to-side anastomosis using 7 0 continuous Prolene sutures.  Next attention was drawn to the aorta with 4.4 punch aortotomy was made.  The proximal anastomosis of the vein graft was then attached to the aorta using 6 0 continuous Prolene sutures.  Next attention was drawn to the diagonal branch.  Which was then opened. The distal anastomosis of the vein graft was then performed to the diagonal as an end-to-side anastomosis using 7 0 continuous Prolene sutures.  Next attention was drawn to the aorta with 4.4 punch aortotomy was made.  The proximal anastomosis was then performed to the aorta using 6 0 continuous Prolene sutures.  Next attention was drawn to the LAD which was opened in the mid LAD region.  The LIMA was then anastomosed to the LAD using 8 0 continuous Prolene sutures as an end-to-side anastomosis. While this was being performed the patient had been rewarming.  The patient was then given hotshot cardioplegia.  Ventilation was restarted.  The aortic cross-clamp was removed. Atrial and ventricular pacemaker wires were placed. A left pleural chest tube and 2 mediastinal chest tube were placed. The patient was then weaned from cardiopulmonary bypass without any problems.  Surgical sites were inspected and found to be free of bleeding. The patient was then decannulated.  Heparin was then reversed with protamine.  Surgical sites were again inspected and found to be free of bleeding. The patient's chest was then closed with figure-of-eight sternal wires and the fascia was closed with 1.  Vicryl sutures.  And the skin was then closed with 3 0 Monocryl sutures..    Description of the Findings of the Procedure:  See detailed description above.  Significant Surgical Tasks Conducted by the Assistant(s), if Applicable:  Endoscopic vein harvesting  Complications: No    Estimated Blood Loss (EBL): * 750 mL         Implants: * No implants in log *    Specimens:   Specimen (12h ago, onward)    None                  Condition: Stable    Disposition: ICU - intubated and hemodynamically stable.    Attestation: I performed the procedure.

## 2018-12-18 NOTE — PLAN OF CARE
Patient returned from surg CABG x 4 vessels and intubated . No family at the bedside. CM unable to assess for optimal d/c plan at the present time. CM to f/u for safe transition     12/18/18 0688   Discharge Reassessment   Assessment Type Discharge Planning Reassessment   Provided patient/caregiver education on the expected discharge date and the discharge plan No   Do you have any problems affording any of your prescribed medications? TBD   Discharge Plan A Home with family;Home Health   Discharge Plan B Home with family;Skilled Nursing Facility   Patient choice form signed by patient/caregiver N/A   Anticipated Discharge Disposition Home-Health   Can the patient answer the patient profile reliably? No, cognitively impaired;No historian available   How does the patient rate their overall health at the present time? Good   Describe the patient's ability to walk at the present time. Major restrictions/daily assistance from another person   How often would a person be available to care for the patient? Whenever needed   Number of comorbid conditions (as recorded on the chart) Five or more   During the past month, has the patient often been bothered by feeling down, depressed or hopeless? No   During the past month, has the patient often been bothered by little interest or pleasure in doing things? No

## 2018-12-19 PROBLEM — Z99.11 ON MECHANICALLY ASSISTED VENTILATION: Status: RESOLVED | Noted: 2018-12-18 | Resolved: 2018-12-19

## 2018-12-19 PROBLEM — J98.11 ATELECTASIS, LEFT: Status: ACTIVE | Noted: 2018-12-19

## 2018-12-19 LAB
ALBUMIN SERPL BCP-MCNC: 3.4 G/DL
ALP SERPL-CCNC: 15 U/L
ALT SERPL W/O P-5'-P-CCNC: 26 U/L
ANION GAP SERPL CALC-SCNC: 10 MMOL/L
ANION GAP SERPL CALC-SCNC: 10 MMOL/L
ANION GAP SERPL CALC-SCNC: 9 MMOL/L
APTT BLDCRRT: 24.2 SEC
AST SERPL-CCNC: 82 U/L
BASOPHILS # BLD AUTO: 0.01 K/UL
BASOPHILS NFR BLD: 0.1 %
BILIRUB SERPL-MCNC: 0.6 MG/DL
BRPFT: ABNORMAL
BUN SERPL-MCNC: 16 MG/DL
BUN SERPL-MCNC: 16 MG/DL
BUN SERPL-MCNC: 22 MG/DL
CALCIUM SERPL-MCNC: 8.9 MG/DL
CALCIUM SERPL-MCNC: 9 MG/DL
CALCIUM SERPL-MCNC: 9 MG/DL
CHLORIDE SERPL-SCNC: 105 MMOL/L
CHLORIDE SERPL-SCNC: 108 MMOL/L
CHLORIDE SERPL-SCNC: 108 MMOL/L
CO2 SERPL-SCNC: 23 MMOL/L
CO2 SERPL-SCNC: 23 MMOL/L
CO2 SERPL-SCNC: 26 MMOL/L
CREAT SERPL-MCNC: 1.7 MG/DL
CREAT SERPL-MCNC: 1.7 MG/DL
CREAT SERPL-MCNC: 1.8 MG/DL
DIFFERENTIAL METHOD: ABNORMAL
EOSINOPHIL # BLD AUTO: 0 K/UL
EOSINOPHIL NFR BLD: 0 %
ERYTHROCYTE [DISTWIDTH] IN BLOOD BY AUTOMATED COUNT: 14.5 %
EST. GFR  (AFRICAN AMERICAN): 43 ML/MIN/1.73 M^2
EST. GFR  (AFRICAN AMERICAN): 46 ML/MIN/1.73 M^2
EST. GFR  (AFRICAN AMERICAN): 46 ML/MIN/1.73 M^2
EST. GFR  (NON AFRICAN AMERICAN): 37 ML/MIN/1.73 M^2
EST. GFR  (NON AFRICAN AMERICAN): 40 ML/MIN/1.73 M^2
EST. GFR  (NON AFRICAN AMERICAN): 40 ML/MIN/1.73 M^2
FEF 25 75 LLN: 0.97
FEF 25 75 PRE REF: 88.7 %
FEF 25 75 REF: 2.3
FEV1 FVC LLN: 62
FEV1 FVC PRE REF: 93.6 %
FEV1 FVC REF: 76
FEV1 LLN: 2.18
FEV1 PRE REF: 83.6 %
FEV1 REF: 3.05
FEV6 LLN: 3.09
FEV6 PRE REF: 86.4 %
FEV6 PRE: 3.43 L (ref 3.09–4.85)
FEV6 REF: 3.97
FVC LLN: 2.98
FVC PRE REF: 88.9 %
FVC REF: 4.03
GLUCOSE SERPL-MCNC: 114 MG/DL
GLUCOSE SERPL-MCNC: 118 MG/DL
GLUCOSE SERPL-MCNC: 118 MG/DL
HCT VFR BLD AUTO: 22.3 %
HGB BLD-MCNC: 7.1 G/DL
INR PPP: 1
LYMPHOCYTES # BLD AUTO: 0.6 K/UL
LYMPHOCYTES NFR BLD: 6.3 %
MAGNESIUM SERPL-MCNC: 2.7 MG/DL
MAGNESIUM SERPL-MCNC: 3.3 MG/DL
MCH RBC QN AUTO: 30.1 PG
MCHC RBC AUTO-ENTMCNC: 31.8 G/DL
MCV RBC AUTO: 95 FL
MONOCYTES # BLD AUTO: 0.8 K/UL
MONOCYTES NFR BLD: 8.8 %
MVV LLN: 102
MVV REF: 119
NEUTROPHILS # BLD AUTO: 8 K/UL
NEUTROPHILS NFR BLD: 84.8 %
PEF LLN: 5.78
PEF PRE REF: 43.1 %
PEF REF: 8.03
PLATELET # BLD AUTO: 165 K/UL
PMV BLD AUTO: 11.7 FL
POCT GLUCOSE: 103 MG/DL (ref 70–110)
POCT GLUCOSE: 114 MG/DL (ref 70–110)
POCT GLUCOSE: 114 MG/DL (ref 70–110)
POCT GLUCOSE: 118 MG/DL (ref 70–110)
POCT GLUCOSE: 120 MG/DL (ref 70–110)
POCT GLUCOSE: 121 MG/DL (ref 70–110)
POCT GLUCOSE: 124 MG/DL (ref 70–110)
POCT GLUCOSE: 130 MG/DL (ref 70–110)
POCT GLUCOSE: 131 MG/DL (ref 70–110)
POCT GLUCOSE: 132 MG/DL (ref 70–110)
POCT GLUCOSE: 148 MG/DL (ref 70–110)
POCT GLUCOSE: 156 MG/DL (ref 70–110)
POCT GLUCOSE: 182 MG/DL (ref 70–110)
POCT GLUCOSE: 96 MG/DL (ref 70–110)
POTASSIUM SERPL-SCNC: 4.1 MMOL/L
POTASSIUM SERPL-SCNC: 4.1 MMOL/L
POTASSIUM SERPL-SCNC: 5.3 MMOL/L
PRE FEF 25 75: 2.04 L/S (ref 0.97–4.18)
PRE FET 100: 8.46 SEC
PRE FEV1 FVC: 71.02 % (ref 62.17–88.04)
PRE FEV1: 2.55 L (ref 2.18–3.85)
PRE FVC: 3.58 L (ref 2.98–5.1)
PRE PEF: 3.46 L/S (ref 5.78–10.28)
PROT SERPL-MCNC: 5.2 G/DL
PROTHROMBIN TIME: 10.9 SEC
RBC # BLD AUTO: 2.36 M/UL
SODIUM SERPL-SCNC: 140 MMOL/L
SODIUM SERPL-SCNC: 141 MMOL/L
SODIUM SERPL-SCNC: 141 MMOL/L
WBC # BLD AUTO: 9.44 K/UL

## 2018-12-19 PROCEDURE — 36556 INSERT NON-TUNNEL CV CATH: CPT | Mod: HCNC

## 2018-12-19 PROCEDURE — C9113 INJ PANTOPRAZOLE SODIUM, VIA: HCPCS | Mod: HCNC | Performed by: THORACIC SURGERY (CARDIOTHORACIC VASCULAR SURGERY)

## 2018-12-19 PROCEDURE — 85610 PROTHROMBIN TIME: CPT | Mod: HCNC

## 2018-12-19 PROCEDURE — 80053 COMPREHEN METABOLIC PANEL: CPT | Mod: HCNC

## 2018-12-19 PROCEDURE — 94799 UNLISTED PULMONARY SVC/PX: CPT | Mod: HCNC

## 2018-12-19 PROCEDURE — 99232 PR SUBSEQUENT HOSPITAL CARE,LEVL II: ICD-10-PCS | Mod: HCNC,,, | Performed by: INTERNAL MEDICINE

## 2018-12-19 PROCEDURE — 97530 THERAPEUTIC ACTIVITIES: CPT | Mod: HCNC

## 2018-12-19 PROCEDURE — 99900037 HC PT THERAPY SCREENING (STAT): Mod: HCNC

## 2018-12-19 PROCEDURE — 94664 DEMO&/EVAL PT USE INHALER: CPT | Mod: HCNC

## 2018-12-19 PROCEDURE — 25000242 PHARM REV CODE 250 ALT 637 W/ HCPCS: Mod: HCNC | Performed by: THORACIC SURGERY (CARDIOTHORACIC VASCULAR SURGERY)

## 2018-12-19 PROCEDURE — 94640 AIRWAY INHALATION TREATMENT: CPT | Mod: HCNC

## 2018-12-19 PROCEDURE — 63600175 PHARM REV CODE 636 W HCPCS: Mod: HCNC | Performed by: THORACIC SURGERY (CARDIOTHORACIC VASCULAR SURGERY)

## 2018-12-19 PROCEDURE — G8988 SELF CARE GOAL STATUS: HCPCS | Mod: CK,HCNC

## 2018-12-19 PROCEDURE — 97162 PT EVAL MOD COMPLEX 30 MIN: CPT | Mod: HCNC

## 2018-12-19 PROCEDURE — 83735 ASSAY OF MAGNESIUM: CPT | Mod: 91,HCNC

## 2018-12-19 PROCEDURE — 36569 INSJ PICC 5 YR+ W/O IMAGING: CPT | Mod: HCNC,,, | Performed by: NURSE PRACTITIONER

## 2018-12-19 PROCEDURE — 27000221 HC OXYGEN, UP TO 24 HOURS: Mod: HCNC

## 2018-12-19 PROCEDURE — G8979 MOBILITY GOAL STATUS: HCPCS | Mod: CI,HCNC

## 2018-12-19 PROCEDURE — 36569 PR INSERT PICC W/O SUB-Q PORT >5Y/O: ICD-10-PCS | Mod: HCNC,,, | Performed by: NURSE PRACTITIONER

## 2018-12-19 PROCEDURE — 97802 MEDICAL NUTRITION INDIV IN: CPT | Mod: HCNC

## 2018-12-19 PROCEDURE — G8978 MOBILITY CURRENT STATUS: HCPCS | Mod: CM,HCNC

## 2018-12-19 PROCEDURE — G8987 SELF CARE CURRENT STATUS: HCPCS | Mod: CN,HCNC

## 2018-12-19 PROCEDURE — 27200667 HC PACEMAKER, TEMPORARY MONITORING, PER SHIFT: Mod: HCNC

## 2018-12-19 PROCEDURE — 99900035 HC TECH TIME PER 15 MIN (STAT): Mod: HCNC

## 2018-12-19 PROCEDURE — 99232 SBSQ HOSP IP/OBS MODERATE 35: CPT | Mod: HCNC,,, | Performed by: INTERNAL MEDICINE

## 2018-12-19 PROCEDURE — 80048 BASIC METABOLIC PNL TOTAL CA: CPT | Mod: HCNC

## 2018-12-19 PROCEDURE — 99291 PR CRITICAL CARE, E/M 30-74 MINUTES: ICD-10-PCS | Mod: 25,HCNC,, | Performed by: INTERNAL MEDICINE

## 2018-12-19 PROCEDURE — 97167 OT EVAL HIGH COMPLEX 60 MIN: CPT | Mod: HCNC

## 2018-12-19 PROCEDURE — 99291 CRITICAL CARE FIRST HOUR: CPT | Mod: 25,HCNC,, | Performed by: INTERNAL MEDICINE

## 2018-12-19 PROCEDURE — 20000000 HC ICU ROOM: Mod: HCNC

## 2018-12-19 PROCEDURE — 27000646 HC AEROBIKA DEVICE: Mod: HCNC

## 2018-12-19 PROCEDURE — 25000003 PHARM REV CODE 250: Mod: HCNC | Performed by: THORACIC SURGERY (CARDIOTHORACIC VASCULAR SURGERY)

## 2018-12-19 PROCEDURE — 85730 THROMBOPLASTIN TIME PARTIAL: CPT | Mod: HCNC

## 2018-12-19 PROCEDURE — 85025 COMPLETE CBC W/AUTO DIFF WBC: CPT | Mod: HCNC

## 2018-12-19 RX ORDER — FUROSEMIDE 10 MG/ML
40 INJECTION INTRAMUSCULAR; INTRAVENOUS 2 TIMES DAILY
Status: DISCONTINUED | OUTPATIENT
Start: 2018-12-19 | End: 2018-12-24

## 2018-12-19 RX ORDER — IBUPROFEN 200 MG
24 TABLET ORAL
Status: DISCONTINUED | OUTPATIENT
Start: 2018-12-19 | End: 2018-12-25 | Stop reason: HOSPADM

## 2018-12-19 RX ORDER — IBUPROFEN 200 MG
16 TABLET ORAL
Status: DISCONTINUED | OUTPATIENT
Start: 2018-12-19 | End: 2018-12-25 | Stop reason: HOSPADM

## 2018-12-19 RX ORDER — FUROSEMIDE 10 MG/ML
40 INJECTION INTRAMUSCULAR; INTRAVENOUS ONCE
Status: COMPLETED | OUTPATIENT
Start: 2018-12-19 | End: 2018-12-19

## 2018-12-19 RX ORDER — GLUCAGON 1 MG
1 KIT INJECTION
Status: DISCONTINUED | OUTPATIENT
Start: 2018-12-19 | End: 2018-12-25 | Stop reason: HOSPADM

## 2018-12-19 RX ORDER — INSULIN ASPART 100 [IU]/ML
1-10 INJECTION, SOLUTION INTRAVENOUS; SUBCUTANEOUS
Status: DISCONTINUED | OUTPATIENT
Start: 2018-12-19 | End: 2018-12-25 | Stop reason: HOSPADM

## 2018-12-19 RX ORDER — ACETAMINOPHEN 325 MG/1
650 TABLET ORAL EVERY 6 HOURS PRN
Status: DISCONTINUED | OUTPATIENT
Start: 2018-12-19 | End: 2018-12-25 | Stop reason: HOSPADM

## 2018-12-19 RX ADMIN — FUROSEMIDE 40 MG: 10 INJECTION, SOLUTION INTRAMUSCULAR; INTRAVENOUS at 10:12

## 2018-12-19 RX ADMIN — POTASSIUM CHLORIDE 20 MEQ: 1500 TABLET, EXTENDED RELEASE ORAL at 10:12

## 2018-12-19 RX ADMIN — IPRATROPIUM BROMIDE AND ALBUTEROL SULFATE 3 ML: .5; 3 SOLUTION RESPIRATORY (INHALATION) at 12:12

## 2018-12-19 RX ADMIN — CEFAZOLIN SODIUM 2 G: 2 SOLUTION INTRAVENOUS at 03:12

## 2018-12-19 RX ADMIN — POLYETHYLENE GLYCOL 3350 17 G: 17 POWDER, FOR SOLUTION ORAL at 10:12

## 2018-12-19 RX ADMIN — PANTOPRAZOLE SODIUM 40 MG: 40 INJECTION, POWDER, FOR SOLUTION INTRAVENOUS at 10:12

## 2018-12-19 RX ADMIN — CLOPIDOGREL BISULFATE 75 MG: 75 TABLET ORAL at 10:12

## 2018-12-19 RX ADMIN — METOPROLOL TARTRATE 25 MG: 25 TABLET ORAL at 08:12

## 2018-12-19 RX ADMIN — FUROSEMIDE 40 MG: 10 INJECTION, SOLUTION INTRAMUSCULAR; INTRAVENOUS at 11:12

## 2018-12-19 RX ADMIN — ASPIRIN 81 MG: 81 TABLET, COATED ORAL at 10:12

## 2018-12-19 RX ADMIN — IPRATROPIUM BROMIDE AND ALBUTEROL SULFATE 3 ML: .5; 3 SOLUTION RESPIRATORY (INHALATION) at 07:12

## 2018-12-19 RX ADMIN — DOCUSATE SODIUM 100 MG: 100 CAPSULE, LIQUID FILLED ORAL at 08:12

## 2018-12-19 RX ADMIN — OXYCODONE HYDROCHLORIDE 5 MG: 5 TABLET ORAL at 08:12

## 2018-12-19 RX ADMIN — ACETAMINOPHEN 650 MG: 325 TABLET ORAL at 07:12

## 2018-12-19 RX ADMIN — CHLORHEXIDINE GLUCONATE 10 ML: 1.2 RINSE ORAL at 10:12

## 2018-12-19 RX ADMIN — CHLORHEXIDINE GLUCONATE 10 ML: 1.2 RINSE ORAL at 08:12

## 2018-12-19 RX ADMIN — SODIUM CHLORIDE 3.3 UNITS/HR: 9 INJECTION, SOLUTION INTRAVENOUS at 08:12

## 2018-12-19 RX ADMIN — OXYCODONE HYDROCHLORIDE 5 MG: 5 TABLET ORAL at 03:12

## 2018-12-19 RX ADMIN — IPRATROPIUM BROMIDE AND ALBUTEROL SULFATE 3 ML: .5; 3 SOLUTION RESPIRATORY (INHALATION) at 04:12

## 2018-12-19 RX ADMIN — DOCUSATE SODIUM 100 MG: 100 CAPSULE, LIQUID FILLED ORAL at 10:12

## 2018-12-19 RX ADMIN — FUROSEMIDE 40 MG: 10 INJECTION, SOLUTION INTRAMUSCULAR; INTRAVENOUS at 07:12

## 2018-12-19 RX ADMIN — ONDANSETRON 4 MG: 2 INJECTION INTRAMUSCULAR; INTRAVENOUS at 02:12

## 2018-12-19 RX ADMIN — METOPROLOL TARTRATE 25 MG: 25 TABLET ORAL at 10:12

## 2018-12-19 NOTE — ASSESSMENT & PLAN NOTE
Patient presented to [facility]- due to chest pain  EKG revealed inferior ST elevation  NTG SL x 1 given in ED  Repeat EKG revealed persistently elevation in inferior leads  IV heparin 5,000 units given in ED  No BB due to Bradycardia in ED  TTE pending  FLP pending  Patient taken to Cath Lab for emergent LHC per Dr. Monroe.  Aggrastat bolus and Infusion to follow  Further recs to follow    12/16  -CABG recommended, CVT consulted and plan for CABG on 12/18  -Continue IV heparin gtt, ASA, Statin, BB  -Start low dose ARB today  -Transfer to telemetry today  -No chest pain on exam today.   -FLP not drawn, will reorder for AM  -ECHO revealed EF 60-65%, -WMA's, normal Bi-V function.    12/17/18  -Stable overnight  -No chest pain or SOB  -Continue IV heparin  -Continue ASA, BB, statin, ARB  -CABG planned for AM    12/18/18  -s/p CABG x 4  -Hemodynamically stable  -Continue current post-op care  -ASA, Plavix, statin, BB      12/19/18  -Stable overnight  -H and H low, defer transfusion decision to CVT  -Continue ASA, Plavix, statin, BB

## 2018-12-19 NOTE — PT/OT/SLP EVAL
Occupational Therapy   Evaluation    Name: Filipe Agustin Jr.  MRN: [id number]  Admitting Diagnosis:  Coronary artery disease involving native coronary artery of native heart with unstable angina pectoris 1 Day Post-Op    Recommendations:     Discharge Recommendations: (home health  O.T.)  Discharge Equipment Recommendations:  none  Barriers to discharge:  None    History:     Occupational Profile:  Living Environment: lives with spouse in 1 story house with no steps  Previous level of function: (i) with adl's, functional mobility   Roles and Routines: occupational therapy  Equipment Used at Home:  none  Assistance upon Discharge:     Past Medical History:   Diagnosis Date    Acute coronary syndrome     Cancer of prostate with intermediate recurrence risk (stage T2b-c or Jonathon 7 or PSA 10-20) 1/8/2018    CKD (chronic kidney disease) stage 3, GFR 30-59 ml/min 9/24/2015    Coronary artery disease     Coronary artery disease involving native coronary artery of native heart with unstable angina pectoris     Elevated PSA 6/27/2017    History of coronary angioplasty 9/19/2014    Hyperlipidemia     Hypertension     Myocardial infarction 2008    Obesity, Class II, BMI 35.0-39.9, with comorbidity (see actual BMI) 6/6/2014    Polio     as a child    Tobacco dependence     resolved       Past Surgical History:   Procedure Laterality Date    CARDIAC CATHETERIZATION  2008    CORONARY ANGIOPLASTY  2008    acute PCI- RCA- RUI    CORONARY ARTERY BYPASS GRAFT (CABG) N/A 12/18/2018    Procedure: CORONARY ARTERY BYPASS GRAFT (CABG);  Surgeon: Rg Johnson MD;  Location: [facility] OR;  Service: Cardiothoracic;  Laterality: N/A;    CORONARY ARTERY BYPASS GRAFT (CABG) N/A 12/18/2018    Performed by Rg Johnson MD at [facility] OR    CORONARY STENT PLACEMENT  2008    ECHOCARDIOGRAM,TRANSESOPHAGEAL N/A 12/18/2018    Performed by Rg Johnson MD at [facility] OR    ENDOSCOPIC HARVEST OF VEIN Bilateral 12/18/2018    Procedure:  SURGICAL PROCUREMENT, VEIN, ENDOSCOPIC;  Surgeon: Rg Johnson MD;  Location: [facility] OR;  Service: Cardiothoracic;  Laterality: Bilateral;    SURGICAL PROCUREMENT, VEIN, ENDOSCOPIC Bilateral 12/18/2018    Performed by Rg Johnson MD at [facility] OR       Subjective     Chief Complaint: debility and generalized weakness  Patient/Family Comments/goals:     Pain/Comfort:  · Pain Rating 1: 5/10  · Location - Side 1: Bilateral  · Location - Orientation 1: generalized  · Location 1: chest  · Pain Rating Post-Intervention 1: 0/10    Patients cultural, spiritual, Mu-ism conflicts given the current situation:      Objective:     Communicated with: nurse and epic chart review prior to session.  Patient found with: all lines intact, call button in reach and nurse pulido notified and arterial line, blood pressure cuff, kang catheter, peripheral IV, oxygen, wound vac upon OT entry to room.    General Precautions: Standard, fall, sternal, respiratory   Orthopedic Precautions:N/A   Braces: N/A     Occupational Performance:    Activities of Daily Living:  · Upper Body Dressing: total assistance .  · Lower Body Dressing: total assistance .    Cognitive/Visual Perceptual:  Cognitive/Psychosocial Skills:     -       Oriented to: Person, Place, Time and Situation   -       Follows Commands/attention:Follows two-step commands  -       Communication: clear/fluent  -       Memory: No Deficits noted  -       Safety awareness/insight to disability: impaired   Visual/Perceptual:      -Intact .    Physical Exam:  Upper Extremity Range of Motion:     -       Right Upper Extremity: wfl of sternal precautions  -       Left Upper Extremity: wfl of sternal precautions    AMPAC 6 Click ADL:  AMPAC Total Score: 13    Treatment & Education:    Education:    Patient left up in chair with all lines intact, call button in reach and nurse Pulido notified    Assessment:     Filipe Agustin  is a 72 y.o. male with a medical diagnosis of Coronary  "artery disease involving native coronary artery of native heart with unstable angina pectoris.  He presents with the following performance deficits affecting function: weakness, impaired self care skills, impaired balance, decreased safety awareness, impaired functional mobilty, impaired endurance, decreased upper extremity function, gait instability.      Rehab Prognosis: Fair; patient would benefit from acute skilled OT services to address these deficits and reach maximum level of function.         Clinical Decision Making:     3.  OT High:  "Pt evaluation falls under high complexity for evaluation category due to 5+ performance deficits identified with comprehensive assessments and significant modifications/assistance required. An expanded review of history and occupational profile completed in addition to expanded review of physical, cognitive and psychosocial history. Several treatment options considered in care."     Plan:     Patient to be seen 3 x/week to address the above listed problems via self-care/home management, therapeutic activities, therapeutic exercises  · Plan of Care Expires: 12/26/18  · Plan of Care Reviewed with: patient    This Plan of care has been discussed with the patient who was involved in its development and understands and is in agreement with the identified goals and treatment plan    GOALS:   Multidisciplinary Problems     Occupational Therapy Goals        Problem: Occupational Therapy Goal    Goal Priority Disciplines Outcome Interventions   Occupational Therapy Goal     OT, PT/OT     Description:  ot goals to be met by 12-26-18  Mod a with ue dressing  Mod a with le dressing  Mod a with toilet t/f's  Pt will verbalized and return demonstration with ae                    Time Tracking:     OT Date of Treatment: 12/19/18  OT Start Time: 0900  OT Stop Time: 0923  OT Total Time (min): 23 min    Billable Minutes:Evaluation 10 minutes  Therapeutic Activity 13 minutes    Carito " Silvestre, OT  12/19/2018

## 2018-12-19 NOTE — ASSESSMENT & PLAN NOTE
The patient is postop day 1 status post coronary artery bypass grafting x4.  Overall the patient is doing well.  Neuro:  Patient is awake alert and oriented x3.  Neuro exam is nonfocal.    Cardiac:  Patient is on low-dose vasopressin and epinephrine.  Cardiac index is good.  Wean drips to off.  And start beta-blockers.  Respiratory: Patient is maintaining good sats on nasal cannula.  GI:  Patient is started on p.o. intake.  Advanced as tolerated.  Renal:  Creatinine is 1.7.  Urine output is good.  Endocrine:  Glucose is well controlled.  Heme:  Hematocrit is 22 and platelet is 165.  Will hold off transfusions for now.  Id:  T-max is a 101.1 °.  And white count is 9.4.  Activities:  Patient is out of bed to chair.  Advanced a activities as tolerated.  Lines tubes and drains.  Discontinue chest tubes, DANNY, Dakota and Cordis.  Placed mid level line and continue with pacer wires.

## 2018-12-19 NOTE — HOSPITAL COURSE
12/19/2018  The patient is postop day 1 status post coronary artery bypass grafting x4.    12/20/2018  The patient is postop day 2.  Status post coronary artery bypass grafting x4.    12/21/2018  The patient is postop day 3.  Status post coronary artery bypass grafting x4.    12/22/2018  The patient is postop day 4.  Status post coronary artery bypass grafting x4.    12/23/2018  The patient is postop day 5.  Status post coronary artery bypass grafting x4.    12/24/2018  The patient is postop day 6.  Status post coronary artery bypass grafting x4.    10/25/2018  The patient is postop day 7.  Status post coronary artery bypass grafting x4.

## 2018-12-19 NOTE — CONSULTS
[facility] -   Critical Care Medicine  Consult Note    Patient Name: Filipe Agustin Jr.  MRN: [id number]  Admission Date: 12/15/2018  Hospital Length of Stay: 4 days  Code Status: Full Code  Attending Physician: Rg Johnson MD   Primary Care Provider: Jojo Duque DO   Principal Problem: Coronary artery disease involving native coronary artery of native heart with unstable angina pectoris      Subjective: Extubated up in chair     HPI:  Mr Agustin is a 73 yo obese WM with a PMH of CKD3, CAD (stent placed 2008), Prostate CA (post XRT), HTN, and HLD.  He admits to malaise and AVLARADO progressive over last several weeks.  Today he noted acute midsternal, non-radiating CP which onset gradually when pt woke up at 0700 this AM. Pt describes the pain as a pressure.  Sxs are intermittent and moderate. No modifying factors. Associated sxs include nausea. He has not had a heart cath since stent placement in 2008. Pt reports his care is followed by Dr. Figueroa (Cardiology). Pt denies any fever, chills, diaphoresis, SOB, cough, palpitations, BLE edema/pain, emesis, extremity weakness/numbness, dizziness, and all other sxs. EKG via EMS was normal. Pt took 325mg ASA prior to EMS arrival. No further complaints or concerns.  In ED abnormal EKG and troponin 1.3.  CODE stemi called and taken emergently for LHC revealing diffuse CAD.  Admitted to ICU post LHC and CVT surgery consulted.  EF 45% w/ LHC.         Hospital/ICU Course:  12/15 - Admitted to ICU post LHC, sheath removed in lab and no CP.    12/18 - CVT consulted on patient and all agreed to proceed with CABG. Today taken to OR undergoing 4-vessel CABG with no reported complications.  Admitted to ICU post op from OR on [facility] ventilation.  12/19 extubated, sitting up in chair - no new compliants    Past Medical History:   Diagnosis Date    Acute coronary syndrome     Cancer of prostate with intermediate recurrence risk (stage T2b-c or Jackson 7 or PSA 10-20)  1/8/2018    CKD (chronic kidney disease) stage 3, GFR 30-59 ml/min 9/24/2015    Coronary artery disease     Coronary artery disease involving native coronary artery of native heart with unstable angina pectoris     Elevated PSA 6/27/2017    History of coronary angioplasty 9/19/2014    Hyperlipidemia     Hypertension     Myocardial infarction 2008    Obesity, Class II, BMI 35.0-39.9, with comorbidity (see actual BMI) 6/6/2014    Polio     as a child    Tobacco dependence     resolved       Past Surgical History:   Procedure Laterality Date    CARDIAC CATHETERIZATION  2008    CORONARY ANGIOPLASTY  2008    acute PCI- RCA- RUI    CORONARY STENT PLACEMENT  2008       Review of patient's allergies indicates:   Allergen Reactions    Paragoric Other (See Comments)     sneeze       Family History     Problem Relation (Age of Onset)    Cancer Brother    Heart disease Father, Brother        Tobacco Use    Smoking status: Former Smoker     Packs/day: 2.00     Years: 20.00     Pack years: 40.00     Types: Cigarettes     Last attempt to quit: 7/12/2008     Years since quitting: 10.4    Smokeless tobacco: Never Used   Substance and Sexual Activity    Alcohol use: Yes     Comment: rarely    Drug use: No    Sexual activity: Not Currently     Partners: Female         Review of Systems   Constitutional: Positive for fatigue. Negative for fever and unexpected weight change.   HENT: Negative for congestion, postnasal drip, rhinorrhea, sinus pressure and sneezing.    Respiratory: Positive for shortness of breath.    Cardiovascular: Positive for chest pain. Negative for palpitations and leg swelling.   Gastrointestinal: Negative.  Negative for abdominal pain and nausea.   Genitourinary: Negative.    Musculoskeletal: Negative.  Negative for arthralgias and back pain.   Skin: Negative for rash.   Neurological: Negative for dizziness, syncope, weakness and light-headedness.   Hematological: Negative for adenopathy. Does  not bruise/bleed easily.   Psychiatric/Behavioral: Negative.  Negative for dysphoric mood and sleep disturbance. The patient is not nervous/anxious.      Objective:     Vital Signs (Most Recent):  Temp: (!) 100.7 °F (38.2 °C) (12/19/18 1100)  Pulse: 88 (12/19/18 1100)  Resp: 20 (12/19/18 1100)  BP: (!) 118/56 (12/19/18 1100)  SpO2: (!) 94 % (12/19/18 1100) Vital Signs (24h Range):  Temp:  [97.7 °F (36.5 °C)-101.1 °F (38.4 °C)] 100.7 °F (38.2 °C)  Pulse:  [] 88  Resp:  [0-28] 20  SpO2:  [87 %-100 %] 94 %  BP: ()/(44-81) 118/56  Arterial Line BP: ()/(43-76) 118/48     Weight: 114.6 kg (252 lb 10.4 oz)  Body mass index is 37.31 kg/m².      Intake/Output Summary (Last 24 hours) at 12/19/2018 1117  Last data filed at 12/19/2018 1100  Gross per 24 hour   Intake 7618.8 ml   Output 2825 ml   Net 4793.8 ml       Physical Exam   Constitutional: He is oriented to person, place, and time. He appears well-developed and well-nourished.   HENT:   Head: Normocephalic and atraumatic.   Eyes: Conjunctivae are normal. Pupils are equal, round, and reactive to light.   Neck: Neck supple. No JVD present. No tracheal deviation present. No thyromegaly present.   Cardiovascular: Normal rate, regular rhythm and normal heart sounds.       Pulmonary/Chest: Effort normal. He has decreased breath sounds in the right lower field and the left lower field.   Abdominal: Soft.   Musculoskeletal: Normal range of motion.   Lymphadenopathy:     He has no cervical adenopathy.   Neurological: He is alert and oriented to person, place, and time.   Skin: Skin is warm and dry.   Nursing note and vitals reviewed.      Vents:  Vent Mode: Spont (12/18/18 1610)  Ventilator Initiated: Yes (12/18/18 1341)  Set Rate: 0 bmp (12/18/18 1610)  Vt Set: 400 mL (12/18/18 1610)  Pressure Support: 10 cmH20 (12/18/18 1610)  PEEP/CPAP: 5 cmH20 (12/18/18 1610)  Oxygen Concentration (%): 32 (12/19/18 0706)  Peak Airway Pressure: 16 cmH2O (12/18/18  1610)  Plateau Pressure: 0 cmH20 (12/18/18 1610)  Total Ve: 11.3 mL (12/18/18 1610)  F/VT Ratio<105 (RSBI): (!) 38.99 (12/18/18 1610)    Lines/Drains/Airways     Central Venous Catheter Line                 Introducer 12/18/18  right internal jugular 1 day         Pulmonary Artery Catheter Assessment  12/18/18 0730 1 day          Drain                 Urethral Catheter 12/18/18 0730 Latex;Straight-tip;Temperature probe 16 Fr. 1 day          Line                 Pacer Wires 12/18/18 1200 less than 1 day          Peripheral Intravenous Line                 Peripheral IV - Single Lumen 12/18/18 0408 Anterior;Right Upper Arm 1 day                Significant Labs:    CBC/Anemia Profile:  Recent Labs   Lab 12/18/18  1334 12/18/18  1634 12/19/18  0345   WBC 6.84 12.42 9.44   HGB 8.3* 8.2* 7.1*   HCT 25.8* 25.9* 22.3*   * 174 165   MCV 95 95 95   RDW 13.9 13.9 14.5        Chemistries:  Recent Labs   Lab 12/18/18  0504 12/18/18  1335 12/18/18  1634 12/18/18  1854 12/19/18  0345    139 140  --  141  141   K 3.7 4.1 4.0  --  4.1  4.1    106 106  --  108  108   CO2 26 21* 17*  --  23  23   BUN 14 12 13  --  16  16   CREATININE 1.3 1.3 1.5*  --  1.7*  1.7*   CALCIUM 9.2 8.0* 7.9*  --  9.0  9.0   ALBUMIN 3.2*  --   --   --  3.4*   PROT 6.5  --   --   --  5.2*   BILITOT 0.7  --   --   --  0.6   ALKPHOS 26*  --   --   --  15*   ALT 29  --   --   --  26   AST 34  --   --   --  82*   MG  --  5.1*  --  4.1* 3.3*       BMP:   Recent Labs   Lab 12/19/18  0345   *  118*     141   K 4.1  4.1     108   CO2 23  23   BUN 16  16   CREATININE 1.7*  1.7*   CALCIUM 9.0  9.0   MG 3.3*     CMP:   Recent Labs   Lab 12/18/18  0504 12/18/18  1335 12/18/18  1634 12/19/18  0345    139 140 141  141   K 3.7 4.1 4.0 4.1  4.1    106 106 108  108   CO2 26 21* 17* 23  23   GLU 94 159* 214* 118*  118*   BUN 14 12 13 16  16   CREATININE 1.3 1.3 1.5* 1.7*  1.7*   CALCIUM 9.2 8.0* 7.9* 9.0   9.0   PROT 6.5  --   --  5.2*   ALBUMIN 3.2*  --   --  3.4*   BILITOT 0.7  --   --  0.6   ALKPHOS 26*  --   --  15*   AST 34  --   --  82*   ALT 29  --   --  26   ANIONGAP 8 12 17* 10  10   EGFRNONAA 55* 55* 46* 40*  40*     Cardiac Markers: No results for input(s): CKMB, TROPONINT, MYOGLOBIN in the last 48 hours.  All pertinent labs within the past 24 hours have been reviewed.    Significant Imaging:   I have reviewed all pertinent imaging results/findings within the past 24 hours.  I have reviewed and interpreted all pertinent imaging results/findings within the past 24 hours.      ABG  Recent Labs   Lab 12/18/18  1620   PH 7.316*   PO2 80   PCO2 34.7*   HCO3 17.7*   BE -8     Assessment/Plan:     Pulmonary   Atelectasis, left    12/19 Continue IS     Cardiac/Vascular   * Coronary artery disease involving native coronary artery of native heart with unstable angina pectoris post ACS    Per Cards  Cont ASA, Lopressor and Plavix  ICU Cardiac monitoring  S/P CABG X 4 vessels     S/P CABG x 4    Per CVT surgery  Monitor chest tube output  Post op ICU hemodynamic monitoring  Encourage OOB, C&DB and IS use post extubation  12/19 Extubated , doing well     Hypertension goal BP (blood pressure) < 140/90    Cont Lopressor  Cardene infusion if needed post op     Renal/   CKD (chronic kidney disease) stage 3, GFR 30-59 ml/min    Cont IVFs post CABG  BMP in AM     Oncology   Cancer of prostate with intermediate recurrence risk (stage T2b-c or Jonathon 7 or PSA 10-20)    Outpt follow up with Urology     Other   Severe obesity with body mass index (BMI) of 35.0 to 39.9 with serious comorbidity    Encouraged weight loss         Critical Care Daily Checklist:    A: Awake: RASS Goal/Actual Goal: RASS Goal: 0-->alert and calm  Actual: Vasquez Agitation Sedation Scale (RASS): (P) Alert and calm   B: Spontaneous Breathing Trial Performed?     C: SAT & SBT Coordinated?  y                      D: Delirium: CAM-ICU Overall CAM-ICU:  (P) Negative   E: Early Mobility Performed? Yes   F: Feeding Goal: Goals: Meet >85% EEN/EPN this admit  Status: Nutrition Goal Status: new   Current Diet Order   Procedures    Diet clear liquid      AS: Analgesia/Sedation adequate   T: Thromboembolic Prophylaxis yes   H: HOB > 300 Yes   U: Stress Ulcer Prophylaxis (if needed) yes   G: Glucose Control adequate   B: Bowel Function Stool Occurrence: 0   I: Indwelling Catheter (Lines & Tavera) Necessity needed   D: De-escalation of Antimicrobials/Pharmacotherapies na    Plan for the day/ETD out of bed , post op monitoring    Code Status:  Family/Goals of Care: Full Code  discussed     Critical Care Time: 40 minutes  Critical secondary to Patient has a condition that poses threat to life and bodily function: Acute Myocardial Infarction     Critical care was time spent personally by me on the following activities: development of treatment plan with patient or surrogate and bedside caregivers, discussions with consultants, evaluation of patient's response to treatment, examination of patient, ordering and performing treatments and interventions, ordering and review of laboratory studies, ordering and review of radiographic studies, pulse oximetry, re-evaluation of patient's condition. This critical care time did not overlap with that of any other provider or involve time for any procedures.    Thank you for your consult. I will follow-up with patient. Please contact us if you have any additional questions.     Leroy Robles MD  Critical Care Medicine  [facility] -

## 2018-12-19 NOTE — CONSULTS
"  [facility] -   Adult Nutrition  Consult Note    SUMMARY     Recommendations    Recommendation: 1. When medically able, ADAT to Cardiac diet. 2. Will continue to monitor  Intervention: Cardiac diet education, coordination of care w/ primary team  Goals: Meet >85% EEN/EPN this admit  Nutrition Goal Status: new  Communication of RD Recs: (POC review, sticky note)    Reason for Assessment    Reason For Assessment: consult("post op")  Dx: Coronary artery disease   PMH: HTN, HLD, CAD, CKD 3  General Information Comments: Pt is s/p CABG, currently on clear liquid diet w/ plans to advance per MD Johnson note. Pt w/ good appetite at home, no wt loss noted. RD educated pt on Cardiac diet s/p CABG. RD explained rationale for dietary modifications. Instructed pt to avoid saturated fat, trans fat, and sodium. Indicated food sources of each. Encouraged unsaturated fats and gradual increase in fiber foods. Indicated food sources of each. RD utilized written materials from Nutrition Care Manual during education. Pt verbalized understanding. All concerns addressed. RD contact info and handouts left at bedside.  Nutrition Discharge Planning: Cardiac diet    Nutrition Risk Screen    Nutrition Risk Screen: no indicators present    Nutrition/Diet History    Spiritual, Cultural Beliefs, Yarsani Practices, Values that Affect Care: no  Food Allergies: NKFA  Factors Affecting Nutritional Intake: clear liquid diet    Anthropometrics    Temp: (!) 100.7 °F (38.2 °C)  Height Method: Stated  Height: 5' 9" (175.3 cm)  Height (inches): 69 in  Weight Method: Bed Scale  Weight: 114.6 kg (252 lb 10.4 oz)  Weight (lb): 252.65 lb  Ideal Body Weight (IBW), Male: 160 lb  % Ideal Body Weight, Male (lb): 157.91 lb  BMI (Calculated): 37.4  BMI Grade: 35 - 39.9 - obesity - grade II       Lab/Procedures/Meds    Pertinent Labs Reviewed: reviewed  Pertinent Labs Comments: Cr=1.7(H), GFR=40(L), Toa=459(H), Mg=3.3(H)  Pertinent Medications " Reviewed: reviewed  Pertinent Medications Comments: docusate sodium, KCl 20 mEq, D5W in LR @ 25 mL/hr    Physical Findings/Assessment     Overall: well nourished  Oral: tooth/teeth missing, no dental appliance present  Skin: incisions to L&R leg, chest; Ircardo=17    Estimated/Assessed Needs    Weight Used For Calorie Calculations: 114.6 kg (252 lb 10.4 oz)  Energy Calorie Requirements (kcal): 7823-9554  Energy Need Method: Clive-St Jeor(x1.2-1.3)  Protein Requirements: 115-140  Weight Used For Protein Calculations: 114.6 kg (252 lb 10.4 oz)(x1-1.2)     Estimated Fluid Requirement Method: RDA Method(or per MD)  RDA Method (mL): 2270  CHO Requirement: n/a      Nutrition Prescription Ordered    Current Diet Order: Clear liquids    Evaluation of Received Nutrient/Fluid Intake    Other Calories (kcal): 102(D5W in LR @ 25 mL/hr)  % Intake of Estimated Energy Needs: 25 - 50 %  % Meal Intake: 75 - 100 %    Nutrition Risk    Level of Risk/Frequency of Follow-up: (f/u x2 weekly)     Assessment and Plan    Nutrition Problem  Inadequate energy intake    Related to (etiology):   Inability to consume sufficient kcal    Signs and Symptoms (as evidenced by):   Current diet order meeting <50% EEN    Interventions/Recommendations (treatment strategy):  See above    Nutrition Diagnosis Status:   New         Monitor and Evaluation    Food and Nutrient Intake: energy intake, food and beverage intake  Food and Nutrient Adminstration: diet order  Knowledge/Beliefs/Attitudes: food and nutrition knowledge/skill  Anthropometric Measurements: weight  Biochemical Data, Medical Tests and Procedures: electrolyte and renal panel, lipid profile  Nutrition-Focused Physical Findings: overall appearance     Malnutrition Assessment                                       Nutrition Follow-Up    RD Follow-up?: Yes

## 2018-12-19 NOTE — PLAN OF CARE
Problem: Adult Inpatient Plan of Care  Goal: Plan of Care Review  Outcome: Ongoing (interventions implemented as appropriate)  Pt received post CABGx4. Extubated at 1621. Currently on vasopressin 0.04units/min and epi 0.04mcg/kg/min; Good CO/CI. Albumin 250mL x 2, 500mL of LR, and 1g CaCl given. MAP improved, but still low.   100% paced on CM. Good UOP per manny kang. Sanguinous drainage to CTs w/avg OP 30-50mL/hr. More sanguinous drainage to L leg DANNY than R leg JPs; Dr Hu aware and has assessed at bedside.  Pain controlled w/PRN fentanyl. PRN Zofran given for nausea.   POC discussed w/pt's wife at bedside, questions answered, verbalized understanding.

## 2018-12-19 NOTE — PLAN OF CARE
12/19/18 1600   Medicare Message   Important Message from Medicare regarding Discharge Appeal Rights Given to patient/caregiver;Explained to patient/caregiver;Signed/date by patient/caregiver   Date IMM was signed 12/19/18   Time IMM was signed 1600

## 2018-12-19 NOTE — PT/OT/SLP PROGRESS
Physical Therapy      Patient Name:  Filipe Agustin JrPedro   MRN:  [id number]    PAYAL WHALEN INITIATED THIS AM VIA CHART REVIEW, PT CURRENTLY WITH SURGEON, WILL ASSESS NEXT VISIT    Aruna Hugo, PT   12/19/2018  0800

## 2018-12-19 NOTE — PROCEDURES
"Filipe Agustin Jr. is a 72 y.o. male patient.    Temp: (!) 100.7 °F (38.2 °C) (12/19/18 1100)  Pulse: 90 (12/19/18 1415)  Resp: 10 (12/19/18 1415)  BP: 111/62 (12/19/18 1415)  SpO2: (!) 94 % (12/19/18 1415)  Weight: 114.6 kg (252 lb 10.4 oz) (12/19/18 1028)  Height: 5' 9" (175.3 cm) (12/19/18 1038)  Mallampati Scale: Class II  ASA Classification: Class 2    Mid Line  Date/Time: 12/19/2018 3:31 PM  Location procedure was performed: [facility] INTENSIVE CARE UNIT  Performed by: Aron Rosado NP  Pre-operative Diagnosis: post op CABG  Post-operative diagnosis: post op CABG  Consent Done: Yes  Time out: Immediately prior to procedure a "time out" was called to verify the correct patient, procedure, equipment, support staff and site/side marked as required.  Indications: vascular access  Preparation: skin prepped with ChloraPrep  Skin prep agent dried: skin prep agent completely dried prior to procedure  Sterile barriers: all five maximum sterile barriers used - cap, mask, sterile gown, sterile gloves, and large sterile sheet  Hand hygiene: hand hygiene performed prior to central venous catheter insertion  Location details: left basilic  Catheter type: double lumen  Catheter size: 5 Fr  Catheter Length: 25cm    Ultrasound guidance: yes  Vessel Caliber: small, patent, compressibility normal  Vascular Doppler: not done  Needle advanced into vessel with real time Ultrasound guidance.  Guidewire confirmed in vessel.  Sterile sheath used.  Manometry: No   Number of attempts: 1  Assessment: successful placement  Complications: none  Estimated blood loss (mL): 1  Specimens: No  Implants: No  Post-procedure: chlorhexidine patch,  sterile dressing applied and blood return through all ports  Complications: No  Comments: Placed at request of Dr. Alex Rosado  12/19/2018  "

## 2018-12-19 NOTE — SUBJECTIVE & OBJECTIVE
Interval History:  The patient is postop day 1 status post coronary artery bypass grafting x4.  The patient is complaining of some incisional pain.  Pain is well controlled with pain meds.    ROS  Medications:  Continuous Infusions:   dexmedetomidine (PRECEDEX) infusion Stopped (12/18/18 1710)    dextrose 5% lactated ringers 25 mL/hr at 12/19/18 0900    epinephrine infusion 0.02 mcg/kg/min (12/19/18 0900)    insulin (HUMAN R) infusion (adults) 3.3 Units/hr (12/19/18 0900)    niCARdipine Stopped (12/18/18 1410)    norepinephrine bitartrate-D5W Stopped (12/18/18 2200)    vasopressin (PITRESSIN) infusion Stopped (12/19/18 0400)     Scheduled Meds:   albuterol-ipratropium  3 mL Nebulization Q4H    aspirin  81 mg Oral Daily    chlorhexidine  10 mL Mouth/Throat BID    clopidogrel  75 mg Oral Daily    docusate sodium  100 mg Oral BID    furosemide  40 mg Intravenous BID    metoprolol tartrate  25 mg Oral BID    nozaseptin   Each Nare BID    pantoprazole  40 mg Intravenous Daily    polyethylene glycol  17 g Oral Daily    potassium chloride  20 mEq Oral Q12H     PRN Meds:albumin human 5%, albuterol sulfate, dextrose 50%, dextrose 50%, fentaNYL, fentaNYL, insulin (HUMAN R) infusion (adults), lactated ringers, magnesium sulfate IVPB, metoclopramide HCl, ondansetron, oxyCODONE, oxyCODONE, potassium chloride in water **AND** potassium chloride in water **AND** potassium chloride in water     Objective:     Vital Signs (Most Recent):  Temp: (!) 100.7 °F (38.2 °C) (12/19/18 0706)  Pulse: 95 (12/19/18 0900)  Resp: 20 (12/19/18 0900)  BP: (!) 110/56 (12/19/18 0900)  SpO2: (!) 92 % (12/19/18 0900) Vital Signs (24h Range):  Temp:  [97.7 °F (36.5 °C)-101.1 °F (38.4 °C)] 100.7 °F (38.2 °C)  Pulse:  [] 95  Resp:  [0-28] 20  SpO2:  [87 %-100 %] 92 %  BP: ()/(44-81) 110/56  Arterial Line BP: ()/(43-76) 118/48     Weight: 114.6 kg (252 lb 10.4 oz)  Body mass index is 37.31 kg/m².    SpO2: (!) 92 %  O2  Device (Oxygen Therapy): nasal cannula    Intake/Output - Last 3 Shifts       12/17 0700 - 12/18 0659 12/18 0700 - 12/19 0659 12/19 0700 - 12/20 0659    P.O. 600      I.V. (mL/kg) 216.8 (2) 8084.2 (70.5) 84.6 (0.7)    IV Piggyback  400     Total Intake(mL/kg) 816.8 (7.7) 8484.2 (74) 84.6 (0.7)    Urine (mL/kg/hr) 980 (0.4) 1173 (0.4) 82 (0.3)    Drains  180 250    Stool 0 0     Chest Tube  620 20    Total Output 980 1973 352    Net -163.2 +6511.2 -267.4           Stool Occurrence 1 x 0 x           Lines/Drains/Airways     Central Venous Catheter Line                 Introducer 12/18/18  right internal jugular 1 day         Pulmonary Artery Catheter Assessment  12/18/18 0730 1 day          Drain                 Chest Tube 12/18/18  3 Left Pleural 1 day         Urethral Catheter 12/18/18 0730 Latex;Straight-tip;Temperature probe 16 Fr. 1 day         Y Chest Tube 1 and 2 12/18/18  1 Mediastinal 2 Mediastinal 1 day         Closed/Suction Drain 12/18/18 1003 Right Leg Bulb 19 Fr. less than 1 day         Closed/Suction Drain 12/18/18 1004 Right Leg Bulb 19 Fr. less than 1 day         Closed/Suction Drain 12/18/18 1005 Left Leg Bulb 19 Fr. less than 1 day          Arterial Line                 Arterial Line 12/18/18 1325 Right Radial less than 1 day          Line                 Pacer Wires 12/18/18 1200 less than 1 day          Peripheral Intravenous Line                 Peripheral IV - Single Lumen 12/18/18 0408 Anterior;Right Upper Arm 1 day                Physical Exam   Constitutional: He is oriented to person, place, and time. He appears well-developed and well-nourished. No distress.   HENT:   Head: Normocephalic and atraumatic.   Cardiovascular: Normal rate, regular rhythm and normal heart sounds.   Pulmonary/Chest: Effort normal and breath sounds normal.   Abdominal: Soft. Bowel sounds are normal.   Musculoskeletal: He exhibits edema. He exhibits no deformity.   Neurological: He is alert and oriented to person,  place, and time. No cranial nerve deficit.   Skin: Skin is warm and dry. He is not diaphoretic.       Significant Labs:  All pertinent labs from the last 24 hours have been reviewed.    Significant Diagnostics:  I have reviewed all pertinent imaging results/findings within the past 24 hours.

## 2018-12-19 NOTE — PROGRESS NOTES
[facility] -   Cardiothoracic Surgery  Progress Note    Patient Name: Filipe Agustin Jr.  MRN: [id number]  Admission Date: 12/15/2018  Hospital Length of Stay: 4 days  Code Status: Full Code   Attending Physician: Rg Johnson MD   Referring Provider: Self, Aaareferral  Principal Problem:Coronary artery disease involving native coronary artery of native heart with unstable angina pectoris            Subjective:     Post-Op Info:  Procedure(s) (LRB):  CORONARY ARTERY BYPASS GRAFT (CABG) (N/A)  ECHOCARDIOGRAM,TRANSESOPHAGEAL (N/A)  SURGICAL PROCUREMENT, VEIN, ENDOSCOPIC (Bilateral)   1 Day Post-Op     Interval History:  The patient is postop day 1 status post coronary artery bypass grafting x4.  The patient is complaining of some incisional pain.  Pain is well controlled with pain meds.    ROS  Medications:  Continuous Infusions:   dexmedetomidine (PRECEDEX) infusion Stopped (12/18/18 1710)    dextrose 5% lactated ringers 25 mL/hr at 12/19/18 0900    epinephrine infusion 0.02 mcg/kg/min (12/19/18 0900)    insulin (HUMAN R) infusion (adults) 3.3 Units/hr (12/19/18 0900)    niCARdipine Stopped (12/18/18 1410)    norepinephrine bitartrate-D5W Stopped (12/18/18 2200)    vasopressin (PITRESSIN) infusion Stopped (12/19/18 0400)     Scheduled Meds:   albuterol-ipratropium  3 mL Nebulization Q4H    aspirin  81 mg Oral Daily    chlorhexidine  10 mL Mouth/Throat BID    clopidogrel  75 mg Oral Daily    docusate sodium  100 mg Oral BID    furosemide  40 mg Intravenous BID    metoprolol tartrate  25 mg Oral BID    nozaseptin   Each Nare BID    pantoprazole  40 mg Intravenous Daily    polyethylene glycol  17 g Oral Daily    potassium chloride  20 mEq Oral Q12H     PRN Meds:albumin human 5%, albuterol sulfate, dextrose 50%, dextrose 50%, fentaNYL, fentaNYL, insulin (HUMAN R) infusion (adults), lactated ringers, magnesium sulfate IVPB, metoclopramide HCl, ondansetron, oxyCODONE, oxyCODONE, potassium  chloride in water **AND** potassium chloride in water **AND** potassium chloride in water     Objective:     Vital Signs (Most Recent):  Temp: (!) 100.7 °F (38.2 °C) (12/19/18 0706)  Pulse: 95 (12/19/18 0900)  Resp: 20 (12/19/18 0900)  BP: (!) 110/56 (12/19/18 0900)  SpO2: (!) 92 % (12/19/18 0900) Vital Signs (24h Range):  Temp:  [97.7 °F (36.5 °C)-101.1 °F (38.4 °C)] 100.7 °F (38.2 °C)  Pulse:  [] 95  Resp:  [0-28] 20  SpO2:  [87 %-100 %] 92 %  BP: ()/(44-81) 110/56  Arterial Line BP: ()/(43-76) 118/48     Weight: 114.6 kg (252 lb 10.4 oz)  Body mass index is 37.31 kg/m².    SpO2: (!) 92 %  O2 Device (Oxygen Therapy): nasal cannula    Intake/Output - Last 3 Shifts       12/17 0700 - 12/18 0659 12/18 0700 - 12/19 0659 12/19 0700 - 12/20 0659    P.O. 600      I.V. (mL/kg) 216.8 (2) 8084.2 (70.5) 84.6 (0.7)    IV Piggyback  400     Total Intake(mL/kg) 816.8 (7.7) 8484.2 (74) 84.6 (0.7)    Urine (mL/kg/hr) 980 (0.4) 1173 (0.4) 82 (0.3)    Drains  180 250    Stool 0 0     Chest Tube  620 20    Total Output 980 1973 352    Net -163.2 +6511.2 -267.4           Stool Occurrence 1 x 0 x           Lines/Drains/Airways     Central Venous Catheter Line                 Introducer 12/18/18  right internal jugular 1 day         Pulmonary Artery Catheter Assessment  12/18/18 0730 1 day          Drain                 Chest Tube 12/18/18  3 Left Pleural 1 day         Urethral Catheter 12/18/18 0730 Latex;Straight-tip;Temperature probe 16 Fr. 1 day         Y Chest Tube 1 and 2 12/18/18  1 Mediastinal 2 Mediastinal 1 day         Closed/Suction Drain 12/18/18 1003 Right Leg Bulb 19 Fr. less than 1 day         Closed/Suction Drain 12/18/18 1004 Right Leg Bulb 19 Fr. less than 1 day         Closed/Suction Drain 12/18/18 1005 Left Leg Bulb 19 Fr. less than 1 day          Arterial Line                 Arterial Line 12/18/18 1325 Right Radial less than 1 day          Line                 Pacer Wires 12/18/18 1200 less than  1 day          Peripheral Intravenous Line                 Peripheral IV - Single Lumen 12/18/18 0408 Anterior;Right Upper Arm 1 day                Physical Exam   Constitutional: He is oriented to person, place, and time. He appears well-developed and well-nourished. No distress.   HENT:   Head: Normocephalic and atraumatic.   Cardiovascular: Normal rate, regular rhythm and normal heart sounds.   Pulmonary/Chest: Effort normal and breath sounds normal.   Abdominal: Soft. Bowel sounds are normal.   Musculoskeletal: He exhibits edema. He exhibits no deformity.   Neurological: He is alert and oriented to person, place, and time. No cranial nerve deficit.   Skin: Skin is warm and dry. He is not diaphoretic.       Significant Labs:  All pertinent labs from the last 24 hours have been reviewed.    Significant Diagnostics:  I have reviewed all pertinent imaging results/findings within the past 24 hours.    Assessment/Plan:     * Coronary artery disease involving native coronary artery of native heart with unstable angina pectoris post ACS    The patient is a 72-year-old male with history of CAD status post stent placement in the past, hypertension, hyperlipidemia who presented with acute onset of chest pain.  Patient's workup included cardiac catheterization which showed severe multivessel coronary artery.  The risk and benefits of surgery was discussed with the patient and his wife.  The patient understands and has agreed to proceed with surgery which has been scheduled for 12/18/2018.     S/P CABG x 4    The patient is postop day 1 status post coronary artery bypass grafting x4.  Overall the patient is doing well.  Neuro:  Patient is awake alert and oriented x3.  Neuro exam is nonfocal.    Cardiac:  Patient is on low-dose vasopressin and epinephrine.  Cardiac index is good.  Wean drips to off.  And start beta-blockers.  Respiratory: Patient is maintaining good sats on nasal cannula.  GI:  Patient is started on p.o.  intake.  Advanced as tolerated.  Renal:  Creatinine is 1.7.  Urine output is good.  Endocrine:  Glucose is well controlled.  Heme:  Hematocrit is 22 and platelet is 165.  Will hold off transfusions for now.  Id:  T-max is a 101.1 °.  And white count is 9.4.  Activities:  Patient is out of bed to chair.  Advanced a activities as tolerated.  Lines tubes and drains.  Discontinue chest tubes, DANNY, [place] and Cordis.  Placed mid level line and continue with pacer wires.         Rg Johnson MD  Cardiothoracic Surgery  [facility]

## 2018-12-19 NOTE — PROGRESS NOTES
Pts oral temperature is 101.4.  Pt resting quietly and states he does not feel any different.  Dr. Johnson at bedside, notified of temperature.

## 2018-12-19 NOTE — PLAN OF CARE
Problem: Adult Inpatient Plan of Care  Goal: Plan of Care Review  Outcome: Ongoing (interventions implemented as appropriate)  poc reviewed c pt.  Worked to maintain MAP > 65 and eventually to make progress weaning vasopressors.  Ca and albumin given per MD Johnson orders.  Gradual recovery of hemodynamics although still labile with turning/stimulation.  Pt on/off c/o nausea throughout the evening coupled with frequent requests for sips of water/ice chips.  Consistent uop 40/hr, stable chest tube output 5-10/20cc/hr.  Pt required max assist to chair in am.  MD Johnson aware of am labs, namely H/H 7.1/22.3.  No orders to transfuse.  Will continue to monitor, and work to wean vasopressors as appropriate.

## 2018-12-19 NOTE — PROGRESS NOTES
[facility]  Cardiology  Progress Note    Patient Name: Filipe Agustin Jr.  MRN: [id number]  Admission Date: 12/15/2018  Hospital Length of Stay: 4 days  Code Status: Full Code   Attending Physician: Rg Johnson MD   Primary Care Physician: Jojo Duque DO  Expected Discharge Date:   Principal Problem:Coronary artery disease involving native coronary artery of native heart with unstable angina pectoris    Subjective:   HPI:  Mr. Agustin is a 72 year old male with PMHx of CAD s/p coronary stenting at [facility] per patient report, HTN, HLP who presented to [facility] ED due to chest pain which started abruptly this AM. Initial EKG revealed ST elevated in inferior leads, CODE STEMI activated at that time. Patient received IV heparin bolus and NTG SL x 1 and repeat EKG which continued to reveal ST elevation in inferior leads. Patient transported to cath lab for emergent LHC per Dr. Monroe. IV aggrastat bolus per Dr. Monroe's recommendations. TTE pending. Further recs to follow. Labs pending.     Hospital Course:   12/16/18-Patient seen and examined in ICU, sitting in bedside chair. Denies chest pain or shortness of breath today on exam. IV heparin gtt in progress. Pending CABG on 12/18 per CVT. Labs reviewed, stable.     12/17/18-Patient seen and examined today, resting in bed. No acute events overnight. Denies chest pain or SOB. Labs stable. CABG planned for AM.    12/18/18-Patient seen and examined today, s/p CABG x 4. Hemodynamically stable.     12/19/18-Patient seen and examined today, POD # 1 s/p CABG x 4. Feels ok. Complains of fatigue and incisional soreness. Labs reviewed. Creatinine 1.7. H and H low.         Review of Systems   Constitution: Positive for weakness and malaise/fatigue.   HENT: Negative.    Eyes: Negative.    Cardiovascular: Positive for chest pain (incisional).   Respiratory: Negative.    Endocrine: Negative.    Hematologic/Lymphatic: Negative.    Skin: Negative.    Musculoskeletal:  Negative.    Gastrointestinal: Negative.    Genitourinary: Negative.    Psychiatric/Behavioral: Negative.    Allergic/Immunologic: Negative.      Objective:     Vital Signs (Most Recent):  Temp: (!) 101.4 °F (38.6 °C) (12/19/18 1500)  Pulse: 91 (12/19/18 1500)  Resp: (!) 22 (12/19/18 1500)  BP: 111/62 (12/19/18 1415)  SpO2: (!) 94 % (12/19/18 1500) Vital Signs (24h Range):  Temp:  [98.1 °F (36.7 °C)-101.4 °F (38.6 °C)] 101.4 °F (38.6 °C)  Pulse:  [] 91  Resp:  [10-28] 22  SpO2:  [87 %-99 %] 94 %  BP: ()/(44-67) 111/62  Arterial Line BP: ()/(43-56) 133/50     Weight: 114.6 kg (252 lb 10.4 oz)  Body mass index is 37.31 kg/m².     SpO2: (!) 94 %  O2 Device (Oxygen Therapy): nasal cannula      Intake/Output Summary (Last 24 hours) at 12/19/2018 1541  Last data filed at 12/19/2018 1500  Gross per 24 hour   Intake 4607.96 ml   Output 2488 ml   Net 2119.96 ml       Lines/Drains/Airways     Central Venous Catheter Line                 Introducer 12/18/18  right internal jugular 1 day         Pulmonary Artery Catheter Assessment  12/18/18 0730 1 day          Drain                 Urethral Catheter 12/18/18 0730 Latex;Straight-tip;Temperature probe 16 Fr. 1 day          Line                 Pacer Wires 12/18/18 1200 1 day          Peripheral Intravenous Line                 Peripheral IV - Single Lumen 12/18/18 0408 Anterior;Right Upper Arm 1 day                Physical Exam   Constitutional: He is oriented to person, place, and time. He appears well-developed and well-nourished. No distress.   HENT:   Head: Normocephalic and atraumatic.   Eyes: Pupils are equal, round, and reactive to light. Right eye exhibits no discharge. Left eye exhibits no discharge.   Neck: Neck supple. No JVD present.   Cardiovascular: Normal rate, regular rhythm, S1 normal, S2 normal and normal heart sounds.   No murmur heard.  Pulmonary/Chest: Effort normal and breath sounds normal. No respiratory distress. He has no wheezes. He  has no rales.   Abdominal: Soft. He exhibits no distension. There is no tenderness.   Musculoskeletal: He exhibits no edema.   Neurological: He is alert and oriented to person, place, and time.   Skin: Skin is warm and dry. He is not diaphoretic. No erythema.   SVG sites dressed; C/D/I; no bleeding erythema or drainage   Psychiatric: He has a normal mood and affect. His behavior is normal. Thought content normal.   Nursing note and vitals reviewed.      Significant Labs:   CMP   Recent Labs   Lab 12/18/18  0504 12/18/18  1335 12/18/18  1634 12/19/18  0345    139 140 141  141   K 3.7 4.1 4.0 4.1  4.1    106 106 108  108   CO2 26 21* 17* 23  23   GLU 94 159* 214* 118*  118*   BUN 14 12 13 16  16   CREATININE 1.3 1.3 1.5* 1.7*  1.7*   CALCIUM 9.2 8.0* 7.9* 9.0  9.0   PROT 6.5  --   --  5.2*   ALBUMIN 3.2*  --   --  3.4*   BILITOT 0.7  --   --  0.6   ALKPHOS 26*  --   --  15*   AST 34  --   --  82*   ALT 29  --   --  26   ANIONGAP 8 12 17* 10  10   ESTGFRAFRICA >60 >60 53* 46*  46*   EGFRNONAA 55* 55* 46* 40*  40*   , CBC   Recent Labs   Lab 12/18/18  1334 12/18/18  1634 12/19/18  0345   WBC 6.84 12.42 9.44   HGB 8.3* 8.2* 7.1*   HCT 25.8* 25.9* 22.3*   * 174 165   , Troponin No results for input(s): TROPONINI in the last 48 hours. and All pertinent lab results from the last 24 hours have been reviewed.    Significant Imaging: Echocardiogram:   2D echo with color flow doppler:   Results for orders placed or performed during the hospital encounter of 12/15/18   2D echo with color flow doppler   Result Value Ref Range    QEF 60 55 - 65    Diastolic Dysfunction No     and X-Ray: CXR: X-Ray Chest 1 View (CXR): No results found for this visit on 12/15/18. and X-Ray Chest PA and Lateral (CXR): No results found for this visit on 12/15/18.    Assessment and Plan:   Patient who presents with STEMI/multivessel CAD recovering s/p CABG x 4. Continue current post-op care/mgmt. Defer transfusion decision  to CVT.    * Coronary artery disease involving native coronary artery of native heart with unstable angina pectoris post ACS    -See plan under STEMI     S/P CABG x 4    Patient presented to [facility] due to chest pain  EKG revealed inferior ST elevation  NTG SL x 1 given in ED  Repeat EKG revealed persistently elevation in inferior leads  IV heparin 5,000 units given in ED  No BB due to Bradycardia in ED  TTE pending  FLP pending  Patient taken to Cath Lab for emergent LHC per Dr. Monroe.  Aggrastat bolus and Infusion to follow  Further recs to follow    12/16  -CABG recommended, CVT consulted and plan for CABG on 12/18  -Continue IV heparin gtt, ASA, Statin, BB  -Start low dose ARB today  -Transfer to telemetry today  -No chest pain on exam today.   -FLP not drawn, will reorder for AM  -ECHO revealed EF 60-65%, -WMA's, normal Bi-V function.    12/17/18  -Stable overnight  -No chest pain or SOB  -Continue IV heparin  -Continue ASA, BB, statin, ARB  -CABG planned for AM    12/18/18  -s/p CABG x 4  -Hemodynamically stable  -Continue current post-op care  -ASA, Plavix, statin, BB      12/19/18  -Stable overnight  -H and H low, defer transfusion decision to CVT  -Continue ASA, Plavix, statin, BB     CKD (chronic kidney disease) stage 3, GFR 30-59 ml/min    -Monitor     Hypertension goal BP (blood pressure) < 140/90    On medical therapy  Continue BB  Start ARB today     Severe obesity with body mass index (BMI) of 35.0 to 39.9 with serious comorbidity    -Encourage weight loss         VTE Risk Mitigation (From admission, onward)        Ordered     Place NARCISA hose  Until discontinued      12/18/18 1249     Place sequential compression device  Until discontinued      12/18/18 1249     heparin 25,000 units in dextrose 5% 250 mL (100 units/mL) infusion LOW INTENSITY nomogram - [facility]  Continuous      12/15/18 1121     heparin (porcine) injection 5,000 Units  ED 1 Time      12/15/18 0931          Cheryl Farrell  DONAL  Cardiology  [facility]    Chart reviewed. Dr. Romo examined patient and agrees with plan as outlined above.

## 2018-12-19 NOTE — PLAN OF CARE
Problem: Adult Inpatient Plan of Care  Goal: Plan of Care Review  Outcome: Ongoing (interventions implemented as appropriate)  Pt on O2 tolerating well. No distress noted at this time.

## 2018-12-19 NOTE — SUBJECTIVE & OBJECTIVE
Review of Systems   Constitution: Positive for weakness and malaise/fatigue.   HENT: Negative.    Eyes: Negative.    Cardiovascular: Positive for chest pain (incisional).   Respiratory: Negative.    Endocrine: Negative.    Hematologic/Lymphatic: Negative.    Skin: Negative.    Musculoskeletal: Negative.    Gastrointestinal: Negative.    Genitourinary: Negative.    Psychiatric/Behavioral: Negative.    Allergic/Immunologic: Negative.      Objective:     Vital Signs (Most Recent):  Temp: (!) 101.4 °F (38.6 °C) (12/19/18 1500)  Pulse: 91 (12/19/18 1500)  Resp: (!) 22 (12/19/18 1500)  BP: 111/62 (12/19/18 1415)  SpO2: (!) 94 % (12/19/18 1500) Vital Signs (24h Range):  Temp:  [98.1 °F (36.7 °C)-101.4 °F (38.6 °C)] 101.4 °F (38.6 °C)  Pulse:  [] 91  Resp:  [10-28] 22  SpO2:  [87 %-99 %] 94 %  BP: ()/(44-67) 111/62  Arterial Line BP: ()/(43-56) 133/50     Weight: 114.6 kg (252 lb 10.4 oz)  Body mass index is 37.31 kg/m².     SpO2: (!) 94 %  O2 Device (Oxygen Therapy): nasal cannula      Intake/Output Summary (Last 24 hours) at 12/19/2018 1541  Last data filed at 12/19/2018 1500  Gross per 24 hour   Intake 4607.96 ml   Output 2488 ml   Net 2119.96 ml       Lines/Drains/Airways     Central Venous Catheter Line                 Introducer 12/18/18  right internal jugular 1 day         Pulmonary Artery Catheter Assessment  12/18/18 0730 1 day          Drain                 Urethral Catheter 12/18/18 0730 Latex;Straight-tip;Temperature probe 16 Fr. 1 day          Line                 Pacer Wires 12/18/18 1200 1 day          Peripheral Intravenous Line                 Peripheral IV - Single Lumen 12/18/18 0408 Anterior;Right Upper Arm 1 day                Physical Exam   Constitutional: He is oriented to person, place, and time. He appears well-developed and well-nourished. No distress.   HENT:   Head: Normocephalic and atraumatic.   Eyes: Pupils are equal, round, and reactive to light. Right eye exhibits no  discharge. Left eye exhibits no discharge.   Neck: Neck supple. No JVD present.   Cardiovascular: Normal rate, regular rhythm, S1 normal, S2 normal and normal heart sounds.   No murmur heard.  Pulmonary/Chest: Effort normal and breath sounds normal. No respiratory distress. He has no wheezes. He has no rales.   Abdominal: Soft. He exhibits no distension. There is no tenderness.   Musculoskeletal: He exhibits no edema.   Neurological: He is alert and oriented to person, place, and time.   Skin: Skin is warm and dry. He is not diaphoretic. No erythema.   SVG sites dressed; C/D/I; no bleeding erythema or drainage   Psychiatric: He has a normal mood and affect. His behavior is normal. Thought content normal.   Nursing note and vitals reviewed.      Significant Labs:   CMP   Recent Labs   Lab 12/18/18  0504 12/18/18  1335 12/18/18  1634 12/19/18  0345    139 140 141  141   K 3.7 4.1 4.0 4.1  4.1    106 106 108  108   CO2 26 21* 17* 23  23   GLU 94 159* 214* 118*  118*   BUN 14 12 13 16  16   CREATININE 1.3 1.3 1.5* 1.7*  1.7*   CALCIUM 9.2 8.0* 7.9* 9.0  9.0   PROT 6.5  --   --  5.2*   ALBUMIN 3.2*  --   --  3.4*   BILITOT 0.7  --   --  0.6   ALKPHOS 26*  --   --  15*   AST 34  --   --  82*   ALT 29  --   --  26   ANIONGAP 8 12 17* 10  10   ESTGFRAFRICA >60 >60 53* 46*  46*   EGFRNONAA 55* 55* 46* 40*  40*   , CBC   Recent Labs   Lab 12/18/18  1334 12/18/18  1634 12/19/18  0345   WBC 6.84 12.42 9.44   HGB 8.3* 8.2* 7.1*   HCT 25.8* 25.9* 22.3*   * 174 165   , Troponin No results for input(s): TROPONINI in the last 48 hours. and All pertinent lab results from the last 24 hours have been reviewed.    Significant Imaging: Echocardiogram:   2D echo with color flow doppler:   Results for orders placed or performed during the hospital encounter of 12/15/18   2D echo with color flow doppler   Result Value Ref Range    QEF 60 55 - 65    Diastolic Dysfunction No     and X-Ray: CXR: X-Ray Chest 1 View  (CXR): No results found for this visit on 12/15/18. and X-Ray Chest PA and Lateral (CXR): No results found for this visit on 12/15/18.

## 2018-12-19 NOTE — PLAN OF CARE
Problem: Adult Inpatient Plan of Care  Goal: Plan of Care Review  Outcome: Ongoing (interventions implemented as appropriate)  Recommendations    Recommendation: 1. When medically able, ADAT to Cardiac diet. 2. Will continue to monitor  Intervention: Cardiac diet education, coordination of care w/ primary team  Goals: Meet >85% EEN/EPN this admit  Nutrition Goal Status: new  Communication of RD Recs: (POC review, sticky note)

## 2018-12-19 NOTE — SUBJECTIVE & OBJECTIVE
Past Medical History:   Diagnosis Date    Acute coronary syndrome     Cancer of prostate with intermediate recurrence risk (stage T2b-c or [place] 7 or PSA 10-20) 1/8/2018    CKD (chronic kidney disease) stage 3, GFR 30-59 ml/min 9/24/2015    Coronary artery disease     Coronary artery disease involving native coronary artery of native heart with unstable angina pectoris     Elevated PSA 6/27/2017    History of coronary angioplasty 9/19/2014    Hyperlipidemia     Hypertension     Myocardial infarction 2008    Obesity, Class II, BMI 35.0-39.9, with comorbidity (see actual BMI) 6/6/2014    Polio     as a child    Tobacco dependence     resolved       Past Surgical History:   Procedure Laterality Date    CARDIAC CATHETERIZATION  2008    CORONARY ANGIOPLASTY  2008    acute PCI- RCA- RUI    CORONARY STENT PLACEMENT  2008       Review of patient's allergies indicates:   Allergen Reactions    Paragoric Other (See Comments)     sneeze       Family History     Problem Relation (Age of Onset)    Cancer Brother    Heart disease Father, Brother        Tobacco Use    Smoking status: Former Smoker     Packs/day: 2.00     Years: 20.00     Pack years: 40.00     Types: Cigarettes     Last attempt to quit: 7/12/2008     Years since quitting: 10.4    Smokeless tobacco: Never Used   Substance and Sexual Activity    Alcohol use: Yes     Comment: rarely    Drug use: No    Sexual activity: Not Currently     Partners: Female         Review of Systems   Constitutional: Positive for fatigue. Negative for fever and unexpected weight change.   HENT: Negative for congestion, postnasal drip, rhinorrhea, sinus pressure and sneezing.    Respiratory: Positive for shortness of breath.    Cardiovascular: Positive for chest pain. Negative for palpitations and leg swelling.   Gastrointestinal: Negative.  Negative for abdominal pain and nausea.   Genitourinary: Negative.    Musculoskeletal: Negative.  Negative for arthralgias and  back pain.   Skin: Negative for rash.   Neurological: Negative for dizziness, syncope, weakness and light-headedness.   Hematological: Negative for adenopathy. Does not bruise/bleed easily.   Psychiatric/Behavioral: Negative.  Negative for dysphoric mood and sleep disturbance. The patient is not nervous/anxious.      Objective:     Vital Signs (Most Recent):  Temp: (!) 100.7 °F (38.2 °C) (12/19/18 1100)  Pulse: 88 (12/19/18 1100)  Resp: 20 (12/19/18 1100)  BP: (!) 118/56 (12/19/18 1100)  SpO2: (!) 94 % (12/19/18 1100) Vital Signs (24h Range):  Temp:  [97.7 °F (36.5 °C)-101.1 °F (38.4 °C)] 100.7 °F (38.2 °C)  Pulse:  [] 88  Resp:  [0-28] 20  SpO2:  [87 %-100 %] 94 %  BP: ()/(44-81) 118/56  Arterial Line BP: ()/(43-76) 118/48     Weight: 114.6 kg (252 lb 10.4 oz)  Body mass index is 37.31 kg/m².      Intake/Output Summary (Last 24 hours) at 12/19/2018 1117  Last data filed at 12/19/2018 1100  Gross per 24 hour   Intake 7618.8 ml   Output 2825 ml   Net 4793.8 ml       Physical Exam   Constitutional: He is oriented to person, place, and time. He appears well-developed and well-nourished.   HENT:   Head: Normocephalic and atraumatic.   Eyes: Conjunctivae are normal. Pupils are equal, round, and reactive to light.   Neck: Neck supple. No JVD present. No tracheal deviation present. No thyromegaly present.   Cardiovascular: Normal rate, regular rhythm and normal heart sounds.       Pulmonary/Chest: Effort normal. He has decreased breath sounds in the right lower field and the left lower field.   Abdominal: Soft.   Musculoskeletal: Normal range of motion.   Lymphadenopathy:     He has no cervical adenopathy.   Neurological: He is alert and oriented to person, place, and time.   Skin: Skin is warm and dry.   Nursing note and vitals reviewed.      Vents:  Vent Mode: Spont (12/18/18 1610)  Ventilator Initiated: Yes (12/18/18 1341)  Set Rate: 0 bmp (12/18/18 1610)  Vt Set: 400 mL (12/18/18 1610)  Pressure  Support: 10 cmH20 (12/18/18 1610)  PEEP/CPAP: 5 cmH20 (12/18/18 1610)  Oxygen Concentration (%): 32 (12/19/18 0706)  Peak Airway Pressure: 16 cmH2O (12/18/18 1610)  Plateau Pressure: 0 cmH20 (12/18/18 1610)  Total Ve: 11.3 mL (12/18/18 1610)  F/VT Ratio<105 (RSBI): (!) 38.99 (12/18/18 1610)    Lines/Drains/Airways     Central Venous Catheter Line                 Introducer 12/18/18  right internal jugular 1 day         Pulmonary Artery Catheter Assessment  12/18/18 0730 1 day          Drain                 Urethral Catheter 12/18/18 0730 Latex;Straight-tip;Temperature probe 16 Fr. 1 day          Line                 Pacer Wires 12/18/18 1200 less than 1 day          Peripheral Intravenous Line                 Peripheral IV - Single Lumen 12/18/18 0408 Anterior;Right Upper Arm 1 day                Significant Labs:    CBC/Anemia Profile:  Recent Labs   Lab 12/18/18  1334 12/18/18  1634 12/19/18  0345   WBC 6.84 12.42 9.44   HGB 8.3* 8.2* 7.1*   HCT 25.8* 25.9* 22.3*   * 174 165   MCV 95 95 95   RDW 13.9 13.9 14.5        Chemistries:  Recent Labs   Lab 12/18/18  0504 12/18/18  1335 12/18/18  1634 12/18/18  1854 12/19/18  0345    139 140  --  141  141   K 3.7 4.1 4.0  --  4.1  4.1    106 106  --  108  108   CO2 26 21* 17*  --  23  23   BUN 14 12 13  --  16  16   CREATININE 1.3 1.3 1.5*  --  1.7*  1.7*   CALCIUM 9.2 8.0* 7.9*  --  9.0  9.0   ALBUMIN 3.2*  --   --   --  3.4*   PROT 6.5  --   --   --  5.2*   BILITOT 0.7  --   --   --  0.6   ALKPHOS 26*  --   --   --  15*   ALT 29  --   --   --  26   AST 34  --   --   --  82*   MG  --  5.1*  --  4.1* 3.3*       BMP:   Recent Labs   Lab 12/19/18  0345   *  118*     141   K 4.1  4.1     108   CO2 23  23   BUN 16  16   CREATININE 1.7*  1.7*   CALCIUM 9.0  9.0   MG 3.3*     CMP:   Recent Labs   Lab 12/18/18  0504 12/18/18  1335 12/18/18  1634 12/19/18  0345    139 140 141  141   K 3.7 4.1 4.0 4.1  4.1    106  106 108  108   CO2 26 21* 17* 23  23   GLU 94 159* 214* 118*  118*   BUN 14 12 13 16  16   CREATININE 1.3 1.3 1.5* 1.7*  1.7*   CALCIUM 9.2 8.0* 7.9* 9.0  9.0   PROT 6.5  --   --  5.2*   ALBUMIN 3.2*  --   --  3.4*   BILITOT 0.7  --   --  0.6   ALKPHOS 26*  --   --  15*   AST 34  --   --  82*   ALT 29  --   --  26   ANIONGAP 8 12 17* 10  10   EGFRNONAA 55* 55* 46* 40*  40*     Cardiac Markers: No results for input(s): CKMB, TROPONINT, MYOGLOBIN in the last 48 hours.  All pertinent labs within the past 24 hours have been reviewed.    Significant Imaging:   I have reviewed all pertinent imaging results/findings within the past 24 hours.  I have reviewed and interpreted all pertinent imaging results/findings within the past 24 hours.

## 2018-12-19 NOTE — PT/OT/SLP EVAL
Physical Therapy Evaluation    Patient Name:  Filipe Agustin Jr.   MRN:  [id number]    Recommendations:     Discharge Recommendations:  other (see comments)([facility]   Discharge Equipment Recommendations: none   Barriers to discharge: None    Assessment:     Filipe Agustin Jr. is a 72 y.o. male admitted with a medical diagnosis of Coronary artery disease involving native coronary artery of native heart with unstable angina pectoris.  He presents with the following impairments/functional limitations:  weakness, impaired endurance, impaired functional mobilty, gait instability, impaired balance, decreased coordination, decreased safety awareness, pain.    Rehab Prognosis: Good; patient would benefit from acute skilled PT services to address these deficits and reach maximum level of function.    Recent Surgery: Procedure(s) (LRB):  CORONARY ARTERY BYPASS GRAFT (CABG) (N/A)  ECHOCARDIOGRAM,TRANSESOPHAGEAL (N/A)  SURGICAL PROCUREMENT, VEIN, ENDOSCOPIC (Bilateral) 1 Day Post-Op    Plan:     During this hospitalization, patient to be seen 5 x/week to address the identified rehab impairments via gait training, therapeutic activities, therapeutic exercises and progress toward the following goals:    GOALS:   Multidisciplinary Problems     Physical Therapy Goals        Problem: Physical Therapy Goal    Goal Priority Disciplines Outcome Goal Variances Interventions   Physical Therapy Goal     PT, PT/OT      Description:  LTG'S TO BE MET IN 7 DAYS (12-26-18)  1. PT WILL REQUIRE CGA FOR BED MOBILITY  2. PT WILL REQUIRE SPV FOR TF'S  3. PT WILL ' WITH SPV  4. PT WILL DEMO G DYNAMIC BALANCE DURING GAIT                    · Plan of Care Expires:  12/26/18    Subjective     Chief Complaint: CHEST PAIN  Patient/Family Comments/goals:   Pain/Comfort:  · Pain Rating 1: 5/10  · Location 1: chest    Patients cultural, spiritual, Sikhism conflicts given the current situation:      Living Environment:  PT LIVES WITH WIFE  WHO  IS ABLE TO ASSIST AS NEEDED, 1 STORY HOUSE NO STEPS, PT WAS AN INDEP COMMUNITY AMBULATOR, STILL DRIVES, RETIRED, INDEP WITH ADL'S  Prior to admission, patients level of function was INDEP.  Equipment used at home: none.  DME owned (not currently used): none.  Upon discharge, patient will have assistance from FAMILY.    Objective:     Communicated with NURSE ARMANDO prior to session.  Patient found SEATED IN ELECTRIC RASHID CHAIR arterial line, blood pressure cuff, kang catheter, peripheral IV, oxygen, wound vac, pulse ox (continuous), telemetry, EXTERNAL PACER  upon PT entry to room.    General Precautions: Standard, fall, sternal, respiratory   Orthopedic Precautions:N/A   Braces: N/A     Exams:  · Cognitive Exam:  Patient is oriented to Person, Place, Time and Situation  · Postural Exam:  Patient presented with the following abnormalities:    · -       Rounded shoulders  · Sensation:    · -       Intact  · RLE ROM: WFL  · RLE Strength: WFL  · LLE ROM: WFL  · LLE Strength: WFL    Functional Mobility:  · Bed Mobility:     · Scooting: moderate assistance  · Supine to Sit: maximal assistance    Therapeutic Activities and Exercises:  PT EDUCATED IN ROLE OF P.T. AND POC, PT REQUIRED MODA FOR FWD SEATED SCOOT IN CHAIR, SAFETY CUES FOR STERNAL PRECAUTIONS, MAXA FOR SUP TO SIT IN ELECT. RASHID CHAIR, PT DECLINED FURTHER EVALUATION AT THIS TIME DUE TO C/O FATIGUE AND PAIN, MOVEMENT LIMITED BY MULTIPLE ICU LINES, PT EDUCATED IN STERNAL PRECAUTIONS WITH HANDOUT ISSUED, PT EDUCATED IN BLE THEREX TO PERFORM WHILE SEATED IN CHAIR    AM-PAC 6 CLICK MOBILITY  Total Score:6     Patient left up in chair with all lines intact, call button in reach and NURSE notified.    GOALS:   Multidisciplinary Problems     Physical Therapy Goals        Problem: Physical Therapy Goal    Goal Priority Disciplines Outcome Goal Variances Interventions   Physical Therapy Goal     PT, PT/OT      Description:  LTG'S TO BE MET IN 7 DAYS (12-26-18)  1.  PT WILL REQUIRE CGA FOR BED MOBILITY  2. PT WILL REQUIRE SPV FOR TF'S  3. PT WILL ' WITH SPV  4. PT WILL DEMO G DYNAMIC BALANCE DURING GAIT                    History:     Past Medical History:   Diagnosis Date    Acute coronary syndrome     Cancer of prostate with intermediate recurrence risk (stage T2b-c or [place] 7 or PSA 10-20) 1/8/2018    CKD (chronic kidney disease) stage 3, GFR 30-59 ml/min 9/24/2015    Coronary artery disease     Coronary artery disease involving native coronary artery of native heart with unstable angina pectoris     Elevated PSA 6/27/2017    History of coronary angioplasty 9/19/2014    Hyperlipidemia     Hypertension     Myocardial infarction 2008    Obesity, Class II, BMI 35.0-39.9, with comorbidity (see actual BMI) 6/6/2014    Polio     as a child    Tobacco dependence     resolved       Past Surgical History:   Procedure Laterality Date    CARDIAC CATHETERIZATION  2008    CORONARY ANGIOPLASTY  2008    acute PCI- RCA- RUI    CORONARY ARTERY BYPASS GRAFT (CABG) N/A 12/18/2018    Procedure: CORONARY ARTERY BYPASS GRAFT (CABG);  Surgeon: Rg Johnson MD;  Location: [facility] OR;  Service: Cardiothoracic;  Laterality: N/A;    CORONARY ARTERY BYPASS GRAFT (CABG) N/A 12/18/2018    Performed by Rg Johnson MD at [facility] OR    CORONARY STENT PLACEMENT  2008    ECHOCARDIOGRAM,TRANSESOPHAGEAL N/A 12/18/2018    Performed by Rg [facility] MD at [facility] OR    ENDOSCOPIC HARVEST OF VEIN Bilateral 12/18/2018    Procedure: SURGICAL PROCUREMENT, VEIN, ENDOSCOPIC;  Surgeon: Rg Johnson MD;  Location: [facility] OR;  Service: Cardiothoracic;  Laterality: Bilateral;    SURGICAL PROCUREMENT, VEIN, ENDOSCOPIC Bilateral 12/18/2018    Performed by Rg Johnson MD at [facility] OR       Clinical Decision Making:     History  Co-morbidities and personal factors that may impact the plan of care Examination  Body Structures and Functions, activity limitations and participation  restrictions that may impact the plan of care Clinical Presentation   Decision Making/ Complexity Score   Co-morbidities:   [] Time since onset of injury / illness / exacerbation  [] Status of current condition  []Patient's cognitive status and safety concerns    [] Multiple Medical Problems (see med hx)  Personal Factors:   [] Patient's age  [] Prior Level of function   [] Patient's home situation (environment and family support)  [] Patient's level of motivation  [] Expected progression of patient      HISTORY:(criteria)    [] 08417 - no personal factors/history    [] 17979 - has 1-2 personal factor/comorbidity     [] 45840 - has >3 personal factor/comorbidity     Body Regions:  [] Objective examination findings  [] Head     []  Neck  [] Trunk   [] Upper Extremity  [] Lower Extremity    Body Systems:  [] For communication ability, affect, cognition, language, and learning style: the assessment of the ability to make needs known, consciousness, orientation (person, place, and time), expected emotional /behavioral responses, and learning preferences (eg, learning barriers, education  needs)  [] For the neuromuscular system: a general assessment of gross coordinated movement (eg, balance, gait, locomotion, transfers, and transitions) and motor function  (motor control and motor learning)  [] For the musculoskeletal system: the assessment of gross symmetry, gross range of motion, gross strength, height, and weight  [] For the integumentary system: the assessment of pliability(texture), presence of scar formation, skin color, and skin integrity  [] For cardiovascular/pulmonary system: the assessment of heart rate, respiratory rate, blood pressure, and edema     Activity limitations:    [] Patient's cognitive status and saf ety concerns          [] Status of current condition      [] Weight bearing restriction  [] Cardiopulmunary Restriction    Participation Restrictions:   [] Goals and goal agreement with the patient      [] Rehab potential (prognosis) and probable outcome      Examination of Body System: (criteria)    [] 78977 - addressing 1-2 elements    [] 08634 - addressing a total of 3 or more elements     [] 07586 -  Addressing a total of 4 or more elements         Clinical Presentation: (criteria)  Choose one     On examination of body system using standardized tests and measures patient presents with (CHOOSE ONE) elements from any of the following: body structures and functions, activity limitations, and/or participation restrictions.  Leading to a clinical presentation that is considered (CHOOSE ONE)                              Clinical Decision Making  (Eval Complexity):  Choose One     Time Tracking:     PT Received On: 12/19/18  PT Start Time: 0950     PT Stop Time: 1015  PT Total Time (min): 25 min     Billable Minutes: Evaluation 15 and Therapeutic Activity 10     PT ENCOURAGED TO CALL FOR ASSISTANCE WITH ALL NEEDS DUE TO FALL RISK STATUS, PT AGREEABLE    Aruna Hugo, PT  12/19/2018

## 2018-12-19 NOTE — ASSESSMENT & PLAN NOTE
Per CVT surgery  Monitor chest tube output  Post op ICU hemodynamic monitoring  Encourage OOB, C&DB and IS use post extubation  12/19 Extubated , doing well

## 2018-12-19 NOTE — PROGRESS NOTES
POC reviewed with pt and family at approximately 1400. Pt verbalized understanding. Questions and concerns addressed. Central, [place], and arterial line removed today. Midline placed today. Tavera still in place. Elevated temp reported by JOHANNA Pulido. No interventions at the moment. Pt in bed with support of wife at bedside. Pt progressing toward goals. Will continue to monitor. See flowsheets for full assessment and VS info.

## 2018-12-20 ENCOUNTER — DOCUMENTATION ONLY (OUTPATIENT)
Dept: INTERNAL MEDICINE | Facility: CLINIC | Age: 73
End: 2018-12-20

## 2018-12-20 LAB
ALBUMIN SERPL BCP-MCNC: 3.1 G/DL
ALP SERPL-CCNC: 19 U/L
ALT SERPL W/O P-5'-P-CCNC: 32 U/L
ANION GAP SERPL CALC-SCNC: 10 MMOL/L
ANION GAP SERPL CALC-SCNC: 9 MMOL/L
ANION GAP SERPL CALC-SCNC: 9 MMOL/L
AST SERPL-CCNC: 67 U/L
BACTERIA #/AREA URNS HPF: ABNORMAL /HPF
BASOPHILS # BLD AUTO: 0.01 K/UL
BASOPHILS # BLD AUTO: 0.01 K/UL
BASOPHILS NFR BLD: 0.1 %
BASOPHILS NFR BLD: 0.1 %
BILIRUB SERPL-MCNC: 1.1 MG/DL
BILIRUB UR QL STRIP: NEGATIVE
BUN SERPL-MCNC: 24 MG/DL
BUN SERPL-MCNC: 25 MG/DL
BUN SERPL-MCNC: 29 MG/DL
CALCIUM SERPL-MCNC: 8.6 MG/DL
CALCIUM SERPL-MCNC: 8.7 MG/DL
CALCIUM SERPL-MCNC: 8.8 MG/DL
CHLORIDE SERPL-SCNC: 100 MMOL/L
CHLORIDE SERPL-SCNC: 102 MMOL/L
CHLORIDE SERPL-SCNC: 95 MMOL/L
CLARITY UR: CLEAR
CO2 SERPL-SCNC: 27 MMOL/L
CO2 SERPL-SCNC: 29 MMOL/L
CO2 SERPL-SCNC: 31 MMOL/L
COLOR UR: YELLOW
CREAT SERPL-MCNC: 1.9 MG/DL
CREAT SERPL-MCNC: 2 MG/DL
CREAT SERPL-MCNC: 2 MG/DL
DIFFERENTIAL METHOD: ABNORMAL
DIFFERENTIAL METHOD: ABNORMAL
EOSINOPHIL # BLD AUTO: 0 K/UL
EOSINOPHIL # BLD AUTO: 0 K/UL
EOSINOPHIL NFR BLD: 0 %
EOSINOPHIL NFR BLD: 0 %
ERYTHROCYTE [DISTWIDTH] IN BLOOD BY AUTOMATED COUNT: 15.4 %
ERYTHROCYTE [DISTWIDTH] IN BLOOD BY AUTOMATED COUNT: 15.5 %
EST. GFR  (AFRICAN AMERICAN): 37 ML/MIN/1.73 M^2
EST. GFR  (AFRICAN AMERICAN): 37 ML/MIN/1.73 M^2
EST. GFR  (AFRICAN AMERICAN): 40 ML/MIN/1.73 M^2
EST. GFR  (NON AFRICAN AMERICAN): 32 ML/MIN/1.73 M^2
EST. GFR  (NON AFRICAN AMERICAN): 32 ML/MIN/1.73 M^2
EST. GFR  (NON AFRICAN AMERICAN): 34 ML/MIN/1.73 M^2
GLUCOSE SERPL-MCNC: 103 MG/DL
GLUCOSE SERPL-MCNC: 110 MG/DL
GLUCOSE SERPL-MCNC: 111 MG/DL
GLUCOSE UR QL STRIP: NEGATIVE
HCT VFR BLD AUTO: 22.8 %
HCT VFR BLD AUTO: 22.8 %
HGB BLD-MCNC: 7.4 G/DL
HGB BLD-MCNC: 7.5 G/DL
HGB UR QL STRIP: ABNORMAL
HYALINE CASTS #/AREA URNS LPF: 25 /LPF
KETONES UR QL STRIP: NEGATIVE
LEUKOCYTE ESTERASE UR QL STRIP: NEGATIVE
LYMPHOCYTES # BLD AUTO: 1.1 K/UL
LYMPHOCYTES # BLD AUTO: 1.2 K/UL
LYMPHOCYTES NFR BLD: 11 %
LYMPHOCYTES NFR BLD: 12 %
MAGNESIUM SERPL-MCNC: 2.6 MG/DL
MAGNESIUM SERPL-MCNC: 2.7 MG/DL
MCH RBC QN AUTO: 30 PG
MCH RBC QN AUTO: 30.2 PG
MCHC RBC AUTO-ENTMCNC: 32.5 G/DL
MCHC RBC AUTO-ENTMCNC: 32.9 G/DL
MCV RBC AUTO: 92 FL
MCV RBC AUTO: 92 FL
MICROSCOPIC COMMENT: ABNORMAL
MONOCYTES # BLD AUTO: 0.7 K/UL
MONOCYTES # BLD AUTO: 0.8 K/UL
MONOCYTES NFR BLD: 7 %
MONOCYTES NFR BLD: 8.2 %
NEUTROPHILS # BLD AUTO: 8.2 K/UL
NEUTROPHILS # BLD AUTO: 8.3 K/UL
NEUTROPHILS NFR BLD: 80.7 %
NEUTROPHILS NFR BLD: 80.9 %
NITRITE UR QL STRIP: NEGATIVE
PH UR STRIP: 5 [PH] (ref 5–8)
PLATELET # BLD AUTO: 154 K/UL
PLATELET # BLD AUTO: 158 K/UL
PMV BLD AUTO: 10.9 FL
PMV BLD AUTO: 11.5 FL
POCT GLUCOSE: 112 MG/DL (ref 70–110)
POCT GLUCOSE: 120 MG/DL (ref 70–110)
POCT GLUCOSE: 125 MG/DL (ref 70–110)
POCT GLUCOSE: 134 MG/DL (ref 70–110)
POCT GLUCOSE: 59 MG/DL (ref 70–110)
POTASSIUM SERPL-SCNC: 4.3 MMOL/L
POTASSIUM SERPL-SCNC: 4.7 MMOL/L
POTASSIUM SERPL-SCNC: 4.7 MMOL/L
PROT SERPL-MCNC: 5.6 G/DL
PROT UR QL STRIP: ABNORMAL
RBC # BLD AUTO: 2.47 M/UL
RBC # BLD AUTO: 2.48 M/UL
RBC #/AREA URNS HPF: 5 /HPF (ref 0–4)
SODIUM SERPL-SCNC: 135 MMOL/L
SODIUM SERPL-SCNC: 138 MMOL/L
SODIUM SERPL-SCNC: 139 MMOL/L
SP GR UR STRIP: >=1.03 (ref 1–1.03)
URN SPEC COLLECT METH UR: ABNORMAL
UROBILINOGEN UR STRIP-ACNC: NEGATIVE EU/DL
WBC # BLD AUTO: 10.18 K/UL
WBC # BLD AUTO: 10.29 K/UL
WBC #/AREA URNS HPF: 2 /HPF (ref 0–5)

## 2018-12-20 PROCEDURE — 27000221 HC OXYGEN, UP TO 24 HOURS: Mod: HCNC

## 2018-12-20 PROCEDURE — 99900035 HC TECH TIME PER 15 MIN (STAT): Mod: HCNC

## 2018-12-20 PROCEDURE — 80048 BASIC METABOLIC PNL TOTAL CA: CPT | Mod: HCNC

## 2018-12-20 PROCEDURE — 36415 COLL VENOUS BLD VENIPUNCTURE: CPT | Mod: HCNC

## 2018-12-20 PROCEDURE — 85025 COMPLETE CBC W/AUTO DIFF WBC: CPT | Mod: 91,HCNC

## 2018-12-20 PROCEDURE — 25000003 PHARM REV CODE 250: Mod: HCNC | Performed by: PHYSICIAN ASSISTANT

## 2018-12-20 PROCEDURE — 20000000 HC ICU ROOM: Mod: HCNC

## 2018-12-20 PROCEDURE — 25000003 PHARM REV CODE 250: Mod: HCNC | Performed by: NURSE PRACTITIONER

## 2018-12-20 PROCEDURE — 97110 THERAPEUTIC EXERCISES: CPT | Mod: HCNC

## 2018-12-20 PROCEDURE — 99291 CRITICAL CARE FIRST HOUR: CPT | Mod: HCNC,,, | Performed by: INTERNAL MEDICINE

## 2018-12-20 PROCEDURE — 94664 DEMO&/EVAL PT USE INHALER: CPT | Mod: HCNC

## 2018-12-20 PROCEDURE — 25000003 PHARM REV CODE 250: Mod: HCNC | Performed by: THORACIC SURGERY (CARDIOTHORACIC VASCULAR SURGERY)

## 2018-12-20 PROCEDURE — 87070 CULTURE OTHR SPECIMN AEROBIC: CPT | Mod: HCNC

## 2018-12-20 PROCEDURE — 99232 SBSQ HOSP IP/OBS MODERATE 35: CPT | Mod: HCNC,,, | Performed by: INTERNAL MEDICINE

## 2018-12-20 PROCEDURE — 87205 SMEAR GRAM STAIN: CPT | Mod: HCNC

## 2018-12-20 PROCEDURE — 99291 PR CRITICAL CARE, E/M 30-74 MINUTES: ICD-10-PCS | Mod: HCNC,,, | Performed by: INTERNAL MEDICINE

## 2018-12-20 PROCEDURE — 63600175 PHARM REV CODE 636 W HCPCS: Mod: HCNC | Performed by: THORACIC SURGERY (CARDIOTHORACIC VASCULAR SURGERY)

## 2018-12-20 PROCEDURE — 83735 ASSAY OF MAGNESIUM: CPT | Mod: HCNC

## 2018-12-20 PROCEDURE — 99232 PR SUBSEQUENT HOSPITAL CARE,LEVL II: ICD-10-PCS | Mod: HCNC,,, | Performed by: INTERNAL MEDICINE

## 2018-12-20 PROCEDURE — 87040 BLOOD CULTURE FOR BACTERIA: CPT | Mod: HCNC

## 2018-12-20 PROCEDURE — 81000 URINALYSIS NONAUTO W/SCOPE: CPT | Mod: HCNC

## 2018-12-20 PROCEDURE — 97530 THERAPEUTIC ACTIVITIES: CPT | Mod: HCNC

## 2018-12-20 PROCEDURE — C9113 INJ PANTOPRAZOLE SODIUM, VIA: HCPCS | Mod: HCNC | Performed by: THORACIC SURGERY (CARDIOTHORACIC VASCULAR SURGERY)

## 2018-12-20 PROCEDURE — 80053 COMPREHEN METABOLIC PANEL: CPT | Mod: HCNC

## 2018-12-20 PROCEDURE — 97116 GAIT TRAINING THERAPY: CPT | Mod: HCNC

## 2018-12-20 PROCEDURE — 94799 UNLISTED PULMONARY SVC/PX: CPT | Mod: HCNC

## 2018-12-20 RX ORDER — METOPROLOL TARTRATE 50 MG/1
50 TABLET ORAL 2 TIMES DAILY
Status: DISCONTINUED | OUTPATIENT
Start: 2018-12-20 | End: 2018-12-25 | Stop reason: HOSPADM

## 2018-12-20 RX ORDER — ATORVASTATIN CALCIUM 40 MG/1
80 TABLET, FILM COATED ORAL NIGHTLY
Status: DISCONTINUED | OUTPATIENT
Start: 2018-12-20 | End: 2018-12-25 | Stop reason: HOSPADM

## 2018-12-20 RX ADMIN — FUROSEMIDE 40 MG: 10 INJECTION, SOLUTION INTRAMUSCULAR; INTRAVENOUS at 10:12

## 2018-12-20 RX ADMIN — ATORVASTATIN CALCIUM 80 MG: 40 TABLET, FILM COATED ORAL at 09:12

## 2018-12-20 RX ADMIN — PIPERACILLIN AND TAZOBACTAM 4.5 G: 4; .5 INJECTION, POWDER, LYOPHILIZED, FOR SOLUTION INTRAVENOUS; PARENTERAL at 11:12

## 2018-12-20 RX ADMIN — POLYETHYLENE GLYCOL 3350 17 G: 17 POWDER, FOR SOLUTION ORAL at 10:12

## 2018-12-20 RX ADMIN — CHLORHEXIDINE GLUCONATE 10 ML: 1.2 RINSE ORAL at 09:12

## 2018-12-20 RX ADMIN — PANTOPRAZOLE SODIUM 40 MG: 40 INJECTION, POWDER, FOR SOLUTION INTRAVENOUS at 09:12

## 2018-12-20 RX ADMIN — Medication 16 G: at 06:12

## 2018-12-20 RX ADMIN — OXYCODONE HYDROCHLORIDE 5 MG: 5 TABLET ORAL at 11:12

## 2018-12-20 RX ADMIN — CLOPIDOGREL BISULFATE 75 MG: 75 TABLET ORAL at 10:12

## 2018-12-20 RX ADMIN — OXYCODONE HYDROCHLORIDE 5 MG: 5 TABLET ORAL at 02:12

## 2018-12-20 RX ADMIN — DOCUSATE SODIUM 100 MG: 100 CAPSULE, LIQUID FILLED ORAL at 10:12

## 2018-12-20 RX ADMIN — OXYCODONE HYDROCHLORIDE 5 MG: 5 TABLET ORAL at 01:12

## 2018-12-20 RX ADMIN — PIPERACILLIN AND TAZOBACTAM 4.5 G: 4; .5 INJECTION, POWDER, LYOPHILIZED, FOR SOLUTION INTRAVENOUS; PARENTERAL at 05:12

## 2018-12-20 RX ADMIN — METOPROLOL TARTRATE 50 MG: 50 TABLET ORAL at 09:12

## 2018-12-20 RX ADMIN — PIPERACILLIN AND TAZOBACTAM 4.5 G: 4; .5 INJECTION, POWDER, LYOPHILIZED, FOR SOLUTION INTRAVENOUS; PARENTERAL at 09:12

## 2018-12-20 RX ADMIN — ASPIRIN 81 MG: 81 TABLET, COATED ORAL at 10:12

## 2018-12-20 RX ADMIN — OXYCODONE HYDROCHLORIDE 5 MG: 5 TABLET ORAL at 06:12

## 2018-12-20 RX ADMIN — METOPROLOL TARTRATE 25 MG: 25 TABLET ORAL at 09:12

## 2018-12-20 RX ADMIN — DOCUSATE SODIUM 100 MG: 100 CAPSULE, LIQUID FILLED ORAL at 09:12

## 2018-12-20 RX ADMIN — OXYCODONE HYDROCHLORIDE 5 MG: 5 TABLET ORAL at 05:12

## 2018-12-20 RX ADMIN — FUROSEMIDE 40 MG: 10 INJECTION, SOLUTION INTRAMUSCULAR; INTRAVENOUS at 09:12

## 2018-12-20 RX ADMIN — CHLORHEXIDINE GLUCONATE 10 ML: 1.2 RINSE ORAL at 10:12

## 2018-12-20 RX ADMIN — VANCOMYCIN HYDROCHLORIDE: 750 INJECTION, POWDER, LYOPHILIZED, FOR SOLUTION INTRAVENOUS at 09:12

## 2018-12-20 NOTE — PT/OT/SLP PROGRESS
Physical Therapy  Treatment    Filipe Agustin Jr.   MRN: [id number]   Admitting Diagnosis: Coronary artery disease involving native coronary artery of native heart with unstable angina pectoris    PT Received On: 12/20/18  PT Start Time: 1005     PT Stop Time: 1030    PT Total Time (min): 25 min       Billable Minutes:  Gait Training 15 and Therapeutic Exercise 10    Treatment Type: Treatment  PT/PTA: PT        General Precautions: Standard, fall, respiratory, sternal  Orthopedic Precautions: N/A   Braces: N/A    Subjective:  Communicated with NURSE ARMANDO prior to session.  Pain/Comfort  Pain Rating 1: 5/10  Location 1: chest    Objective:   Patient found with: pulse ox (continuous), telemetry, peripheral IV, oxygen, wound vac, blood pressure cuff, SCD, kang catheter(EXT. PACER)    Functional Mobility:  Therapeutic Activities and Exercises:  PT FOUND SEATED IN CHAIR, MAX ENCOURAGEMENT TO PARTICIPATE, MODA FOR SEATED SCOOT TO EDGE OF CHAIR, PT UNABLE TO RECITE ANY STERNAL PRECAUTIONS SO ALL REVIEWED WITH PT, PT EDUCATED IN CORRECT TECHNIQUE FOR SIT<>STAND TF, MAXA FOR SIT<>STAND TF X 3 TRIALS, PT UNABLE TO MAINTAIN STANCE FOR LONGER THAN 15 SECONDS AT A TIME, PT PRESENTS WITH B BUCKLING KNEE'S, SEATED REST BETWEEN EACH TRIAL, PT AMB 5' WITH HHA/MAXA FOR STEADYING, CHAIR IN TOW FOR SAFETY, VERY QUICK TO FATIGUE, POOR DYNAMIC BALANCE, PT RETURN TO CHAIR, PT EDUCATED IN AND PERFORMED BLE THEREX X 15 REPS AROM    AM-PAC 6 CLICK MOBILITY  How much help from another person does this patient currently need?   1 = Unable, Total/Dependent Assistance  2 = A lot, Maximum/Moderate Assistance  3 = A little, Minimum/Contact Guard/Supervision  4 = None, Modified [place]/Independent    Turning over in bed (including adjusting bedclothes, sheets and blankets)?: 1  Sitting down on and standing up from a chair with arms (e.g., wheelchair, bedside commode, etc.): 2  Moving from lying on back to sitting on the side of the bed?:  1  Moving to and from a bed to a chair (including a wheelchair)?: 2  Need to walk in hospital room?: 2  Climbing 3-5 steps with a railing?: 1  Basic Mobility Total Score: 9    AM-PAC Raw Score CMS G-Code Modifier Level of Impairment Assistance   6 % Total / Unable   7 - 9 CM 80 - 100% Maximal Assist   10 - 14 CL 60 - 80% Moderate Assist   15 - 19 CK 40 - 60% Moderate Assist   20 - 22 CJ 20 - 40% Minimal Assist   23 CI 1-20% SBA / CGA   24 CH 0% Independent/ Mod I     Patient left up in chair with all lines intact, call button in reach and NURSE notified.    Assessment:  Filipe Agustin Jr. is a 73 y.o. male with a medical diagnosis of Coronary artery disease involving native coronary artery of native heart with unstable angina pectoris and presents with IMPAIRED FUNCTIONAL MOBILITY. PT WILL BENEFIT FROM CONT. SKILLED P.T. TO ADDRESS IMPAIRMENTS    Rehab identified problem list/impairments: Rehab identified problem list/impairments: weakness, impaired endurance, impaired balance, decreased coordination, decreased safety awareness, gait instability, decreased lower extremity function    Rehab potential is good.    Activity tolerance: Fair    Discharge recommendations: Discharge Facility/Level of Care Needs: nursing facility, skilled, rehabilitation facility(DEPENDING ON PROGRESS WITH POC)     Barriers to discharge:     Equipment recommendations: Equipment Needed After Discharge: tub bench     GOALS:   Multidisciplinary Problems     Physical Therapy Goals        Problem: Physical Therapy Goal    Goal Priority Disciplines Outcome Goal Variances Interventions   Physical Therapy Goal     PT, PT/OT Ongoing (interventions implemented as appropriate)     Description:  LTG'S TO BE MET IN 7 DAYS (12-26-18)  1. PT WILL REQUIRE CGA FOR BED MOBILITY  2. PT WILL REQUIRE SPV FOR TF'S  3. PT WILL ' WITH SPV  4. PT WILL DEMO G DYNAMIC BALANCE DURING GAIT                    PLAN:    Patient to be seen 5 x/week  to address  the above listed problems via gait training, therapeutic exercises, therapeutic activities  Plan of Care expires: 12/26/18  Plan of Care reviewed with: patient    PT ENCOURAGED TO CALL FOR ASSISTANCE WITH ALL NEEDS DUE TO FALL RISK STATUS, PT AGREEABLE    Aruna Hugo, PT  12/20/2018

## 2018-12-20 NOTE — PROGRESS NOTES
[facility] -   Critical Care Medicine  Progress Note    Patient Name: Filipe Agustin Jr.  MRN: [id number]  Admission Date: 12/15/2018  Hospital Length of Stay: 5 days  Code Status: Full Code  Attending Provider: Rg Johnson MD  Primary Care Provider: Jojo Duque DO   Principal Problem: Coronary artery disease involving native coronary artery of native heart with unstable angina pectoris    Subjective: slow improvement     HPI:  Mr Agustin is a 71 yo obese WM with a PMH of CKD3, CAD (stent placed 2008), Prostate CA (post XRT), HTN, and HLD.  He admits to malaise and ALVARADO progressive over last several weeks.  Today he noted acute midsternal, non-radiating CP which onset gradually when pt woke up at 0700 this AM. Pt describes the pain as a pressure.  Sxs are intermittent and moderate. No modifying factors. Associated sxs include nausea. He has not had a heart cath since stent placement in 2008. Pt reports his care is followed by Dr. Figueroa (Cardiology). Pt denies any fever, chills, diaphoresis, SOB, cough, palpitations, BLE edema/pain, emesis, extremity weakness/numbness, dizziness, and all other sxs. EKG via EMS was normal. Pt took 325mg ASA prior to EMS arrival. No further complaints or concerns.  In ED abnormal EKG and troponin 1.3.  CODE stemi called and taken emergently for LHC revealing diffuse CAD.  Admitted to ICU post LHC and CVT surgery consulted.  EF 45% w/ LHC.         Hospital/ICU Course:  12/15 - Admitted to ICU post LHC, sheath removed in lab and no CP.    12/18 - CVT consulted on patient and all agreed to proceed with CABG. Today taken to OR undergoing 4-vessel CABG with no reported complications.  Admitted to ICU post op from OR on [facility] ventilation.  12/19 extubated, sitting up in chair - no new complaints  12/20 out of bed in chair , tired but no new c/o    Past Medical History:   Diagnosis Date    Acute coronary syndrome     Cancer of prostate with intermediate recurrence risk  (stage T2b-c or Jonathon 7 or PSA 10-20) 1/8/2018    CKD (chronic kidney disease) stage 3, GFR 30-59 ml/min 9/24/2015    Coronary artery disease     Coronary artery disease involving native coronary artery of native heart with unstable angina pectoris     Elevated PSA 6/27/2017    History of coronary angioplasty 9/19/2014    Hyperlipidemia     Hypertension     Myocardial infarction 2008    Obesity, Class II, BMI 35.0-39.9, with comorbidity (see actual BMI) 6/6/2014    Polio     as a child    Tobacco dependence     resolved       Past Surgical History:   Procedure Laterality Date    CARDIAC CATHETERIZATION  2008    CORONARY ANGIOPLASTY  2008    acute PCI- RCA- RUI    CORONARY ARTERY BYPASS GRAFT (CABG) N/A 12/18/2018    Procedure: CORONARY ARTERY BYPASS GRAFT (CABG);  Surgeon: Rg Johnson MD;  Location: [facility] OR;  Service: Cardiothoracic;  Laterality: N/A;    CORONARY ARTERY BYPASS GRAFT (CABG) N/A 12/18/2018    Performed by Rg Johnson MD at [facility] OR    CORONARY STENT PLACEMENT  2008    ECHOCARDIOGRAM,TRANSESOPHAGEAL N/A 12/18/2018    Performed by Rg Johnson MD at [facility] OR    ENDOSCOPIC HARVEST OF VEIN Bilateral 12/18/2018    Procedure: SURGICAL PROCUREMENT, VEIN, ENDOSCOPIC;  Surgeon: Rg Johnson MD;  Location: [facility] OR;  Service: Cardiothoracic;  Laterality: Bilateral;    SURGICAL PROCUREMENT, VEIN, ENDOSCOPIC Bilateral 12/18/2018    Performed by Rg Johnson MD at [facility] OR       Review of patient's allergies indicates:   Allergen Reactions    Paragoric Other (See Comments)     sneeze       Family History     Problem Relation (Age of Onset)    Cancer Brother    Heart disease Father, Brother        Tobacco Use    Smoking status: Former Smoker     Packs/day: 2.00     Years: 20.00     Pack years: 40.00     Types: Cigarettes     Last attempt to quit: 7/12/2008     Years since quitting: 10.4    Smokeless tobacco: Never Used   Substance and Sexual Activity    Alcohol use: Yes      Comment: rarely    Drug use: No    Sexual activity: Not Currently     Partners: Female         Review of Systems   Constitutional: Positive for fatigue. Negative for fever and unexpected weight change.   HENT: Negative for congestion, postnasal drip, rhinorrhea, sinus pressure and sneezing.    Respiratory: Positive for shortness of breath.    Cardiovascular: Positive for chest pain. Negative for palpitations and leg swelling.   Gastrointestinal: Negative.  Negative for abdominal pain and nausea.   Genitourinary: Negative.    Musculoskeletal: Negative.  Negative for arthralgias and back pain.   Skin: Negative for rash.   Neurological: Negative for dizziness, syncope, weakness and light-headedness.   Hematological: Negative for adenopathy. Does not bruise/bleed easily.   Psychiatric/Behavioral: Negative.  Negative for dysphoric mood and sleep disturbance. The patient is not nervous/anxious.      Objective:     Vital Signs (Most Recent):  Temp: (!) 101.3 °F (38.5 °C) (12/20/18 1100)  Pulse: 91 (12/20/18 1430)  Resp: 20 (12/20/18 1430)  BP: 139/62 (12/20/18 1430)  SpO2: 95 % (12/20/18 1430) Vital Signs (24h Range):  Temp:  [100.5 °F (38.1 °C)-101.7 °F (38.7 °C)] 101.3 °F (38.5 °C)  Pulse:  [90-91] 91  Resp:  [11-35] 20  SpO2:  [91 %-99 %] 95 %  BP: ()/() 139/62     Weight: 114.6 kg (252 lb 10.4 oz)  Body mass index is 37.31 kg/m².      Intake/Output Summary (Last 24 hours) at 12/20/2018 1522  Last data filed at 12/20/2018 1400  Gross per 24 hour   Intake 540 ml   Output 3114 ml   Net -2574 ml       Physical Exam   Constitutional: He is oriented to person, place, and time. He appears well-developed and well-nourished.   HENT:   Head: Normocephalic and atraumatic.   Eyes: Conjunctivae are normal. Pupils are equal, round, and reactive to light.   Neck: Neck supple. No JVD present. No tracheal deviation present. No thyromegaly present.   Cardiovascular: Normal rate, regular rhythm and normal heart sounds.        Pulmonary/Chest: Effort normal. He has decreased breath sounds in the right lower field and the left lower field.   Abdominal: Soft.   Musculoskeletal: Normal range of motion.   Lymphadenopathy:     He has no cervical adenopathy.   Neurological: He is alert and oriented to person, place, and time.   Skin: Skin is warm and dry.   Nursing note and vitals reviewed.      Vents:  Vent Mode: Spont (12/18/18 1610)  Ventilator Initiated: Yes (12/18/18 1341)  Set Rate: 0 bmp (12/18/18 1610)  Vt Set: 400 mL (12/18/18 1610)  Pressure Support: 10 cmH20 (12/18/18 1610)  PEEP/CPAP: 5 cmH20 (12/18/18 1610)  Oxygen Concentration (%): 100 (12/20/18 1245)  Peak Airway Pressure: 16 cmH2O (12/18/18 1610)  Plateau Pressure: 0 cmH20 (12/18/18 1610)  Total Ve: 11.3 mL (12/18/18 1610)  F/VT Ratio<105 (RSBI): (!) 38.99 (12/18/18 1610)    Lines/Drains/Airways     Drain                 Urethral Catheter 12/18/18 0730 Latex;Straight-tip;Temperature probe 16 Fr. 2 days          Line                 Pacer Wires 12/18/18 1200 2 days          Peripheral Intravenous Line                 Midline Catheter Insertion/Assessment  - Double Lumen 12/19/18 1500 Left basilic vein (medial side of arm) 1 day                Significant Labs:    CBC/Anemia Profile:  Recent Labs   Lab 12/19/18  0345 12/20/18  0152 12/20/18  0401   WBC 9.44 10.29 10.18   HGB 7.1* 7.5* 7.4*   HCT 22.3* 22.8* 22.8*    154 158   MCV 95 92 92   RDW 14.5 15.5* 15.4*        Chemistries:  Recent Labs   Lab 12/19/18  0345 12/19/18  1741 12/20/18  0152 12/20/18  0401 12/20/18  0838     141 140 139 138  --    K 4.1  4.1 5.3* 4.7 4.7  --      108 105 102 100  --    CO2 23  23 26 27 29  --    BUN 16  16 22 24* 25*  --    CREATININE 1.7*  1.7* 1.8* 1.9* 2.0*  --    CALCIUM 9.0  9.0 8.9 8.8 8.7  --    ALBUMIN 3.4*  --   --  3.1*  --    PROT 5.2*  --   --  5.6*  --    BILITOT 0.6  --   --  1.1*  --    ALKPHOS 15*  --   --  19*  --    ALT 26  --   --  32  --     AST 82*  --   --  67*  --    MG 3.3* 2.7*  --   --  2.7*       BMP:   Recent Labs   Lab 12/20/18  0401 12/20/18  0838     --      --    K 4.7  --      --    CO2 29  --    BUN 25*  --    CREATININE 2.0*  --    CALCIUM 8.7  --    MG  --  2.7*     CMP:   Recent Labs   Lab 12/19/18  0345 12/19/18  1741 12/20/18  0152 12/20/18  0401     141 140 139 138   K 4.1  4.1 5.3* 4.7 4.7     108 105 102 100   CO2 23  23 26 27 29   *  118* 114* 111* 110   BUN 16  16 22 24* 25*   CREATININE 1.7*  1.7* 1.8* 1.9* 2.0*   CALCIUM 9.0  9.0 8.9 8.8 8.7   PROT 5.2*  --   --  5.6*   ALBUMIN 3.4*  --   --  3.1*   BILITOT 0.6  --   --  1.1*   ALKPHOS 15*  --   --  19*   AST 82*  --   --  67*   ALT 26  --   --  32   ANIONGAP 10  10 9 10 9   EGFRNONAA 40*  40* 37* 34* 32*     Cardiac Markers: No results for input(s): CKMB, TROPONINT, MYOGLOBIN in the last 48 hours.  All pertinent labs within the past 24 hours have been reviewed.    Significant Imaging:   I have reviewed all pertinent imaging results/findings within the past 24 hours.  I have reviewed and interpreted all pertinent imaging results/findings within the past 24 hours.      ABG  Recent Labs   Lab 12/18/18  1620   PH 7.316*   PO2 80   PCO2 34.7*   HCO3 17.7*   BE -8     Assessment/Plan:     Pulmonary   Atelectasis, left    12/19 Continue incentive spirometry  12/20      Cardiac/Vascular   * Coronary artery disease involving native coronary artery of native heart with unstable angina pectoris post ACS    Per Cards  Cont ASA, Lopressor and Plavix  ICU Cardiac monitoring  S/P CABG X 4 vessels     S/P CABG x 4    Per CVT surgery  Monitor chest tube output  Post op ICU hemodynamic monitoring  Encourage OOB, C&DB and IS use post extubation  12/19 Extubated , doing well  12/20 stable , doing well     Hypertension goal BP (blood pressure) < 140/90    Cont Lopressor  Cardene infusion if needed post op     Renal/   CKD (chronic kidney disease)  stage 3, GFR 30-59 ml/min    Cont IVFs post CABG  BMP in AM     Oncology   Cancer of prostate with intermediate recurrence risk (stage T2b-c or Garland 7 or PSA 10-20)    Outpt follow up with Urology     Other   Severe obesity with body mass index (BMI) of 35.0 to 39.9 with serious comorbidity    Encouraged weight loss        Critical Care Daily Checklist:    A: Awake: RASS Goal/Actual Goal: RASS Goal: 0-->alert and calm  Actual: Vasquez Agitation Sedation Scale (RASS): Alert and calm   B: Spontaneous Breathing Trial Performed?     C: SAT & SBT Coordinated?  na                      D: Delirium: CAM-ICU Overall CAM-ICU: Negative   E: Early Mobility Performed? Yes   F: Feeding Goal: Goals: Meet >85% EEN/EPN this admit  Status: Nutrition Goal Status: new   Current Diet Order   Procedures    Diet Cardiac      AS: Analgesia/Sedation adeqaute   T: Thromboembolic Prophylaxis y   H: HOB > 300 Yes   U: Stress Ulcer Prophylaxis (if needed) y   G: Glucose Control adequate   B: Bowel Function Stool Occurrence: 0   I: Indwelling Catheter (Lines & Tavera) Necessity needed   D: De-escalation of Antimicrobials/Pharmacotherapies na    Plan for the day/ETD out of bed , incentive spirometry     Code Status:  Family/Goals of Care: Full Code  discussed     Critical Care Time: 40 minutes  Critical secondary to Patient has a condition that poses threat to life and bodily function: Acute Myocardial Infarction      Critical care was time spent personally by me on the following activities: development of treatment plan with patient or surrogate and bedside caregivers, discussions with consultants, evaluation of patient's response to treatment, examination of patient, ordering and performing treatments and interventions, ordering and review of laboratory studies, ordering and review of radiographic studies, pulse oximetry, re-evaluation of patient's condition. This critical care time did not overlap with that of any other provider or involve  time for any procedures.     Leroy Robles MD  Critical Care Medicine  [facility] -

## 2018-12-20 NOTE — PLAN OF CARE
Accepted by [facility]      12/20/18 2686   Post-Acute Status   Post-Acute Authorization Home Health/Hospice   Home Health/Hospice Status Referrals Sent

## 2018-12-20 NOTE — PLAN OF CARE
Problem: Adult Inpatient Plan of Care  Goal: Plan of Care Review  Outcome: Ongoing (interventions implemented as appropriate)  Pt aaox3.  Pt is A Paced on the heart monitor, pacemaker set at rate 90 in AAI (ventricular wire taped to side).  Resp: sats stable on 4L, pt needs encouragement to cough, deep breath and use IS.  : criticore kang in place with good UOP.  Temp max 101.7, Md ordered for tylenol to be given for temp >101.5, tylenol given once and temp remained less than 101.5 for remainder of shift.  Skin: midline incision dsg cdi, leg graft sites cdi, old chest tube sites dsg cdi.  Pt turned and repositioned with use of pillows and wedge.  Midline intact with no redness, swelling or drainage.  Bath complete, pt max assist to cardiac chair.  Bed low, wheels locked, call light in reach.  Pt instructed to call for assistance.  Plan of care reviewed.  Pt verbalizes understanding. Will continue to monitor.

## 2018-12-20 NOTE — PT/OT/SLP PROGRESS
Occupational Therapy  Treatment    Filipe Agustin Jr.   MRN: [id number]   Admitting Diagnosis: Coronary artery disease involving native coronary artery of native heart with unstable angina pectoris    OT Date of Treatment: 12/20/18   OT Start Time: 1005  OT Stop Time: 1030  OT Total Time (min): 25 min    Billable Minutes:  Therapeutic Activity 25 minutes    General Precautions: Standard, fall, respiratory, sternal  Orthopedic Precautions: N/A  Braces: N/A         Subjective:  Communicated with nurse Pulido and [facility] chart review prior to session.    Pain/Comfort  Pain Rating 1: 5/10  Location - Orientation 1: generalized  Location 1: chest    Objective:  Patient found with: telemetry, peripheral IV, pulse ox (continuous), blood pressure cuff, kang catheter, SCD     Functional Mobility:  Transfers:   max a    Functional Ambulation: pt ambulated 5 feet with max a    Activities of Daily Living:     Feeding adaptive equipment: na     UE adaptive equipment: na       LE adaptive equipment:   na           Bathing adaptive equipment: na    Balance:   Static Sit: FAIR+: Able to take MINIMAL challenges from all directions  Dynamic Sit: POOR: N/A  Static Stand: 0: Needs MAXIMAL assist to maintain   Dynamic stand: poor-/ max a    Therapeutic Activities and Exercises:  Pt seen in room sitting on bed side chair (electric bed side chair). Pt educated on sternal precautions and pt unable to verbalized  Or return demonstration of sternal precautions.. Pt req max a with functional t/f's and functional mobility x 5 feet. Pt left sitting in supported epifanio with fair+ sitting balnce and all needs met and call button in reach. Nurse notified  AM-PAC 6 CLICK ADL   How much help from another person does this patient currently need?   1 = Unable, Total/Dependent Assistance  2 = A lot, Maximum/Moderate Assistance  3 = A little, Minimum/Contact Guard/Supervision  4 = None, Modified [place]/Independent    Putting on and taking off regular lower  "body clothing? : 2  Bathing (including washing, rinsing, drying)?: 2  Toileting, which includes using toilet, bedpan, or urinal? : 2  Putting on and taking off regular upper body clothing?: 2  Taking care of personal grooming such as brushing teeth?: 2  Eating meals?: 3  Daily Activity Total Score: 13     AM-PAC Raw Score CMS "G-Code Modifier Level of Impairment Assistance   6 % Total / Unable   7 - 8 CM 80 - 100% Maximal Assist   9-13 CL 60 - 80% Moderate Assist   14 - 19 CK 40 - 60% Moderate Assist   20 - 22 CJ 20 - 40% Minimal Assist   23 CI 1-20% SBA / CGA   24 CH 0% Independent/ Mod I       Patient left up in chair with all lines intact, call button in reach and nurse notified    ASSESSMENT:  Filipe Agustin Jr. is a 73 y.o. male with a medical diagnosis of Coronary artery disease involving native coronary artery of native heart with unstable angina pectoris and presents with debility and generalized weakness. Pt will continue to benefit from skilled O.T.    Rehab identified problem list/impairments: Rehab identified problem list/impairments: weakness, impaired self care skills, impaired balance, decreased safety awareness, impaired endurance, impaired functional mobilty, gait instability, pain    Rehab potential is good.    Activity tolerance: Good    Discharge recommendations: Discharge Facility/Level of Care Needs: nursing facility, skilled, rehabilitation facility     Barriers to discharge: Barriers to Discharge: None    Equipment recommendations: tub bench     GOALS:   Multidisciplinary Problems     Occupational Therapy Goals        Problem: Occupational Therapy Goal    Goal Priority Disciplines Outcome Interventions   Occupational Therapy Goal     OT, PT/OT Ongoing (interventions implemented as appropriate)    Description:  ot goals to be met by 12-26-18  Mod a with ue dressing  Mod a with le dressing  Mod a with toilet t/f's  Pt will verbalized and return demonstration with ae              "       Plan:  Patient to be seen 3 x/week to address the above listed problems via self-care/home management, therapeutic activities, therapeutic exercises  Plan of Care expires: 12/27/18  Plan of Care reviewed with: patient         Carito Rivers, OT  12/20/2018

## 2018-12-20 NOTE — ASSESSMENT & PLAN NOTE
Patient presented to [facility] [place], [place]- due to chest pain  EKG revealed inferior ST elevation  NTG SL x 1 given in ED  Repeat EKG revealed persistently elevation in inferior leads  IV heparin 5,000 units given in ED  No BB due to Bradycardia in ED  TTE pending  FLP pending  Patient taken to Cath Lab for emergent LHC per Dr. Monroe.  Aggrastat bolus and Infusion to follow  Further recs to follow    12/16  -CABG recommended, CVT consulted and plan for CABG on 12/18  -Continue IV heparin gtt, ASA, Statin, BB  -Start low dose ARB today  -Transfer to telemetry today  -No chest pain on exam today.   -FLP not drawn, will reorder for AM  -ECHO revealed EF 60-65%, -WMA's, normal Bi-V function.    12/17/18  -Stable overnight  -No chest pain or SOB  -Continue IV heparin  -Continue ASA, BB, statin, ARB  -CABG planned for AM    12/18/18  -s/p CABG x 4  -Hemodynamically stable  -Continue current post-op care  -ASA, Plavix, statin, BB    12/19/18  -Recovering s/p CABG x 4  -Remains fatigued/weak  -Would benefit from transfusion, defer to CVT  -Continue ASA, Plavix, statin BB  -Ambulation and IS usage      12/19/18  -Stable overnight  -H and H low, defer transfusion decision to CVT  -Continue ASA, Plavix, statin, BB

## 2018-12-20 NOTE — PLAN OF CARE
Problem: Physical Therapy Goal  Goal: Physical Therapy Goal  LTG'S TO BE MET IN 7 DAYS (12-26-18)  1. PT WILL REQUIRE CGA FOR BED MOBILITY  2. PT WILL REQUIRE SPV FOR TF'S  3. PT WILL ' WITH SPV  4. PT WILL DEMO G DYNAMIC BALANCE DURING GAIT   Outcome: Ongoing (interventions implemented as appropriate)  PT WITH FAIR TOLERANCE TO P.T. TX, LIMITED BY PAIN AND WEAKNESS, MAXA FOR SIT<>STAND TF, AMB 5' WITH MAXA, B BUCKLING KNEE'S

## 2018-12-20 NOTE — PROGRESS NOTES
Filipe Agustin Jr. [id number] is a 73 y.o. male who has been consulted for vancomycin dosing for empirical treatment for infection    The patient has the following labs:     Date Creatinine (mg/dl)    BUN WBC Count   12/20/2018 Estimated Creatinine Clearance: 41.7 mL/min (A) (based on SCr of 2 mg/dL (H)). Lab Results   Component Value Date    BUN 25 (H) 12/20/2018     Lab Results   Component Value Date    WBC 10.18 12/20/2018      which calculates to an Estimated Creatinine Clearance: 41.7 mL/min (A) (based on SCr of 2 mg/dL (H))..       Current weight is 114.6 kg (252 lb 10.4 oz)    The patient will be started on vancomycin pulse dosing with 1250mg iv q72hrs as a place briggs dose.    Goal trough is 15-20     Patient will receive a dose when random levels <18     pharmacy will monitor for changes in renal function, toxicity, and efficacy.      The vancomycin random has been ordered for 12/21 at 0430    Thank you for allowing us to participate in this patient's care.     Saba Roth [facility]

## 2018-12-20 NOTE — SUBJECTIVE & OBJECTIVE
Interval History:  Patient is postop day 2.  Status post coronary artery bypass grafting x4.  Over the past 24 hr patient has been febrile with a T-max to 101.7.  Patient also has a persistent cough.    ROS  Medications:  Continuous Infusions:   dexmedetomidine (PRECEDEX) infusion Stopped (12/18/18 1710)    epinephrine infusion Stopped (12/19/18 1234)    niCARdipine Stopped (12/18/18 1410)    norepinephrine bitartrate-D5W Stopped (12/18/18 2200)    vasopressin (PITRESSIN) infusion Stopped (12/19/18 0400)     Scheduled Meds:   aspirin  81 mg Oral Daily    atorvastatin  80 mg Oral QHS    chlorhexidine  10 mL Mouth/Throat BID    clopidogrel  75 mg Oral Daily    docusate sodium  100 mg Oral BID    furosemide  40 mg Intravenous BID    metoprolol tartrate  25 mg Oral BID    nozaseptin   Each Nare BID    pantoprazole  40 mg Intravenous Daily    piperacillin-tazobactam (ZOSYN) IVPB  4.5 g Intravenous Q8H    polyethylene glycol  17 g Oral Daily    [START ON 12/25/2018] vancomycin (VANCOCIN) IVPB  1,250 mg Intravenous Q72H     PRN Meds:acetaminophen, albumin human 5%, albuterol sulfate, dextrose 50%, dextrose 50%, fentaNYL, fentaNYL, glucagon (human recombinant), glucose, glucose, insulin aspart U-100, lactated ringers, magnesium sulfate IVPB, metoclopramide HCl, ondansetron, oxyCODONE, oxyCODONE, potassium chloride in water **AND** potassium chloride in water **AND** potassium chloride in water     Objective:     Vital Signs (Most Recent):  Temp: (!) 100.7 °F (38.2 °C) (12/20/18 0715)  Pulse: 91 (12/20/18 1045)  Resp: (!) 26 (12/20/18 1045)  BP: (!) 102/52 (12/20/18 1045)  SpO2: (!) 94 % (12/20/18 1045) Vital Signs (24h Range):  Temp:  [100.5 °F (38.1 °C)-101.7 °F (38.7 °C)] 100.7 °F (38.2 °C)  Pulse:  [90-91] 91  Resp:  [10-35] 26  SpO2:  [91 %-96 %] 94 %  BP: ()/() 102/52     Weight: 114.6 kg (252 lb 10.4 oz)  Body mass index is 37.31 kg/m².    SpO2: (!) 94 %  O2 Device (Oxygen Therapy): nasal  cannula    Intake/Output - Last 3 Shifts       12/18 0700 - 12/19 0659 12/19 0700 - 12/20 0659 12/20 0700 - 12/21 0659    P.O.  540     I.V. (mL/kg) 8084.2 (70.5) 260.3 (2.3)     IV Piggyback 400      Total Intake(mL/kg) 8484.2 (74) 800.3 (7)     Urine (mL/kg/hr) 1173 (0.4) 3251 (1.2) 172 (0.2)    Drains 180 250     Stool 0 0     Chest Tube 620 20     Total Output 1973 3521 172    Net +6511.2 -2720.8 -172           Stool Occurrence 0 x 0 x           Lines/Drains/Airways     Drain                 Urethral Catheter 12/18/18 0730 Latex;Straight-tip;Temperature probe 16 Fr. 2 days          Line                 Pacer Wires 12/18/18 1200 2 days          Peripheral Intravenous Line                 Midline Catheter Insertion/Assessment  - Double Lumen 12/19/18 1500 Left basilic vein (medial side of arm) less than 1 day                Physical Exam   Constitutional: He is oriented to person, place, and time. He appears well-developed and well-nourished. No distress.   HENT:   Head: Normocephalic and atraumatic.   Cardiovascular: Normal rate and regular rhythm.   Pulmonary/Chest: Effort normal. No respiratory distress.   Abdominal: Soft. Bowel sounds are normal. He exhibits no distension.   Musculoskeletal: He exhibits edema.   Neurological: He is alert and oriented to person, place, and time.   Skin: Skin is warm and dry. He is not diaphoretic.       Significant Labs:  All pertinent labs from the last 24 hours have been reviewed.    Significant Diagnostics:  I have reviewed all pertinent imaging results/findings within the past 24 hours.

## 2018-12-20 NOTE — PROGRESS NOTES
[facility] -   Cardiothoracic Surgery  Progress Note    Patient Name: Filipe Agustin Jr.  MRN: [id number]  Admission Date: 12/15/2018  Hospital Length of Stay: 5 days  Code Status: Full Code   Attending Physician: Rg Johnson MD   Referring Provider: Self, Aaareferral  Principal Problem:Coronary artery disease involving native coronary artery of native heart with unstable angina pectoris            Subjective:     Post-Op Info:  Procedure(s) (LRB):  CORONARY ARTERY BYPASS GRAFT (CABG) (N/A)  ECHOCARDIOGRAM,TRANSESOPHAGEAL (N/A)  SURGICAL PROCUREMENT, VEIN, ENDOSCOPIC (Bilateral)   2 Days Post-Op     Interval History:  Patient is postop day 2.  Status post coronary artery bypass grafting x4.  Over the past 24 hr patient has been febrile with a T-max to 101.7.  Patient also has a persistent cough.    ROS  Medications:  Continuous Infusions:   dexmedetomidine (PRECEDEX) infusion Stopped (12/18/18 1710)    epinephrine infusion Stopped (12/19/18 1234)    niCARdipine Stopped (12/18/18 1410)    norepinephrine bitartrate-D5W Stopped (12/18/18 2200)    vasopressin (PITRESSIN) infusion Stopped (12/19/18 0400)     Scheduled Meds:   aspirin  81 mg Oral Daily    atorvastatin  80 mg Oral QHS    chlorhexidine  10 mL Mouth/Throat BID    clopidogrel  75 mg Oral Daily    docusate sodium  100 mg Oral BID    furosemide  40 mg Intravenous BID    metoprolol tartrate  25 mg Oral BID    nozaseptin   Each Nare BID    pantoprazole  40 mg Intravenous Daily    piperacillin-tazobactam (ZOSYN) IVPB  4.5 g Intravenous Q8H    polyethylene glycol  17 g Oral Daily    [START ON 12/25/2018] vancomycin (VANCOCIN) IVPB  1,250 mg Intravenous Q72H     PRN Meds:acetaminophen, albumin human 5%, albuterol sulfate, dextrose 50%, dextrose 50%, fentaNYL, fentaNYL, glucagon (human recombinant), glucose, glucose, insulin aspart U-100, lactated ringers, magnesium sulfate IVPB, metoclopramide HCl, ondansetron, oxyCODONE, oxyCODONE,  potassium chloride in water **AND** potassium chloride in water **AND** potassium chloride in water     Objective:     Vital Signs (Most Recent):  Temp: (!) 100.7 °F (38.2 °C) (12/20/18 0715)  Pulse: 91 (12/20/18 1045)  Resp: (!) 26 (12/20/18 1045)  BP: (!) 102/52 (12/20/18 1045)  SpO2: (!) 94 % (12/20/18 1045) Vital Signs (24h Range):  Temp:  [100.5 °F (38.1 °C)-101.7 °F (38.7 °C)] 100.7 °F (38.2 °C)  Pulse:  [90-91] 91  Resp:  [10-35] 26  SpO2:  [91 %-96 %] 94 %  BP: ()/() 102/52     Weight: 114.6 kg (252 lb 10.4 oz)  Body mass index is 37.31 kg/m².    SpO2: (!) 94 %  O2 Device (Oxygen Therapy): nasal cannula    Intake/Output - Last 3 Shifts       12/18 0700 - 12/19 0659 12/19 0700 - 12/20 0659 12/20 0700 - 12/21 0659    P.O.  540     I.V. (mL/kg) 8084.2 (70.5) 260.3 (2.3)     IV Piggyback 400      Total Intake(mL/kg) 8484.2 (74) 800.3 (7)     Urine (mL/kg/hr) 1173 (0.4) 3251 (1.2) 172 (0.2)    Drains 180 250     Stool 0 0     Chest Tube 620 20     Total Output 1973 3521 172    Net +6511.2 -2720.8 -172           Stool Occurrence 0 x 0 x           Lines/Drains/Airways     Drain                 Urethral Catheter 12/18/18 0730 Latex;Straight-tip;Temperature probe 16 Fr. 2 days          Line                 Pacer Wires 12/18/18 1200 2 days          Peripheral Intravenous Line                 Midline Catheter Insertion/Assessment  - Double Lumen 12/19/18 1500 Left basilic vein (medial side of arm) less than 1 day                Physical Exam   Constitutional: He is oriented to person, place, and time. He appears well-developed and well-nourished. No distress.   HENT:   Head: Normocephalic and atraumatic.   Cardiovascular: Normal rate and regular rhythm.   Pulmonary/Chest: Effort normal. No respiratory distress.   Abdominal: Soft. Bowel sounds are normal. He exhibits no distension.   Musculoskeletal: He exhibits edema.   Neurological: He is alert and oriented to person, place, and time.   Skin: Skin is warm  and dry. He is not diaphoretic.       Significant Labs:  All pertinent labs from the last 24 hours have been reviewed.    Significant Diagnostics:  I have reviewed all pertinent imaging results/findings within the past 24 hours.    Assessment/Plan:     * Coronary artery disease involving native coronary artery of native heart with unstable angina pectoris post ACS    The patient is a 72-year-old male with history of CAD status post stent placement in the past, hypertension, hyperlipidemia who presented with acute onset of chest pain.  Patient's workup included cardiac catheterization which showed severe multivessel coronary artery.  The risk and benefits of surgery was discussed with the patient and his wife.  The patient understands and has agreed to proceed with surgery which has been scheduled for 12/18/2018.     S/P CABG x 4    The patient is postop day 1 status post coronary artery bypass grafting x4.  Overall the patient is doing well.  Neuro:  Patient is awake alert and oriented x3.  Neuro exam is nonfocal.    Cardiac:  Patient is on low-dose vasopressin and epinephrine.  Cardiac index is good.  Wean drips to off.  And start beta-blockers.  Respiratory: Patient is maintaining good sats on nasal cannula.  GI:  Patient is started on p.o. intake.  Advanced as tolerated.  Renal:  Creatinine is 1.7.  Urine output is good.  Endocrine:  Glucose is well controlled.  Heme:  Hematocrit is 22 and platelet is 165.  Will hold off transfusions for now.  Id:  T-max is a 101.1 °.  And white count is 9.4.  Activities:  Patient is out of bed to chair.  Advanced a activities as tolerated.  Lines tubes and drains.  Discontinue chest tubes, DANNY, [place] and [place].  Placed mid level line and continue with pacer wires.    12/20/2018    The patient is postop day 2 status post coronary artery bypass grafting x4.  Over the past 24 hr patient has been febrile..  Neuro:  Patient is awake alert and oriented x3.  Neuro exam is nonfocal.     Cardiac:  Patient is tolerating beta-blocker.  Respiratory: Patient is maintaining good sats on nasal cannula.  GI:  Patient is started on p.o. intake.  Advanced as tolerated.  Renal:  Creatinine is 2.0  Urine output is good.  Endocrine:  Glucose is well controlled.  Heme:  Hematocrit is 22 and platelet is 158.  Will hold off transfusions for now.  Id:  T-max is a 101.7°.  And white count is 10.  Will panculture the patient.  Started on broad spectrum antibiotics.  Activities:  Patient is out of bed to chair.  Advance  activities as tolerated.  Lines tubes and drains.  Mid level line, Pacer wires and kang           Rg Johnson MD  Cardiothoracic Surgery  [facility] -

## 2018-12-20 NOTE — PROGRESS NOTES
[facility]  Cardiology  Progress Note    Patient Name: Filipe Agustin Jr.  MRN: [id number]  Admission Date: 12/15/2018  Hospital Length of Stay: 5 days  Code Status: Full Code   Attending Physician: Rg Johnson MD   Primary Care Physician: Jojo Duque DO  Expected Discharge Date:   Principal Problem:Coronary artery disease involving native coronary artery of native heart with unstable angina pectoris    Subjective:   HPI:  Mr. Agustin is a 72 year old male with PMHx of CAD s/p coronary stenting at [facility] per patient report, HTN, HLP who presented to [facility] ED due to chest pain which started abruptly this AM. Initial EKG revealed ST elevated in inferior leads, CODE STEMI activated at that time. Patient received IV heparin bolus and NTG SL x 1 and repeat EKG which continued to reveal ST elevation in inferior leads. Patient transported to cath lab for emergent LHC per Dr. Monroe. IV aggrastat bolus per Dr. Monroe's recommendations. TTE pending. Further recs to follow. Labs pending.     Hospital Course:   12/16/18-Patient seen and examined in ICU, sitting in bedside chair. Denies chest pain or shortness of breath today on exam. IV heparin gtt in progress. Pending CABG on 12/18 per CVT. Labs reviewed, stable.     12/17/18-Patient seen and examined today, resting in bed. No acute events overnight. Denies chest pain or SOB. Labs stable. CABG planned for AM.    12/18/18-Patient seen and examined today, s/p CABG x 4. Hemodynamically stable.     12/19/18-Patient seen and examined today, POD # 1 s/p CABG x 4. Feels ok. Complains of fatigue and incisional soreness. Labs reviewed. Creatinine 1.7. H and H low.     12/20/18-Patient seen and examined today, POD # 2 s/p CABG x 4. Feels fatigued and weak. Pain fairly well controlled. No SOB. Labs reviewed. Creatinine bumped to 1.9. H and H remains low (7.4/22). +Fever noted overnight.        Review of Systems   Constitution: Positive for fever, weakness and  malaise/fatigue.   HENT: Negative.    Eyes: Negative.    Cardiovascular: Positive for chest pain (incisional).   Respiratory: Negative.    Endocrine: Negative.    Hematologic/Lymphatic: Negative.    Skin: Negative.    Musculoskeletal: Negative.    Gastrointestinal: Negative.    Genitourinary: Negative.    Psychiatric/Behavioral: Negative.    Allergic/Immunologic: Negative.      Objective:     Vital Signs (Most Recent):  Temp: (!) 100.7 °F (38.2 °C) (12/20/18 0715)  Pulse: 91 (12/20/18 0900)  Resp: 18 (12/20/18 0900)  BP: 113/62 (12/20/18 0900)  SpO2: (!) 93 % (12/20/18 0900) Vital Signs (24h Range):  Temp:  [100.5 °F (38.1 °C)-101.7 °F (38.7 °C)] 100.7 °F (38.2 °C)  Pulse:  [84-94] 91  Resp:  [10-35] 18  SpO2:  [92 %-97 %] 93 %  BP: ()/() 113/62  Arterial Line BP: (126-133)/(48-50) 133/50     Weight: 114.6 kg (252 lb 10.4 oz)  Body mass index is 37.31 kg/m².     SpO2: (!) 93 %  O2 Device (Oxygen Therapy): nasal cannula      Intake/Output Summary (Last 24 hours) at 12/20/2018 0927  Last data filed at 12/20/2018 0600  Gross per 24 hour   Intake 715.65 ml   Output 3019 ml   Net -2303.35 ml       Lines/Drains/Airways     Drain                 Urethral Catheter 12/18/18 0730 Latex;Straight-tip;Temperature probe 16 Fr. 2 days          Line                 Pacer Wires 12/18/18 1200 1 day          Peripheral Intravenous Line                 Midline Catheter Insertion/Assessment  - Double Lumen 12/19/18 1500 Left basilic vein (medial side of arm) less than 1 day                Physical Exam   Constitutional: He is oriented to person, place, and time. He appears well-developed and well-nourished. No distress.   HENT:   Head: Normocephalic and atraumatic.   Eyes: Pupils are equal, round, and reactive to light. Right eye exhibits no discharge. Left eye exhibits no discharge.   Neck: Neck supple. No JVD present.   Cardiovascular: Normal rate, regular rhythm, S1 normal, S2 normal and normal heart sounds.   No murmur  heard.  Pulmonary/Chest: Effort normal and breath sounds normal. No respiratory distress.   Diminished BS at bases  CABG site C/D/I; no bleeding erythema or drainage   Abdominal: Soft. He exhibits no distension. There is no tenderness.   Musculoskeletal: He exhibits edema (trace BLE).   Neurological: He is alert and oriented to person, place, and time.   Skin: Skin is warm and dry. He is not diaphoretic. No erythema.   SVG sites C/D/I; no bleeding erythema or drainage   Psychiatric: He has a normal mood and affect. His behavior is normal. Thought content normal.   Nursing note and vitals reviewed.      Significant Labs:   CMP   Recent Labs   Lab 12/19/18  0345 12/19/18  1741 12/20/18  0152 12/20/18  0401     141 140 139 138   K 4.1  4.1 5.3* 4.7 4.7     108 105 102 100   CO2 23  23 26 27 29   *  118* 114* 111* 110   BUN 16  16 22 24* 25*   CREATININE 1.7*  1.7* 1.8* 1.9* 2.0*   CALCIUM 9.0  9.0 8.9 8.8 8.7   PROT 5.2*  --   --  5.6*   ALBUMIN 3.4*  --   --  3.1*   BILITOT 0.6  --   --  1.1*   ALKPHOS 15*  --   --  19*   AST 82*  --   --  67*   ALT 26  --   --  32   ANIONGAP 10  10 9 10 9   ESTGFRAFRICA 46*  46* 43* 40* 37*   EGFRNONAA 40*  40* 37* 34* 32*   , CBC   Recent Labs   Lab 12/19/18  0345 12/20/18  0152 12/20/18  0401   WBC 9.44 10.29 10.18   HGB 7.1* 7.5* 7.4*   HCT 22.3* 22.8* 22.8*    154 158   , Troponin No results for input(s): TROPONINI in the last 48 hours. and All pertinent lab results from the last 24 hours have been reviewed.    Significant Imaging: Echocardiogram:   2D echo with color flow doppler:   Results for orders placed or performed during the hospital encounter of 12/15/18   2D echo with color flow doppler   Result Value Ref Range    QEF 60 55 - 65    Diastolic Dysfunction No     and X-Ray: CXR: X-Ray Chest 1 View (CXR): No results found for this visit on 12/15/18. and X-Ray Chest PA and Lateral (CXR): No results found for this visit on  12/15/18.    Assessment and Plan:   Patient who presents with STEMI/multivessel CAD. Recovering s/p CABG x 4. Continue same mgmt. Would benefit from transfusion, defer to CVT.    * Coronary artery disease involving native coronary artery of native heart with unstable angina pectoris post ACS    -See plan under STEMI     S/P CABG x 4    [place] [facility] to [place]  EKG revealed inferior ST elevation  NTG SL x 1 given in ED  Repeat EKG revealed persistently elevation in inferior leads  IV heparin 5,000 units given in ED  No BB due to Bradycardia in ED  TTE pending  FLP pending  Patient taken to Cath Lab for emergent LHC per Dr. Monroe.  Aggrastat bolus and Infusion to follow  Further recs to follow    12/16  -CABG recommended, CVT consulted and plan for CABG on 12/18  -Continue IV heparin gtt, ASA, Statin, BB  -Start low dose ARB today  -Transfer to telemetry today  -No chest pain on exam today.   -FLP not drawn, will reorder for AM  -ECHO revealed EF 60-65%, -WMA's, normal Bi-V function.    12/17/18  -Stable overnight  -No chest pain or SOB  -Continue IV heparin  -Continue ASA, BB, statin, ARB  -CABG planned for AM    12/18/18  -s/p CABG x 4  -Hemodynamically stable  -Continue current post-op care  -ASA, Plavix, statin, BB    12/19/18  -Recovering s/p CABG x 4  -Remains fatigued/weak  -Would benefit from transfusion, defer to CVT  -Continue ASA, Plavix, statin BB  -Ambulation and IS usage      12/19/18  -Stable overnight  -H and H low, defer transfusion decision to CVT  -Continue ASA, Plavix, statin, BB     CKD (chronic kidney disease) stage 3, GFR 30-59 ml/min    -Monitor  -Creatinine bumped to 1.9  -Would recommend holding IV Lasix, defer to CVT     Hypertension goal BP (blood pressure) < 140/90    On medical therapy  Continue BB  Start ARB today     Severe obesity with body mass index (BMI) of 35.0 to 39.9 with serious comorbidity    -Encourage weight loss         VTE Risk Mitigation (From  admission, onward)        Ordered     Place NARCISA hose  Until discontinued      12/18/18 1249     Place sequential compression device  Until discontinued      12/18/18 1249     heparin 25,000 units in dextrose 5% 250 mL (100 units/mL) infusion LOW INTENSITY nomogram - [facility]  Continuous      12/15/18 1121     heparin (porcine) injection 5,000 Units  ED 1 Time      12/15/18 0931          Cheryl Farrell PA-C  Cardiology  [facility] -     Chart reviewed. Dr. Romo examined patient and agrees with plan as outlined above.

## 2018-12-20 NOTE — SUBJECTIVE & OBJECTIVE
Review of Systems   Constitution: Positive for fever, weakness and malaise/fatigue.   HENT: Negative.    Eyes: Negative.    Cardiovascular: Positive for chest pain (incisional).   Respiratory: Negative.    Endocrine: Negative.    Hematologic/Lymphatic: Negative.    Skin: Negative.    Musculoskeletal: Negative.    Gastrointestinal: Negative.    Genitourinary: Negative.    Psychiatric/Behavioral: Negative.    Allergic/Immunologic: Negative.      Objective:     Vital Signs (Most Recent):  Temp: (!) 100.7 °F (38.2 °C) (12/20/18 0715)  Pulse: 91 (12/20/18 0900)  Resp: 18 (12/20/18 0900)  BP: 113/62 (12/20/18 0900)  SpO2: (!) 93 % (12/20/18 0900) Vital Signs (24h Range):  Temp:  [100.5 °F (38.1 °C)-101.7 °F (38.7 °C)] 100.7 °F (38.2 °C)  Pulse:  [84-94] 91  Resp:  [10-35] 18  SpO2:  [92 %-97 %] 93 %  BP: ()/() 113/62  Arterial Line BP: (126-133)/(48-50) 133/50     Weight: 114.6 kg (252 lb 10.4 oz)  Body mass index is 37.31 kg/m².     SpO2: (!) 93 %  O2 Device (Oxygen Therapy): nasal cannula      Intake/Output Summary (Last 24 hours) at 12/20/2018 0927  Last data filed at 12/20/2018 0600  Gross per 24 hour   Intake 715.65 ml   Output 3019 ml   Net -2303.35 ml       Lines/Drains/Airways     Drain                 Urethral Catheter 12/18/18 0730 Latex;Straight-tip;Temperature probe 16 Fr. 2 days          Line                 Pacer Wires 12/18/18 1200 1 day          Peripheral Intravenous Line                 Midline Catheter Insertion/Assessment  - Double Lumen 12/19/18 1500 Left basilic vein (medial side of arm) less than 1 day                Physical Exam   Constitutional: He is oriented to person, place, and time. He appears well-developed and well-nourished. No distress.   HENT:   Head: Normocephalic and atraumatic.   Eyes: Pupils are equal, round, and reactive to light. Right eye exhibits no discharge. Left eye exhibits no discharge.   Neck: Neck supple. No JVD present.   Cardiovascular: Normal rate,  regular rhythm, S1 normal, S2 normal and normal heart sounds.   No murmur heard.  Pulmonary/Chest: Effort normal and breath sounds normal. No respiratory distress.   Diminished BS at bases  CABG site C/D/I; no bleeding erythema or drainage   Abdominal: Soft. He exhibits no distension. There is no tenderness.   Musculoskeletal: He exhibits edema (trace BLE).   Neurological: He is alert and oriented to person, place, and time.   Skin: Skin is warm and dry. He is not diaphoretic. No erythema.   SVG sites C/D/I; no bleeding erythema or drainage   Psychiatric: He has a normal mood and affect. His behavior is normal. Thought content normal.   Nursing note and vitals reviewed.      Significant Labs:   CMP   Recent Labs   Lab 12/19/18  0345 12/19/18  1741 12/20/18  0152 12/20/18  0401     141 140 139 138   K 4.1  4.1 5.3* 4.7 4.7     108 105 102 100   CO2 23  23 26 27 29   *  118* 114* 111* 110   BUN 16  16 22 24* 25*   CREATININE 1.7*  1.7* 1.8* 1.9* 2.0*   CALCIUM 9.0  9.0 8.9 8.8 8.7   PROT 5.2*  --   --  5.6*   ALBUMIN 3.4*  --   --  3.1*   BILITOT 0.6  --   --  1.1*   ALKPHOS 15*  --   --  19*   AST 82*  --   --  67*   ALT 26  --   --  32   ANIONGAP 10  10 9 10 9   ESTGFRAFRICA 46*  46* 43* 40* 37*   EGFRNONAA 40*  40* 37* 34* 32*   , CBC   Recent Labs   Lab 12/19/18  0345 12/20/18  0152 12/20/18  0401   WBC 9.44 10.29 10.18   HGB 7.1* 7.5* 7.4*   HCT 22.3* 22.8* 22.8*    154 158   , Troponin No results for input(s): TROPONINI in the last 48 hours. and All pertinent lab results from the last 24 hours have been reviewed.    Significant Imaging: Echocardiogram:   2D echo with color flow doppler:   Results for orders placed or performed during the hospital encounter of 12/15/18   2D echo with color flow doppler   Result Value Ref Range    QEF 60 55 - 65    Diastolic Dysfunction No     and X-Ray: CXR: X-Ray Chest 1 View (CXR): No results found for this visit on 12/15/18. and X-Ray Chest  PA and Lateral (CXR): No results found for this visit on 12/15/18.

## 2018-12-20 NOTE — SUBJECTIVE & OBJECTIVE
Past Medical History:   Diagnosis Date    Acute coronary syndrome     Cancer of prostate with intermediate recurrence risk (stage T2b-c or [place] 7 or PSA 10-20) 1/8/2018    CKD (chronic kidney disease) stage 3, GFR 30-59 ml/min 9/24/2015    Coronary artery disease     Coronary artery disease involving native coronary artery of native heart with unstable angina pectoris     Elevated PSA 6/27/2017    History of coronary angioplasty 9/19/2014    Hyperlipidemia     Hypertension     Myocardial infarction 2008    Obesity, Class II, BMI 35.0-39.9, with comorbidity (see actual BMI) 6/6/2014    Polio     as a child    Tobacco dependence     resolved       Past Surgical History:   Procedure Laterality Date    CARDIAC CATHETERIZATION  2008    CORONARY ANGIOPLASTY  2008    acute PCI- RCA- RUI    CORONARY ARTERY BYPASS GRAFT (CABG) N/A 12/18/2018    Procedure: CORONARY ARTERY BYPASS GRAFT (CABG);  Surgeon: Rg Johnson MD;  Location: [facility] OR;  Service: Cardiothoracic;  Laterality: N/A;    CORONARY ARTERY BYPASS GRAFT (CABG) N/A 12/18/2018    Performed by Rg Johnson MD at [facility] OR    CORONARY STENT PLACEMENT  2008    ECHOCARDIOGRAM,TRANSESOPHAGEAL N/A 12/18/2018    Performed by Rg Johnson MD at [facility] OR    ENDOSCOPIC HARVEST OF VEIN Bilateral 12/18/2018    Procedure: SURGICAL PROCUREMENT, VEIN, ENDOSCOPIC;  Surgeon: Rg Johnson MD;  Location: [facility] OR;  Service: Cardiothoracic;  Laterality: Bilateral;    SURGICAL PROCUREMENT, VEIN, ENDOSCOPIC Bilateral 12/18/2018    Performed by Rg Johnson MD at [facility] OR       Review of patient's allergies indicates:   Allergen Reactions    Paragoric Other (See Comments)     sneeze       Family History     Problem Relation (Age of Onset)    Cancer Brother    Heart disease Father, Brother        Tobacco Use    Smoking status: Former Smoker     Packs/day: 2.00     Years: 20.00     Pack years: 40.00     Types: Cigarettes     Last attempt to quit:  7/12/2008     Years since quitting: 10.4    Smokeless tobacco: Never Used   Substance and Sexual Activity    Alcohol use: Yes     Comment: rarely    Drug use: No    Sexual activity: Not Currently     Partners: Female         Review of Systems   Constitutional: Positive for fatigue. Negative for fever and unexpected weight change.   HENT: Negative for congestion, postnasal drip, rhinorrhea, sinus pressure and sneezing.    Respiratory: Positive for shortness of breath.    Cardiovascular: Positive for chest pain. Negative for palpitations and leg swelling.   Gastrointestinal: Negative.  Negative for abdominal pain and nausea.   Genitourinary: Negative.    Musculoskeletal: Negative.  Negative for arthralgias and back pain.   Skin: Negative for rash.   Neurological: Negative for dizziness, syncope, weakness and light-headedness.   Hematological: Negative for adenopathy. Does not bruise/bleed easily.   Psychiatric/Behavioral: Negative.  Negative for dysphoric mood and sleep disturbance. The patient is not nervous/anxious.      Objective:     Vital Signs (Most Recent):  Temp: (!) 101.3 °F (38.5 °C) (12/20/18 1100)  Pulse: 91 (12/20/18 1430)  Resp: 20 (12/20/18 1430)  BP: 139/62 (12/20/18 1430)  SpO2: 95 % (12/20/18 1430) Vital Signs (24h Range):  Temp:  [100.5 °F (38.1 °C)-101.7 °F (38.7 °C)] 101.3 °F (38.5 °C)  Pulse:  [90-91] 91  Resp:  [11-35] 20  SpO2:  [91 %-99 %] 95 %  BP: ()/() 139/62     Weight: 114.6 kg (252 lb 10.4 oz)  Body mass index is 37.31 kg/m².      Intake/Output Summary (Last 24 hours) at 12/20/2018 1522  Last data filed at 12/20/2018 1400  Gross per 24 hour   Intake 540 ml   Output 3114 ml   Net -2574 ml       Physical Exam   Constitutional: He is oriented to person, place, and time. He appears well-developed and well-nourished.   HENT:   Head: Normocephalic and atraumatic.   Eyes: Conjunctivae are normal. Pupils are equal, round, and reactive to light.   Neck: Neck supple. No JVD  present. No tracheal deviation present. No thyromegaly present.   Cardiovascular: Normal rate, regular rhythm and normal heart sounds.       Pulmonary/Chest: Effort normal. He has decreased breath sounds in the right lower field and the left lower field.   Abdominal: Soft.   Musculoskeletal: Normal range of motion.   Lymphadenopathy:     He has no cervical adenopathy.   Neurological: He is alert and oriented to person, place, and time.   Skin: Skin is warm and dry.   Nursing note and vitals reviewed.      Vents:  Vent Mode: Spont (12/18/18 1610)  Ventilator Initiated: Yes (12/18/18 1341)  Set Rate: 0 bmp (12/18/18 1610)  Vt Set: 400 mL (12/18/18 1610)  Pressure Support: 10 cmH20 (12/18/18 1610)  PEEP/CPAP: 5 cmH20 (12/18/18 1610)  Oxygen Concentration (%): 100 (12/20/18 1245)  Peak Airway Pressure: 16 cmH2O (12/18/18 1610)  Plateau Pressure: 0 cmH20 (12/18/18 1610)  Total Ve: 11.3 mL (12/18/18 1610)  F/VT Ratio<105 (RSBI): (!) 38.99 (12/18/18 1610)    Lines/Drains/Airways     Drain                 Urethral Catheter 12/18/18 0730 Latex;Straight-tip;Temperature probe 16 Fr. 2 days          Line                 Pacer Wires 12/18/18 1200 2 days          Peripheral Intravenous Line                 Midline Catheter Insertion/Assessment  - Double Lumen 12/19/18 1500 Left basilic vein (medial side of arm) 1 day                Significant Labs:    CBC/Anemia Profile:  Recent Labs   Lab 12/19/18  0345 12/20/18  0152 12/20/18  0401   WBC 9.44 10.29 10.18   HGB 7.1* 7.5* 7.4*   HCT 22.3* 22.8* 22.8*    154 158   MCV 95 92 92   RDW 14.5 15.5* 15.4*        Chemistries:  Recent Labs   Lab 12/19/18  0345 12/19/18  1741 12/20/18  0152 12/20/18  0401 12/20/18  0838     141 140 139 138  --    K 4.1  4.1 5.3* 4.7 4.7  --      108 105 102 100  --    CO2 23  23 26 27 29  --    BUN 16  16 22 24* 25*  --    CREATININE 1.7*  1.7* 1.8* 1.9* 2.0*  --    CALCIUM 9.0  9.0 8.9 8.8 8.7  --    ALBUMIN 3.4*  --   --  3.1*   --    PROT 5.2*  --   --  5.6*  --    BILITOT 0.6  --   --  1.1*  --    ALKPHOS 15*  --   --  19*  --    ALT 26  --   --  32  --    AST 82*  --   --  67*  --    MG 3.3* 2.7*  --   --  2.7*       BMP:   Recent Labs   Lab 12/20/18  0401 12/20/18  0838     --      --    K 4.7  --      --    CO2 29  --    BUN 25*  --    CREATININE 2.0*  --    CALCIUM 8.7  --    MG  --  2.7*     CMP:   Recent Labs   Lab 12/19/18  0345 12/19/18  1741 12/20/18  0152 12/20/18  0401     141 140 139 138   K 4.1  4.1 5.3* 4.7 4.7     108 105 102 100   CO2 23  23 26 27 29   *  118* 114* 111* 110   BUN 16  16 22 24* 25*   CREATININE 1.7*  1.7* 1.8* 1.9* 2.0*   CALCIUM 9.0  9.0 8.9 8.8 8.7   PROT 5.2*  --   --  5.6*   ALBUMIN 3.4*  --   --  3.1*   BILITOT 0.6  --   --  1.1*   ALKPHOS 15*  --   --  19*   AST 82*  --   --  67*   ALT 26  --   --  32   ANIONGAP 10  10 9 10 9   EGFRNONAA 40*  40* 37* 34* 32*     Cardiac Markers: No results for input(s): CKMB, TROPONINT, MYOGLOBIN in the last 48 hours.  All pertinent labs within the past 24 hours have been reviewed.    Significant Imaging:   I have reviewed all pertinent imaging results/findings within the past 24 hours.  I have reviewed and interpreted all pertinent imaging results/findings within the past 24 hours.

## 2018-12-20 NOTE — ASSESSMENT & PLAN NOTE
The patient is postop day 1 status post coronary artery bypass grafting x4.  Overall the patient is doing well.  Neuro:  Patient is awake alert and oriented x3.  Neuro exam is nonfocal.    Cardiac:  Patient is on low-dose vasopressin and epinephrine.  Cardiac index is good.  Wean drips to off.  And start beta-blockers.  Respiratory: Patient is maintaining good sats on nasal cannula.  GI:  Patient is started on p.o. intake.  Advanced as tolerated.  Renal:  Creatinine is 1.7.  Urine output is good.  Endocrine:  Glucose is well controlled.  Heme:  Hematocrit is 22 and platelet is 165.  Will hold off transfusions for now.  Id:  T-max is a 101.1 °.  And white count is 9.4.  Activities:  Patient is out of bed to chair.  Advanced a activities as tolerated.  Lines tubes and drains.  Discontinue chest tubes, DANNY, [place] and [place].  Placed mid level line and continue with pacer wires.    12/20/2018    The patient is postop day 2 status post coronary artery bypass grafting x4.  Over the past 24 hr patient has been febrile..  Neuro:  Patient is awake alert and oriented x3.  Neuro exam is nonfocal.    Cardiac:  Patient is tolerating beta-blocker.  Respiratory: Patient is maintaining good sats on nasal cannula.  GI:  Patient is started on p.o. intake.  Advanced as tolerated.  Renal:  Creatinine is 2.0  Urine output is good.  Endocrine:  Glucose is well controlled.  Heme:  Hematocrit is 22 and platelet is 158.  Will hold off transfusions for now.  Id:  T-max is a 101.7°.  And white count is 10.  Will panculture the patient.  Started on broad spectrum antibiotics.  Activities:  Patient is out of bed to chair.  Advance  activities as tolerated.  Lines tubes and drains.  Mid level line, Pacer wires and kang

## 2018-12-20 NOTE — PLAN OF CARE
Patient and family choice . Preference form signed for [facility]. Copy given to the patient and the referral in Located within [facility]. Referral was accepted by [facility]. Original copy to the blue folder.     12/20/18 3113   Discharge Reassessment   Assessment Type Discharge Planning Reassessment   Provided patient/caregiver education on the expected discharge date and the discharge plan No   Do you have any problems affording any of your prescribed medications? No   Discharge Plan A Home Health;Home with family   Discharge Plan B Home with family;Home Health   Anticipated Discharge Disposition Home-Health   Can the patient answer the patient profile reliably? Yes, cognitively intact   How does the patient rate their overall health at the present time? Good   Describe the patient's ability to walk at the present time. Major restrictions/daily assistance from another person   How often would a person be available to care for the patient? Whenever needed   Number of comorbid conditions (as recorded on the chart) Five or more   During the past month, has the patient often been bothered by feeling down, depressed or hopeless? No   During the past month, has the patient often been bothered by little interest or pleasure in doing things? No

## 2018-12-20 NOTE — ASSESSMENT & PLAN NOTE
Per CVT surgery  Monitor chest tube output  Post op ICU hemodynamic monitoring  Encourage OOB, C&DB and IS use post extubation  12/19 Extubated , doing well  12/20 stable , doing well

## 2018-12-21 LAB
ABO + RH BLD: NORMAL
ALBUMIN SERPL BCP-MCNC: 2.7 G/DL
ALP SERPL-CCNC: 21 U/L
ALT SERPL W/O P-5'-P-CCNC: 27 U/L
ANION GAP SERPL CALC-SCNC: 9 MMOL/L
ANION GAP SERPL CALC-SCNC: 9 MMOL/L
AST SERPL-CCNC: 39 U/L
BASOPHILS # BLD AUTO: 0.01 K/UL
BASOPHILS # BLD AUTO: 0.01 K/UL
BASOPHILS NFR BLD: 0.1 %
BASOPHILS NFR BLD: 0.1 %
BILIRUB SERPL-MCNC: 1 MG/DL
BLD GP AB SCN CELLS X3 SERPL QL: NORMAL
BLD PROD TYP BPU: NORMAL
BLOOD UNIT EXPIRATION DATE: NORMAL
BLOOD UNIT TYPE CODE: 6200
BLOOD UNIT TYPE: NORMAL
BUN SERPL-MCNC: 31 MG/DL
BUN SERPL-MCNC: 31 MG/DL
CALCIUM SERPL-MCNC: 8.3 MG/DL
CALCIUM SERPL-MCNC: 8.3 MG/DL
CHLORIDE SERPL-SCNC: 94 MMOL/L
CHLORIDE SERPL-SCNC: 94 MMOL/L
CO2 SERPL-SCNC: 33 MMOL/L
CO2 SERPL-SCNC: 33 MMOL/L
CODING SYSTEM: NORMAL
CREAT SERPL-MCNC: 2 MG/DL
CREAT SERPL-MCNC: 2 MG/DL
DIFFERENTIAL METHOD: ABNORMAL
DIFFERENTIAL METHOD: ABNORMAL
DISPENSE STATUS: NORMAL
EOSINOPHIL # BLD AUTO: 0 K/UL
EOSINOPHIL # BLD AUTO: 0 K/UL
EOSINOPHIL NFR BLD: 0.3 %
EOSINOPHIL NFR BLD: 0.3 %
ERYTHROCYTE [DISTWIDTH] IN BLOOD BY AUTOMATED COUNT: 15.1 %
ERYTHROCYTE [DISTWIDTH] IN BLOOD BY AUTOMATED COUNT: 15.1 %
EST. GFR  (AFRICAN AMERICAN): 37 ML/MIN/1.73 M^2
EST. GFR  (AFRICAN AMERICAN): 37 ML/MIN/1.73 M^2
EST. GFR  (NON AFRICAN AMERICAN): 32 ML/MIN/1.73 M^2
EST. GFR  (NON AFRICAN AMERICAN): 32 ML/MIN/1.73 M^2
GLUCOSE SERPL-MCNC: 106 MG/DL
GLUCOSE SERPL-MCNC: 106 MG/DL
HCT VFR BLD AUTO: 19.1 %
HCT VFR BLD AUTO: 19.8 %
HGB BLD-MCNC: 6.1 G/DL
HGB BLD-MCNC: 6.4 G/DL
LYMPHOCYTES # BLD AUTO: 1.1 K/UL
LYMPHOCYTES # BLD AUTO: 1.2 K/UL
LYMPHOCYTES NFR BLD: 11.3 %
LYMPHOCYTES NFR BLD: 11.8 %
MAGNESIUM SERPL-MCNC: 2.3 MG/DL
MCH RBC QN AUTO: 28.9 PG
MCH RBC QN AUTO: 29.4 PG
MCHC RBC AUTO-ENTMCNC: 31.9 G/DL
MCHC RBC AUTO-ENTMCNC: 32.3 G/DL
MCV RBC AUTO: 91 FL
MCV RBC AUTO: 91 FL
MONOCYTES # BLD AUTO: 0.7 K/UL
MONOCYTES # BLD AUTO: 0.7 K/UL
MONOCYTES NFR BLD: 7.4 %
MONOCYTES NFR BLD: 7.4 %
NEUTROPHILS # BLD AUTO: 7.9 K/UL
NEUTROPHILS # BLD AUTO: 8.1 K/UL
NEUTROPHILS NFR BLD: 80.4 %
NEUTROPHILS NFR BLD: 80.9 %
NUM UNITS TRANS PACKED RBC: NORMAL
PLATELET # BLD AUTO: 164 K/UL
PLATELET # BLD AUTO: 177 K/UL
PMV BLD AUTO: 10.5 FL
PMV BLD AUTO: 11.3 FL
POCT GLUCOSE: 113 MG/DL (ref 70–110)
POCT GLUCOSE: 114 MG/DL (ref 70–110)
POCT GLUCOSE: 116 MG/DL (ref 70–110)
POCT GLUCOSE: 98 MG/DL (ref 70–110)
POTASSIUM SERPL-SCNC: 3.8 MMOL/L
POTASSIUM SERPL-SCNC: 3.8 MMOL/L
PROT SERPL-MCNC: 5.7 G/DL
RBC # BLD AUTO: 2.11 M/UL
RBC # BLD AUTO: 2.18 M/UL
SODIUM SERPL-SCNC: 136 MMOL/L
SODIUM SERPL-SCNC: 136 MMOL/L
VANCOMYCIN SERPL-MCNC: 15 UG/ML
WBC # BLD AUTO: 10.06 K/UL
WBC # BLD AUTO: 9.75 K/UL

## 2018-12-21 PROCEDURE — 86901 BLOOD TYPING SEROLOGIC RH(D): CPT | Mod: HCNC

## 2018-12-21 PROCEDURE — 25000003 PHARM REV CODE 250: Mod: HCNC | Performed by: PHYSICIAN ASSISTANT

## 2018-12-21 PROCEDURE — 63600175 PHARM REV CODE 636 W HCPCS: Mod: HCNC | Performed by: THORACIC SURGERY (CARDIOTHORACIC VASCULAR SURGERY)

## 2018-12-21 PROCEDURE — 27000221 HC OXYGEN, UP TO 24 HOURS: Mod: HCNC

## 2018-12-21 PROCEDURE — C9113 INJ PANTOPRAZOLE SODIUM, VIA: HCPCS | Mod: HCNC | Performed by: THORACIC SURGERY (CARDIOTHORACIC VASCULAR SURGERY)

## 2018-12-21 PROCEDURE — 99232 PR SUBSEQUENT HOSPITAL CARE,LEVL II: ICD-10-PCS | Mod: HCNC,,, | Performed by: INTERNAL MEDICINE

## 2018-12-21 PROCEDURE — 33210 INSERT ELECTRD/PM CATH SNGL: CPT | Mod: HCNC

## 2018-12-21 PROCEDURE — 80053 COMPREHEN METABOLIC PANEL: CPT | Mod: HCNC

## 2018-12-21 PROCEDURE — 94664 DEMO&/EVAL PT USE INHALER: CPT | Mod: HCNC

## 2018-12-21 PROCEDURE — 27200667 HC PACEMAKER, TEMPORARY MONITORING, PER SHIFT: Mod: HCNC

## 2018-12-21 PROCEDURE — 21400001 HC TELEMETRY ROOM: Mod: HCNC

## 2018-12-21 PROCEDURE — 99232 SBSQ HOSP IP/OBS MODERATE 35: CPT | Mod: HCNC,,, | Performed by: INTERNAL MEDICINE

## 2018-12-21 PROCEDURE — 80202 ASSAY OF VANCOMYCIN: CPT | Mod: HCNC

## 2018-12-21 PROCEDURE — P9016 RBC LEUKOCYTES REDUCED: HCPCS | Mod: HCNC

## 2018-12-21 PROCEDURE — 85025 COMPLETE CBC W/AUTO DIFF WBC: CPT | Mod: 91,HCNC

## 2018-12-21 PROCEDURE — 25000003 PHARM REV CODE 250: Mod: HCNC | Performed by: THORACIC SURGERY (CARDIOTHORACIC VASCULAR SURGERY)

## 2018-12-21 PROCEDURE — 83735 ASSAY OF MAGNESIUM: CPT | Mod: HCNC

## 2018-12-21 PROCEDURE — 97110 THERAPEUTIC EXERCISES: CPT | Mod: HCNC

## 2018-12-21 PROCEDURE — 27000646 HC AEROBIKA DEVICE: Mod: HCNC

## 2018-12-21 PROCEDURE — 99900035 HC TECH TIME PER 15 MIN (STAT): Mod: HCNC

## 2018-12-21 PROCEDURE — 86920 COMPATIBILITY TEST SPIN: CPT | Mod: HCNC

## 2018-12-21 PROCEDURE — 99291 CRITICAL CARE FIRST HOUR: CPT | Mod: HCNC,,, | Performed by: INTERNAL MEDICINE

## 2018-12-21 PROCEDURE — 94799 UNLISTED PULMONARY SVC/PX: CPT | Mod: HCNC

## 2018-12-21 PROCEDURE — 99291 PR CRITICAL CARE, E/M 30-74 MINUTES: ICD-10-PCS | Mod: HCNC,,, | Performed by: INTERNAL MEDICINE

## 2018-12-21 PROCEDURE — 36430 TRANSFUSION BLD/BLD COMPNT: CPT | Mod: HCNC

## 2018-12-21 RX ORDER — HYDROCODONE BITARTRATE AND ACETAMINOPHEN 500; 5 MG/1; MG/1
TABLET ORAL
Status: DISCONTINUED | OUTPATIENT
Start: 2018-12-21 | End: 2018-12-25 | Stop reason: HOSPADM

## 2018-12-21 RX ADMIN — FUROSEMIDE 40 MG: 10 INJECTION, SOLUTION INTRAMUSCULAR; INTRAVENOUS at 09:12

## 2018-12-21 RX ADMIN — PIPERACILLIN AND TAZOBACTAM 4.5 G: 4; .5 INJECTION, POWDER, LYOPHILIZED, FOR SOLUTION INTRAVENOUS; PARENTERAL at 08:12

## 2018-12-21 RX ADMIN — VANCOMYCIN HYDROCHLORIDE 1250 MG: 10 INJECTION, POWDER, LYOPHILIZED, FOR SOLUTION INTRAVENOUS at 12:12

## 2018-12-21 RX ADMIN — METOPROLOL TARTRATE 50 MG: 50 TABLET ORAL at 09:12

## 2018-12-21 RX ADMIN — METOPROLOL TARTRATE 50 MG: 50 TABLET ORAL at 08:12

## 2018-12-21 RX ADMIN — ASPIRIN 81 MG: 81 TABLET, COATED ORAL at 08:12

## 2018-12-21 RX ADMIN — FUROSEMIDE 40 MG: 10 INJECTION, SOLUTION INTRAMUSCULAR; INTRAVENOUS at 08:12

## 2018-12-21 RX ADMIN — DOCUSATE SODIUM 100 MG: 100 CAPSULE, LIQUID FILLED ORAL at 09:12

## 2018-12-21 RX ADMIN — OXYCODONE HYDROCHLORIDE 5 MG: 5 TABLET ORAL at 05:12

## 2018-12-21 RX ADMIN — CHLORHEXIDINE GLUCONATE 10 ML: 1.2 RINSE ORAL at 08:12

## 2018-12-21 RX ADMIN — POLYETHYLENE GLYCOL 3350 17 G: 17 POWDER, FOR SOLUTION ORAL at 08:12

## 2018-12-21 RX ADMIN — ATORVASTATIN CALCIUM 80 MG: 40 TABLET, FILM COATED ORAL at 09:12

## 2018-12-21 RX ADMIN — DOCUSATE SODIUM 100 MG: 100 CAPSULE, LIQUID FILLED ORAL at 08:12

## 2018-12-21 RX ADMIN — CHLORHEXIDINE GLUCONATE 10 ML: 1.2 RINSE ORAL at 09:12

## 2018-12-21 RX ADMIN — OXYCODONE HYDROCHLORIDE 5 MG: 5 TABLET ORAL at 04:12

## 2018-12-21 RX ADMIN — CLOPIDOGREL BISULFATE 75 MG: 75 TABLET ORAL at 08:12

## 2018-12-21 RX ADMIN — PIPERACILLIN SODIUM AND TAZOBACTAM SODIUM 4.5 G: 4; .5 INJECTION, POWDER, LYOPHILIZED, FOR SOLUTION INTRAVENOUS at 09:12

## 2018-12-21 RX ADMIN — PANTOPRAZOLE SODIUM 40 MG: 40 INJECTION, POWDER, FOR SOLUTION INTRAVENOUS at 08:12

## 2018-12-21 NOTE — PT/OT/SLP PROGRESS
Physical Therapy  Treatment    Filipe Agustin Jr.   MRN: [id number]   Admitting Diagnosis: Coronary artery disease involving native coronary artery of native heart with unstable angina pectoris    PT Received On: 12/21/18  PT Start Time: 0910     PT Stop Time: 0920    PT Total Time (min): 10 min       Billable Minutes:  Therapeutic Exercise 10    Treatment Type: Treatment  PT/PTA: PT       General Precautions: Standard, fall, respiratory, sternal  Orthopedic Precautions: N/A   Braces: N/A    Subjective:  Communicated with NURSE JOHN prior to session.  Pain/Comfort  Pain Rating 1: 5/10  Location 1: chest    Objective:   Patient found with: telemetry, peripheral IV, oxygen, pulse ox (continuous), kang catheter    Functional Mobility:  Therapeutic Activities and Exercises:  PT FOUND SEATED IN CHAIR UPON ARRIVAL, PT CURRENTLY RECEIVING BLOOD TRANSFUSION, PT ABLE TO RECITE 1/5 STERNAL PRECAUTIONS SO ALL REVIEWED WITH PT, PT EDUCATED IN AND PERFORMED BLE THEREX X 20 REPS AROM WITH REST    AM-PAC 6 CLICK MOBILITY  How much help from another person does this patient currently need?   1 = Unable, Total/Dependent Assistance  2 = A lot, Maximum/Moderate Assistance  3 = A little, Minimum/Contact Guard/Supervision  4 = None, Modified [place]/Independent    Turning over in bed (including adjusting bedclothes, sheets and blankets)?: 1  Sitting down on and standing up from a chair with arms (e.g., wheelchair, bedside commode, etc.): 2  Moving from lying on back to sitting on the side of the bed?: 1  Moving to and from a bed to a chair (including a wheelchair)?: 2  Need to walk in hospital room?: 2  Climbing 3-5 steps with a railing?: 1  Basic Mobility Total Score: 9    AM-PAC Raw Score CMS G-Code Modifier Level of Impairment Assistance   6 % Total / Unable   7 - 9 CM 80 - 100% Maximal Assist   10 - 14 CL 60 - 80% Moderate Assist   15 - 19 CK 40 - 60% Moderate Assist   20 - 22 CJ 20 - 40% Minimal Assist   23 CI 1-20%  SBA / CGA   24 CH 0% Independent/ Mod I     Patient left up in chair with all lines intact, call button in reach and NURSE notified.    Assessment:  Filipe Agustin Jr. is a 73 y.o. male with a medical diagnosis of Coronary artery disease involving native coronary artery of native heart with unstable angina pectoris and presents with IMPAIRED FUNCTIONAL MOBILITY. PT WILL BENEFIT FROM CONT. SKILLED P.T. TO ADDRESS IMPAIRMENTS    Rehab identified problem list/impairments: Rehab identified problem list/impairments: weakness, impaired endurance, impaired functional mobilty, gait instability, impaired balance, decreased coordination, decreased safety awareness    Rehab potential is good.    Activity tolerance: Fair    Discharge recommendations: Discharge Facility/Level of Care Needs: nursing facility, skilled, rehabilitation facility     Barriers to discharge:      Equipment recommendations: Equipment Needed After Discharge: tub bench     GOALS:   Multidisciplinary Problems     Physical Therapy Goals        Problem: Physical Therapy Goal    Goal Priority Disciplines Outcome Goal Variances Interventions   Physical Therapy Goal     PT, PT/OT Ongoing (interventions implemented as appropriate)     Description:  LTG'S TO BE MET IN 7 DAYS (12-26-18)  1. PT WILL REQUIRE CGA FOR BED MOBILITY  2. PT WILL REQUIRE SPV FOR TF'S  3. PT WILL ' WITH SPV  4. PT WILL DEMO G DYNAMIC BALANCE DURING GAIT                    PLAN:    Patient to be seen 5 x/week  to address the above listed problems via gait training, therapeutic activities, therapeutic exercises  Plan of Care expires: 12/26/18  Plan of Care reviewed with: patient    PT ENCOURAGED TO CALL FOR ASSISTANCE WITH ALL NEEDS DUE TO FALL RISK STATUS, PT AGREEABLE    Aruna Hugo, PT  12/21/2018

## 2018-12-21 NOTE — PLAN OF CARE
Problem: Adult Inpatient Plan of Care  Goal: Plan of Care Review  Outcome: Ongoing (interventions implemented as appropriate)  Pt a little stronger today.  Pt continues to really have to be encouraged to perform IS and acappela therapy.  Pt VSS today.  Pt out of bed twice.  Pt walked about 15 steps today with PT.  Pt continues to not have much of an appetite.  pts cbg down to 59 this afternoon, glucose tablets given as ordered.  Pt notified that he needs to eat a little bit of dinner.  Pt given a juice as well.  Pts pain under control with prn oxycodone as ordered.  pts temperature down to 100.1 this afternoon.

## 2018-12-21 NOTE — PLAN OF CARE
Problem: Physical Therapy Goal  Goal: Physical Therapy Goal  LTG'S TO BE MET IN 7 DAYS (12-26-18)  1. PT WILL REQUIRE CGA FOR BED MOBILITY  2. PT WILL REQUIRE SPV FOR TF'S  3. PT WILL ' WITH SPV  4. PT WILL DEMO G DYNAMIC BALANCE DURING GAIT   Outcome: Ongoing (interventions implemented as appropriate)  PT WITH FAIR TOLERANCE TO P.T. , LIMITED TX, PT GETTING BLOOD

## 2018-12-21 NOTE — NURSING TRANSFER
Nursing Transfer Note      12/21/2018     Transfer To: 237    Transfer via wheelchair    Transfer with cardiac monitoring    Transported by TERESA Devi RN    Medicines sent: none    Chart send with patient: Yes    Notified: spouse at bedside    Bedside report given to Royal SPENCER

## 2018-12-21 NOTE — SUBJECTIVE & OBJECTIVE
Interval History:  The patient is postop day 3.  Status post coronary bypass grafting x4.  Patient has no complaints.  He feels better today.    ROS  Medications:  Continuous Infusions:   dexmedetomidine (PRECEDEX) infusion Stopped (12/18/18 1710)    epinephrine infusion Stopped (12/19/18 1234)    niCARdipine Stopped (12/18/18 1410)    norepinephrine bitartrate-D5W Stopped (12/18/18 2200)    vasopressin (PITRESSIN) infusion Stopped (12/19/18 0400)     Scheduled Meds:   aspirin  81 mg Oral Daily    atorvastatin  80 mg Oral QHS    chlorhexidine  10 mL Mouth/Throat BID    clopidogrel  75 mg Oral Daily    docusate sodium  100 mg Oral BID    furosemide  40 mg Intravenous BID    metoprolol tartrate  50 mg Oral BID    nozaseptin   Each Nare BID    pantoprazole  40 mg Intravenous Daily    piperacillin-tazobactam (ZOSYN) IVPB  4.5 g Intravenous Q12H    polyethylene glycol  17 g Oral Daily    [START ON 12/25/2018] vancomycin (VANCOCIN) IVPB  1,250 mg Intravenous Q72H     PRN Meds:sodium chloride, acetaminophen, albumin human 5%, albuterol sulfate, dextrose 50%, dextrose 50%, fentaNYL, fentaNYL, glucagon (human recombinant), glucose, glucose, insulin aspart U-100, lactated ringers, magnesium sulfate IVPB, metoclopramide HCl, ondansetron, oxyCODONE, oxyCODONE, potassium chloride in water **AND** potassium chloride in water **AND** potassium chloride in water     Objective:     Vital Signs (Most Recent):  Temp: 99 °F (37.2 °C) (12/21/18 1515)  Pulse: 90 (12/21/18 1500)  Resp: 12 (12/21/18 1500)  BP: 97/64 (12/21/18 1500)  SpO2: 99 % (12/21/18 1500) Vital Signs (24h Range):  Temp:  [98.2 °F (36.8 °C)-100.6 °F (38.1 °C)] 99 °F (37.2 °C)  Pulse:  [90-93] 90  Resp:  [11-33] 12  SpO2:  [88 %-99 %] 99 %  BP: ()/(45-73) 97/64     Weight: 109.5 kg (241 lb 6.5 oz)  Body mass index is 35.65 kg/m².    SpO2: 99 %  O2 Device (Oxygen Therapy): nasal cannula    Intake/Output - Last 3 Shifts       12/19 0700 - 12/20 0659  12/20 0700 - 12/21 0659 12/21 0700 - 12/22 0659    P.O. 540 480 240    I.V. (mL/kg) 260.3 (2.3)      Blood   662.5    IV Piggyback  200 350    Total Intake(mL/kg) 800.3 (7) 680 (6.2) 1252.5 (11.4)    Urine (mL/kg/hr) 3251 (1.2) 1431 (0.5) 1000 (0.9)    Drains 250      Stool 0      Chest Tube 20      Total Output 3521 1431 1000    Net -2720.8 -751 +252.5           Urine Occurrence  3 x 1 x    Stool Occurrence 0 x            Lines/Drains/Airways     Line                 Pacer Wires 12/18/18 1200 3 days          Pressure Ulcer                 Negative Pressure Wound Therapy  3 days          Peripheral Intravenous Line                 Midline Catheter Insertion/Assessment  - Double Lumen 12/19/18 1500 Left basilic vein (medial side of arm) 2 days                Physical Exam   Constitutional: He is oriented to person, place, and time. He appears well-developed and well-nourished. No distress.   HENT:   Head: Normocephalic and atraumatic.   Cardiovascular: Normal rate, regular rhythm and normal heart sounds.   Pulmonary/Chest: Effort normal and breath sounds normal.   Abdominal: Soft. Bowel sounds are normal. He exhibits no distension.   Musculoskeletal: He exhibits edema. He exhibits no deformity.   Neurological: He is alert and oriented to person, place, and time.   Skin: Skin is warm and dry. He is not diaphoretic.       Significant Labs:  All pertinent labs from the last 24 hours have been reviewed.    Significant Diagnostics:  I have reviewed all pertinent imaging results/findings within the past 24 hours.

## 2018-12-21 NOTE — PROGRESS NOTES
Spoke w/ Dr. Johnson, informed that pt's H/H dropped this AM to 6.4/19.8. Per MD will recollect CBC and type/screen pt and call w/ results to repeat CBC.

## 2018-12-21 NOTE — PROGRESS NOTES
Pharmacist Renal Dose Adjustment Note    Filipe Agustin Jr. is a 73 y.o. male being treated with the medication zosyn    Patient Data:    Vital Signs (Most Recent):  Temp: 98.7 °F (37.1 °C) (12/21/18 1030)  Pulse: 90 (12/21/18 1030)  Resp: 15 (12/21/18 1030)  BP: (!) 91/54 (12/21/18 1030)  SpO2: 97 % (12/21/18 1030)   Vital Signs (72h Range):  Temp:  [97.7 °F (36.5 °C)-101.7 °F (38.7 °C)]   Pulse:  []   Resp:  [0-35]   BP: ()/()   SpO2:  [87 %-100 %]   Arterial Line BP: ()/(43-76)      Recent Labs   Lab 12/20/18  0401 12/20/18  1616 12/21/18  0335   CREATININE 2.0* 2.0* 2.0*  2.0*     Serum creatinine: 2 mg/dL (H) 12/21/18 0335  Estimated creatinine clearance: 40.7 mL/min (A)    Medication: zosyn 4.5 iv q8hrs will be changed to medication: zosyn 4.5 iv q12hrs for Estimated Creatinine Clearance: 40.7 mL/min (A) (based on SCr of 2 mg/dL (H)).  and decreased urine output in last 24hrs.  Pharmacist's Name: Saba Roth [facility]  Pharmacist's Extension: [phone]

## 2018-12-21 NOTE — ASSESSMENT & PLAN NOTE
12/19 Continue incentive spirometry  12/20 conrinue incentive spirometry  12/21 continue incentive spirometry, out of bed

## 2018-12-21 NOTE — ASSESSMENT & PLAN NOTE
Patient presented to [facility] [place], [place]- due to chest pain  EKG revealed inferior ST elevation  NTG SL x 1 given in ED  Repeat EKG revealed persistently elevation in inferior leads  IV heparin 5,000 units given in ED  No BB due to Bradycardia in ED  TTE pending  FLP pending  Patient taken to Cath Lab for emergent LHC per Dr. Monroe.  Aggrastat bolus and Infusion to follow  Further recs to follow    12/16  -CABG recommended, CVT consulted and plan for CABG on 12/18  -Continue IV heparin gtt, ASA, Statin, BB  -Start low dose ARB today  -Transfer to telemetry today  -No chest pain on exam today.   -FLP not drawn, will reorder for AM  -ECHO revealed EF 60-65%, -WMA's, normal Bi-V function.    12/17/18  -Stable overnight  -No chest pain or SOB  -Continue IV heparin  -Continue ASA, BB, statin, ARB  -CABG planned for AM    12/18/18  -s/p CABG x 4  -Hemodynamically stable  -Continue current post-op care  -ASA, Plavix, statin, BB    12/19/18  -Recovering s/p CABG x 4  -Remains fatigued/weak  -Would benefit from transfusion, defer to CVT  -Continue ASA, Plavix, statin BB  -Ambulation and IS usage      12/19/18  -Stable overnight  -H and H low, defer transfusion decision to CVT  -Continue ASA, Plavix, statin, BB    12/21/18  -Stable CV wise  -Being transfused  -Continue ASA, Plavix, statin, BB  -No ACEI/ARB given bumped creatinine  -IS usage and ambulation

## 2018-12-21 NOTE — PROGRESS NOTES
Notified Dr. Johnson w/ repeat CBC results, H/H dropped more to 6.1/19.1. Per MD will prepare and transfuse 2 units of PRBC's.

## 2018-12-21 NOTE — SUBJECTIVE & OBJECTIVE
Review of Systems   Constitution: Positive for weakness and malaise/fatigue.   HENT: Negative.    Eyes: Negative.    Cardiovascular: Negative.    Respiratory: Negative.    Endocrine: Negative.    Hematologic/Lymphatic: Negative.    Skin: Negative.    Genitourinary: Negative.    Psychiatric/Behavioral: Negative.    Allergic/Immunologic: Negative.      Objective:     Vital Signs (Most Recent):  Temp: 98.7 °F (37.1 °C) (12/21/18 1130)  Pulse: 90 (12/21/18 1130)  Resp: 14 (12/21/18 1130)  BP: (!) 96/58 (12/21/18 1130)  SpO2: 97 % (12/21/18 1130) Vital Signs (24h Range):  Temp:  [98.2 °F (36.8 °C)-100.6 °F (38.1 °C)] 98.7 °F (37.1 °C)  Pulse:  [90-91] 90  Resp:  [11-33] 14  SpO2:  [88 %-99 %] 97 %  BP: ()/(47-71) 96/58     Weight: 109.5 kg (241 lb 6.5 oz)  Body mass index is 35.65 kg/m².     SpO2: 97 %  O2 Device (Oxygen Therapy): nasal cannula      Intake/Output Summary (Last 24 hours) at 12/21/2018 1156  Last data filed at 12/21/2018 1000  Gross per 24 hour   Intake 998.75 ml   Output 1315 ml   Net -316.25 ml       Lines/Drains/Airways     Line                 Pacer Wires 12/18/18 1200 2 days          Pressure Ulcer                 Negative Pressure Wound Therapy  3 days          Peripheral Intravenous Line                 Midline Catheter Insertion/Assessment  - Double Lumen 12/19/18 1500 Left basilic vein (medial side of arm) 1 day                Physical Exam   Constitutional: He is oriented to person, place, and time. He appears well-developed and well-nourished. No distress.   HENT:   Head: Normocephalic and atraumatic.   Eyes: Pupils are equal, round, and reactive to light. Right eye exhibits no discharge. Left eye exhibits no discharge.   Neck: Neck supple. No JVD present. No thyromegaly present.   Cardiovascular: Normal rate, regular rhythm, S1 normal and S2 normal.   No murmur heard.  Pulmonary/Chest: Effort normal. No respiratory distress.   Coarse BS at bases  CABG site C/D/I; dressed   Abdominal:  Soft. He exhibits no distension.   Musculoskeletal: He exhibits no edema.   Neurological: He is alert and oriented to person, place, and time.   Skin: Skin is warm and dry. He is not diaphoretic. No erythema.   Psychiatric: He has a normal mood and affect. His behavior is normal. Thought content normal.   Nursing note and vitals reviewed.      Significant Labs:   CMP   Recent Labs   Lab 12/20/18  0401 12/20/18  1616 12/21/18  0335    135* 136  136   K 4.7 4.3 3.8  3.8    95 94*  94*   CO2 29 31* 33*  33*    103 106  106   BUN 25* 29* 31*  31*   CREATININE 2.0* 2.0* 2.0*  2.0*   CALCIUM 8.7 8.6* 8.3*  8.3*   PROT 5.6*  --  5.7*   ALBUMIN 3.1*  --  2.7*   BILITOT 1.1*  --  1.0   ALKPHOS 19*  --  21*   AST 67*  --  39   ALT 32  --  27   ANIONGAP 9 9 9  9   ESTGFRAFRICA 37* 37* 37*  37*   EGFRNONAA 32* 32* 32*  32*   , CBC   Recent Labs   Lab 12/20/18  0401 12/21/18  0335 12/21/18  0515   WBC 10.18 10.06 9.75   HGB 7.4* 6.4* 6.1*   HCT 22.8* 19.8* 19.1*    177 164   , Troponin No results for input(s): TROPONINI in the last 48 hours. and All pertinent lab results from the last 24 hours have been reviewed.    Significant Imaging: Echocardiogram:   2D echo with color flow doppler:   Results for orders placed or performed during the hospital encounter of 12/15/18   2D echo with color flow doppler   Result Value Ref Range    QEF 60 55 - 65    Diastolic Dysfunction No     and X-Ray: CXR: X-Ray Chest 1 View (CXR): No results found for this visit on 12/15/18. and X-Ray Chest PA and Lateral (CXR): No results found for this visit on 12/15/18.

## 2018-12-21 NOTE — ASSESSMENT & PLAN NOTE
Per CVT surgery  Monitor chest tube output  Post op ICU hemodynamic monitoring  Encourage OOB, C&DB and IS use post extubation  12/19 Extubated , doing well  12/20 stable , doing well  12/21 stable post op course- transfused today

## 2018-12-21 NOTE — ASSESSMENT & PLAN NOTE
The patient is postop day 1 status post coronary artery bypass grafting x4.  Overall the patient is doing well.  Neuro:  Patient is awake alert and oriented x3.  Neuro exam is nonfocal.    Cardiac:  Patient is on low-dose vasopressin and epinephrine.  Cardiac index is good.  Wean drips to off.  And start beta-blockers.  Respiratory: Patient is maintaining good sats on nasal cannula.  GI:  Patient is started on p.o. intake.  Advanced as tolerated.  Renal:  Creatinine is 1.7.  Urine output is good.  Endocrine:  Glucose is well controlled.  Heme:  Hematocrit is 22 and platelet is 165.  Will hold off transfusions for now.  Id:  T-max is a 101.1 °.  And white count is 9.4.  Activities:  Patient is out of bed to chair.  Advanced a activities as tolerated.  Lines tubes and drains.  Discontinue chest tubes, DANNY, [place] and [place].  Placed mid level line and continue with pacer wires.    12/20/2018    The patient is postop day 2 status post coronary artery bypass grafting x4.  Over the past 24 hr patient has been febrile..  Neuro:  Patient is awake alert and oriented x3.  Neuro exam is nonfocal.    Cardiac:  Patient is tolerating beta-blocker.  Respiratory: Patient is maintaining good sats on nasal cannula.  GI:  Patient is started on p.o. intake.  Advanced as tolerated.  Renal:  Creatinine is 2.0  Urine output is good.  Endocrine:  Glucose is well controlled.  Heme:  Hematocrit is 22 and platelet is 158.  Will hold off transfusions for now.  Id:  T-max is a 101.7°.  And white count is 10.  Will panculture the patient.  Started on broad spectrum antibiotics.  Activities:  Patient is out of bed to chair.  Advance  activities as tolerated.  Lines tubes and drains.  Mid level line, Pacer wires and kang    12/21/2018    The patient is postop day 3 status post coronary artery bypass grafting x4.  Overall patient is doing much better.  Fever curve is improving.  Will transfer patient to telemetry.  Neuro:  Patient is awake alert and  oriented x3.  Neuro exam is nonfocal.    Cardiac:  Patient is tolerating metoprolol.  Respiratory: Patient is maintaining good sats on nasal cannula.  GI:  Patient is started on p.o. intake.  Advanced as tolerated.  Renal:  Creatinine is 2.0  Urine output is good.  Endocrine:  Glucose is well controlled.  Heme:  Hematocrit was19 and platelet is 164.  Patient transfused 2 units packed red blood cells.  Id:  T-max is a 101.3.  And white count is 9.  Cultures are pending.  The no growth to date.  Started on broad spectrum antibiotics.  Activities:  Patient is out of bed to chair.  Advance  activities as tolerated.  Lines tubes and drains.  Mid level line, Pacer wires and kang

## 2018-12-21 NOTE — SUBJECTIVE & OBJECTIVE
Past Medical History:   Diagnosis Date    Acute coronary syndrome     Cancer of prostate with intermediate recurrence risk (stage T2b-c or [place] 7 or PSA 10-20) 1/8/2018    CKD (chronic kidney disease) stage 3, GFR 30-59 ml/min 9/24/2015    Coronary artery disease     Coronary artery disease involving native coronary artery of native heart with unstable angina pectoris     Elevated PSA 6/27/2017    History of coronary angioplasty 9/19/2014    Hyperlipidemia     Hypertension     Myocardial infarction 2008    Obesity, Class II, BMI 35.0-39.9, with comorbidity (see actual BMI) 6/6/2014    Polio     as a child    Tobacco dependence     resolved       Past Surgical History:   Procedure Laterality Date    CARDIAC CATHETERIZATION  2008    CORONARY ANGIOPLASTY  2008    acute PCI- RCA- RUI    CORONARY ARTERY BYPASS GRAFT (CABG) N/A 12/18/2018    [facility]    CORONARY STENT PLACEMENT  2008    ECHOCARDIOGRAM,TRANSESOPHAGEAL N/A 12/18/2018    [facility]    SURGICAL PROCUREMENT, VEIN, ENDOSCOPIC Bilateral 12/18/2018    Performed [facility] at [facility] OR       Review of patient's allergies indicates:   Allergen Reactions    Paragoric Other (See Comments)     sneeze       Family History     Problem Relation (Age of Onset)    Cancer Brother    Heart disease Father, Brother        Tobacco Use    Smoking status: Former Smoker     Packs/day: 2.00     Years: 20.00     Pack years: 40.00     Types: Cigarettes     Last attempt to quit: 7/12/2008     Years since quitting: 10.4    Smokeless tobacco: Never Used   Substance and Sexual Activity    Alcohol use: Yes     Comment: rarely    Drug use: No    Sexual activity: Not Currently     Partners: Female         Review of Systems   Constitutional: Positive for fatigue. Negative for fever and unexpected weight change.   HENT: Negative for congestion, postnasal drip, rhinorrhea, sinus pressure  and sneezing.    Respiratory: Positive for shortness of breath.    Cardiovascular: Positive for chest pain. Negative for palpitations and leg swelling.   Gastrointestinal: Negative.  Negative for abdominal pain and nausea.   Genitourinary: Negative.    Musculoskeletal: Negative.  Negative for arthralgias and back pain.   Skin: Negative for rash.   Neurological: Negative for dizziness, syncope, weakness and light-headedness.   Hematological: Negative for adenopathy. Does not bruise/bleed easily.   Psychiatric/Behavioral: Negative.  Negative for dysphoric mood and sleep disturbance. The patient is not nervous/anxious.      Objective:     Vital Signs (Most Recent):  Temp: 98.7 °F (37.1 °C) (12/21/18 1245)  Pulse: 91 (12/21/18 1245)  Resp: 13 (12/21/18 1245)  BP: (!) 87/45 (12/21/18 1245)  SpO2: 97 % (12/21/18 1245) Vital Signs (24h Range):  Temp:  [98.2 °F (36.8 °C)-100.6 °F (38.1 °C)] 98.7 °F (37.1 °C)  Pulse:  [90-91] 91  Resp:  [11-33] 13  SpO2:  [88 %-99 %] 97 %  BP: ()/(45-71) 87/45     Weight: 109.5 kg (241 lb 6.5 oz)  Body mass index is 35.65 kg/m².      Intake/Output Summary (Last 24 hours) at 12/21/2018 1314  Last data filed at 12/21/2018 1000  Gross per 24 hour   Intake 998.75 ml   Output 1103 ml   Net -104.25 ml       Physical Exam   Constitutional: He is oriented to person, place, and time. He appears well-developed and well-nourished.   HENT:   Head: Normocephalic and atraumatic.   Eyes: Conjunctivae are normal. Pupils are equal, round, and reactive to light.   Neck: Neck supple. No JVD present. No tracheal deviation present. No thyromegaly present.   Cardiovascular: Normal rate, regular rhythm and normal heart sounds.       Pulmonary/Chest: Effort normal. He has decreased breath sounds in the right lower field and the left lower field.   Abdominal: Soft.   Musculoskeletal: Normal range of motion.   Lymphadenopathy:     He has no cervical adenopathy.   Neurological: He is alert and oriented to  person, place, and time.   Skin: Skin is warm and dry.   Nursing note and vitals reviewed.      Vents:  Vent Mode: Spont (12/18/18 1610)  Ventilator Initiated: Yes (12/18/18 1341)  Set Rate: 0 bmp (12/18/18 1610)  Vt Set: 400 mL (12/18/18 1610)  Pressure Support: 10 cmH20 (12/18/18 1610)  PEEP/CPAP: 5 cmH20 (12/18/18 1610)  Oxygen Concentration (%): 100 (12/20/18 1245)  Peak Airway Pressure: 16 cmH2O (12/18/18 1610)  Plateau Pressure: 0 cmH20 (12/18/18 1610)  Total Ve: 11.3 mL (12/18/18 1610)  F/VT Ratio<105 (RSBI): (!) 38.99 (12/18/18 1610)    Lines/Drains/Airways     Line                 Pacer Wires 12/18/18 1200 3 days          Pressure Ulcer                 Negative Pressure Wound Therapy  3 days          Peripheral Intravenous Line                 Midline Catheter Insertion/Assessment  - Double Lumen 12/19/18 1500 Left basilic vein (medial side of arm) 1 day                Significant Labs:    CBC/Anemia Profile:  Recent Labs   Lab 12/20/18  0401 12/21/18  0335 12/21/18  0515   WBC 10.18 10.06 9.75   HGB 7.4* 6.4* 6.1*   HCT 22.8* 19.8* 19.1*    177 164   MCV 92 91 91   RDW 15.4* 15.1* 15.1*        Chemistries:  Recent Labs   Lab 12/20/18  0401 12/20/18  0838 12/20/18  1616 12/21/18  0335     --  135* 136  136   K 4.7  --  4.3 3.8  3.8     --  95 94*  94*   CO2 29  --  31* 33*  33*   BUN 25*  --  29* 31*  31*   CREATININE 2.0*  --  2.0* 2.0*  2.0*   CALCIUM 8.7  --  8.6* 8.3*  8.3*   ALBUMIN 3.1*  --   --  2.7*   PROT 5.6*  --   --  5.7*   BILITOT 1.1*  --   --  1.0   ALKPHOS 19*  --   --  21*   ALT 32  --   --  27   AST 67*  --   --  39   MG  --  2.7* 2.6 2.3       BMP:   Recent Labs   Lab 12/21/18  0335     106     136   K 3.8  3.8   CL 94*  94*   CO2 33*  33*   BUN 31*  31*   CREATININE 2.0*  2.0*   CALCIUM 8.3*  8.3*   MG 2.3     CMP:   Recent Labs   Lab 12/20/18  0401 12/20/18  1616 12/21/18  0335    135* 136  136   K 4.7 4.3 3.8  3.8    95 94*   94*   CO2 29 31* 33*  33*    103 106  106   BUN 25* 29* 31*  31*   CREATININE 2.0* 2.0* 2.0*  2.0*   CALCIUM 8.7 8.6* 8.3*  8.3*   PROT 5.6*  --  5.7*   ALBUMIN 3.1*  --  2.7*   BILITOT 1.1*  --  1.0   ALKPHOS 19*  --  21*   AST 67*  --  39   ALT 32  --  27   ANIONGAP 9 9 9  9   EGFRNONAA 32* 32* 32*  32*     Cardiac Markers: No results for input(s): CKMB, TROPONINT, MYOGLOBIN in the last 48 hours.  All pertinent labs within the past 24 hours have been reviewed.    Significant Imaging:   I have reviewed all pertinent imaging results/findings within the past 24 hours.  I have reviewed and interpreted all pertinent imaging results/findings within the past 24 hours.

## 2018-12-21 NOTE — PROGRESS NOTES
[facility] -   Critical Care Medicine  Progress Note    Patient Name: Filipe Agustin Jr.  MRN: [id number]  Admission Date: 12/15/2018  Hospital Length of Stay: 6 days  Code Status: Full Code  Attending Provider: Rg Johnson MD  Primary Care Provider: Jojo Duque DO   Principal Problem: Coronary artery disease involving native coronary artery of native heart with unstable angina pectoris    Subjective:     HPI:  Mr Agustin is a 73 yo obese WM with a PMH of CKD3, CAD (stent placed 2008), Prostate CA (post XRT), HTN, and HLD.  He admits to malaise and ALVARADO progressive over last several weeks.  Today he noted acute midsternal, non-radiating CP which onset gradually when pt woke up at 0700 this AM. Pt describes the pain as a pressure.  Sxs are intermittent and moderate. No modifying factors. Associated sxs include nausea. He has not had a heart cath since stent placement in 2008. Pt reports his care is followed by Dr. Figueroa (Cardiology). Pt denies any fever, chills, diaphoresis, SOB, cough, palpitations, BLE edema/pain, emesis, extremity weakness/numbness, dizziness, and all other sxs. EKG via EMS was normal. Pt took 325mg ASA prior to EMS arrival. No further complaints or concerns.  In ED abnormal EKG and troponin 1.3.  CODE stemi called and taken emergently for LHC revealing diffuse CAD.  Admitted to ICU post LHC and CVT surgery consulted.  EF 45% w/ LHC.         Hospital/ICU Course:  12/15 - Admitted to ICU post LHC, sheath removed in lab and no CP.    12/18 - CVT consulted on patient and all agreed to proceed with CABG. Today taken to OR undergoing 4-vessel CABG with no reported complications.  Admitted to ICU post op from OR on [facility] ventilation.  12/19 extubated, sitting up in chair - no new complaints  12/20 out of bed in chair , tired but no new c/o  12/21 stable out of bed in chair , no new complaints, transfused with 2 units    Past Medical History:   Diagnosis Date    Acute coronary syndrome      Cancer of prostate with intermediate recurrence risk (stage T2b-c or Jonathon 7 or PSA 10-20) 1/8/2018    CKD (chronic kidney disease) stage 3, GFR 30-59 ml/min 9/24/2015    Coronary artery disease     Coronary artery disease involving native coronary artery of native heart with unstable angina pectoris     Elevated PSA 6/27/2017    History of coronary angioplasty 9/19/2014    Hyperlipidemia     Hypertension     Myocardial infarction 2008    Obesity, Class II, BMI 35.0-39.9, with comorbidity (see actual BMI) 6/6/2014    Polio     as a child    Tobacco dependence     resolved       Past Surgical History:   Procedure Laterality Date    CARDIAC CATHETERIZATION  2008    CORONARY ANGIOPLASTY  2008    acute PCI- RCA- RUI    CORONARY ARTERY BYPASS GRAFT (CABG) N/A 12/18/2018    Performed by [facility] at [facility]    CORONARY STENT PLACEMENT  2008    ECHOCARDIOGRAM,TRANSESOPHAGEAL N/A 12/18/2018    [facility] OR    SURGICAL PROCUREMENT, VEIN, ENDOSCOPIC Bilateral 12/18/2018    Performed by [facility]g [facility] at [facility] OR       Review of patient's allergies indicates:   Allergen Reactions    Paragoric Other (See Comments)     sneeze       Family History     Problem Relation (Age of Onset)    Cancer Brother    Heart disease Father, Brother        Tobacco Use    Smoking status: Former Smoker     Packs/day: 2.00     Years: 20.00     Pack years: 40.00     Types: Cigarettes     Last attempt to quit: 7/12/2008     Years since quitting: 10.4    Smokeless tobacco: Never Used   Substance and Sexual Activity    Alcohol use: Yes     Comment: rarely    Drug use: No    Sexual activity: Not Currently     Partners: Female         Review of Systems   Constitutional: Positive for fatigue. Negative for fever and unexpected weight change.   HENT: Negative for congestion, postnasal drip, rhinorrhea, sinus pressure and sneezing.    Respiratory: Positive for shortness of breath.     Cardiovascular: Positive for chest pain. Negative for palpitations and leg swelling.   Gastrointestinal: Negative.  Negative for abdominal pain and nausea.   Genitourinary: Negative.    Musculoskeletal: Negative.  Negative for arthralgias and back pain.   Skin: Negative for rash.   Neurological: Negative for dizziness, syncope, weakness and light-headedness.   Hematological: Negative for adenopathy. Does not bruise/bleed easily.   Psychiatric/Behavioral: Negative.  Negative for dysphoric mood and sleep disturbance. The patient is not nervous/anxious.      Objective:     Vital Signs (Most Recent):  Temp: 98.7 °F (37.1 °C) (12/21/18 1245)  Pulse: 91 (12/21/18 1245)  Resp: 13 (12/21/18 1245)  BP: (!) 87/45 (12/21/18 1245)  SpO2: 97 % (12/21/18 1245) Vital Signs (24h Range):  Temp:  [98.2 °F (36.8 °C)-100.6 °F (38.1 °C)] 98.7 °F (37.1 °C)  Pulse:  [90-91] 91  Resp:  [11-33] 13  SpO2:  [88 %-99 %] 97 %  BP: ()/(45-71) 87/45     Weight: 109.5 kg (241 lb 6.5 oz)  Body mass index is 35.65 kg/m².      Intake/Output Summary (Last 24 hours) at 12/21/2018 1314  Last data filed at 12/21/2018 1000  Gross per 24 hour   Intake 998.75 ml   Output 1103 ml   Net -104.25 ml       Physical Exam   Constitutional: He is oriented to person, place, and time. He appears well-developed and well-nourished.   HENT:   Head: Normocephalic and atraumatic.   Eyes: Conjunctivae are normal. Pupils are equal, round, and reactive to light.   Neck: Neck supple. No JVD present. No tracheal deviation present. No thyromegaly present.   Cardiovascular: Normal rate, regular rhythm and normal heart sounds.       Pulmonary/Chest: Effort normal. He has decreased breath sounds in the right lower field and the left lower field.   Abdominal: Soft.   Musculoskeletal: Normal range of motion.   Lymphadenopathy:     He has no cervical adenopathy.   Neurological: He is alert and oriented to person, place, and time.   Skin: Skin is warm and dry.   Nursing note  and vitals reviewed.      Vents:  Vent Mode: Spont (12/18/18 1610)  Ventilator Initiated: Yes (12/18/18 1341)  Set Rate: 0 bmp (12/18/18 1610)  Vt Set: 400 mL (12/18/18 1610)  Pressure Support: 10 cmH20 (12/18/18 1610)  PEEP/CPAP: 5 cmH20 (12/18/18 1610)  Oxygen Concentration (%): 100 (12/20/18 1245)  Peak Airway Pressure: 16 cmH2O (12/18/18 1610)  Plateau Pressure: 0 cmH20 (12/18/18 1610)  Total Ve: 11.3 mL (12/18/18 1610)  F/VT Ratio<105 (RSBI): (!) 38.99 (12/18/18 1610)    Lines/Drains/Airways     Line                 Pacer Wires 12/18/18 1200 3 days          Pressure Ulcer                 Negative Pressure Wound Therapy  3 days          Peripheral Intravenous Line                 Midline Catheter Insertion/Assessment  - Double Lumen 12/19/18 1500 Left basilic vein (medial side of arm) 1 day                Significant Labs:    CBC/Anemia Profile:  Recent Labs   Lab 12/20/18  0401 12/21/18  0335 12/21/18  0515   WBC 10.18 10.06 9.75   HGB 7.4* 6.4* 6.1*   HCT 22.8* 19.8* 19.1*    177 164   MCV 92 91 91   RDW 15.4* 15.1* 15.1*        Chemistries:  Recent Labs   Lab 12/20/18  0401 12/20/18  0838 12/20/18  1616 12/21/18  0335     --  135* 136  136   K 4.7  --  4.3 3.8  3.8     --  95 94*  94*   CO2 29  --  31* 33*  33*   BUN 25*  --  29* 31*  31*   CREATININE 2.0*  --  2.0* 2.0*  2.0*   CALCIUM 8.7  --  8.6* 8.3*  8.3*   ALBUMIN 3.1*  --   --  2.7*   PROT 5.6*  --   --  5.7*   BILITOT 1.1*  --   --  1.0   ALKPHOS 19*  --   --  21*   ALT 32  --   --  27   AST 67*  --   --  39   MG  --  2.7* 2.6 2.3       BMP:   Recent Labs   Lab 12/21/18  0335     106     136   K 3.8  3.8   CL 94*  94*   CO2 33*  33*   BUN 31*  31*   CREATININE 2.0*  2.0*   CALCIUM 8.3*  8.3*   MG 2.3     CMP:   Recent Labs   Lab 12/20/18  0401 12/20/18  1616 12/21/18  0335    135* 136  136   K 4.7 4.3 3.8  3.8    95 94*  94*   CO2 29 31* 33*  33*    103 106  106   BUN 25* 29* 31*   31*   CREATININE 2.0* 2.0* 2.0*  2.0*   CALCIUM 8.7 8.6* 8.3*  8.3*   PROT 5.6*  --  5.7*   ALBUMIN 3.1*  --  2.7*   BILITOT 1.1*  --  1.0   ALKPHOS 19*  --  21*   AST 67*  --  39   ALT 32  --  27   ANIONGAP 9 9 9  9   EGFRNONAA 32* 32* 32*  32*     Cardiac Markers: No results for input(s): CKMB, TROPONINT, MYOGLOBIN in the last 48 hours.  All pertinent labs within the past 24 hours have been reviewed.    Significant Imaging:   I have reviewed all pertinent imaging results/findings within the past 24 hours.  I have reviewed and interpreted all pertinent imaging results/findings within the past 24 hours.      ABG  Recent Labs   Lab 12/18/18  1620   PH 7.316*   PO2 80   PCO2 34.7*   HCO3 17.7*   BE -8     Assessment/Plan:     Pulmonary   Atelectasis, left    12/19 Continue incentive spirometry  12/20 conrinue incentive spirometry  12/21 continue incentive spirometry, out of bed      Cardiac/Vascular   * Coronary artery disease involving native coronary artery of native heart with unstable angina pectoris post ACS    Per Cards  Cont ASA, Lopressor and Plavix  ICU Cardiac monitoring  S/P CABG X 4 vessels     S/P CABG x 4    Per CVT surgery  Monitor chest tube output  Post op ICU hemodynamic monitoring  Encourage OOB, C&DB and IS use post extubation  12/19 Extubated , doing well  12/20 stable , doing well  12/21 stable post op course- transfused today     Hypertension goal BP (blood pressure) < 140/90    Cont Lopressor  Cardene infusion if needed post op     Renal/   CKD (chronic kidney disease) stage 3, GFR 30-59 ml/min    Cont IVFs post CABG  BMP in AM     Oncology   Cancer of prostate with intermediate recurrence risk (stage T2b-c or Jonathon 7 or PSA 10-20)    Outpt follow up with Urology     Other   Severe obesity with body mass index (BMI) of 35.0 to 39.9 with serious comorbidity    Encouraged weight loss        Critical Care Daily Checklist:    A: Awake: RASS Goal/Actual Goal: RASS Goal: 0-->alert and  calm  Actual: Vasquez Agitation Sedation Scale (RASS): Alert and calm   B: Spontaneous Breathing Trial Performed?     C: SAT & SBT Coordinated?  na                      D: Delirium: CAM-ICU Overall CAM-ICU: Negative   E: Early Mobility Performed? Yes   F: Feeding Goal: Goals: Meet >85% EEN/EPN this admit  Status: Nutrition Goal Status: new   Current Diet Order   Procedures    Diet Cardiac      AS: Analgesia/Sedation na   T: Thromboembolic Prophylaxis y   H: HOB > 300 Yes   U: Stress Ulcer Prophylaxis (if needed) y   G: Glucose Control adequate   B: Bowel Function Stool Occurrence: 0   I: Indwelling Catheter (Lines & Tavera) Necessity removed   D: De-escalation of Antimicrobials/Pharmacotherapies na    Plan for the day/ETD transfer    Code Status:  Family/Goals of Care: Full Code  discussed     Critical Care Time: 30 minutes  Critical secondary to Patient has a condition that poses threat to life and bodily function: Acute Myocardial Infarction      Critical care was time spent personally by me on the following activities: development of treatment plan with patient or surrogate and bedside caregivers, discussions with consultants, evaluation of patient's response to treatment, examination of patient, ordering and performing treatments and interventions, ordering and review of laboratory studies, ordering and review of radiographic studies, pulse oximetry, re-evaluation of patient's condition. This critical care time did not overlap with that of any other provider or involve time for any procedures.     Leroy Robles MD  Critical Care Medicine  [facility] -

## 2018-12-21 NOTE — PROGRESS NOTES
Vancomycin Progress Notes:  Empirical dosing for opportunistic infection  Estimated Creatinine Clearance: 40.7 mL/min (A) (based on SCr of 2 mg/dL (H)).  white blood cell count = 9.75  Tmax/24h = 100.6 F  Cultures :  Blood and Respiratory-ngtd (collected 12/20)    Serum creatinine stable @ 2  Random level this am @ 0335 = 15  Will give a 1x dose of 1250 mg   Repeat random 12/22 @ 0900  Continue pulse dosing/Saba Roth [facility] 12/21/2018 11:50 AM

## 2018-12-21 NOTE — PROGRESS NOTES
[facility]  Cardiology  Progress Note    Patient Name: Filipe Agustin Jr.  MRN: [id number]  Admission Date: 12/15/2018  Hospital Length of Stay: 6 days  Code Status: Full Code   Attending Physician: Rg Johnson MD   Primary Care Physician: Jojo Duque DO  Expected Discharge Date:   Principal Problem:Coronary artery disease involving native coronary artery of native heart with unstable angina pectoris    Subjective:   HPI:  Mr. Agustin is a 72 year old male with PMHx of CAD s/p coronary stenting at [facility] per patient report, HTN, HLP who presented to [facility] ED due to chest pain which started abruptly this AM. Initial EKG revealed ST elevated in inferior leads, CODE STEMI activated at that time. Patient received IV heparin bolus and NTG SL x 1 and repeat EKG which continued to reveal ST elevation in inferior leads. Patient transported to cath lab for emergent LHC per Dr. Monroe. IV aggrastat bolus per Dr. Monroe's recommendations. TTE pending. Further recs to follow. Labs pending.     Hospital Course:   12/16/18-Patient seen and examined in ICU, sitting in bedside chair. Denies chest pain or shortness of breath today on exam. IV heparin gtt in progress. Pending CABG on 12/18 per CVT. Labs reviewed, stable.     12/17/18-Patient seen and examined today, resting in bed. No acute events overnight. Denies chest pain or SOB. Labs stable. CABG planned for AM.    12/18/18-Patient seen and examined today, s/p CABG x 4. Hemodynamically stable.     12/19/18-Patient seen and examined today, POD # 1 s/p CABG x 4. Feels ok. Complains of fatigue and incisional soreness. Labs reviewed. Creatinine 1.7. H and H low.     12/20/18-Patient seen and examined today, POD # 2 s/p CABG x 4. Feels fatigued and weak. Pain fairly well controlled. No SOB. Labs reviewed. Creatinine bumped to 1.9. H and H remains low (7.4/22).    12/21/18-Patient seen and examined today, POD # # s/p CABG x 4. Feels ok. Still complains of fatigue.  No chest pain or SOB. Being transfused. Creatinine 2.0.        Review of Systems   Constitution: Positive for weakness and malaise/fatigue.   HENT: Negative.    Eyes: Negative.    Cardiovascular: Negative.    Respiratory: Negative.    Endocrine: Negative.    Hematologic/Lymphatic: Negative.    Skin: Negative.    Genitourinary: Negative.    Psychiatric/Behavioral: Negative.    Allergic/Immunologic: Negative.      Objective:     Vital Signs (Most Recent):  Temp: 98.7 °F (37.1 °C) (12/21/18 1130)  Pulse: 90 (12/21/18 1130)  Resp: 14 (12/21/18 1130)  BP: (!) 96/58 (12/21/18 1130)  SpO2: 97 % (12/21/18 1130) Vital Signs (24h Range):  Temp:  [98.2 °F (36.8 °C)-100.6 °F (38.1 °C)] 98.7 °F (37.1 °C)  Pulse:  [90-91] 90  Resp:  [11-33] 14  SpO2:  [88 %-99 %] 97 %  BP: ()/(47-71) 96/58     Weight: 109.5 kg (241 lb 6.5 oz)  Body mass index is 35.65 kg/m².     SpO2: 97 %  O2 Device (Oxygen Therapy): nasal cannula      Intake/Output Summary (Last 24 hours) at 12/21/2018 1156  Last data filed at 12/21/2018 1000  Gross per 24 hour   Intake 998.75 ml   Output 1315 ml   Net -316.25 ml       Lines/Drains/Airways     Line                 Pacer Wires 12/18/18 1200 2 days          Pressure Ulcer                 Negative Pressure Wound Therapy  3 days          Peripheral Intravenous Line                 Midline Catheter Insertion/Assessment  - Double Lumen 12/19/18 1500 Left basilic vein (medial side of arm) 1 day                Physical Exam   Constitutional: He is oriented to person, place, and time. He appears well-developed and well-nourished. No distress.   HENT:   Head: Normocephalic and atraumatic.   Eyes: Pupils are equal, round, and reactive to light. Right eye exhibits no discharge. Left eye exhibits no discharge.   Neck: Neck supple. No JVD present. No thyromegaly present.   Cardiovascular: Normal rate, regular rhythm, S1 normal and S2 normal.   No murmur heard.  Pulmonary/Chest: Effort normal. No respiratory distress.    Coarse BS at bases  CABG site C/D/I; dressed   Abdominal: Soft. He exhibits no distension.   Musculoskeletal: He exhibits no edema.   Neurological: He is alert and oriented to person, place, and time.   Skin: Skin is warm and dry. He is not diaphoretic. No erythema.   Psychiatric: He has a normal mood and affect. His behavior is normal. Thought content normal.   Nursing note and vitals reviewed.      Significant Labs:   CMP   Recent Labs   Lab 12/20/18  0401 12/20/18  1616 12/21/18  0335    135* 136  136   K 4.7 4.3 3.8  3.8    95 94*  94*   CO2 29 31* 33*  33*    103 106  106   BUN 25* 29* 31*  31*   CREATININE 2.0* 2.0* 2.0*  2.0*   CALCIUM 8.7 8.6* 8.3*  8.3*   PROT 5.6*  --  5.7*   ALBUMIN 3.1*  --  2.7*   BILITOT 1.1*  --  1.0   ALKPHOS 19*  --  21*   AST 67*  --  39   ALT 32  --  27   ANIONGAP 9 9 9  9   ESTGFRAFRICA 37* 37* 37*  37*   EGFRNONAA 32* 32* 32*  32*   , CBC   Recent Labs   Lab 12/20/18  0401 12/21/18  0335 12/21/18  0515   WBC 10.18 10.06 9.75   HGB 7.4* 6.4* 6.1*   HCT 22.8* 19.8* 19.1*    177 164   , Troponin No results for input(s): TROPONINI in the last 48 hours. and All pertinent lab results from the last 24 hours have been reviewed.    Significant Imaging: Echocardiogram:   2D echo with color flow doppler:   Results for orders placed or performed during the hospital encounter of 12/15/18   2D echo with color flow doppler   Result Value Ref Range    QEF 60 55 - 65    Diastolic Dysfunction No     and X-Ray: CXR: X-Ray Chest 1 View (CXR): No results found for this visit on 12/15/18. and X-Ray Chest PA and Lateral (CXR): No results found for this visit on 12/15/18.    Assessment and Plan:   Patient recovering s/p CABG. Continue current mgmt. IS usage and ambulation.     * Coronary artery disease involving native coronary artery of native heart with unstable angina pectoris post ACS    -See plan under CABG     S/P CABG x 4    Patient presented to [facility] due  to chest pain  EKG revealed inferior ST elevation  NTG SL x 1 given in ED  Repeat EKG revealed persistently elevation in inferior leads  IV heparin 5,000 units given in ED  No BB due to Bradycardia in ED  TTE pending  FLP pending  Patient taken to Cath Lab for emergent LHC per Dr. Monroe.  Aggrastat bolus and Infusion to follow  Further recs to follow    12/16  -CABG recommended, CVT consulted and plan for CABG on 12/18  -Continue IV heparin gtt, ASA, Statin, BB  -Start low dose ARB today  -Transfer to telemetry today  -No chest pain on exam today.   -FLP not drawn, will reorder for AM  -ECHO revealed EF 60-65%, -WMA's, normal Bi-V function.    12/17/18  -Stable overnight  -No chest pain or SOB  -Continue IV heparin  -Continue ASA, BB, statin, ARB  -CABG planned for AM    12/18/18  -s/p CABG x 4  -Hemodynamically stable  -Continue current post-op care  -ASA, Plavix, statin, BB    12/19/18  -Recovering s/p CABG x 4  -Remains fatigued/weak  -Would benefit from transfusion, defer to CVT  -Continue ASA, Plavix, statin BB  -Ambulation and IS usage      12/19/18  -Stable overnight  -H and H low, defer transfusion decision to CVT  -Continue ASA, Plavix, statin, BB    12/21/18  -Stable CV wise  -Being transfused  -Continue ASA, Plavix, statin, BB  -No ACEI/ARB given bumped creatinine  -IS usage and ambulation     CKD (chronic kidney disease) stage 3, GFR 30-59 ml/min    -Monitor  -Creatinine 2.0       Hypertension goal BP (blood pressure) < 140/90    On medical therapy  Continue BB  Start ARB today     Severe obesity with body mass index (BMI) of 35.0 to 39.9 with serious comorbidity    -Encourage weight loss         VTE Risk Mitigation (From admission, onward)        Ordered     Place NARCISA hose  Until discontinued      12/18/18 1249     Place sequential compression device  Until discontinued      12/18/18 1249     heparin 25,000 units in dextrose 5% 250 mL (100 units/mL) infusion LOW INTENSITY nomogram - [facility]  Continuous       12/15/18 1121     heparin (porcine) injection 5,000 Units  ED 1 Time      12/15/18 0931          Cheryl Farrell PA-C  Cardiology  [facility] -     Chart reviewed. Dr. Romo examined patient and agrees with plan as outlined above.

## 2018-12-21 NOTE — PROGRESS NOTES
[facility] -   Cardiothoracic Surgery  Progress Note    Patient Name: Filipe Agustin Jr.  MRN: [id number]  Admission Date: 12/15/2018  Hospital Length of Stay: 6 days  Code Status: Full Code   Attending Physician: Rg Johnson MD   Referring Provider: Self, Aaareferral  Principal Problem:Coronary artery disease involving native coronary artery of native heart with unstable angina pectoris            Subjective:     Post-Op Info:  Procedure(s) (LRB):  CORONARY ARTERY BYPASS GRAFT (CABG) (N/A)  ECHOCARDIOGRAM,TRANSESOPHAGEAL (N/A)  SURGICAL PROCUREMENT, VEIN, ENDOSCOPIC (Bilateral)   3 Days Post-Op     Interval History:  The patient is postop day 3.  Status post coronary bypass grafting x4.  Patient has no complaints.  He feels better today.    ROS  Medications:  Continuous Infusions:   dexmedetomidine (PRECEDEX) infusion Stopped (12/18/18 1710)    epinephrine infusion Stopped (12/19/18 1234)    niCARdipine Stopped (12/18/18 1410)    norepinephrine bitartrate-D5W Stopped (12/18/18 2200)    vasopressin (PITRESSIN) infusion Stopped (12/19/18 0400)     Scheduled Meds:   aspirin  81 mg Oral Daily    atorvastatin  80 mg Oral QHS    chlorhexidine  10 mL Mouth/Throat BID    clopidogrel  75 mg Oral Daily    docusate sodium  100 mg Oral BID    furosemide  40 mg Intravenous BID    metoprolol tartrate  50 mg Oral BID    nozaseptin   Each Nare BID    pantoprazole  40 mg Intravenous Daily    piperacillin-tazobactam (ZOSYN) IVPB  4.5 g Intravenous Q12H    polyethylene glycol  17 g Oral Daily    [START ON 12/25/2018] vancomycin (VANCOCIN) IVPB  1,250 mg Intravenous Q72H     PRN Meds:sodium chloride, acetaminophen, albumin human 5%, albuterol sulfate, dextrose 50%, dextrose 50%, fentaNYL, fentaNYL, glucagon (human recombinant), glucose, glucose, insulin aspart U-100, lactated ringers, magnesium sulfate IVPB, metoclopramide HCl, ondansetron, oxyCODONE, oxyCODONE, potassium chloride in water **AND**  potassium chloride in water **AND** potassium chloride in water     Objective:     Vital Signs (Most Recent):  Temp: 99 °F (37.2 °C) (12/21/18 1515)  Pulse: 90 (12/21/18 1500)  Resp: 12 (12/21/18 1500)  BP: 97/64 (12/21/18 1500)  SpO2: 99 % (12/21/18 1500) Vital Signs (24h Range):  Temp:  [98.2 °F (36.8 °C)-100.6 °F (38.1 °C)] 99 °F (37.2 °C)  Pulse:  [90-93] 90  Resp:  [11-33] 12  SpO2:  [88 %-99 %] 99 %  BP: ()/(45-73) 97/64     Weight: 109.5 kg (241 lb 6.5 oz)  Body mass index is 35.65 kg/m².    SpO2: 99 %  O2 Device (Oxygen Therapy): nasal cannula    Intake/Output - Last 3 Shifts       12/19 0700 - 12/20 0659 12/20 0700 - 12/21 0659 12/21 0700 - 12/22 0659    P.O. 540 480 240    I.V. (mL/kg) 260.3 (2.3)      Blood   662.5    IV Piggyback  200 350    Total Intake(mL/kg) 800.3 (7) 680 (6.2) 1252.5 (11.4)    Urine (mL/kg/hr) 3251 (1.2) 1431 (0.5) 1000 (0.9)    Drains 250      Stool 0      Chest Tube 20      Total Output 3521 1431 1000    Net -2720.8 -751 +252.5           Urine Occurrence  3 x 1 x    Stool Occurrence 0 x            Lines/Drains/Airways     Line                 Pacer Wires 12/18/18 1200 3 days          Pressure Ulcer                 Negative Pressure Wound Therapy  3 days          Peripheral Intravenous Line                 Midline Catheter Insertion/Assessment  - Double Lumen 12/19/18 1500 Left basilic vein (medial side of arm) 2 days                Physical Exam   Constitutional: He is oriented to person, place, and time. He appears well-developed and well-nourished. No distress.   HENT:   Head: Normocephalic and atraumatic.   Cardiovascular: Normal rate, regular rhythm and normal heart sounds.   Pulmonary/Chest: Effort normal and breath sounds normal.   Abdominal: Soft. Bowel sounds are normal. He exhibits no distension.   Musculoskeletal: He exhibits edema. He exhibits no deformity.   Neurological: He is alert and oriented to person, place, and time.   Skin: Skin is warm and dry. He is not  diaphoretic.       Significant Labs:  All pertinent labs from the last 24 hours have been reviewed.    Significant Diagnostics:  I have reviewed all pertinent imaging results/findings within the past 24 hours.    Assessment/Plan:     * Coronary artery disease involving native coronary artery of native heart with unstable angina pectoris post ACS    The patient is a 72-year-old male with history of CAD status post stent placement in the past, hypertension, hyperlipidemia who presented with acute onset of chest pain.  Patient's workup included cardiac catheterization which showed severe multivessel coronary artery.  The risk and benefits of surgery was discussed with the patient and his wife.  The patient understands and has agreed to proceed with surgery which has been scheduled for 12/18/2018.     S/P CABG x 4    The patient is postop day 1 status post coronary artery bypass grafting x4.  Overall the patient is doing well.  Neuro:  Patient is awake alert and oriented x3.  Neuro exam is nonfocal.    Cardiac:  Patient is on low-dose vasopressin and epinephrine.  Cardiac index is good.  Wean drips to off.  And start beta-blockers.  Respiratory: Patient is maintaining good sats on nasal cannula.  GI:  Patient is started on p.o. intake.  Advanced as tolerated.  Renal:  Creatinine is 1.7.  Urine output is good.  Endocrine:  Glucose is well controlled.  Heme:  Hematocrit is 22 and platelet is 165.  Will hold off transfusions for now.  Id:  T-max is a 101.1 °.  And white count is 9.4.  Activities:  Patient is out of bed to chair.  Advanced a activities as tolerated.  Lines tubes and drains.  Discontinue chest tubes, DANNY, [place] and [place].  Placed mid level line and continue with pacer wires.    12/20/2018    The patient is postop day 2 status post coronary artery bypass grafting x4.  Over the past 24 hr patient has been febrile..  Neuro:  Patient is awake alert and oriented x3.  Neuro exam is nonfocal.    Cardiac:  Patient is  tolerating beta-blocker.  Respiratory: Patient is maintaining good sats on nasal cannula.  GI:  Patient is started on p.o. intake.  Advanced as tolerated.  Renal:  Creatinine is 2.0  Urine output is good.  Endocrine:  Glucose is well controlled.  Heme:  Hematocrit is 22 and platelet is 158.  Will hold off transfusions for now.  Id:  T-max is a 101.7°.  And white count is 10.  Will panculture the patient.  Started on broad spectrum antibiotics.  Activities:  Patient is out of bed to chair.  Advance  activities as tolerated.  Lines tubes and drains.  Mid level line, Pacer wires and kang    12/21/2018    The patient is postop day 3 status post coronary artery bypass grafting x4.  Overall patient is doing much better.  Fever curve is improving.  Will transfer patient to telemetry.  Neuro:  Patient is awake alert and oriented x3.  Neuro exam is nonfocal.    Cardiac:  Patient is tolerating metoprolol.  Respiratory: Patient is maintaining good sats on nasal cannula.  GI:  Patient is started on p.o. intake.  Advanced as tolerated.  Renal:  Creatinine is 2.0  Urine output is good.  Endocrine:  Glucose is well controlled.  Heme:  Hematocrit was19 and platelet is 164.  Patient transfused 2 units packed red blood cells.  Id:  T-max is a 101.3.  And white count is 9.  Cultures are pending.  The no growth to date.  Started on broad spectrum antibiotics.  Activities:  Patient is out of bed to chair.  Advance  activities as tolerated.  Lines tubes and drains.  Mid level line, Pacer wires and kang               Rg Johnson MD  Cardiothoracic Surgery  [facility] -

## 2018-12-21 NOTE — PLAN OF CARE
Problem: Adult Inpatient Plan of Care  Goal: Plan of Care Review  Outcome: Ongoing (interventions implemented as appropriate)  No acute changes over night, pt remains AAOx4, VSS, A-Paced on monitor, UOP adequate, and had a TMAX of 100.6. Pt's O2 sats decreased through the night into the high 80's and pt's O2 was increased to 5 lpm NC and is tolerating well w/ O2 sats > 92%. Pt continues to diurese well but creatinine remains elevated at 2 this AM. Pt remains weak and pale, drop in H/H this AM, and will transuse 2 units of PRBC's today. Pt up to chair this AM w/ 2 assist, turned q2 hrs w/ heels floated, and POC discussed w/ pt.

## 2018-12-22 LAB
ALBUMIN SERPL BCP-MCNC: 2.7 G/DL
ALP SERPL-CCNC: 27 U/L
ALT SERPL W/O P-5'-P-CCNC: 24 U/L
ANION GAP SERPL CALC-SCNC: 9 MMOL/L
AST SERPL-CCNC: 29 U/L
BASOPHILS # BLD AUTO: 0.01 K/UL
BASOPHILS NFR BLD: 0.1 %
BILIRUB SERPL-MCNC: 1.2 MG/DL
BUN SERPL-MCNC: 31 MG/DL
CALCIUM SERPL-MCNC: 8.3 MG/DL
CHLORIDE SERPL-SCNC: 92 MMOL/L
CO2 SERPL-SCNC: 36 MMOL/L
CREAT SERPL-MCNC: 1.8 MG/DL
DIFFERENTIAL METHOD: ABNORMAL
EOSINOPHIL # BLD AUTO: 0.1 K/UL
EOSINOPHIL NFR BLD: 1.4 %
ERYTHROCYTE [DISTWIDTH] IN BLOOD BY AUTOMATED COUNT: 14.9 %
EST. GFR  (AFRICAN AMERICAN): 43 ML/MIN/1.73 M^2
EST. GFR  (NON AFRICAN AMERICAN): 37 ML/MIN/1.73 M^2
GLUCOSE SERPL-MCNC: 95 MG/DL
HCT VFR BLD AUTO: 27.6 %
HGB BLD-MCNC: 9.1 G/DL
LYMPHOCYTES # BLD AUTO: 1 K/UL
LYMPHOCYTES NFR BLD: 12.5 %
MCH RBC QN AUTO: 29.5 PG
MCHC RBC AUTO-ENTMCNC: 33 G/DL
MCV RBC AUTO: 90 FL
MONOCYTES # BLD AUTO: 0.6 K/UL
MONOCYTES NFR BLD: 8.4 %
NEUTROPHILS # BLD AUTO: 5.9 K/UL
NEUTROPHILS NFR BLD: 77.6 %
PLATELET # BLD AUTO: 249 K/UL
PMV BLD AUTO: 11 FL
POCT GLUCOSE: 106 MG/DL (ref 70–110)
POCT GLUCOSE: 112 MG/DL (ref 70–110)
POCT GLUCOSE: 123 MG/DL (ref 70–110)
POCT GLUCOSE: 98 MG/DL (ref 70–110)
POTASSIUM SERPL-SCNC: 3.5 MMOL/L
PROT SERPL-MCNC: 6 G/DL
RBC # BLD AUTO: 3.08 M/UL
SODIUM SERPL-SCNC: 137 MMOL/L
VANCOMYCIN SERPL-MCNC: 12.6 UG/ML
WBC # BLD AUTO: 7.6 K/UL

## 2018-12-22 PROCEDURE — 80202 ASSAY OF VANCOMYCIN: CPT | Mod: HCNC

## 2018-12-22 PROCEDURE — 25000003 PHARM REV CODE 250: Mod: HCNC | Performed by: PHYSICIAN ASSISTANT

## 2018-12-22 PROCEDURE — 27000221 HC OXYGEN, UP TO 24 HOURS: Mod: HCNC

## 2018-12-22 PROCEDURE — 97530 THERAPEUTIC ACTIVITIES: CPT | Mod: HCNC | Performed by: PHYSICAL THERAPIST

## 2018-12-22 PROCEDURE — 99900035 HC TECH TIME PER 15 MIN (STAT): Mod: HCNC

## 2018-12-22 PROCEDURE — 97535 SELF CARE MNGMENT TRAINING: CPT | Mod: HCNC | Performed by: PHYSICAL THERAPIST

## 2018-12-22 PROCEDURE — 99233 PR SUBSEQUENT HOSPITAL CARE,LEVL III: ICD-10-PCS | Mod: HCNC,,, | Performed by: INTERNAL MEDICINE

## 2018-12-22 PROCEDURE — 36415 COLL VENOUS BLD VENIPUNCTURE: CPT | Mod: HCNC

## 2018-12-22 PROCEDURE — 63600175 PHARM REV CODE 636 W HCPCS: Mod: HCNC | Performed by: THORACIC SURGERY (CARDIOTHORACIC VASCULAR SURGERY)

## 2018-12-22 PROCEDURE — 94799 UNLISTED PULMONARY SVC/PX: CPT | Mod: HCNC

## 2018-12-22 PROCEDURE — 25000003 PHARM REV CODE 250: Mod: HCNC | Performed by: THORACIC SURGERY (CARDIOTHORACIC VASCULAR SURGERY)

## 2018-12-22 PROCEDURE — 99233 SBSQ HOSP IP/OBS HIGH 50: CPT | Mod: HCNC,,, | Performed by: INTERNAL MEDICINE

## 2018-12-22 PROCEDURE — 97116 GAIT TRAINING THERAPY: CPT | Mod: HCNC

## 2018-12-22 PROCEDURE — C9113 INJ PANTOPRAZOLE SODIUM, VIA: HCPCS | Mod: HCNC | Performed by: THORACIC SURGERY (CARDIOTHORACIC VASCULAR SURGERY)

## 2018-12-22 PROCEDURE — 94664 DEMO&/EVAL PT USE INHALER: CPT | Mod: HCNC

## 2018-12-22 PROCEDURE — 97530 THERAPEUTIC ACTIVITIES: CPT | Mod: HCNC

## 2018-12-22 PROCEDURE — 80053 COMPREHEN METABOLIC PANEL: CPT | Mod: HCNC

## 2018-12-22 PROCEDURE — 99231 PR SUBSEQUENT HOSPITAL CARE,LEVL I: ICD-10-PCS | Mod: HCNC,,, | Performed by: INTERNAL MEDICINE

## 2018-12-22 PROCEDURE — 99231 SBSQ HOSP IP/OBS SF/LOW 25: CPT | Mod: HCNC,,, | Performed by: INTERNAL MEDICINE

## 2018-12-22 PROCEDURE — 21400001 HC TELEMETRY ROOM: Mod: HCNC

## 2018-12-22 PROCEDURE — 85025 COMPLETE CBC W/AUTO DIFF WBC: CPT | Mod: HCNC

## 2018-12-22 RX ORDER — POTASSIUM CHLORIDE 20 MEQ/1
40 TABLET, EXTENDED RELEASE ORAL ONCE
Status: COMPLETED | OUTPATIENT
Start: 2018-12-22 | End: 2018-12-22

## 2018-12-22 RX ADMIN — POTASSIUM CHLORIDE 40 MEQ: 1500 TABLET, EXTENDED RELEASE ORAL at 11:12

## 2018-12-22 RX ADMIN — FUROSEMIDE 40 MG: 10 INJECTION, SOLUTION INTRAMUSCULAR; INTRAVENOUS at 09:12

## 2018-12-22 RX ADMIN — FUROSEMIDE 40 MG: 10 INJECTION, SOLUTION INTRAMUSCULAR; INTRAVENOUS at 08:12

## 2018-12-22 RX ADMIN — ATORVASTATIN CALCIUM 80 MG: 40 TABLET, FILM COATED ORAL at 08:12

## 2018-12-22 RX ADMIN — CHLORHEXIDINE GLUCONATE 10 ML: 1.2 RINSE ORAL at 08:12

## 2018-12-22 RX ADMIN — PIPERACILLIN SODIUM AND TAZOBACTAM SODIUM 4.5 G: 4; .5 INJECTION, POWDER, LYOPHILIZED, FOR SOLUTION INTRAVENOUS at 08:12

## 2018-12-22 RX ADMIN — PANTOPRAZOLE SODIUM 40 MG: 40 INJECTION, POWDER, FOR SOLUTION INTRAVENOUS at 08:12

## 2018-12-22 RX ADMIN — CLOPIDOGREL BISULFATE 75 MG: 75 TABLET ORAL at 08:12

## 2018-12-22 RX ADMIN — VANCOMYCIN HYDROCHLORIDE 1250 MG: 10 INJECTION, POWDER, LYOPHILIZED, FOR SOLUTION INTRAVENOUS at 02:12

## 2018-12-22 RX ADMIN — METOPROLOL TARTRATE 50 MG: 50 TABLET ORAL at 08:12

## 2018-12-22 RX ADMIN — OXYCODONE HYDROCHLORIDE 5 MG: 5 TABLET ORAL at 05:12

## 2018-12-22 RX ADMIN — ASPIRIN 81 MG: 81 TABLET, COATED ORAL at 08:12

## 2018-12-22 RX ADMIN — POTASSIUM CHLORIDE 40 MEQ: 1500 TABLET, EXTENDED RELEASE ORAL at 08:12

## 2018-12-22 NOTE — CLINICAL REVIEW
Clinical Pharmacy Review     Pharmacy Vancomycin Consult day #3  PULSE DOSE --> Scr 1.8, stable --> will schedule dosing if therapy continued beyond 12/23  Random level 12/22 at 1119 = 12.6   1,250 mg dose given 12/22 at 1410   Indication: Opportunistic infection prophylaxis, empiric coverage   Goal trough: 15-20   WBC WNL x 3 days   Temp WNL x 3 day   Blood and respiratory Cultures NGTD x 3 days   Also on Zosyn x 3 days   Per Dr. Johnson, will continue with Vancomycin until final cultures result  Random level due 12/23 at 0430 --> Placeholder dose will be given/adjusted pending  level     Pharmacy will continue to closely monitor patient and make adjustments as necessary.   Thank you for allowing us to participate in this patient's care,   Diana Romero 12/22/2018 3:11 PM

## 2018-12-22 NOTE — SUBJECTIVE & OBJECTIVE
Interval History:  Patient is postop day 4.  Status post coronary artery bypass grafting x4.  Patient has no complaints.    ROS  Medications:  Continuous Infusions:  Scheduled Meds:   aspirin  81 mg Oral Daily    atorvastatin  80 mg Oral QHS    chlorhexidine  10 mL Mouth/Throat BID    clopidogrel  75 mg Oral Daily    docusate sodium  100 mg Oral BID    furosemide  40 mg Intravenous BID    metoprolol tartrate  50 mg Oral BID    nozaseptin   Each Nare BID    pantoprazole  40 mg Intravenous Daily    piperacillin-tazobactam (ZOSYN) IVPB  4.5 g Intravenous Q12H    polyethylene glycol  17 g Oral Daily    potassium chloride  40 mEq Oral Once    potassium chloride  40 mEq Oral Once    [START ON 12/25/2018] vancomycin (VANCOCIN) IVPB  1,250 mg Intravenous Q72H     PRN Meds:sodium chloride, acetaminophen, albuterol sulfate, dextrose 50%, dextrose 50%, glucagon (human recombinant), glucose, glucose, insulin aspart U-100, metoclopramide HCl, ondansetron, oxyCODONE, oxyCODONE     Objective:     Vital Signs (Most Recent):  Temp: 98.3 °F (36.8 °C) (12/22/18 0744)  Pulse: 90 (12/22/18 0914)  Resp: 18 (12/22/18 0830)  BP: 108/63 (12/22/18 0744)  SpO2: (!) 93 % (12/22/18 0830) Vital Signs (24h Range):  Temp:  [96.7 °F (35.9 °C)-99 °F (37.2 °C)] 98.3 °F (36.8 °C)  Pulse:  [90-93] 90  Resp:  [12-22] 18  SpO2:  [87 %-99 %] 93 %  BP: ()/(45-73) 108/63     Weight: 109.6 kg (241 lb 10 oz)  Body mass index is 35.68 kg/m².    SpO2: (!) 93 %  O2 Device (Oxygen Therapy): nasal cannula    Intake/Output - Last 3 Shifts       12/20 0700 - 12/21 0659 12/21 0700 - 12/22 0659 12/22 0700 - 12/23 0659    P.O. 480 480     I.V. (mL/kg)       Blood  662.5     IV Piggyback 200 350     Total Intake(mL/kg) 680 (6.2) 1492.5 (13.6)     Urine (mL/kg/hr) 1431 (0.5) 1300 (0.5) 100 (0.3)    Drains       Stool       Chest Tube       Total Output 1431 1300 100    Net -751 +192.5 -100           Urine Occurrence 3 x 1 x            Lines/Drains/Airways     Line                 Pacer Wires 12/18/18 1200 3 days          Pressure Ulcer                 Negative Pressure Wound Therapy  4 days          Peripheral Intravenous Line                 Midline Catheter Insertion/Assessment  - Double Lumen 12/19/18 1500 Left basilic vein (medial side of arm) 2 days                Physical Exam   Constitutional: He is oriented to person, place, and time. He appears well-developed and well-nourished. No distress.   HENT:   Head: Normocephalic and atraumatic.   Cardiovascular: Normal rate and regular rhythm.   Pulmonary/Chest: Effort normal and breath sounds normal.   Abdominal: Soft. Bowel sounds are normal.   Musculoskeletal: He exhibits edema.   Neurological: He is alert and oriented to person, place, and time.   Skin: Skin is warm and dry. He is not diaphoretic.       Significant Labs:  All pertinent labs from the last 24 hours have been reviewed.    Significant Diagnostics:  I have reviewed all pertinent imaging results/findings within the past 24 hours.

## 2018-12-22 NOTE — PLAN OF CARE
Problem: Adult Inpatient Plan of Care  Goal: Plan of Care Review  Outcome: Ongoing (interventions implemented as appropriate)  Patient AAOX4. Vitals stable, paced on tele monitor. Pain controlled with PRN medication. Voids per urinal. Wound vac in place. Assist X1-2 to transfer. No s/s of distress. Will continue to monitor.

## 2018-12-22 NOTE — PT/OT/SLP PROGRESS
Occupational Therapy  Treatment    Filipe Agustin Jr.   MRN: [id number]   Admitting Diagnosis: Coronary artery disease involving native coronary artery of native heart with unstable angina pectoris    OT Date of Treatment: 12/22/18   OT Start Time: 1230  OT Stop Time: 1255  OT Total Time (min): 25 min    Billable Minutes:  Self Care/Home Management 15 min and Therapeutic Activity 10 min    General Precautions: Standard, fall, sternal, respiratory  Orthopedic Precautions: N/A  Braces: N/A         Subjective:  Communicated with Nurse Luis and Epic chart review prior to session.  Pt sitting in recliner and agreeable to tx at this time.   Pain/Comfort  Pain Rating 1: 5/10  Location 1: chest  Pain Addressed 1: Reposition, Distraction  Pain Rating Post-Intervention 1: 5/10    Objective:  Patient found with: telemetry, oxygen, peripheral IV, wound vac(external pacer)     Therapist reviewed sternal precautions with pt, able to recall 2 out of 5 precautions. Pt scooted to edge of chair with Mod A, sit>sit and with Mod A and verbal cues for safety. Pt stood with Min A for balance in order to be cleaned secondary to having a BM. Pt was cleaned with Total A. Pt had c/o of dizziness and requested to sit, required Mod A to sit. Pt sat in chair a few minutes and dizziness passed therefore stood again with Mod A in order to finish cleaning his bottom with Total A. Pt performed stand pivot t/f to edge of bed with Min A, sit>supine with Mod A.      AM-PAC 6 CLICK ADL   How much help from another person does this patient currently need?   1 = Unable, Total/Dependent Assistance  2 = A lot, Maximum/Moderate Assistance  3 = A little, Minimum/Contact Guard/Supervision  4 = None, Modified [place]/Independent    Putting on and taking off regular lower body clothing? : 2  Bathing (including washing, rinsing, drying)?: 2  Toileting, which includes using toilet, bedpan, or urinal? : 2  Putting on and taking off regular upper body  "clothing?: 2  Taking care of personal grooming such as brushing teeth?: 3  Eating meals?: 4  Daily Activity Total Score: 15     AM-PAC Raw Score CMS "G-Code Modifier Level of Impairment Assistance   6 % Total / Unable   7 - 8 CM 80 - 100% Maximal Assist   9-13 CL 60 - 80% Moderate Assist   14 - 19 CK 40 - 60% Moderate Assist   20 - 22 CJ 20 - 40% Minimal Assist   23 CI 1-20% SBA / CGA   24 CH 0% Independent/ Mod I       Patient left supine with HOB elevated with all lines intact, call button in reach and Nurse Janelle notified    ASSESSMENT:  Filipe Agustin Jr. is a 73 y.o. male with a medical diagnosis of Coronary artery disease involving native coronary artery of native heart with unstable angina pectoris and presents with impaired functional mobility and ADLs. Pt will benefit from continued skilled OT in order to address impairments.    Rehab identified problem list/impairments: Rehab identified problem list/impairments: weakness, impaired endurance, impaired self care skills, impaired functional mobilty, decreased coordination, impaired balance, gait instability, decreased upper extremity function, decreased lower extremity function, decreased safety awareness, pain, edema    Rehab potential is good.    Activity tolerance: Fair    Discharge recommendations: Discharge Facility/Level of Care Needs: nursing facility, skilled, rehabilitation facility     Barriers to discharge:      Equipment recommendations: tub bench     GOALS:   Multidisciplinary Problems     Occupational Therapy Goals        Problem: Occupational Therapy Goal    Goal Priority Disciplines Outcome Interventions   Occupational Therapy Goal     OT, PT/OT Ongoing (interventions implemented as appropriate)    Description:  ot goals to be met by 12-26-18  Mod a with ue dressing  Mod a with le dressing  Mod a with toilet t/f's  Pt will verbalized and return demonstration with ae                    Plan:  Patient to be seen 3 x/week to address the " above listed problems via self-care/home management, therapeutic activities, therapeutic exercises  Plan of Care expires: 12/27/18  Plan of Care reviewed with: patient    OT G-codes  Functional Assessment Tool Used: [facility]  Score: 15    Lynda Glover, PT/OT  12/22/2018

## 2018-12-22 NOTE — PLAN OF CARE
Problem: Occupational Therapy Goal  Goal: Occupational Therapy Goal  ot goals to be met by 12-26-18  Mod a with ue dressing  Mod a with le dressing  Mod a with toilet t/f's  Pt will verbalized and return demonstration with ae   Outcome: Ongoing (interventions implemented as appropriate)  Working toward goals

## 2018-12-22 NOTE — PROGRESS NOTES
[facility] -   Cardiothoracic Surgery  Progress Note    Patient Name: Filipe Agustin Jr.  MRN: [id number]  Admission Date: 12/15/2018  Hospital Length of Stay: 7 days  Code Status: Full Code   Attending Physician: Rg Johnson MD   Referring Provider: Self, Aaareferral  Principal Problem:Coronary artery disease involving native coronary artery of native heart with unstable angina pectoris            Subjective:     Post-Op Info:  Procedure(s) (LRB):  CORONARY ARTERY BYPASS GRAFT (CABG) (N/A)  ECHOCARDIOGRAM,TRANSESOPHAGEAL (N/A)  SURGICAL PROCUREMENT, VEIN, ENDOSCOPIC (Bilateral)   4 Days Post-Op     Interval History:  Patient is postop day 4.  Status post coronary artery bypass grafting x4.  Patient has no complaints.    ROS  Medications:  Continuous Infusions:  Scheduled Meds:   aspirin  81 mg Oral Daily    atorvastatin  80 mg Oral QHS    chlorhexidine  10 mL Mouth/Throat BID    clopidogrel  75 mg Oral Daily    docusate sodium  100 mg Oral BID    furosemide  40 mg Intravenous BID    metoprolol tartrate  50 mg Oral BID    nozaseptin   Each Nare BID    pantoprazole  40 mg Intravenous Daily    piperacillin-tazobactam (ZOSYN) IVPB  4.5 g Intravenous Q12H    polyethylene glycol  17 g Oral Daily    potassium chloride  40 mEq Oral Once    potassium chloride  40 mEq Oral Once    [START ON 12/25/2018] vancomycin (VANCOCIN) IVPB  1,250 mg Intravenous Q72H     PRN Meds:sodium chloride, acetaminophen, albuterol sulfate, dextrose 50%, dextrose 50%, glucagon (human recombinant), glucose, glucose, insulin aspart U-100, metoclopramide HCl, ondansetron, oxyCODONE, oxyCODONE     Objective:     Vital Signs (Most Recent):  Temp: 98.3 °F (36.8 °C) (12/22/18 0744)  Pulse: 90 (12/22/18 0914)  Resp: 18 (12/22/18 0830)  BP: 108/63 (12/22/18 0744)  SpO2: (!) 93 % (12/22/18 0830) Vital Signs (24h Range):  Temp:  [96.7 °F (35.9 °C)-99 °F (37.2 °C)] 98.3 °F (36.8 °C)  Pulse:  [90-93] 90  Resp:  [12-22] 18  SpO2:   [87 %-99 %] 93 %  BP: ()/(45-73) 108/63     Weight: 109.6 kg (241 lb 10 oz)  Body mass index is 35.68 kg/m².    SpO2: (!) 93 %  O2 Device (Oxygen Therapy): nasal cannula    Intake/Output - Last 3 Shifts       12/20 0700 - 12/21 0659 12/21 0700 - 12/22 0659 12/22 0700 - 12/23 0659    P.O. 480 480     I.V. (mL/kg)       Blood  662.5     IV Piggyback 200 350     Total Intake(mL/kg) 680 (6.2) 1492.5 (13.6)     Urine (mL/kg/hr) 1431 (0.5) 1300 (0.5) 100 (0.3)    Drains       Stool       Chest Tube       Total Output 1431 1300 100    Net -751 +192.5 -100           Urine Occurrence 3 x 1 x           Lines/Drains/Airways     Line                 Pacer Wires 12/18/18 1200 3 days          Pressure Ulcer                 Negative Pressure Wound Therapy  4 days          Peripheral Intravenous Line                 Midline Catheter Insertion/Assessment  - Double Lumen 12/19/18 1500 Left basilic vein (medial side of arm) 2 days                Physical Exam   Constitutional: He is oriented to person, place, and time. He appears well-developed and well-nourished. No distress.   HENT:   Head: Normocephalic and atraumatic.   Cardiovascular: Normal rate and regular rhythm.   Pulmonary/Chest: Effort normal and breath sounds normal.   Abdominal: Soft. Bowel sounds are normal.   Musculoskeletal: He exhibits edema.   Neurological: He is alert and oriented to person, place, and time.   Skin: Skin is warm and dry. He is not diaphoretic.       Significant Labs:  All pertinent labs from the last 24 hours have been reviewed.    Significant Diagnostics:  I have reviewed all pertinent imaging results/findings within the past 24 hours.    Assessment/Plan:     * Coronary artery disease involving native coronary artery of native heart with unstable angina pectoris post ACS    The patient is a 72-year-old male with history of CAD status post stent placement in the past, hypertension, hyperlipidemia who presented with acute onset of chest pain.   Patient's workup included cardiac catheterization which showed severe multivessel coronary artery.  The risk and benefits of surgery was discussed with the patient and his wife.  The patient understands and has agreed to proceed with surgery which has been scheduled for 12/18/2018.     S/P CABG x 4    The patient is postop day 1 status post coronary artery bypass grafting x4.  Overall the patient is doing well.  Neuro:  Patient is awake alert and oriented x3.  Neuro exam is nonfocal.    Cardiac:  Patient is on low-dose vasopressin and epinephrine.  Cardiac index is good.  Wean drips to off.  And start beta-blockers.  Respiratory: Patient is maintaining good sats on nasal cannula.  GI:  Patient is started on p.o. intake.  Advanced as tolerated.  Renal:  Creatinine is 1.7.  Urine output is good.  Endocrine:  Glucose is well controlled.  Heme:  Hematocrit is 22 and platelet is 165.  Will hold off transfusions for now.  Id:  T-max is a 101.1 °.  And white count is 9.4.  Activities:  Patient is out of bed to chair.  Advanced a activities as tolerated.  Lines tubes and drains.  Discontinue chest tubes, DANNY, [place] and Cordis.  Placed mid level line and continue with pacer wires.    12/20/2018    The patient is postop day 2 status post coronary artery bypass grafting x4.  Over the past 24 hr patient has been febrile..  Neuro:  Patient is awake alert and oriented x3.  Neuro exam is nonfocal.    Cardiac:  Patient is tolerating beta-blocker.  Respiratory: Patient is maintaining good sats on nasal cannula.  GI:  Patient is started on p.o. intake.  Advanced as tolerated.  Renal:  Creatinine is 2.0  Urine output is good.  Endocrine:  Glucose is well controlled.  Heme:  Hematocrit is 22 and platelet is 158.  Will hold off transfusions for now.  Id:  T-max is a 101.7°.  And white count is 10.  Will panculture the patient.  Started on broad spectrum antibiotics.  Activities:  Patient is out of bed to chair.  Advance  activities as  tolerated.  Lines tubes and drains.  Mid level line, Pacer wires and kang    12/21/2018    The patient is postop day 3 status post coronary artery bypass grafting x4.  Overall patient is doing much better.  Fever curve is improving.  Will transfer patient to telemetry.  Neuro:  Patient is awake alert and oriented x3.  Neuro exam is nonfocal.    Cardiac:  Patient is tolerating metoprolol.  Respiratory: Patient is maintaining good sats on nasal cannula.  GI:  Patient is started on p.o. intake.  Advanced as tolerated.  Renal:  Creatinine is 2.0  Urine output is good.  Endocrine:  Glucose is well controlled.  Heme:  Hematocrit was19 and platelet is 164.  Patient transfused 2 units packed red blood cells.  Id:  T-max is a 101.3.  And white count is 9.  Cultures are pending.  The no growth to date.  Started on broad spectrum antibiotics.  Activities:  Patient is out of bed to chair.  Advance  activities as tolerated.  Lines tubes and drains.  Mid level line, Pacer wires and kang    12/22/2018    The patient is postop day 4status post coronary artery bypass grafting x4.  Overall patient is doing much better.  Anticipate discharge in the next 24-48 hours to home or to skilled nursing facility.    Neuro:  Patient is awake alert and oriented x3.  Neuro exam is nonfocal.    Cardiac:  Patient is tolerating metoprolol.    Respiratory: Patient is maintaining good sats on nasal cannula.  GI:  Patient is started on p.o. intake.  Advanced as tolerated.  Renal:  Creatinine is down to 1.8  Urine output is good.  Endocrine:  Glucose is well controlled.  Heme:  Repeat hematocrit is pending Patient transfused 2 units packed red blood cells.  Id:  Patient is afebrile cultures are no growth to date.  Started on broad spectrum antibiotics.  Activities:  Patient is out of bed to chair.  Advance  activities as tolerated.  Lines tubes and drains.  Mid level line, Pacer wires                Rg Johnson MD  Cardiothoracic Surgery  [facility] -

## 2018-12-22 NOTE — ASSESSMENT & PLAN NOTE
Patient presented to [facility] [place]- due to chest pain  EKG revealed inferior ST elevation  NTG SL x 1 given in ED  Repeat EKG revealed persistently elevation in inferior leads  IV heparin 5,000 units given in ED  No BB due to Bradycardia in ED  TTE pending  FLP pending  Patient taken to Cath Lab for emergent LHC per Dr. Monroe.  Aggrastat bolus and Infusion to follow  Further recs to follow    12/16  -CABG recommended, CVT consulted and plan for CABG on 12/18  -Continue IV heparin gtt, ASA, Statin, BB  -Start low dose ARB today  -Transfer to telemetry today  -No chest pain on exam today.   -FLP not drawn, will reorder for AM  -ECHO revealed EF 60-65%, -WMA's, normal Bi-V function.    12/17/18  -Stable overnight  -No chest pain or SOB  -Continue IV heparin  -Continue ASA, BB, statin, ARB  -CABG planned for AM    12/18/18  -s/p CABG x 4  -Hemodynamically stable  -Continue current post-op care  -ASA, Plavix, statin, BB    12/19/18  -Recovering s/p CABG x 4  -Remains fatigued/weak  -Would benefit from transfusion, defer to CVT  -Continue ASA, Plavix, statin BB  -Ambulation and IS usage      12/19/18  -Stable overnight  -H and H low, defer transfusion decision to CVT  -Continue ASA, Plavix, statin, BB    12/21/18  -Stable CV wise  -Being transfused  -Continue ASA, Plavix, statin, BB  -No ACEI/ARB given bumped creatinine  -IS usage and ambulation    12/22/18  -Stable overnight  -Continue ASA, Plavix, statin, BB  -IS usage and ambulation

## 2018-12-22 NOTE — SUBJECTIVE & OBJECTIVE
Review of Systems   Constitution: Positive for weakness and malaise/fatigue.   HENT: Negative.    Eyes: Negative.    Cardiovascular: Negative.    Respiratory: Negative.    Endocrine: Negative.    Hematologic/Lymphatic: Negative.    Skin: Negative.    Musculoskeletal: Negative.    Gastrointestinal: Negative.    Genitourinary: Negative.    Psychiatric/Behavioral: Negative.    Allergic/Immunologic: Negative.      Objective:     Vital Signs (Most Recent):  Temp: 98.3 °F (36.8 °C) (12/22/18 0744)  Pulse: 90 (12/22/18 0914)  Resp: 18 (12/22/18 0830)  BP: 108/63 (12/22/18 0744)  SpO2: (!) 93 % (12/22/18 0830) Vital Signs (24h Range):  Temp:  [96.7 °F (35.9 °C)-99 °F (37.2 °C)] 98.3 °F (36.8 °C)  Pulse:  [90-93] 90  Resp:  [12-21] 18  SpO2:  [87 %-99 %] 93 %  BP: ()/(45-73) 108/63     Weight: 109.6 kg (241 lb 10 oz)  Body mass index is 35.68 kg/m².     SpO2: (!) 93 %  O2 Device (Oxygen Therapy): nasal cannula      Intake/Output Summary (Last 24 hours) at 12/22/2018 1107  Last data filed at 12/22/2018 0913  Gross per 24 hour   Intake 1073.75 ml   Output 1200 ml   Net -126.25 ml       Lines/Drains/Airways     Line                 Pacer Wires 12/18/18 1200 3 days          Pressure Ulcer                 Negative Pressure Wound Therapy  4 days          Peripheral Intravenous Line                 Midline Catheter Insertion/Assessment  - Double Lumen 12/19/18 1500 Left basilic vein (medial side of arm) 2 days                Physical Exam   Constitutional: He is oriented to person, place, and time. He appears well-developed and well-nourished. No distress.   HENT:   Head: Normocephalic and atraumatic.   Eyes: Pupils are equal, round, and reactive to light. Right eye exhibits no discharge. Left eye exhibits no discharge.   Neck: Neck supple. No JVD present.   Cardiovascular: Normal rate, regular rhythm, S1 normal, S2 normal and normal heart sounds.   No murmur heard.  Pulmonary/Chest: Effort normal and breath sounds  normal. No respiratory distress.   CABG site C/D/I; no bleeding erythema or drainage    Diminished BS   Abdominal: Soft. He exhibits no distension. There is no tenderness. There is no rebound.   Musculoskeletal: He exhibits no edema.   Neurological: He is alert and oriented to person, place, and time.   Skin: Skin is warm and dry. He is not diaphoretic. No erythema.   Psychiatric: He has a normal mood and affect. His behavior is normal. Thought content normal.   Nursing note and vitals reviewed.      Significant Labs:   CMP   Recent Labs   Lab 12/20/18  1616 12/21/18  0335 12/22/18  0557   * 136  136 137   K 4.3 3.8  3.8 3.5   CL 95 94*  94* 92*   CO2 31* 33*  33* 36*    106  106 95   BUN 29* 31*  31* 31*   CREATININE 2.0* 2.0*  2.0* 1.8*   CALCIUM 8.6* 8.3*  8.3* 8.3*   PROT  --  5.7* 6.0   ALBUMIN  --  2.7* 2.7*   BILITOT  --  1.0 1.2*   ALKPHOS  --  21* 27*   AST  --  39 29   ALT  --  27 24   ANIONGAP 9 9  9 9   ESTGFRAFRICA 37* 37*  37* 43*   EGFRNONAA 32* 32*  32* 37*   , CBC   Recent Labs   Lab 12/21/18  0335 12/21/18  0515   WBC 10.06 9.75   HGB 6.4* 6.1*   HCT 19.8* 19.1*    164   , Troponin No results for input(s): TROPONINI in the last 48 hours. and All pertinent lab results from the last 24 hours have been reviewed.    Significant Imaging: Echocardiogram:   2D echo with color flow doppler:   Results for orders placed or performed during the hospital encounter of 12/15/18   2D echo with color flow doppler   Result Value Ref Range    QEF 60 55 - 65    Diastolic Dysfunction No

## 2018-12-22 NOTE — PLAN OF CARE
Problem: Adult Inpatient Plan of Care  Goal: Plan of Care Review  Outcome: Ongoing (interventions implemented as appropriate)  Pt transferred from ICU to room 237, via wheelchair, in no apparent distress. Assisted from wheelchair to bedside cardiac chair without issue.   Wound vac in place and in process. Pacemaker in use.  Tele monitor placed in ICU, rhythm verified by tele monitor tech.   Assessment and vitals documented.  Oriented pt and wife to room, bed, call light.  Discussed safety issues such as use of cardiac pillow to splint when sitting, standing or coughing; bed alarm when in bed. Also discussed being in cardiac chair for all meals and ambulating in merchant x5 daily.

## 2018-12-22 NOTE — ASSESSMENT & PLAN NOTE
The patient is postop day 1 status post coronary artery bypass grafting x4.  Overall the patient is doing well.  Neuro:  Patient is awake alert and oriented x3.  Neuro exam is nonfocal.    Cardiac:  Patient is on low-dose vasopressin and epinephrine.  Cardiac index is good.  Wean drips to off.  And start beta-blockers.  Respiratory: Patient is maintaining good sats on nasal cannula.  GI:  Patient is started on p.o. intake.  Advanced as tolerated.  Renal:  Creatinine is 1.7.  Urine output is good.  Endocrine:  Glucose is well controlled.  Heme:  Hematocrit is 22 and platelet is 165.  Will hold off transfusions for now.  Id:  T-max is a 101.1 °.  And white count is 9.4.  Activities:  Patient is out of bed to chair.  Advanced a activities as tolerated.  Lines tubes and drains.  Discontinue chest tubes, DANNY, [place] and Cordis.  Placed mid level line and continue with pacer wires.    12/20/2018    The patient is postop day 2 status post coronary artery bypass grafting x4.  Over the past 24 hr patient has been febrile..  Neuro:  Patient is awake alert and oriented x3.  Neuro exam is nonfocal.    Cardiac:  Patient is tolerating beta-blocker.  Respiratory: Patient is maintaining good sats on nasal cannula.  GI:  Patient is started on p.o. intake.  Advanced as tolerated.  Renal:  Creatinine is 2.0  Urine output is good.  Endocrine:  Glucose is well controlled.  Heme:  Hematocrit is 22 and platelet is 158.  Will hold off transfusions for now.  Id:  T-max is a 101.7°.  And white count is 10.  Will panculture the patient.  Started on broad spectrum antibiotics.  Activities:  Patient is out of bed to chair.  Advance  activities as tolerated.  Lines tubes and drains.  Mid level line, Pacer wires and kang    12/21/2018    The patient is postop day 3 status post coronary artery bypass grafting x4.  Overall patient is doing much better.  Fever curve is improving.  Will transfer patient to telemetry.  Neuro:  Patient is awake alert and  oriented x3.  Neuro exam is nonfocal.    Cardiac:  Patient is tolerating metoprolol.  Respiratory: Patient is maintaining good sats on nasal cannula.  GI:  Patient is started on p.o. intake.  Advanced as tolerated.  Renal:  Creatinine is 2.0  Urine output is good.  Endocrine:  Glucose is well controlled.  Heme:  Hematocrit was19 and platelet is 164.  Patient transfused 2 units packed red blood cells.  Id:  T-max is a 101.3.  And white count is 9.  Cultures are pending.  The no growth to date.  Started on broad spectrum antibiotics.  Activities:  Patient is out of bed to chair.  Advance  activities as tolerated.  Lines tubes and drains.  Mid level line, Pacer wires and kang    12/22/2018    The patient is postop day 4status post coronary artery bypass grafting x4.  Overall patient is doing much better.  Anticipate discharge in the next 24-48 hours to home or to skilled nursing facility.    Neuro:  Patient is awake alert and oriented x3.  Neuro exam is nonfocal.    Cardiac:  Patient is tolerating metoprolol.    Respiratory: Patient is maintaining good sats on nasal cannula.  GI:  Patient is started on p.o. intake.  Advanced as tolerated.  Renal:  Creatinine is down to 1.8  Urine output is good.  Endocrine:  Glucose is well controlled.  Heme:  Repeat hematocrit is pending Patient transfused 2 units packed red blood cells.  Id:  Patient is afebrile cultures are no growth to date.  Started on broad spectrum antibiotics.  Activities:  Patient is out of bed to chair.  Advance  activities as tolerated.  Lines tubes and drains.  Mid level line, Pacer wires

## 2018-12-23 LAB
ALBUMIN SERPL BCP-MCNC: 2.8 G/DL
ALP SERPL-CCNC: 31 U/L
ALT SERPL W/O P-5'-P-CCNC: 23 U/L
ANION GAP SERPL CALC-SCNC: 11 MMOL/L
ANION GAP SERPL CALC-SCNC: 13 MMOL/L
AST SERPL-CCNC: 29 U/L
BASOPHILS # BLD AUTO: 0.04 K/UL
BASOPHILS NFR BLD: 0.6 %
BILIRUB SERPL-MCNC: 1.4 MG/DL
BUN SERPL-MCNC: 26 MG/DL
BUN SERPL-MCNC: 27 MG/DL
CALCIUM SERPL-MCNC: 8.7 MG/DL
CALCIUM SERPL-MCNC: 8.7 MG/DL
CHLORIDE SERPL-SCNC: 96 MMOL/L
CHLORIDE SERPL-SCNC: 97 MMOL/L
CO2 SERPL-SCNC: 27 MMOL/L
CO2 SERPL-SCNC: 28 MMOL/L
CREAT SERPL-MCNC: 1.6 MG/DL
CREAT SERPL-MCNC: 1.6 MG/DL
DIFFERENTIAL METHOD: ABNORMAL
EOSINOPHIL # BLD AUTO: 0.2 K/UL
EOSINOPHIL NFR BLD: 2.4 %
ERYTHROCYTE [DISTWIDTH] IN BLOOD BY AUTOMATED COUNT: 14.9 %
EST. GFR  (AFRICAN AMERICAN): 49 ML/MIN/1.73 M^2
EST. GFR  (AFRICAN AMERICAN): 49 ML/MIN/1.73 M^2
EST. GFR  (NON AFRICAN AMERICAN): 42 ML/MIN/1.73 M^2
EST. GFR  (NON AFRICAN AMERICAN): 42 ML/MIN/1.73 M^2
GLUCOSE SERPL-MCNC: 90 MG/DL
GLUCOSE SERPL-MCNC: 91 MG/DL
HCT VFR BLD AUTO: 30.6 %
HGB BLD-MCNC: 10 G/DL
LYMPHOCYTES # BLD AUTO: 1 K/UL
LYMPHOCYTES NFR BLD: 15.2 %
MAGNESIUM SERPL-MCNC: 2.3 MG/DL
MCH RBC QN AUTO: 29.7 PG
MCHC RBC AUTO-ENTMCNC: 32.7 G/DL
MCV RBC AUTO: 91 FL
MONOCYTES # BLD AUTO: 0.7 K/UL
MONOCYTES NFR BLD: 10.5 %
NEUTROPHILS # BLD AUTO: 4.5 K/UL
NEUTROPHILS NFR BLD: 71.3 %
PLATELET # BLD AUTO: 288 K/UL
PMV BLD AUTO: 11.1 FL
POCT GLUCOSE: 102 MG/DL (ref 70–110)
POCT GLUCOSE: 105 MG/DL (ref 70–110)
POCT GLUCOSE: 111 MG/DL (ref 70–110)
POCT GLUCOSE: 119 MG/DL (ref 70–110)
POTASSIUM SERPL-SCNC: 4 MMOL/L
POTASSIUM SERPL-SCNC: 4.1 MMOL/L
PROT SERPL-MCNC: 6.5 G/DL
RBC # BLD AUTO: 3.37 M/UL
SODIUM SERPL-SCNC: 136 MMOL/L
SODIUM SERPL-SCNC: 136 MMOL/L
VANCOMYCIN SERPL-MCNC: 17.8 UG/ML
WBC # BLD AUTO: 6.26 K/UL

## 2018-12-23 PROCEDURE — 25000003 PHARM REV CODE 250: Mod: HCNC | Performed by: THORACIC SURGERY (CARDIOTHORACIC VASCULAR SURGERY)

## 2018-12-23 PROCEDURE — 94664 DEMO&/EVAL PT USE INHALER: CPT | Mod: HCNC

## 2018-12-23 PROCEDURE — 63600175 PHARM REV CODE 636 W HCPCS: Mod: HCNC | Performed by: THORACIC SURGERY (CARDIOTHORACIC VASCULAR SURGERY)

## 2018-12-23 PROCEDURE — 85025 COMPLETE CBC W/AUTO DIFF WBC: CPT | Mod: HCNC

## 2018-12-23 PROCEDURE — 27000221 HC OXYGEN, UP TO 24 HOURS: Mod: HCNC

## 2018-12-23 PROCEDURE — 36415 COLL VENOUS BLD VENIPUNCTURE: CPT | Mod: HCNC

## 2018-12-23 PROCEDURE — 99900035 HC TECH TIME PER 15 MIN (STAT): Mod: HCNC

## 2018-12-23 PROCEDURE — 99233 SBSQ HOSP IP/OBS HIGH 50: CPT | Mod: HCNC,,, | Performed by: INTERNAL MEDICINE

## 2018-12-23 PROCEDURE — 99231 SBSQ HOSP IP/OBS SF/LOW 25: CPT | Mod: HCNC,,, | Performed by: INTERNAL MEDICINE

## 2018-12-23 PROCEDURE — 80048 BASIC METABOLIC PNL TOTAL CA: CPT | Mod: HCNC

## 2018-12-23 PROCEDURE — 83735 ASSAY OF MAGNESIUM: CPT | Mod: HCNC

## 2018-12-23 PROCEDURE — 94761 N-INVAS EAR/PLS OXIMETRY MLT: CPT | Mod: HCNC

## 2018-12-23 PROCEDURE — 94799 UNLISTED PULMONARY SVC/PX: CPT | Mod: HCNC

## 2018-12-23 PROCEDURE — 80053 COMPREHEN METABOLIC PANEL: CPT | Mod: HCNC

## 2018-12-23 PROCEDURE — 25000003 PHARM REV CODE 250: Mod: HCNC | Performed by: PHYSICIAN ASSISTANT

## 2018-12-23 PROCEDURE — 21400001 HC TELEMETRY ROOM: Mod: HCNC

## 2018-12-23 PROCEDURE — 99231 PR SUBSEQUENT HOSPITAL CARE,LEVL I: ICD-10-PCS | Mod: HCNC,,, | Performed by: INTERNAL MEDICINE

## 2018-12-23 PROCEDURE — 80202 ASSAY OF VANCOMYCIN: CPT | Mod: HCNC

## 2018-12-23 PROCEDURE — 99233 PR SUBSEQUENT HOSPITAL CARE,LEVL III: ICD-10-PCS | Mod: HCNC,,, | Performed by: INTERNAL MEDICINE

## 2018-12-23 RX ORDER — PANTOPRAZOLE SODIUM 40 MG/1
40 TABLET, DELAYED RELEASE ORAL DAILY
Status: DISCONTINUED | OUTPATIENT
Start: 2018-12-23 | End: 2018-12-25 | Stop reason: HOSPADM

## 2018-12-23 RX ADMIN — OXYCODONE HYDROCHLORIDE 5 MG: 5 TABLET ORAL at 09:12

## 2018-12-23 RX ADMIN — DOCUSATE SODIUM 100 MG: 100 CAPSULE, LIQUID FILLED ORAL at 09:12

## 2018-12-23 RX ADMIN — METOPROLOL TARTRATE 50 MG: 50 TABLET ORAL at 08:12

## 2018-12-23 RX ADMIN — METOPROLOL TARTRATE 50 MG: 50 TABLET ORAL at 09:12

## 2018-12-23 RX ADMIN — DOCUSATE SODIUM 100 MG: 100 CAPSULE, LIQUID FILLED ORAL at 08:12

## 2018-12-23 RX ADMIN — CLOPIDOGREL BISULFATE 75 MG: 75 TABLET ORAL at 09:12

## 2018-12-23 RX ADMIN — CHLORHEXIDINE GLUCONATE 10 ML: 1.2 RINSE ORAL at 09:12

## 2018-12-23 RX ADMIN — OXYCODONE HYDROCHLORIDE 5 MG: 5 TABLET ORAL at 05:12

## 2018-12-23 RX ADMIN — ASPIRIN 81 MG: 81 TABLET, COATED ORAL at 09:12

## 2018-12-23 RX ADMIN — ATORVASTATIN CALCIUM 80 MG: 40 TABLET, FILM COATED ORAL at 08:12

## 2018-12-23 RX ADMIN — PIPERACILLIN SODIUM AND TAZOBACTAM SODIUM 4.5 G: 4; .5 INJECTION, POWDER, LYOPHILIZED, FOR SOLUTION INTRAVENOUS at 09:12

## 2018-12-23 RX ADMIN — PIPERACILLIN SODIUM AND TAZOBACTAM SODIUM 4.5 G: 4; .5 INJECTION, POWDER, LYOPHILIZED, FOR SOLUTION INTRAVENOUS at 08:12

## 2018-12-23 RX ADMIN — PANTOPRAZOLE SODIUM 40 MG: 40 TABLET, DELAYED RELEASE ORAL at 09:12

## 2018-12-23 RX ADMIN — FUROSEMIDE 40 MG: 10 INJECTION, SOLUTION INTRAMUSCULAR; INTRAVENOUS at 08:12

## 2018-12-23 RX ADMIN — FUROSEMIDE 40 MG: 10 INJECTION, SOLUTION INTRAMUSCULAR; INTRAVENOUS at 09:12

## 2018-12-23 NOTE — PROGRESS NOTES
[facility]  Cardiology  Progress Note    Patient Name: Filipe Agustin Jr.  MRN: [id number]  Admission Date: 12/15/2018  Hospital Length of Stay: 8 days  Code Status: Full Code   Attending Physician: Rg Johnson MD   Primary Care Physician: Jojo Duque DO  Expected Discharge Date:   Principal Problem:Coronary artery disease involving native coronary artery of native heart with unstable angina pectoris    Subjective:   HPI:  Mr. Agustin is a 72 year old male with PMHx of CAD s/p coronary stenting at [facility] per patient report, HTN, HLP who presented to [facility] ED due to chest pain which started abruptly this AM. Initial EKG revealed ST elevated in inferior leads, CODE STEMI activated at that time. Patient received IV heparin bolus and NTG SL x 1 and repeat EKG which continued to reveal ST elevation in inferior leads. Patient transported to cath lab for emergent LHC per Dr. Monroe. IV aggrastat bolus per Dr. Monroe's recommendations. TTE pending. Further recs to follow. Labs pending.     Hospital Course:   12/16/18-Patient seen and examined in ICU, sitting in bedside chair. Denies chest pain or shortness of breath today on exam. IV heparin gtt in progress. Pending CABG on 12/18 per CVT. Labs reviewed, stable.     12/17/18-Patient seen and examined today, resting in bed. No acute events overnight. Denies chest pain or SOB. Labs stable. CABG planned for AM.    12/18/18-Patient seen and examined today, s/p CABG x 4. Hemodynamically stable.     12/19/18-Patient seen and examined today, POD # 1 s/p CABG x 4. Feels ok. Complains of fatigue and incisional soreness. Labs reviewed. Creatinine 1.7. H and H low.     12/20/18-Patient seen and examined today, POD # 2 s/p CABG x 4. Feels fatigued and weak. Pain fairly well controlled. No SOB. Labs reviewed. Creatinine bumped to 1.9. H and H remains low (7.4/22).    12/21/18-Patient seen and examined today, POD # 3 s/p CABG x 4. Feels ok. Still complains of fatigue.  No chest pain or SOB. Being transfused. Creatinine 2.0.    12/22/18-Patient seen and examined today, POD # 4 s/p CABG x 4. Feeling ok. Still weak. Denies pain. Labs stable. Needs to ambulate.     12/23/18-Patient seen and examined today, POD # 5 s/p CABG x 4. Still complains of fatigue and weakness. No chest pain or SOB. Ambulated yesterday. Labs stable.         Review of Systems   Constitution: Positive for weakness and malaise/fatigue.   HENT: Negative.    Eyes: Negative.    Cardiovascular: Negative.    Respiratory: Negative.    Endocrine: Negative.    Hematologic/Lymphatic: Negative.    Skin: Negative.    Musculoskeletal: Negative.    Gastrointestinal: Negative.    Genitourinary: Negative.    Psychiatric/Behavioral: Negative.    Allergic/Immunologic: Negative.      Objective:     Vital Signs (Most Recent):  Temp: 98.4 °F (36.9 °C) (12/23/18 1103)  Pulse: 78 (12/23/18 1103)  Resp: 17 (12/23/18 1103)  BP: 113/61 (12/23/18 1103)  SpO2: 97 % (12/23/18 1103) Vital Signs (24h Range):  Temp:  [98.3 °F (36.8 °C)-99.3 °F (37.4 °C)] 98.4 °F (36.9 °C)  Pulse:  [78-92] 78  Resp:  [16-20] 17  SpO2:  [88 %-97 %] 97 %  BP: (105-131)/(61-76) 113/61     Weight: 108.3 kg (238 lb 12.1 oz)  Body mass index is 35.26 kg/m².     SpO2: 97 %  O2 Device (Oxygen Therapy): nasal cannula      Intake/Output Summary (Last 24 hours) at 12/23/2018 1134  Last data filed at 12/23/2018 0800  Gross per 24 hour   Intake 920 ml   Output 2450 ml   Net -1530 ml       Lines/Drains/Airways     Line                 Pacer Wires 12/18/18 1200 4 days          Pressure Ulcer                 Negative Pressure Wound Therapy  5 days          Peripheral Intravenous Line                 Midline Catheter Insertion/Assessment  - Double Lumen 12/19/18 1500 Left basilic vein (medial side of arm) 3 days                Physical Exam   Constitutional: He is oriented to person, place, and time. He appears well-developed and well-nourished. No distress.   HENT:   Head:  Normocephalic and atraumatic.   Eyes: Pupils are equal, round, and reactive to light. Right eye exhibits no discharge. Left eye exhibits no discharge.   Neck: Neck supple. No JVD present.   Cardiovascular: Normal rate, regular rhythm, S1 normal, S2 normal and normal heart sounds.   No murmur heard.  Pulmonary/Chest: Effort normal and breath sounds normal. No respiratory distress.   CABG site C/D/I; no bleeding erythema or drainage    Diminished BS at bases   Abdominal: Soft. He exhibits no distension. There is no tenderness. There is no rebound.   Musculoskeletal: He exhibits edema (BLE).   Neurological: He is alert and oriented to person, place, and time.   Skin: Skin is warm and dry. He is not diaphoretic. No erythema.   Psychiatric: He has a normal mood and affect. His behavior is normal.   Nursing note and vitals reviewed.      Significant Labs:   CMP   Recent Labs   Lab 12/22/18  0557 12/23/18  0508    136  136   K 3.5 4.1  4.0   CL 92* 96  97   CO2 36* 27  28   GLU 95 90  91   BUN 31* 27*  26*   CREATININE 1.8* 1.6*  1.6*   CALCIUM 8.3* 8.7  8.7   PROT 6.0 6.5   ALBUMIN 2.7* 2.8*   BILITOT 1.2* 1.4*   ALKPHOS 27* 31*   AST 29 29   ALT 24 23   ANIONGAP 9 13  11   ESTGFRAFRICA 43* 49*  49*   EGFRNONAA 37* 42*  42*   , CBC   Recent Labs   Lab 12/22/18  1018 12/23/18  0508   WBC 7.60 6.26   HGB 9.1* 10.0*   HCT 27.6* 30.6*    288   , Troponin No results for input(s): TROPONINI in the last 48 hours. and All pertinent lab results from the last 24 hours have been reviewed.    Significant Imaging: Echocardiogram:   2D echo with color flow doppler:   Results for orders placed or performed during the hospital encounter of 12/15/18   2D echo with color flow doppler   Result Value Ref Range    QEF 60 55 - 65    Diastolic Dysfunction No     and X-Ray: CXR: X-Ray Chest 1 View (CXR): No results found for this visit on 12/15/18. and X-Ray Chest PA and Lateral (CXR): No results found for this visit on  12/15/18.    Assessment and Plan:     Patient recovering slowly s/p CABG x 4. Continue current meds and post-op care/mgmt.    * Coronary artery disease involving native coronary artery of native heart with unstable angina pectoris post ACS    -See plan under CABG     S/P CABG x 4    Patient presented to [facility] due to chest pain  EKG revealed inferior ST elevation  NTG SL x 1 given in ED  Repeat EKG revealed persistently elevation in inferior leads  IV heparin 5,000 units given in ED  No BB due to Bradycardia in ED  TTE pending  FLP pending  Patient taken to Cath Lab for emergent LHC per Dr. Monroe.  Aggrastat bolus and Infusion to follow  Further recs to follow    12/16  -CABG recommended, CVT consulted and plan for CABG on 12/18  -Continue IV heparin gtt, ASA, Statin, BB  -Start low dose ARB today  -Transfer to telemetry today  -No chest pain on exam today.   -FLP not drawn, will reorder for AM  -ECHO revealed EF 60-65%, -WMA's, normal Bi-V function.    12/17/18  -Stable overnight  -No chest pain or SOB  -Continue IV heparin  -Continue ASA, BB, statin, ARB  -CABG planned for AM    12/18/18  -s/p CABG x 4  -Hemodynamically stable  -Continue current post-op care  -ASA, Plavix, statin, BB    12/19/18  -Recovering s/p CABG x 4  -Remains fatigued/weak  -Would benefit from transfusion, defer to CVT  -Continue ASA, Plavix, statin BB  -Ambulation and IS usage      12/19/18  -Stable overnight  -H and H low, defer transfusion decision to CVT  -Continue ASA, Plavix, statin, BB    12/21/18  -Stable CV wise  -Being transfused  -Continue ASA, Plavix, statin, BB  -No ACEI/ARB given bumped creatinine  -IS usage and ambulation    12/22/18  -Stable overnight  -Continue ASA, Plavix, statin, BB  -IS usage and ambulation    12/23/18  -Remains stable  -Continue ASA, Plavix, statin, BB  -IS usage and ambulation     CKD (chronic kidney disease) stage 3, GFR 30-59 ml/min    -Monitor       Hypertension goal BP (blood pressure) < 140/90     On medical therapy  Continue BB  Start ARB today     Severe obesity with body mass index (BMI) of 35.0 to 39.9 with serious comorbidity    -Encourage weight loss         VTE Risk Mitigation (From admission, onward)        Ordered     IP VTE HIGH RISK PATIENT  Once      12/22/18 0800     Place NARCISA hose  Until discontinued      12/22/18 0800     Place sequential compression device  Until discontinued      12/22/18 0800     Place NARCISA hose  Until discontinued      12/18/18 1249     Place sequential compression device  Until discontinued      12/18/18 1249     heparin 25,000 units in dextrose 5% 250 mL (100 units/mL) infusion LOW INTENSITY nomogram - [facility]  Continuous      12/15/18 1121     heparin (porcine) injection 5,000 Units  ED 1 Time      12/15/18 0931          Cheryl Farrell PA-C  Cardiology  [facility]     Chart reviewed. Dr. Aguilera examined patient and agrees with plan as outlined above.

## 2018-12-23 NOTE — PLAN OF CARE
Problem: Adult Inpatient Plan of Care  Goal: Plan of Care Review  Outcome: Ongoing (interventions implemented as appropriate)  Plan of care reviewed with the patient and his wife, both verbalized understanding.  Patient remains in a paced rhythm with temporary pacemaker.  Patient's wound vac to midline chest incision remains in placed and sealed.  Patient encouraged to use IS, cough and deep breath, he remains on 3L of oxygen.  Patient ambulated in merchant twice this shift, with much encouragement from staff to ambulate.  Patient's pain assessed and treated as needed.

## 2018-12-23 NOTE — ASSESSMENT & PLAN NOTE
[place] [facility] [place] to [place]  EKG revealed inferior ST elevation  NTG SL x 1 given in ED  Repeat EKG revealed persistently elevation in inferior leads  IV heparin 5,000 units given in ED  No BB due to Bradycardia in ED  TTE pending  FLP pending  Patient taken to Cath Lab for emergent LHC per Dr. Monroe.  Aggrastat bolus and Infusion to follow  Further recs to follow    12/16  -CABG recommended, CVT consulted and plan for CABG on 12/18  -Continue IV heparin gtt, ASA, Statin, BB  -Start low dose ARB today  -Transfer to telemetry today  -No chest pain on exam today.   -FLP not drawn, will reorder for AM  -ECHO revealed EF 60-65%, -WMA's, normal Bi-V function.    12/17/18  -Stable overnight  -No chest pain or SOB  -Continue IV heparin  -Continue ASA, BB, statin, ARB  -CABG planned for AM    12/18/18  -s/p CABG x 4  -Hemodynamically stable  -Continue current post-op care  -ASA, Plavix, statin, BB    12/19/18  -Recovering s/p CABG x 4  -Remains fatigued/weak  -Would benefit from transfusion, defer to CVT  -Continue ASA, Plavix, statin BB  -Ambulation and IS usage      12/19/18  -Stable overnight  -H and H low, defer transfusion decision to CVT  -Continue ASA, Plavix, statin, BB    12/21/18  -Stable CV wise  -Being transfused  -Continue ASA, Plavix, statin, BB  -No ACEI/ARB given bumped creatinine  -IS usage and ambulation    12/22/18  -Stable overnight  -Continue ASA, Plavix, statin, BB  -IS usage and ambulation    12/23/18  -Remains stable  -Continue ASA, Plavix, statin, BB  -IS usage and ambulation

## 2018-12-23 NOTE — PLAN OF CARE
Problem: Physical Therapy Goal  Goal: Physical Therapy Goal  LTG'S TO BE MET IN 7 DAYS (12-26-18)  1. PT WILL REQUIRE CGA FOR BED MOBILITY  2. PT WILL REQUIRE SPV FOR TF'S  3. PT WILL ' WITH SPV  4. PT WILL DEMO G DYNAMIC BALANCE DURING GAIT   Outcome: Ongoing (interventions implemented as appropriate)  12/22/18: Patient was sitting up in chair. Sit to stand x multilpe reps min A with cues for sternal precautions. Patient amb from chair to bed x 4ft min A. Sit to supine min A per sternal precautions. Stood multiple times >2 min total with patient c/o le weakness and fatigue

## 2018-12-23 NOTE — ASSESSMENT & PLAN NOTE
The patient is postop day 1 status post coronary artery bypass grafting x4.  Overall the patient is doing well.  Neuro:  Patient is awake alert and oriented x3.  Neuro exam is nonfocal.    Cardiac:  Patient is on low-dose vasopressin and epinephrine.  Cardiac index is good.  Wean drips to off.  And start beta-blockers.  Respiratory: Patient is maintaining good sats on nasal cannula.  GI:  Patient is started on p.o. intake.  Advanced as tolerated.  Renal:  Creatinine is 1.7.  Urine output is good.  Endocrine:  Glucose is well controlled.  Heme:  Hematocrit is 22 and platelet is 165.  Will hold off transfusions for now.  Id:  T-max is a 101.1 °.  And white count is 9.4.  Activities:  Patient is out of bed to chair.  Advanced a activities as tolerated.  Lines tubes and drains.  Discontinue chest tubes, DANNY, Mercedita and Cordis.  Placed mid level line and continue with pacer wires.    12/20/2018    The patient is postop day 2 status post coronary artery bypass grafting x4.  Over the past 24 hr patient has been febrile..  Neuro:  Patient is awake alert and oriented x3.  Neuro exam is nonfocal.    Cardiac:  Patient is tolerating beta-blocker.  Respiratory: Patient is maintaining good sats on nasal cannula.  GI:  Patient is started on p.o. intake.  Advanced as tolerated.  Renal:  Creatinine is 2.0  Urine output is good.  Endocrine:  Glucose is well controlled.  Heme:  Hematocrit is 22 and platelet is 158.  Will hold off transfusions for now.  Id:  T-max is a 101.7°.  And white count is 10.  Will panculture the patient.  Started on broad spectrum antibiotics.  Activities:  Patient is out of bed to chair.  Advance  activities as tolerated.  Lines tubes and drains.  Mid level line, Pacer wires and kang    12/21/2018    The patient is postop day 3 status post coronary artery bypass grafting x4.  Overall patient is doing much better.  Fever curve is improving.  Will transfer patient to telemetry.  Neuro:  Patient is awake alert and  oriented x3.  Neuro exam is nonfocal.    Cardiac:  Patient is tolerating metoprolol.  Respiratory: Patient is maintaining good sats on nasal cannula.  GI:  Patient is started on p.o. intake.  Advanced as tolerated.  Renal:  Creatinine is 2.0  Urine output is good.  Endocrine:  Glucose is well controlled.  Heme:  Hematocrit was19 and platelet is 164.  Patient transfused 2 units packed red blood cells.  Id:  T-max is a 101.3.  And white count is 9.  Cultures are pending.  The no growth to date.  Started on broad spectrum antibiotics.  Activities:  Patient is out of bed to chair.  Advance  activities as tolerated.  Lines tubes and drains.  Mid level line, Pacer wires and kang    12/22/2018    The patient is postop day 4status post coronary artery bypass grafting x4.  Overall patient is doing much better.  Anticipate discharge in the next 24-48 hours to home or to skilled nursing facility.    Neuro:  Patient is awake alert and oriented x3.  Neuro exam is nonfocal.    Cardiac:  Patient is tolerating metoprolol.    Respiratory: Patient is maintaining good sats on nasal cannula.  GI:  Patient is started on p.o. intake.  Advanced as tolerated.  Renal:  Creatinine is down to 1.8  Urine output is good.  Endocrine:  Glucose is well controlled.  Heme:  Repeat hematocrit is pending Patient transfused 2 units packed red blood cells.  Id:  Patient is afebrile cultures are no growth to date.  Started on broad spectrum antibiotics.  Activities:  Patient is out of bed to chair.  Advance  activities as tolerated.  Lines tubes and drains.  Mid level line, Pacer wires     12/23/2018    The patient is postop day 5 status post coronary artery bypass grafting x4.  Overall patient is doing much better.  Anticipate discharge in the next 24-48 hours to home or to skilled nursing facility.    Neuro:  Patient is awake alert and oriented x3.  Neuro exam is nonfocal.    Cardiac:  Patient is tolerating metoprolol.    Respiratory: Patient has been  weaned down to room air.  Continue pulmonary toilet continue incentive spirometry.  GI:  Patient is tolerating a regular diet.  Renal:  Creatinine i is down to 1.6.  Urine output is good.  Endocrine:  Glucose is well controlled.  Heme:  Hematocrit is 30 and platelets are 288.    Id:  Patient is afebrile. cultures are no growth to date.  Final results of cultures pending.  Continue antibiotics for now.  Activities:  Patient is out of bed to chair.  Advance  activities as tolerated.  Lines tubes and drains.  Patient has a mid level line.

## 2018-12-23 NOTE — PROGRESS NOTES
Home Oxygen Evaluation    Date Performed: 12/23/2018    1) Patient's Home O2 Sat on room air, while at rest: 85        If O2 sats on room air at rest are 88% or below, patient qualifies. No additional testing needed. Document N/A in steps 2 and 3. If 89% or above, complete steps 2.      2) Patient's O2 Sat on room air while exercising: na        If O2 sats on room air while exercising remain 89% or above patient does not qualify, no further testing needed Document N/A in step 3. If O2 sats on room air while exercising are 88% or below, continue to step 3.      3) Patient's O2 Sat while exercising on O2: 94 at 3 LPM         (Must show improvement from #2 for patients to qualify)    If O2 sats improve on oxygen, patient qualifies for portable oxygen. If not, the patient does not qualify.

## 2018-12-23 NOTE — PROGRESS NOTES
Pharmacist Intervention IV to PO Note    Filipe Agustin Jr. is a 73 y.o. male being treated with IV medication : protonix    Patient Data:    Vital Signs (Most Recent):  Temp: 99.3 °F (37.4 °C) (12/23/18 0421)  Pulse: 90 (12/23/18 0527)  Resp: 18 (12/23/18 0421)  BP: 128/66 (12/23/18 0421)  SpO2: (!) 93 % (12/23/18 0421)   Vital Signs (72h Range):  Temp:  [96.7 °F (35.9 °C)-101.3 °F (38.5 °C)]   Pulse:  [90-93]   Resp:  [11-33]   BP: ()/(45-76)   SpO2:  [87 %-99 %]      CBC:  Recent Labs   Lab 12/21/18  0335 12/21/18  0515 12/22/18  1018   WBC 10.06 9.75 7.60   RBC 2.18* 2.11* 3.08*   HGB 6.4* 6.1* 9.1*   HCT 19.8* 19.1* 27.6*    164 249   MCV 91 91 90   MCH 29.4 28.9 29.5   MCHC 32.3 31.9* 33.0     CMP:     Recent Labs   Lab 12/21/18  0335 12/22/18  0557 12/23/18  0508     106 95 90  91   CALCIUM 8.3*  8.3* 8.3* 8.7  8.7   ALBUMIN 2.7* 2.7* 2.8*   PROT 5.7* 6.0 6.5     136 137 136  136   K 3.8  3.8 3.5 4.1  4.0   CO2 33*  33* 36* 27  28   CL 94*  94* 92* 96  97   BUN 31*  31* 31* 27*  26*   CREATININE 2.0*  2.0* 1.8* 1.6*  1.6*   ALKPHOS 21* 27* 31*   ALT 27 24 23   AST 39 29 29   BILITOT 1.0 1.2* 1.4*       Dietary Orders:  Diet Orders            Diet Cardiac: Cardiac starting at 12/19 1129            Based on the following criteria, this patient qualifies for intravenous to oral conversion:  [x] The patients gastrointestinal tract is functioning (tolerating medications via oral or enteral route for 24 hours and tolerating food or enteral feeds for 24 hours).  [x] The patient is hemodynamically stable for 24 hours (heart rate <100 beats per minute, systolic blood pressure >99 mm Hg, and respiratory rate <20 breaths per minute).  [x] The patient shows clinical improvement (afebrile for at least 24 hours and white blood cell count downtrending or normalized). Additionally, the patient must be non-neutropenic (absolute neutrophil count >500 cells/mm3).  [x] For  antimicrobials, the patient has received IV therapy for at least 24 hours.    IV medication protonix 40 mg daily will be changed to oral medication : protonix 40mg daily    Pharmacist's Name: Alvarez Kurtz  Pharmacist's Extension: [phone]

## 2018-12-23 NOTE — SUBJECTIVE & OBJECTIVE
Review of Systems   Constitution: Positive for weakness and malaise/fatigue.   HENT: Negative.    Eyes: Negative.    Cardiovascular: Negative.    Respiratory: Negative.    Endocrine: Negative.    Hematologic/Lymphatic: Negative.    Skin: Negative.    Musculoskeletal: Negative.    Gastrointestinal: Negative.    Genitourinary: Negative.    Psychiatric/Behavioral: Negative.    Allergic/Immunologic: Negative.      Objective:     Vital Signs (Most Recent):  Temp: 98.4 °F (36.9 °C) (12/23/18 1103)  Pulse: 78 (12/23/18 1103)  Resp: 17 (12/23/18 1103)  BP: 113/61 (12/23/18 1103)  SpO2: 97 % (12/23/18 1103) Vital Signs (24h Range):  Temp:  [98.3 °F (36.8 °C)-99.3 °F (37.4 °C)] 98.4 °F (36.9 °C)  Pulse:  [78-92] 78  Resp:  [16-20] 17  SpO2:  [88 %-97 %] 97 %  BP: (105-131)/(61-76) 113/61     Weight: 108.3 kg (238 lb 12.1 oz)  Body mass index is 35.26 kg/m².     SpO2: 97 %  O2 Device (Oxygen Therapy): nasal cannula      Intake/Output Summary (Last 24 hours) at 12/23/2018 1134  Last data filed at 12/23/2018 0800  Gross per 24 hour   Intake 920 ml   Output 2450 ml   Net -1530 ml       Lines/Drains/Airways     Line                 Pacer Wires 12/18/18 1200 4 days          Pressure Ulcer                 Negative Pressure Wound Therapy  5 days          Peripheral Intravenous Line                 Midline Catheter Insertion/Assessment  - Double Lumen 12/19/18 1500 Left basilic vein (medial side of arm) 3 days                Physical Exam   Constitutional: He is oriented to person, place, and time. He appears well-developed and well-nourished. No distress.   HENT:   Head: Normocephalic and atraumatic.   Eyes: Pupils are equal, round, and reactive to light. Right eye exhibits no discharge. Left eye exhibits no discharge.   Neck: Neck supple. No JVD present.   Cardiovascular: Normal rate, regular rhythm, S1 normal, S2 normal and normal heart sounds.   No murmur heard.  Pulmonary/Chest: Effort normal and breath sounds normal. No  respiratory distress.   CABG site C/D/I; no bleeding erythema or drainage    Diminished BS at bases   Abdominal: Soft. He exhibits no distension. There is no tenderness. There is no rebound.   Musculoskeletal: He exhibits edema (BLE).   Neurological: He is alert and oriented to person, place, and time.   Skin: Skin is warm and dry. He is not diaphoretic. No erythema.   Psychiatric: He has a normal mood and affect. His behavior is normal.   Nursing note and vitals reviewed.      Significant Labs:   CMP   Recent Labs   Lab 12/22/18  0557 12/23/18  0508    136  136   K 3.5 4.1  4.0   CL 92* 96  97   CO2 36* 27  28   GLU 95 90  91   BUN 31* 27*  26*   CREATININE 1.8* 1.6*  1.6*   CALCIUM 8.3* 8.7  8.7   PROT 6.0 6.5   ALBUMIN 2.7* 2.8*   BILITOT 1.2* 1.4*   ALKPHOS 27* 31*   AST 29 29   ALT 24 23   ANIONGAP 9 13  11   ESTGFRAFRICA 43* 49*  49*   EGFRNONAA 37* 42*  42*   , CBC   Recent Labs   Lab 12/22/18  1018 12/23/18  0508   WBC 7.60 6.26   HGB 9.1* 10.0*   HCT 27.6* 30.6*    288   , Troponin No results for input(s): TROPONINI in the last 48 hours. and All pertinent lab results from the last 24 hours have been reviewed.    Significant Imaging: Echocardiogram:   2D echo with color flow doppler:   Results for orders placed or performed during the hospital encounter of 12/15/18   2D echo with color flow doppler   Result Value Ref Range    QEF 60 55 - 65    Diastolic Dysfunction No     and X-Ray: CXR: X-Ray Chest 1 View (CXR): No results found for this visit on 12/15/18. and X-Ray Chest PA and Lateral (CXR): No results found for this visit on 12/15/18.

## 2018-12-23 NOTE — PLAN OF CARE
Problem: Adult Inpatient Plan of Care  Goal: Plan of Care Review  Outcome: Ongoing (interventions implemented as appropriate)  Pt is paced on monitor, 8612. Fall precautions maintained and he remained free of falls this shift.  Left arm mid-line intact with SL;easily flushed this shift. Patient with no c/o HA this shift.    Remains with O2 @ 2L NC in use however noncompliant with usuage, VSS, glucose monitoring completed this shift with coverage per  orders.  Personal belongings and call bell within reach with urinal nearby. Bed bath completed, incision drsg intact with no drainage noted.  Patient requirse assist and encouragement with turns, moves and ADLs therefore instructed to call for assistance when needed. Patient up in chair and encouraged to use IS,  wheels locked. Hourly rounding completed. Will continue to monitor.

## 2018-12-23 NOTE — SUBJECTIVE & OBJECTIVE
Interval History: less cough a nd chest congestion    Objective:     Vital Signs (Most Recent):  Temp: 99.1 °F (37.3 °C) (12/22/18 1912)  Pulse: 90 (12/22/18 1927)  Resp: 18 (12/22/18 1927)  BP: 115/69 (12/22/18 1912)  SpO2: (!) 92 % (12/22/18 1927) Vital Signs (24h Range):  Temp:  [96.7 °F (35.9 °C)-99.1 °F (37.3 °C)] 99.1 °F (37.3 °C)  Pulse:  [90-91] 90  Resp:  [18] 18  SpO2:  [87 %-93 %] 92 %  BP: (105-131)/(63-73) 115/69     Weight: 109.6 kg (241 lb 10 oz)  Body mass index is 35.68 kg/m².      Intake/Output Summary (Last 24 hours) at 12/22/2018 2112  Last data filed at 12/22/2018 1800  Gross per 24 hour   Intake 720 ml   Output 1550 ml   Net -830 ml       Physical Exam   Constitutional: He is oriented to person, place, and time. He appears well-developed and well-nourished.   HENT:   Head: Normocephalic and atraumatic.   Eyes: Conjunctivae are normal. Pupils are equal, round, and reactive to light.   Neck: Neck supple. No JVD present. No tracheal deviation present. No thyromegaly present.   Cardiovascular: Normal rate and regular rhythm. Exam reveals gallop and S4.   Pulmonary/Chest: Effort normal. He has decreased breath sounds.   Abdominal: Soft.   Musculoskeletal: Normal range of motion.   Lymphadenopathy:     He has no cervical adenopathy.   Neurological: He is alert and oriented to person, place, and time.   Skin: Skin is warm and dry.   Nursing note and vitals reviewed.      Vents:  Vent Mode: Spont (12/18/18 1610)  Ventilator Initiated: Yes (12/18/18 1341)  Set Rate: 0 bmp (12/18/18 1610)  Vt Set: 400 mL (12/18/18 1610)  Pressure Support: 10 cmH20 (12/18/18 1610)  PEEP/CPAP: 5 cmH20 (12/18/18 1610)  Oxygen Concentration (%): 30 (12/22/18 1927)  Peak Airway Pressure: 16 cmH2O (12/18/18 1610)  Plateau Pressure: 0 cmH20 (12/18/18 1610)  Total Ve: 11.3 mL (12/18/18 1610)  F/VT Ratio<105 (RSBI): (!) 38.99 (12/18/18 1610)    Lines/Drains/Airways     Line                 Pacer Wires 12/18/18 1200 4 days           Pressure Ulcer                 Negative Pressure Wound Therapy  4 days          Peripheral Intravenous Line                 Midline Catheter Insertion/Assessment  - Double Lumen 12/19/18 1500 Left basilic vein (medial side of arm) 3 days                Significant Labs:    CBC/Anemia Profile:  Recent Labs   Lab 12/21/18  0335 12/21/18  0515 12/22/18  1018   WBC 10.06 9.75 7.60   HGB 6.4* 6.1* 9.1*   HCT 19.8* 19.1* 27.6*    164 249   MCV 91 91 90   RDW 15.1* 15.1* 14.9*        Chemistries:  Recent Labs   Lab 12/21/18  0335 12/22/18  0557     136 137   K 3.8  3.8 3.5   CL 94*  94* 92*   CO2 33*  33* 36*   BUN 31*  31* 31*   CREATININE 2.0*  2.0* 1.8*   CALCIUM 8.3*  8.3* 8.3*   ALBUMIN 2.7* 2.7*   PROT 5.7* 6.0   BILITOT 1.0 1.2*   ALKPHOS 21* 27*   ALT 27 24   AST 39 29   MG 2.3  --        BMP:   Recent Labs   Lab 12/21/18  0335 12/22/18  0557     106 95     136 137   K 3.8  3.8 3.5   CL 94*  94* 92*   CO2 33*  33* 36*   BUN 31*  31* 31*   CREATININE 2.0*  2.0* 1.8*   CALCIUM 8.3*  8.3* 8.3*   MG 2.3  --      CMP:   Recent Labs   Lab 12/21/18  0335 12/22/18  0557     136 137   K 3.8  3.8 3.5   CL 94*  94* 92*   CO2 33*  33* 36*     106 95   BUN 31*  31* 31*   CREATININE 2.0*  2.0* 1.8*   CALCIUM 8.3*  8.3* 8.3*   PROT 5.7* 6.0   ALBUMIN 2.7* 2.7*   BILITOT 1.0 1.2*   ALKPHOS 21* 27*   AST 39 29   ALT 27 24   ANIONGAP 9  9 9   EGFRNONAA 32*  32* 37*     All pertinent labs within the past 24 hours have been reviewed.    Discharge plannig    Significant Imaging:  I have reviewed all pertinent imaging results/findings within the past 24 hours.  I have reviewed and interpreted all pertinent imaging results/findings within the past 24 hours.

## 2018-12-23 NOTE — SUBJECTIVE & OBJECTIVE
Interval History:  Patient is postop day 5 status post coronary artery bypass grafting x4.  Overall the patient continues to make slow progress.  He has no complaints.    ROS  Medications:  Continuous Infusions:  Scheduled Meds:   aspirin  81 mg Oral Daily    atorvastatin  80 mg Oral QHS    clopidogrel  75 mg Oral Daily    docusate sodium  100 mg Oral BID    furosemide  40 mg Intravenous BID    metoprolol tartrate  50 mg Oral BID    nozaseptin   Each Nare BID    pantoprazole  40 mg Oral Daily    piperacillin-tazobactam (ZOSYN) IVPB  4.5 g Intravenous Q12H    polyethylene glycol  17 g Oral Daily    [START ON 12/30/2018] vancomycin (VANCOCIN) IVPB  1,250 mg Intravenous Q72H     PRN Meds:sodium chloride, acetaminophen, albuterol sulfate, dextrose 50%, dextrose 50%, glucagon (human recombinant), glucose, glucose, insulin aspart U-100, metoclopramide HCl, ondansetron, oxyCODONE, oxyCODONE     Objective:     Vital Signs (Most Recent):  Temp: 98.6 °F (37 °C) (12/23/18 0731)  Pulse: 92 (12/23/18 0819)  Resp: 20 (12/23/18 0819)  BP: 114/73 (12/23/18 0731)  SpO2: (!) 92 % (12/23/18 0819) Vital Signs (24h Range):  Temp:  [98.3 °F (36.8 °C)-99.3 °F (37.4 °C)] 98.6 °F (37 °C)  Pulse:  [90-92] 92  Resp:  [16-20] 20  SpO2:  [88 %-97 %] 92 %  BP: (105-131)/(62-76) 114/73     Weight: 108.3 kg (238 lb 12.1 oz)  Body mass index is 35.26 kg/m².    SpO2: (!) 92 %  O2 Device (Oxygen Therapy): nasal cannula    Intake/Output - Last 3 Shifts       12/21 0700 - 12/22 0659 12/22 0700 - 12/23 0659 12/23 0700 - 12/24 0659    P.O. 480 820 240    Blood 662.5      IV Piggyback 350 100     Total Intake(mL/kg) 1492.5 (13.6) 920 (8.5) 240 (2.2)    Urine (mL/kg/hr) 1300 (0.5) 2350 (0.9) 200 (0.9)    Stool  0     Total Output 1300 2350 200    Net +192.5 -1430 +40           Urine Occurrence 1 x      Stool Occurrence  1 x           Lines/Drains/Airways     Line                 Pacer Wires 12/18/18 1200 4 days          Pressure Ulcer                  Negative Pressure Wound Therapy  5 days          Peripheral Intravenous Line                 Midline Catheter Insertion/Assessment  - Double Lumen 12/19/18 1500 Left basilic vein (medial side of arm) 3 days                Physical Exam   Constitutional: He is oriented to person, place, and time. He appears well-developed and well-nourished. No distress.   Cardiovascular: Normal rate, regular rhythm and normal heart sounds.   Pulmonary/Chest: Effort normal and breath sounds normal.   Abdominal: Soft. Bowel sounds are normal.   Musculoskeletal: He exhibits edema.   Neurological: He is alert and oriented to person, place, and time.   Skin: Skin is warm and dry. He is not diaphoretic.       Significant Labs:  All pertinent labs from the last 24 hours have been reviewed.    Significant Diagnostics:  I have reviewed all pertinent imaging results/findings within the past 24 hours.

## 2018-12-23 NOTE — PROGRESS NOTES
[facility] -   Pulmonology  Progress Note    Patient Name: Filipe Agustin Jr.  MRN: [id number]  Admission Date: 12/15/2018  Hospital Length of Stay: 7 days  Code Status: Full Code  Attending Provider: Rg Johnson MD  Primary Care Provider: Jojo Duque DO   Principal Problem: Coronary artery disease involving native coronary artery of native heart with unstable angina pectoris    Subjective:     Interval History: less cough a nd chest congestion    Objective:     Vital Signs (Most Recent):  Temp: 99.1 °F (37.3 °C) (12/22/18 1912)  Pulse: 90 (12/22/18 1927)  Resp: 18 (12/22/18 1927)  BP: 115/69 (12/22/18 1912)  SpO2: (!) 92 % (12/22/18 1927) Vital Signs (24h Range):  Temp:  [96.7 °F (35.9 °C)-99.1 °F (37.3 °C)] 99.1 °F (37.3 °C)  Pulse:  [90-91] 90  Resp:  [18] 18  SpO2:  [87 %-93 %] 92 %  BP: (105-131)/(63-73) 115/69     Weight: 109.6 kg (241 lb 10 oz)  Body mass index is 35.68 kg/m².      Intake/Output Summary (Last 24 hours) at 12/22/2018 2112  Last data filed at 12/22/2018 1800  Gross per 24 hour   Intake 720 ml   Output 1550 ml   Net -830 ml       Physical Exam   Constitutional: He is oriented to person, place, and time. He appears well-developed and well-nourished.   HENT:   Head: Normocephalic and atraumatic.   Eyes: Conjunctivae are normal. Pupils are equal, round, and reactive to light.   Neck: Neck supple. No JVD present. No tracheal deviation present. No thyromegaly present.   Cardiovascular: Normal rate and regular rhythm. Exam reveals gallop and S4.   Pulmonary/Chest: Effort normal. He has decreased breath sounds.   Abdominal: Soft.   Musculoskeletal: Normal range of motion.   Lymphadenopathy:     He has no cervical adenopathy.   Neurological: He is alert and oriented to person, place, and time.   Skin: Skin is warm and dry.   Nursing note and vitals reviewed.      Vents:  Vent Mode: Spont (12/18/18 1610)  Ventilator Initiated: Yes (12/18/18 1341)  Set Rate: 0 bmp (12/18/18 1610)  Vt  Set: 400 mL (12/18/18 1610)  Pressure Support: 10 cmH20 (12/18/18 1610)  PEEP/CPAP: 5 cmH20 (12/18/18 1610)  Oxygen Concentration (%): 30 (12/22/18 1927)  Peak Airway Pressure: 16 cmH2O (12/18/18 1610)  Plateau Pressure: 0 cmH20 (12/18/18 1610)  Total Ve: 11.3 mL (12/18/18 1610)  F/VT Ratio<105 (RSBI): (!) 38.99 (12/18/18 1610)    Lines/Drains/Airways     Line                 Pacer Wires 12/18/18 1200 4 days          Pressure Ulcer                 Negative Pressure Wound Therapy  4 days          Peripheral Intravenous Line                 Midline Catheter Insertion/Assessment  - Double Lumen 12/19/18 1500 Left basilic vein (medial side of arm) 3 days                Significant Labs:    CBC/Anemia Profile:  Recent Labs   Lab 12/21/18 0335 12/21/18  0515 12/22/18  1018   WBC 10.06 9.75 7.60   HGB 6.4* 6.1* 9.1*   HCT 19.8* 19.1* 27.6*    164 249   MCV 91 91 90   RDW 15.1* 15.1* 14.9*        Chemistries:  Recent Labs   Lab 12/21/18 0335 12/22/18  0557     136 137   K 3.8  3.8 3.5   CL 94*  94* 92*   CO2 33*  33* 36*   BUN 31*  31* 31*   CREATININE 2.0*  2.0* 1.8*   CALCIUM 8.3*  8.3* 8.3*   ALBUMIN 2.7* 2.7*   PROT 5.7* 6.0   BILITOT 1.0 1.2*   ALKPHOS 21* 27*   ALT 27 24   AST 39 29   MG 2.3  --        BMP:   Recent Labs   Lab 12/21/18  0335 12/22/18  0557     106 95     136 137   K 3.8  3.8 3.5   CL 94*  94* 92*   CO2 33*  33* 36*   BUN 31*  31* 31*   CREATININE 2.0*  2.0* 1.8*   CALCIUM 8.3*  8.3* 8.3*   MG 2.3  --      CMP:   Recent Labs   Lab 12/21/18  0335 12/22/18  0557     136 137   K 3.8  3.8 3.5   CL 94*  94* 92*   CO2 33*  33* 36*     106 95   BUN 31*  31* 31*   CREATININE 2.0*  2.0* 1.8*   CALCIUM 8.3*  8.3* 8.3*   PROT 5.7* 6.0   ALBUMIN 2.7* 2.7*   BILITOT 1.0 1.2*   ALKPHOS 21* 27*   AST 39 29   ALT 27 24   ANIONGAP 9  9 9   EGFRNONAA 32*  32* 37*     All pertinent labs within the past 24 hours have been reviewed.    Discharge  plannig    Significant Imaging:  I have reviewed all pertinent imaging results/findings within the past 24 hours.  I have reviewed and interpreted all pertinent imaging results/findings within the past 24 hours.      ABG  Recent Labs   Lab 12/18/18  1620   PH 7.316*   PO2 80   PCO2 34.7*   HCO3 17.7*   BE -8     Assessment/Plan:     No new Assessment & Plan notes have been filed under this hospital service since the last note was generated.  Service: Pulmonology         Leroy Rboles MD  Pulmonology  [facility] -

## 2018-12-23 NOTE — PLAN OF CARE
Problem: Adult Inpatient Plan of Care  Goal: Plan of Care Review  Outcome: Ongoing (interventions implemented as appropriate)  Pt is tolerating NC well.

## 2018-12-23 NOTE — PROGRESS NOTES
[facility]  Cardiothoracic Surgery  Progress Note    Patient Name: Filipe Agustin Jr.  MRN: [id number]  Admission Date: 12/15/2018  Hospital Length of Stay: 8 days  Code Status: Full Code   Attending Physician: Rg Johnson MD   Referring Provider: Self, Aaareferral  Principal Problem:Coronary artery disease involving native coronary artery of native heart with unstable angina pectoris            Subjective:     Post-Op Info:  Procedure(s) (LRB):  CORONARY ARTERY BYPASS GRAFT (CABG) (N/A)  ECHOCARDIOGRAM,TRANSESOPHAGEAL (N/A)  SURGICAL PROCUREMENT, VEIN, ENDOSCOPIC (Bilateral)   5 Days Post-Op     Interval History:  Patient is postop day 5 status post coronary artery bypass grafting x4.  Overall the patient continues to make slow progress.  He has no complaints.    ROS  Medications:  Continuous Infusions:  Scheduled Meds:   aspirin  81 mg Oral Daily    atorvastatin  80 mg Oral QHS    clopidogrel  75 mg Oral Daily    docusate sodium  100 mg Oral BID    furosemide  40 mg Intravenous BID    metoprolol tartrate  50 mg Oral BID    nozaseptin   Each Nare BID    pantoprazole  40 mg Oral Daily    piperacillin-tazobactam (ZOSYN) IVPB  4.5 g Intravenous Q12H    polyethylene glycol  17 g Oral Daily    [START ON 12/30/2018] vancomycin (VANCOCIN) IVPB  1,250 mg Intravenous Q72H     PRN Meds:sodium chloride, acetaminophen, albuterol sulfate, dextrose 50%, dextrose 50%, glucagon (human recombinant), glucose, glucose, insulin aspart U-100, metoclopramide HCl, ondansetron, oxyCODONE, oxyCODONE     Objective:     Vital Signs (Most Recent):  Temp: 98.6 °F (37 °C) (12/23/18 0731)  Pulse: 92 (12/23/18 0819)  Resp: 20 (12/23/18 0819)  BP: 114/73 (12/23/18 0731)  SpO2: (!) 92 % (12/23/18 0819) Vital Signs (24h Range):  Temp:  [98.3 °F (36.8 °C)-99.3 °F (37.4 °C)] 98.6 °F (37 °C)  Pulse:  [90-92] 92  Resp:  [16-20] 20  SpO2:  [88 %-97 %] 92 %  BP: (105-131)/(62-76) 114/73     Weight: 108.3 kg (238 lb 12.1  oz)  Body mass index is 35.26 kg/m².    SpO2: (!) 92 %  O2 Device (Oxygen Therapy): nasal cannula    Intake/Output - Last 3 Shifts       12/21 0700 - 12/22 0659 12/22 0700 - 12/23 0659 12/23 0700 - 12/24 0659    P.O. 480 820 240    Blood 662.5      IV Piggyback 350 100     Total Intake(mL/kg) 1492.5 (13.6) 920 (8.5) 240 (2.2)    Urine (mL/kg/hr) 1300 (0.5) 2350 (0.9) 200 (0.9)    Stool  0     Total Output 1300 2350 200    Net +192.5 -1430 +40           Urine Occurrence 1 x      Stool Occurrence  1 x           Lines/Drains/Airways     Line                 Pacer Wires 12/18/18 1200 4 days          Pressure Ulcer                 Negative Pressure Wound Therapy  5 days          Peripheral Intravenous Line                 Midline Catheter Insertion/Assessment  - Double Lumen 12/19/18 1500 Left basilic vein (medial side of arm) 3 days                Physical Exam   Constitutional: He is oriented to person, place, and time. He appears well-developed and well-nourished. No distress.   Cardiovascular: Normal rate, regular rhythm and normal heart sounds.   Pulmonary/Chest: Effort normal and breath sounds normal.   Abdominal: Soft. Bowel sounds are normal.   Musculoskeletal: He exhibits edema.   Neurological: He is alert and oriented to person, place, and time.   Skin: Skin is warm and dry. He is not diaphoretic.       Significant Labs:  All pertinent labs from the last 24 hours have been reviewed.    Significant Diagnostics:  I have reviewed all pertinent imaging results/findings within the past 24 hours.    Assessment/Plan:     * Coronary artery disease involving native coronary artery of native heart with unstable angina pectoris post ACS    The patient is a 72-year-old male with history of CAD status post stent placement in the past, hypertension, hyperlipidemia who presented with acute onset of chest pain.  Patient's workup included cardiac catheterization which showed severe multivessel coronary artery.  The risk and  benefits of surgery was discussed with the patient and his wife.  The patient understands and has agreed to proceed with surgery which has been scheduled for 12/18/2018.     S/P CABG x 4    The patient is postop day 1 status post coronary artery bypass grafting x4.  Overall the patient is doing well.  Neuro:  Patient is awake alert and oriented x3.  Neuro exam is nonfocal.    Cardiac:  Patient is on low-dose vasopressin and epinephrine.  Cardiac index is good.  Wean drips to off.  And start beta-blockers.  Respiratory: Patient is maintaining good sats on nasal cannula.  GI:  Patient is started on p.o. intake.  Advanced as tolerated.  Renal:  Creatinine is 1.7.  Urine output is good.  Endocrine:  Glucose is well controlled.  Heme:  Hematocrit is 22 and platelet is 165.  Will hold off transfusions for now.  Id:  T-max is a 101.1 °.  And white count is 9.4.  Activities:  Patient is out of bed to chair.  Advanced a activities as tolerated.  Lines tubes and drains.  Discontinue chest tubes, DANNY, [place] and [place].  Placed mid level line and continue with pacer wires.    12/20/2018    The patient is postop day 2 status post coronary artery bypass grafting x4.  Over the past 24 hr patient has been febrile..  Neuro:  Patient is awake alert and oriented x3.  Neuro exam is nonfocal.    Cardiac:  Patient is tolerating beta-blocker.  Respiratory: Patient is maintaining good sats on nasal cannula.  GI:  Patient is started on p.o. intake.  Advanced as tolerated.  Renal:  Creatinine is 2.0  Urine output is good.  Endocrine:  Glucose is well controlled.  Heme:  Hematocrit is 22 and platelet is 158.  Will hold off transfusions for now.  Id:  T-max is a 101.7°.  And white count is 10.  Will panculture the patient.  Started on broad spectrum antibiotics.  Activities:  Patient is out of bed to chair.  Advance  activities as tolerated.  Lines tubes and drains.  Mid level line, Pacer wires and kang    12/21/2018    The patient is postop  day 3 status post coronary artery bypass grafting x4.  Overall patient is doing much better.  Fever curve is improving.  Will transfer patient to telemetry.  Neuro:  Patient is awake alert and oriented x3.  Neuro exam is nonfocal.    Cardiac:  Patient is tolerating metoprolol.  Respiratory: Patient is maintaining good sats on nasal cannula.  GI:  Patient is started on p.o. intake.  Advanced as tolerated.  Renal:  Creatinine is 2.0  Urine output is good.  Endocrine:  Glucose is well controlled.  Heme:  Hematocrit was19 and platelet is 164.  Patient transfused 2 units packed red blood cells.  Id:  T-max is a 101.3.  And white count is 9.  Cultures are pending.  The no growth to date.  Started on broad spectrum antibiotics.  Activities:  Patient is out of bed to chair.  Advance  activities as tolerated.  Lines tubes and drains.  Mid level line, Pacer wires and kang    12/22/2018    The patient is postop day 4status post coronary artery bypass grafting x4.  Overall patient is doing much better.  Anticipate discharge in the next 24-48 hours to home or to skilled nursing facility.    Neuro:  Patient is awake alert and oriented x3.  Neuro exam is nonfocal.    Cardiac:  Patient is tolerating metoprolol.    Respiratory: Patient is maintaining good sats on nasal cannula.  GI:  Patient is started on p.o. intake.  Advanced as tolerated.  Renal:  Creatinine is down to 1.8  Urine output is good.  Endocrine:  Glucose is well controlled.  Heme:  Repeat hematocrit is pending Patient transfused 2 units packed red blood cells.  Id:  Patient is afebrile cultures are no growth to date.  Started on broad spectrum antibiotics.  Activities:  Patient is out of bed to chair.  Advance  activities as tolerated.  Lines tubes and drains.  Mid level line, Pacer wires     12/23/2018    The patient is postop day 5 status post coronary artery bypass grafting x4.  Overall patient is doing much better.  Anticipate discharge in the next 24-48 hours to  home or to skilled nursing facility.    Neuro:  Patient is awake alert and oriented x3.  Neuro exam is nonfocal.    Cardiac:  Patient is tolerating metoprolol.    Respiratory: Patient has been weaned down to room air.  Continue pulmonary toilet continue incentive spirometry.  GI:  Patient is tolerating a regular diet.  Renal:  Creatinine i is down to 1.6.  Urine output is good.  Endocrine:  Glucose is well controlled.  Heme:  Hematocrit is 30 and platelets are 288.    Id:  Patient is afebrile. cultures are no growth to date.  Final results of cultures pending.  Continue antibiotics for now.  Activities:  Patient is out of bed to chair.  Advance  activities as tolerated.  Lines tubes and drains.  Patient has a mid level line.                 Rg Johnson MD  Cardiothoracic Surgery  [facility] -

## 2018-12-23 NOTE — ASSESSMENT & PLAN NOTE
Per CVT surgery  Monitor chest tube output  Post op ICU hemodynamic monitoring  Encourage OOB, C&DB and IS use post extubation  12/19 Extubated , doing well  12/20 stable , doing well  12/21 stable post op course- transfused today  12/22 Needs incentive spirometry  12/22 stable

## 2018-12-24 LAB
ALBUMIN SERPL BCP-MCNC: 2.9 G/DL
ALP SERPL-CCNC: 31 U/L
ALT SERPL W/O P-5'-P-CCNC: 22 U/L
ANION GAP SERPL CALC-SCNC: 11 MMOL/L
ANION GAP SERPL CALC-SCNC: 11 MMOL/L
AST SERPL-CCNC: 24 U/L
BACTERIA SPEC AEROBE CULT: NORMAL
BACTERIA SPEC AEROBE CULT: NORMAL
BASOPHILS # BLD AUTO: 0.04 K/UL
BASOPHILS NFR BLD: 0.5 %
BILIRUB SERPL-MCNC: 1.6 MG/DL
BUN SERPL-MCNC: 26 MG/DL
BUN SERPL-MCNC: 26 MG/DL
CALCIUM SERPL-MCNC: 9 MG/DL
CALCIUM SERPL-MCNC: 9.1 MG/DL
CHLORIDE SERPL-SCNC: 94 MMOL/L
CHLORIDE SERPL-SCNC: 95 MMOL/L
CO2 SERPL-SCNC: 31 MMOL/L
CO2 SERPL-SCNC: 31 MMOL/L
CREAT SERPL-MCNC: 1.8 MG/DL
CREAT SERPL-MCNC: 1.8 MG/DL
DIFFERENTIAL METHOD: ABNORMAL
EOSINOPHIL # BLD AUTO: 0.2 K/UL
EOSINOPHIL NFR BLD: 2.8 %
ERYTHROCYTE [DISTWIDTH] IN BLOOD BY AUTOMATED COUNT: 14.9 %
EST. GFR  (AFRICAN AMERICAN): 43 ML/MIN/1.73 M^2
EST. GFR  (AFRICAN AMERICAN): 43 ML/MIN/1.73 M^2
EST. GFR  (NON AFRICAN AMERICAN): 37 ML/MIN/1.73 M^2
EST. GFR  (NON AFRICAN AMERICAN): 37 ML/MIN/1.73 M^2
GLUCOSE SERPL-MCNC: 98 MG/DL
GLUCOSE SERPL-MCNC: 99 MG/DL
GRAM STN SPEC: NORMAL
HCT VFR BLD AUTO: 31.9 %
HGB BLD-MCNC: 10.2 G/DL
LYMPHOCYTES # BLD AUTO: 1.4 K/UL
LYMPHOCYTES NFR BLD: 16.4 %
MAGNESIUM SERPL-MCNC: 2.4 MG/DL
MCH RBC QN AUTO: 29.2 PG
MCHC RBC AUTO-ENTMCNC: 32 G/DL
MCV RBC AUTO: 91 FL
MONOCYTES # BLD AUTO: 0.8 K/UL
MONOCYTES NFR BLD: 9.3 %
NEUTROPHILS # BLD AUTO: 6.1 K/UL
NEUTROPHILS NFR BLD: 71 %
PLATELET # BLD AUTO: 346 K/UL
PMV BLD AUTO: 10.4 FL
POCT GLUCOSE: 103 MG/DL (ref 70–110)
POCT GLUCOSE: 109 MG/DL (ref 70–110)
POCT GLUCOSE: 113 MG/DL (ref 70–110)
POCT GLUCOSE: 143 MG/DL (ref 70–110)
POTASSIUM SERPL-SCNC: 4.2 MMOL/L
POTASSIUM SERPL-SCNC: 4.2 MMOL/L
PROT SERPL-MCNC: 6.8 G/DL
RBC # BLD AUTO: 3.49 M/UL
SODIUM SERPL-SCNC: 136 MMOL/L
SODIUM SERPL-SCNC: 137 MMOL/L
WBC # BLD AUTO: 8.59 K/UL

## 2018-12-24 PROCEDURE — 21400001 HC TELEMETRY ROOM: Mod: HCNC

## 2018-12-24 PROCEDURE — 99233 PR SUBSEQUENT HOSPITAL CARE,LEVL III: ICD-10-PCS | Mod: HCNC,,, | Performed by: INTERNAL MEDICINE

## 2018-12-24 PROCEDURE — 94761 N-INVAS EAR/PLS OXIMETRY MLT: CPT | Mod: HCNC

## 2018-12-24 PROCEDURE — 83735 ASSAY OF MAGNESIUM: CPT | Mod: HCNC

## 2018-12-24 PROCEDURE — 99900035 HC TECH TIME PER 15 MIN (STAT): Mod: HCNC

## 2018-12-24 PROCEDURE — 63600175 PHARM REV CODE 636 W HCPCS: Mod: HCNC | Performed by: THORACIC SURGERY (CARDIOTHORACIC VASCULAR SURGERY)

## 2018-12-24 PROCEDURE — 25000003 PHARM REV CODE 250: Mod: HCNC | Performed by: PHYSICIAN ASSISTANT

## 2018-12-24 PROCEDURE — 27000646 HC AEROBIKA DEVICE: Mod: HCNC

## 2018-12-24 PROCEDURE — 25000003 PHARM REV CODE 250: Mod: HCNC | Performed by: THORACIC SURGERY (CARDIOTHORACIC VASCULAR SURGERY)

## 2018-12-24 PROCEDURE — 97535 SELF CARE MNGMENT TRAINING: CPT | Mod: HCNC | Performed by: PHYSICAL THERAPIST

## 2018-12-24 PROCEDURE — 36415 COLL VENOUS BLD VENIPUNCTURE: CPT | Mod: HCNC

## 2018-12-24 PROCEDURE — 80053 COMPREHEN METABOLIC PANEL: CPT | Mod: HCNC

## 2018-12-24 PROCEDURE — 99233 SBSQ HOSP IP/OBS HIGH 50: CPT | Mod: HCNC,,, | Performed by: INTERNAL MEDICINE

## 2018-12-24 PROCEDURE — 85025 COMPLETE CBC W/AUTO DIFF WBC: CPT | Mod: HCNC

## 2018-12-24 PROCEDURE — 97110 THERAPEUTIC EXERCISES: CPT | Mod: HCNC

## 2018-12-24 PROCEDURE — 94799 UNLISTED PULMONARY SVC/PX: CPT | Mod: HCNC

## 2018-12-24 PROCEDURE — 80048 BASIC METABOLIC PNL TOTAL CA: CPT | Mod: HCNC

## 2018-12-24 PROCEDURE — 97530 THERAPEUTIC ACTIVITIES: CPT | Mod: HCNC

## 2018-12-24 PROCEDURE — 97116 GAIT TRAINING THERAPY: CPT | Mod: HCNC

## 2018-12-24 PROCEDURE — 94664 DEMO&/EVAL PT USE INHALER: CPT | Mod: HCNC

## 2018-12-24 RX ORDER — VANCOMYCIN HCL IN 5 % DEXTROSE 1G/250ML
1000 PLASTIC BAG, INJECTION (ML) INTRAVENOUS
Status: DISCONTINUED | OUTPATIENT
Start: 2018-12-30 | End: 2018-12-25 | Stop reason: HOSPADM

## 2018-12-24 RX ADMIN — FUROSEMIDE 40 MG: 10 INJECTION, SOLUTION INTRAMUSCULAR; INTRAVENOUS at 08:12

## 2018-12-24 RX ADMIN — METOPROLOL TARTRATE 50 MG: 50 TABLET ORAL at 08:12

## 2018-12-24 RX ADMIN — LOSARTAN POTASSIUM 12.5 MG: 25 TABLET, FILM COATED ORAL at 08:12

## 2018-12-24 RX ADMIN — ASPIRIN 81 MG: 81 TABLET, COATED ORAL at 08:12

## 2018-12-24 RX ADMIN — VANCOMYCIN HYDROCHLORIDE 1250 MG: 1 INJECTION, POWDER, LYOPHILIZED, FOR SOLUTION INTRAVENOUS at 01:12

## 2018-12-24 RX ADMIN — ATORVASTATIN CALCIUM 80 MG: 40 TABLET, FILM COATED ORAL at 08:12

## 2018-12-24 RX ADMIN — OXYCODONE HYDROCHLORIDE 5 MG: 5 TABLET ORAL at 07:12

## 2018-12-24 RX ADMIN — DOCUSATE SODIUM 100 MG: 100 CAPSULE, LIQUID FILLED ORAL at 08:12

## 2018-12-24 RX ADMIN — CLOPIDOGREL BISULFATE 75 MG: 75 TABLET ORAL at 08:12

## 2018-12-24 RX ADMIN — PIPERACILLIN SODIUM AND TAZOBACTAM SODIUM 4.5 G: 4; .5 INJECTION, POWDER, LYOPHILIZED, FOR SOLUTION INTRAVENOUS at 08:12

## 2018-12-24 RX ADMIN — PANTOPRAZOLE SODIUM 40 MG: 40 TABLET, DELAYED RELEASE ORAL at 08:12

## 2018-12-24 RX ADMIN — OXYCODONE HYDROCHLORIDE 5 MG: 5 TABLET ORAL at 06:12

## 2018-12-24 NOTE — PROGRESS NOTES
"  [facility] -   Adult Nutrition  Consult Note    SUMMARY     Recommendations    Recommendation: 1. Continue current diet order. 2. Will continue to monitor  Intervention: sodium/fat restricted diet, Cardiac diet education, coordination of care w/ primary team  Goals: Meet >85% EEN/EPN this admit  Nutrition Goal Status: goal met, continues  Communication of RD Recs: (POC review, sticky note)    Reason for Assessment    Reason For Assessment: consult("post op")  Dx: Coronary artery disease   PMH: HTN, HLD, CAD, CKD 3  General Information Comments: Pt is s/p CABG, currently on clear liquid diet w/ plans to advance per MD Johnson note. Pt w/ good appetite at home, no wt loss noted. RD educated pt on Cardiac diet s/p CABG. RD explained rationale for dietary modifications. Instructed pt to avoid saturated fat, trans fat, and sodium. Indicated food sources of each. Encouraged unsaturated fats and gradual increase in fiber foods. Indicated food sources of each. RD utilized written materials from Nutrition Care Manual during education. Pt verbalized understanding. All concerns addressed. RD contact info and handouts left at bedside.  Nutrition Discharge Planning: Cardiac diet    Nutrition Risk Screen    Nutrition Risk Screen: no indicators present    Nutrition/Diet History    Spiritual, Cultural Beliefs, Latter-day Practices, Values that Affect Care: no  Food Allergies: NKFA  Factors Affecting Nutritional Intake: clear liquid diet    Anthropometrics    Temp: 97.4 °F (36.3 °C)  Height Method: Stated  Height: 5' 9" (175.3 cm)  Height (inches): 69 in  Weight Method: Bed Scale  Weight: 113.4 kg (250 lb 0 oz)  Weight (lb): 250 lb  Ideal Body Weight (IBW), Male: 160 lb  % Ideal Body Weight, Male (lb): 157.91 lb  BMI (Calculated): 37.4  BMI Grade: 35 - 39.9 - obesity - grade II       Lab/Procedures/Meds    Pertinent Labs Reviewed: reviewed  Pertinent Labs Comments: Cr=1.7(H), GFR=40(L), Rim=590(H), " Mg=3.3(H)  Pertinent Medications Reviewed: reviewed  Pertinent Medications Comments: docusate sodium, KCl 20 mEq    Physical Findings/Assessment     Overall: well nourished  Oral: tooth/teeth missing, no dental appliance present  Skin: incisions to L&R leg, chest; Ricardo=17    Estimated/Assessed Needs    Weight Used For Calorie Calculations: 114.6 kg (252 lb 10.4 oz)  Energy Calorie Requirements (kcal): 4778-2529  Energy Need Method: Crenshaw-St Jeor(x1.2-1.3)  Protein Requirements: 115-140  Weight Used For Protein Calculations: 114.6 kg (252 lb 10.4 oz)(x1-1.2)     Estimated Fluid Requirement Method: RDA Method(or per MD)  RDA Method (mL): 2270  CHO Requirement: n/a      Nutrition Prescription Ordered    Current Diet Order: Cardiac    Evaluation of Received Nutrient/Fluid Intake      % Intake of Estimated Energy Needs: 75 - 100 %  % Meal Intake: 75 - 100 %    Nutrition Risk    Level of Risk/Frequency of Follow-up: (f/u x2 weekly)     Assessment and Plan      Nutrition Problem  Decreased nutrient needs (sodium, fat)    Related to (etiology):   Current medical condition    Signs and Symptoms (as evidenced by):   S/p CABG  Current diet order (Cardiac)    Interventions/Recommendations (treatment strategy):  See above    Nutrition Diagnosis Status:   New        Nutrition Problem  Inadequate energy intake    Related to (etiology):   Inability to consume sufficient kcal    Signs and Symptoms (as evidenced by):   Current diet order meeting <50% EEN    Interventions/Recommendations (treatment strategy):  See above    Nutrition Diagnosis Status:   Resolved       Monitor and Evaluation    Food and Nutrient Intake: energy intake, food and beverage intake  Food and Nutrient Adminstration: diet order  Knowledge/Beliefs/Attitudes: food and nutrition knowledge/skill  Anthropometric Measurements: weight  Biochemical Data, Medical Tests and Procedures: electrolyte and renal panel, lipid profile  Nutrition-Focused Physical Findings:  overall appearance     Malnutrition Assessment                                       Nutrition Follow-Up    RD Follow-up?: Yes

## 2018-12-24 NOTE — PROGRESS NOTES
[facility]  Cardiology  Progress Note    Patient Name: Filipe Agustin Jr.  MRN: [id number]  Admission Date: 12/15/2018  Hospital Length of Stay: 9 days  Code Status: Full Code   Attending Physician: Rg Johnson MD   Primary Care Physician: Jojo Duque DO  Expected Discharge Date:   Principal Problem:Coronary artery disease involving native coronary artery of native heart with unstable angina pectoris    Subjective:     Hospital Course:   12/16/18-Patient seen and examined in ICU, sitting in bedside chair. Denies chest pain or shortness of breath today on exam. IV heparin gtt in progress. Pending CABG on 12/18 per CVT. Labs reviewed, stable.     12/17/18-Patient seen and examined today, resting in bed. No acute events overnight. Denies chest pain or SOB. Labs stable. CABG planned for AM.    12/18/18-Patient seen and examined today, s/p CABG x 4. Hemodynamically stable.     12/19/18-Patient seen and examined today, POD # 1 s/p CABG x 4. Feels ok. Complains of fatigue and incisional soreness. Labs reviewed. Creatinine 1.7. H and H low.     12/20/18-Patient seen and examined today, POD # 2 s/p CABG x 4. Feels fatigued and weak. Pain fairly well controlled. No SOB. Labs reviewed. Creatinine bumped to 1.9. H and H remains low (7.4/22).    12/21/18-Patient seen and examined today, POD # 3 s/p CABG x 4. Feels ok. Still complains of fatigue. No chest pain or SOB. Being transfused. Creatinine 2.0.    12/22/18-Patient seen and examined today, POD # 4 s/p CABG x 4. Feeling ok. Still weak. Denies pain. Labs stable. Needs to ambulate.     12/23/18-Patient seen and examined today, POD # 5 s/p CABG x 4. Still complains of fatigue and weakness. No chest pain or SOB. Ambulated yesterday. Labs stable.     12/24/18 -  Patient seen and examined today, POD # 6 s/p CABG x 4. Mild fatigue, No chest pain or SOB. Walking with PT/OT.     Interval History: no acute events o/n    Review of Systems   Constitution: Positive  for weakness and malaise/fatigue.   HENT: Negative.    Eyes: Negative.    Cardiovascular: Negative.    Respiratory: Negative.    Endocrine: Negative.    Hematologic/Lymphatic: Negative.    Skin: Negative.    Musculoskeletal: Negative.    Gastrointestinal: Negative.    Genitourinary: Negative.    Psychiatric/Behavioral: Negative.    Allergic/Immunologic: Negative.      Objective:     Vital Signs (Most Recent):  Temp: 97.4 °F (36.3 °C) (12/24/18 1154)  Pulse: 71 (12/24/18 1336)  Resp: 18 (12/24/18 1154)  BP: 123/62 (12/24/18 1154)  SpO2: 95 % (12/24/18 1154) Vital Signs (24h Range):  Temp:  [97.4 °F (36.3 °C)-98.9 °F (37.2 °C)] 97.4 °F (36.3 °C)  Pulse:  [67-90] 71  Resp:  [16-20] 18  SpO2:  [93 %-97 %] 95 %  BP: (118-146)/(60-87) 123/62     Weight: 113.4 kg (250 lb 0 oz)  Body mass index is 36.92 kg/m².     SpO2: 95 %  O2 Device (Oxygen Therapy): room air      Intake/Output Summary (Last 24 hours) at 12/24/2018 1523  Last data filed at 12/24/2018 0200  Gross per 24 hour   Intake 440 ml   Output 1125 ml   Net -685 ml       Lines/Drains/Airways     Line                 Pacer Wires 12/18/18 1200 6 days          Pressure Ulcer                 Negative Pressure Wound Therapy  6 days          Peripheral Intravenous Line                 Midline Catheter Insertion/Assessment  - Double Lumen 12/19/18 1500 Left basilic vein (medial side of arm) 5 days                Physical Exam   Constitutional: He is oriented to person, place, and time. He appears well-developed and well-nourished. No distress.   HENT:   Head: Normocephalic and atraumatic.   Eyes: Pupils are equal, round, and reactive to light. Right eye exhibits no discharge. Left eye exhibits no discharge.   Neck: Neck supple. No JVD present.   Cardiovascular: Normal rate, regular rhythm, S1 normal, S2 normal and normal heart sounds.   No murmur heard.  Pulmonary/Chest: Effort normal and breath sounds normal. No respiratory distress.   CABG site C/D/I; no bleeding  erythema or drainage    Diminished BS at bases   Abdominal: Soft. He exhibits no distension. There is no tenderness. There is no rebound.   Musculoskeletal: He exhibits edema (BLE).   Neurological: He is alert and oriented to person, place, and time.   Skin: Skin is warm and dry. He is not diaphoretic. No erythema.   Psychiatric: He has a normal mood and affect. His behavior is normal.   Nursing note and vitals reviewed.      Significant Labs:   All pertinent lab results from the last 24 hours have been reviewed. and   Recent Lab Results       12/24/18  1133   12/24/18  0628   12/24/18  0426   12/23/18  2137   12/23/18  1608        Albumin     2.9         Alkaline Phosphatase     31         ALT     22         Anion Gap     11              11         AST     24         Baso #     0.04         Basophil%     0.5         Total Bilirubin     1.6  Comment:  For infants and newborns, interpretation of results should be based  on gestational age, weight and in agreement with clinical  observations.  Premature Infant recommended reference ranges:  Up to 24 hours.............<8.0 mg/dL  Up to 48 hours............<12.0 mg/dL  3-5 days..................<15.0 mg/dL  6-29 days.................<15.0 mg/dL           BUN, Bld     26              26         Calcium     9.0              9.1         Chloride     94              95         CO2     31              31         Creatinine     1.8              1.8         Differential Method     Automated         eGFR if      43              43         eGFR if non      37  Comment:  Calculation used to obtain the estimated glomerular filtration  rate (eGFR) is the CKD-EPI equation.                 37  Comment:  Calculation used to obtain the estimated glomerular filtration  rate (eGFR) is the CKD-EPI equation.            Eos #     0.2         Eosinophil%     2.8         Glucose     99              98         Gran # (ANC)     6.1         Gran%     71.0          Hematocrit     31.9         Hemoglobin     10.2         Lymph #     1.4         Lymph%     16.4         Magnesium     2.4         MCH     29.2         MCHC     32.0         MCV     91         Mono #     0.8         Mono%     9.3         MPV     10.4         Platelets     346         POCT Glucose 143 103   105 119     Potassium     4.2              4.2         Total Protein     6.8         RBC     3.49         RDW     14.9         Sodium     136              137         WBC     8.59                              Significant Imaging: Echocardiogram:   2D echo with color flow doppler:   Results for orders placed or performed during the hospital encounter of 12/15/18   2D echo with color flow doppler   Result Value Ref Range    QEF 60 55 - 65    Diastolic Dysfunction No     and X-Ray: CXR: X-Ray Chest 1 View (CXR): No results found for this visit on 12/15/18.    Assessment and Plan:     Brief HPI: no acute events o/n    * Coronary artery disease involving native coronary artery of native heart with unstable angina pectoris post ACS    -See plan under CABG     S/P CABG x 4    Patient presented to [facility] [place]- due to chest pain  EKG revealed inferior ST elevation  NTG SL x 1 given in ED  Repeat EKG revealed persistently elevation in inferior leads  IV heparin 5,000 units given in ED  No BB due to Bradycardia in ED  TTE pending  FLP pending  Patient taken to Cath Lab for emergent LHC per Dr. Monroe.  Aggrastat bolus and Infusion to follow  Further recs to follow    12/16  -CABG recommended, CVT consulted and plan for CABG on 12/18  -Continue IV heparin gtt, ASA, Statin, BB  -Start low dose ARB today  -Transfer to telemetry today  -No chest pain on exam today.   -FLP not drawn, will reorder for AM  -ECHO revealed EF 60-65%, -WMA's, normal Bi-V function.    12/17/18  -Stable overnight  -No chest pain or SOB  -Continue IV heparin  -Continue ASA, BB, statin, ARB  -CABG planned for AM    12/18/18  -s/p CABG x 4  -Hemodynamically  stable  -Continue current post-op care  -ASA, Plavix, statin, BB    12/19/18  -Recovering s/p CABG x 4  -Remains fatigued/weak  -Would benefit from transfusion, defer to CVT  -Continue ASA, Plavix, statin BB  -Ambulation and IS usage      12/19/18  -Stable overnight  -H and H low, defer transfusion decision to CVT  -Continue ASA, Plavix, statin, BB    12/21/18  -Stable CV wise  -Being transfused  -Continue ASA, Plavix, statin, BB  -No ACEI/ARB given bumped creatinine  -IS usage and ambulation    12/22/18  -Stable overnight  -Continue ASA, Plavix, statin, BB  -IS usage and ambulation    12/23/18  -Remains stable  -Continue ASA, Plavix, statin, BB  -IS usage and ambulation    12/24/18  Cont meds - ASA, Plavix, statin, BB  Is stable      CKD (chronic kidney disease) stage 3, GFR 30-59 ml/min    -Monitor       Hypertension goal BP (blood pressure) < 140/90    On medical therapy  Continue meds and titrate     Severe obesity with body mass index (BMI) of 35.0 to 39.9 with serious comorbidity    -Encourage weight loss         VTE Risk Mitigation (From admission, onward)        Ordered     IP VTE HIGH RISK PATIENT  Once      12/22/18 0800     Place NARCISA hose  Until discontinued      12/22/18 0800     Place sequential compression device  Until discontinued      12/22/18 0800     Place NARCISA hose  Until discontinued      12/18/18 1249     Place sequential compression device  Until discontinued      12/18/18 1249     heparin 25,000 units in dextrose 5% 250 mL (100 units/mL) infusion LOW INTENSITY nomogram - [facility]  Continuous      12/15/18 1121     heparin (porcine) injection 5,000 Units  ED 1 Time      12/15/18 0931          Hernandez Aguilera Md, MD  Cardiology  [facility]

## 2018-12-24 NOTE — PT/OT/SLP PROGRESS
Physical Therapy  Treatment    Filipe Agustin Jr.   MRN: [id number]   Admitting Diagnosis: Coronary artery disease involving native coronary artery of native heart with unstable angina pectoris       PT Start Time: 1000     PT Stop Time: 1040    PT Total Time (min): 40 min       Billable Minutes:  Gait Training 10 and Therapeutic Activity 30    Treatment Type: Treatment  PT/PTA: PT             General Precautions: Standard, sternal, respiratory, fall  Orthopedic Precautions: N/A   Braces:           Subjective:  Communicated with Janelle SPENCER prior to session.           Objective:        Functional Mobility:  Bed Mobility:        Transfers:       Gait:        Stairs:      Balance:       Therapeutic Activities and Exercises:  Patient came sit to stand x 5 reps from bedside chair with cues for sternal precautions and min A for technique/use of rocking and momentum/hugging heart pilow. Patient stood multiple times for 1 minutes each for A with hygiene/ADL's. Patient amb 4ft from chair to bed with O2 donned and min A for balance with dec speed, wide dayana and unsteady gt. Sit to supine with mod A and vc/tc's for body mechanics to follow abiding sternal precautions. Patient still needs cues to not use arms with functional transitions.PT educated patient on d/c recs, PT POC, le exs to do in bed and in chair to prep mobility/dec stiffness, safety/fall/sternal prec with mobility and breathing exs/techs to improve respiration and manage sob.    AM-PAC 6 CLICK MOBILITY  How much help from another person does this patient currently need?   1 = Unable, Total/Dependent Assistance  2 = A lot, Maximum/Moderate Assistance  3 = A little, Minimum/Contact Guard/Supervision  4 = None, Modified [place]/Independent         AM-PAC Raw Score CMS G-Code Modifier Level of Impairment Assistance   6 % Total / Unable   7 - 9 CM 80 - 100% Maximal Assist   10 - 14 CL 60 - 80% Moderate Assist   15 - 19 CK 40 - 60% Moderate Assist   20 - 22 CJ  20 - 40% Minimal Assist   23 CI 1-20% SBA / CGA   24 CH 0% Independent/ Mod I     Patient left HOB elevated with all lines intact, call button in reach and RN notified. And CNA present for vitals    Assessment:  Filipe Agustin Jr. is a 73 y.o. male with a medical diagnosis of Coronary artery disease involving native coronary artery of native heart with unstable angina pectoris and presents with impaired mobility.    Rehab identified problem list/impairments: Rehab identified problem list/impairments: impaired endurance, gait instability, decreased coordination, pain, weakness, impaired functional mobilty, decreased safety awareness, impaired cardiopulmonary response to activity, impaired self care skills, decreased lower extremity function, impaired balance    Rehab potential is good.    Activity tolerance: Fair    Discharge recommendations: Discharge Facility/Level of Care Needs: nursing facility, skilled     Barriers to discharge:      Equipment recommendations: Equipment Needed After Discharge: tub bench     GOALS:   Multidisciplinary Problems     Physical Therapy Goals        Problem: Physical Therapy Goal    Goal Priority Disciplines Outcome Goal Variances Interventions   Physical Therapy Goal     PT, PT/OT Ongoing (interventions implemented as appropriate)     Description:  LTG'S TO BE MET IN 7 DAYS (12-26-18)  1. PT WILL REQUIRE CGA FOR BED MOBILITY  2. PT WILL REQUIRE SPV FOR TF'S  3. PT WILL ' WITH SPV  4. PT WILL DEMO G DYNAMIC BALANCE DURING GAIT                    PLAN:    Patient to be seen 5 x/week  to address the above listed problems via gait training, therapeutic activities, therapeutic exercises  Plan of Care expires: 12/26/18  Plan of Care reviewed with: patient         Chad Rin, PT  12/24/2018

## 2018-12-24 NOTE — PLAN OF CARE
Problem: Adult Inpatient Plan of Care  Goal: Plan of Care Review  Plan of care reviewed with patient. Pt ambulated in hallway X3 today. Pt encouraged to use incentive spirometer. Pt verbalizes understanding.

## 2018-12-24 NOTE — PT/OT/SLP PROGRESS
Physical Therapy  Treatment    Filipe Agustin Jr.   MRN: [id number]   Admitting Diagnosis: Coronary artery disease involving native coronary artery of native heart with unstable angina pectoris    PT Received On: 12/24/18  PT Start Time: 1146     PT Stop Time: 1210    PT Total Time (min): 24 min       Billable Minutes:  Gait Training 14 and Therapeutic Activity 10    Treatment Type: Treatment  PT/PTA: PT             General Precautions: Standard, fall, sternal  Orthopedic Precautions: N/A   Braces: N/A         Subjective:  Communicated with NURSE AND Epic CHART REVIEW prior to session.   PT AGREED TO TX     Pain/Comfort  Pain Rating 1: 0/10  Pain Rating Post-Intervention 1: 0/10    Objective:   Patient found with: telemetry, peripheral IV    Functional Mobility:  PT MET IN RM SEATED IN CHAIR. PT RE-EDUCATED ON STERNAL PRECAUTIONS. PT STOOD WITH CGA AND GT TRAINED X 30' X 1 AND NEEDED URINAL PT BEGAN URINATING ON FLOOR. PT RETURNED TO CHAIR T/F WITH CGA. P.T. ASSIST PT IN CLEAN UP AND CLEANING FLOOR. PT STOOD X 2ND TRIAL WITH CGA. PT GT TRAINED 2X60' WITH UNSTEADY GT WITH ONE SEATED REST BREAK. PT RETURNED TO RM T/F TO CHAIR WITH CGA. PT LEFT SEATED WITH CALL BELL IN REACH AND ALL NEEDS MET.    AM-PAC 6 CLICK MOBILITY  How much help from another person does this patient currently need?   1 = Unable, Total/Dependent Assistance  2 = A lot, Maximum/Moderate Assistance  3 = A little, Minimum/Contact Guard/Supervision  4 = None, Modified [place]/Independent    Turning over in bed (including adjusting bedclothes, sheets and blankets)?: 1  Sitting down on and standing up from a chair with arms (e.g., wheelchair, bedside commode, etc.): 3  Moving from lying on back to sitting on the side of the bed?: 1  Moving to and from a bed to a chair (including a wheelchair)?: 3  Need to walk in hospital room?: 3    AM-PAC Raw Score CMS G-Code Modifier Level of Impairment Assistance   6 % Total / Unable   7 - 9 CM 80 - 100%  Maximal Assist   10 - 14 CL 60 - 80% Moderate Assist   15 - 19 CK 40 - 60% Moderate Assist   20 - 22 CJ 20 - 40% Minimal Assist   23 CI 1-20% SBA / CGA   24 CH 0% Independent/ Mod I     Patient left up in chair with call button in reach.    Assessment:  PT SADI GT HOWEVER INC FATIGUE NOTED AND UNSTEADY GT. PT WILL CONT TO BENEFIT FROM P.T. TO ADDRESS IMPAIRMENTS LISTED.     Rehab identified problem list/impairments: Rehab identified problem list/impairments: weakness, gait instability, impaired endurance, impaired balance, impaired functional mobilty, impaired self care skills, decreased lower extremity function, decreased upper extremity function, decreased ROM, orthopedic precautions, decreased safety awareness    Rehab potential is good.    Activity tolerance: Fair    Discharge recommendations: Discharge Facility/Level of Care Needs: rehabilitation facility     Barriers to discharge:      Equipment recommendations: Equipment Needed After Discharge: none     GOALS:   Multidisciplinary Problems     Physical Therapy Goals        Problem: Physical Therapy Goal    Goal Priority Disciplines Outcome Goal Variances Interventions   Physical Therapy Goal     PT, PT/OT Ongoing (interventions implemented as appropriate)     Description:  LTG'S TO BE MET IN 7 DAYS (12-26-18)  1. PT WILL REQUIRE CGA FOR BED MOBILITY  2. PT WILL REQUIRE SPV FOR TF'S  3. PT WILL ' WITH SPV  4. PT WILL DEMO G DYNAMIC BALANCE DURING GAIT                    PLAN:    Patient to be seen 5 x/week  to address the above listed problems via gait training, therapeutic activities, therapeutic exercises  Plan of Care expires: 12/26/18  Plan of Care reviewed with: patient         Candie Reis, PT  12/24/2018

## 2018-12-24 NOTE — PLAN OF CARE
Problem: Physical Therapy Goal  Goal: Physical Therapy Goal  LTG'S TO BE MET IN 7 DAYS (12-26-18)  1. PT WILL REQUIRE CGA FOR BED MOBILITY  2. PT WILL REQUIRE SPV FOR TF'S  3. PT WILL ' WITH SPV  4. PT WILL DEMO G DYNAMIC BALANCE DURING GAIT   Outcome: Ongoing (interventions implemented as appropriate)  Patient able to stand with min A and ambulate 15ft x 2 with seated rest needed due to fatigue. Still requires O2 continuously. Rec SNF at this point; not safe to go home.

## 2018-12-24 NOTE — ASSESSMENT & PLAN NOTE
Patient presented to [facility] [place]- due to chest pain  EKG revealed inferior ST elevation  NTG SL x 1 given in ED  Repeat EKG revealed persistently elevation in inferior leads  IV heparin 5,000 units given in ED  No BB due to Bradycardia in ED  TTE pending  FLP pending  Patient taken to Cath Lab for emergent LHC per Dr. Monroe.  Aggrastat bolus and Infusion to follow  Further recs to follow    12/16  -CABG recommended, CVT consulted and plan for CABG on 12/18  -Continue IV heparin gtt, ASA, Statin, BB  -Start low dose ARB today  -Transfer to telemetry today  -No chest pain on exam today.   -FLP not drawn, will reorder for AM  -ECHO revealed EF 60-65%, -WMA's, normal Bi-V function.    12/17/18  -Stable overnight  -No chest pain or SOB  -Continue IV heparin  -Continue ASA, BB, statin, ARB  -CABG planned for AM    12/18/18  -s/p CABG x 4  -Hemodynamically stable  -Continue current post-op care  -ASA, Plavix, statin, BB    12/19/18  -Recovering s/p CABG x 4  -Remains fatigued/weak  -Would benefit from transfusion, defer to CVT  -Continue ASA, Plavix, statin BB  -Ambulation and IS usage      12/19/18  -Stable overnight  -H and H low, defer transfusion decision to CVT  -Continue ASA, Plavix, statin, BB    12/21/18  -Stable CV wise  -Being transfused  -Continue ASA, Plavix, statin, BB  -No ACEI/ARB given bumped creatinine  -IS usage and ambulation    12/22/18  -Stable overnight  -Continue ASA, Plavix, statin, BB  -IS usage and ambulation    12/23/18  -Remains stable  -Continue ASA, Plavix, statin, BB  -IS usage and ambulation    12/24/18  Cont meds - ASA, Plavix, statin, BB  Is stable

## 2018-12-24 NOTE — PROGRESS NOTES
Pulmonary Note    History reviewed , patient examined, lab and radiographic studies reviewed.  Exam stable.  Assessment:  Patient Active Problem List   Diagnosis    Severe obesity with body mass index (BMI) of 35.0 to 39.9 with serious comorbidity    Coronary artery disease involving native coronary artery of native heart with unstable angina pectoris post ACS    Hypertension goal BP (blood pressure) < 140/90    CKD (chronic kidney disease) stage 3, GFR 30-59 ml/min    Tricuspid regurgitation    Hyperlipidemia LDL goal <70    Cancer of prostate with intermediate recurrence risk (stage T2b-c or Jonathon 7 or PSA 10-20)    S/P CABG x 4    Heart attack    Morbid (severe) obesity due to excess calories    Atelectasis, left    Post-operative state       Plan:  No new suggestions  Will sign off  Please re consult if any new problems develop    Leroy Robles MD  Pulmonary / Critical Care Medicine

## 2018-12-24 NOTE — PROGRESS NOTES
Clinical Pharmacy Progress Note: Vancomycin Dosing     Vancomycin Day #5  Estimated Creatinine Clearance: 46.1 mL/min (A) (based on SCr of 1.8 mg/dL (H)).   SCr trend: (increased x 1 day)   Lab Results   Component Value Date    WBC 8.59 12/24/2018   WBC trend: (increased x 1 day)         Tmax/24h: 98.9   Indication: Opportunistic infection prophylaxis, empiric   Goal trough: 15-20 mcg/mL  Cultures:   Plan: Pharmacy will continue current vancomycin pulse dose x 1 more day per discussion w/ Dr. Johnson. Pending d/c of antibiotics once final culture results.   Please note: A vancomycin 1000 mg IV every 72 hours placeholder dose only has been entered.      Next level due:  Vancomycin random level due 12/25/18 @ 0430.     Thank you for allowing us to participate in this patient's care.    Anna Luong, PharmD 12/24/2018 8:47 AM

## 2018-12-24 NOTE — PLAN OF CARE
Problem: Adult Inpatient Plan of Care  Goal: Plan of Care Review  Outcome: Ongoing (interventions implemented as appropriate)  POC reviewed, verbalized understanding. Pt remained free from falls, fall precautions in place. Pt is SR on monitor,  VSS. BLOOD SUGAR WNL No other c/o at this time. PIV intact.  Some pain noted prn given patient tolerated well.Call bell and personal belongings within reach. Hourly rounding complete. Reminded to call for assistance. Will continue to monitor.

## 2018-12-24 NOTE — PROGRESS NOTES
[facility]  Cardiothoracic Surgery  Progress Note    Patient Name: Filipe Agustin Jr.  MRN: [id number]  Admission Date: 12/15/2018  Hospital Length of Stay: 9 days  Code Status: Full Code   Attending Physician: Rg Johnson MD   Referring Provider: Self, Aaareferral  Principal Problem:Coronary artery disease involving native coronary artery of native heart with unstable angina pectoris            Subjective:     Post-Op Info:  Procedure(s) (LRB):  CORONARY ARTERY BYPASS GRAFT (CABG) (N/A)  ECHOCARDIOGRAM,TRANSESOPHAGEAL (N/A)  SURGICAL PROCUREMENT, VEIN, ENDOSCOPIC (Bilateral)   6 Days Post-Op     Interval History:  Patient is postop day 6.  Status post coronary bypass grafting x4.  The patient has no complaints.  He is making progress.  Looking forward to discharge.  ROS  Medications:  Continuous Infusions:  Scheduled Meds:   aspirin  81 mg Oral Daily    atorvastatin  80 mg Oral QHS    clopidogrel  75 mg Oral Daily    docusate sodium  100 mg Oral BID    metoprolol tartrate  50 mg Oral BID    nozaseptin   Each Nare BID    pantoprazole  40 mg Oral Daily    piperacillin-tazobactam (ZOSYN) IVPB  4.5 g Intravenous Q12H    polyethylene glycol  17 g Oral Daily    [START ON 12/30/2018] vancomycin (VANCOCIN) IVPB  1,250 mg Intravenous Q72H     PRN Meds:sodium chloride, acetaminophen, albuterol sulfate, dextrose 50%, dextrose 50%, glucagon (human recombinant), glucose, glucose, insulin aspart U-100, metoclopramide HCl, ondansetron, oxyCODONE     Objective:     Vital Signs (Most Recent):  Temp: 98.7 °F (37.1 °C) (12/24/18 0740)  Pulse: 85 (12/24/18 0839)  Resp: 20 (12/24/18 0839)  BP: 123/87 (12/24/18 0740)  SpO2: (!) 93 % (12/24/18 0839) Vital Signs (24h Range):  Temp:  [97.8 °F (36.6 °C)-98.9 °F (37.2 °C)] 98.7 °F (37.1 °C)  Pulse:  [67-90] 85  Resp:  [16-20] 20  SpO2:  [93 %-97 %] 93 %  BP: (113-146)/(60-87) 123/87     Weight: 113.4 kg (250 lb 0 oz)  Body mass index is 36.92 kg/m².    SpO2: (!)  93 %  O2 Device (Oxygen Therapy): room air    Intake/Output - Last 3 Shifts       12/22 0700 - 12/23 0659 12/23 0700 - 12/24 0659 12/24 0700 - 12/25 0659    P.O. 820 838     Blood       IV Piggyback 100 200     Total Intake(mL/kg) 920 (8.5) 1038 (9.2)     Urine (mL/kg/hr) 2350 (0.9) 1925 (0.7)     Stool 0 0     Total Output 2350 1925     Net -1430 -887            Stool Occurrence 1 x 1 x           Lines/Drains/Airways     Line                 Pacer Wires 12/18/18 1200 5 days          Pressure Ulcer                 Negative Pressure Wound Therapy  6 days          Peripheral Intravenous Line                 Midline Catheter Insertion/Assessment  - Double Lumen 12/19/18 1500 Left basilic vein (medial side of arm) 4 days                Physical Exam   Constitutional: He is oriented to person, place, and time. He appears well-developed and well-nourished. No distress.   HENT:   Head: Normocephalic and atraumatic.   Cardiovascular: Normal rate, regular rhythm and normal heart sounds.   Pulmonary/Chest: Effort normal and breath sounds normal.   Abdominal: Soft. Bowel sounds are normal. He exhibits no distension.   Musculoskeletal: He exhibits edema. He exhibits no deformity.   Neurological: He is alert and oriented to person, place, and time.   Skin: Skin is warm and dry. He is not diaphoretic.   Psychiatric: He has a normal mood and affect.       Significant Labs:  All pertinent labs from the last 24 hours have been reviewed.    Significant Diagnostics:  I have reviewed all pertinent imaging results/findings within the past 24 hours.    Assessment/Plan:     * Coronary artery disease involving native coronary artery of native heart with unstable angina pectoris post ACS    The patient is a 72-year-old male with history of CAD status post stent placement in the past, hypertension, hyperlipidemia who presented with acute onset of chest pain.  Patient's workup included cardiac catheterization which showed severe multivessel  coronary artery.  The risk and benefits of surgery was discussed with the patient and his wife.  The patient understands and has agreed to proceed with surgery which has been scheduled for 12/18/2018.     S/P CABG x 4    The patient is postop day 1 status post coronary artery bypass grafting x4.  Overall the patient is doing well.  Neuro:  Patient is awake alert and oriented x3.  Neuro exam is nonfocal.    Cardiac:  Patient is on low-dose vasopressin and epinephrine.  Cardiac index is good.  Wean drips to off.  And start beta-blockers.  Respiratory: Patient is maintaining good sats on nasal cannula.  GI:  Patient is started on p.o. intake.  Advanced as tolerated.  Renal:  Creatinine is 1.7.  Urine output is good.  Endocrine:  Glucose is well controlled.  Heme:  Hematocrit is 22 and platelet is 165.  Will hold off transfusions for now.  Id:  T-max is a 101.1 °.  And white count is 9.4.  Activities:  Patient is out of bed to chair.  Advanced a activities as tolerated.  Lines tubes and drains.  Discontinue chest tubes, DANNY, [place] and [place].  Placed mid level line and continue with pacer wires.    12/20/2018    The patient is postop day 2 status post coronary artery bypass grafting x4.  Over the past 24 hr patient has been febrile..  Neuro:  Patient is awake alert and oriented x3.  Neuro exam is nonfocal.    Cardiac:  Patient is tolerating beta-blocker.  Respiratory: Patient is maintaining good sats on nasal cannula.  GI:  Patient is started on p.o. intake.  Advanced as tolerated.  Renal:  Creatinine is 2.0  Urine output is good.  Endocrine:  Glucose is well controlled.  Heme:  Hematocrit is 22 and platelet is 158.  Will hold off transfusions for now.  Id:  T-max is a 101.7°.  And white count is 10.  Will panculture the patient.  Started on broad spectrum antibiotics.  Activities:  Patient is out of bed to chair.  Advance  activities as tolerated.  Lines tubes and drains.  Mid level line, Pacer wires and  kang    12/21/2018    The patient is postop day 3 status post coronary artery bypass grafting x4.  Overall patient is doing much better.  Fever curve is improving.  Will transfer patient to telemetry.  Neuro:  Patient is awake alert and oriented x3.  Neuro exam is nonfocal.    Cardiac:  Patient is tolerating metoprolol.  Respiratory: Patient is maintaining good sats on nasal cannula.  GI:  Patient is started on p.o. intake.  Advanced as tolerated.  Renal:  Creatinine is 2.0  Urine output is good.  Endocrine:  Glucose is well controlled.  Heme:  Hematocrit was19 and platelet is 164.  Patient transfused 2 units packed red blood cells.  Id:  T-max is a 101.3.  And white count is 9.  Cultures are pending.  The no growth to date.  Started on broad spectrum antibiotics.  Activities:  Patient is out of bed to chair.  Advance  activities as tolerated.  Lines tubes and drains.  Mid level line, Pacer wires and kang    12/22/2018    The patient is postop day 4status post coronary artery bypass grafting x4.  Overall patient is doing much better.  Anticipate discharge in the next 24-48 hours to home or to skilled nursing facility.    Neuro:  Patient is awake alert and oriented x3.  Neuro exam is nonfocal.    Cardiac:  Patient is tolerating metoprolol.    Respiratory: Patient is maintaining good sats on nasal cannula.  GI:  Patient is started on p.o. intake.  Advanced as tolerated.  Renal:  Creatinine is down to 1.8  Urine output is good.  Endocrine:  Glucose is well controlled.  Heme:  Repeat hematocrit is pending Patient transfused 2 units packed red blood cells.  Id:  Patient is afebrile cultures are no growth to date.  Started on broad spectrum antibiotics.  Activities:  Patient is out of bed to chair.  Advance  activities as tolerated.  Lines tubes and drains.  Mid level line, Pacer wires     12/23/2018    The patient is postop day 5 status post coronary artery bypass grafting x4.  Overall patient is doing much better.   Anticipate discharge in the next 24-48 hours to home or to skilled nursing facility.    Neuro:  Patient is awake alert and oriented x3.  Neuro exam is nonfocal.    Cardiac:  Patient is tolerating metoprolol.    Respiratory: Patient has been weaned down to room air.  Continue pulmonary toilet continue incentive spirometry.  GI:  Patient is tolerating a regular diet.  Renal:  Creatinine i is down to 1.6.  Urine output is good.  Endocrine:  Glucose is well controlled.  Heme:  Hematocrit is 30 and platelets are 288.    Id:  Patient is afebrile. cultures are no growth to date.  Final results of cultures pending.  Continue antibiotics for now.  Activities:  Patient is out of bed to chair.  Advance  activities as tolerated.  Lines tubes and drains.  Patient has a mid level line.      12/24/2018    The patient is postop day 6 status post coronary artery bypass grafting x4.  Overall patient is doing much better.  Anticipate discharge in the next 24-48 hours to home or to skilled nursing facility.    Neuro:  Patient is awake alert and oriented x3.  Neuro exam is nonfocal.    Cardiac:  Patient is tolerating metoprolol.  Will discontinue losartan in light of increasing creatinine.  Respiratory: Patient has been weaned down to room air.  Continue pulmonary toilet continue incentive spirometry.  GI:  Patient is tolerating a regular diet.  Renal:  Creatinine up to 1.8.  Urine output is good.  Endocrine:  Glucose is well controlled.  Heme:  Hematocrit is 31 and platelets are 346.    Id:  Patient is afebrile. cultures are no growth to date.  Final results of cultures pending.  Continue antibiotics for now.  Activities:  Patient is out of bed to chair.  Advance  activities as tolerated.  Lines tubes and drains.  Patient has a mid level line.                   Rg Johnson MD  Cardiothoracic Surgery  [facility]

## 2018-12-24 NOTE — PLAN OF CARE
Problem: Physical Therapy Goal  Goal: Physical Therapy Goal  LTG'S TO BE MET IN 7 DAYS (12-26-18)  1. PT WILL REQUIRE CGA FOR BED MOBILITY  2. PT WILL REQUIRE SPV FOR TF'S  3. PT WILL ' WITH SPV  4. PT WILL DEMO G DYNAMIC BALANCE DURING GAIT   Outcome: Ongoing (interventions implemented as appropriate)  PT WITH FAIR TOLERANCE TO P.T., LOW ACTIVITY TOLERANCE, ONDINA FOR SIT<>STAND TF, PT ABLE TO TOLERATE 60' WITH ONDINA

## 2018-12-24 NOTE — ASSESSMENT & PLAN NOTE
The patient is postop day 1 status post coronary artery bypass grafting x4.  Overall the patient is doing well.  Neuro:  Patient is awake alert and oriented x3.  Neuro exam is nonfocal.    Cardiac:  Patient is on low-dose vasopressin and epinephrine.  Cardiac index is good.  Wean drips to off.  And start beta-blockers.  Respiratory: Patient is maintaining good sats on nasal cannula.  GI:  Patient is started on p.o. intake.  Advanced as tolerated.  Renal:  Creatinine is 1.7.  Urine output is good.  Endocrine:  Glucose is well controlled.  Heme:  Hematocrit is 22 and platelet is 165.  Will hold off transfusions for now.  Id:  T-max is a 101.1 °.  And white count is 9.4.  Activities:  Patient is out of bed to chair.  Advanced a activities as tolerated.  Lines tubes and drains.  Discontinue chest tubes, DANNY, [place] and [place].  Placed mid level line and continue with pacer wires.    12/20/2018    The patient is postop day 2 status post coronary artery bypass grafting x4.  Over the past 24 hr patient has been febrile..  Neuro:  Patient is awake alert and oriented x3.  Neuro exam is nonfocal.    Cardiac:  Patient is tolerating beta-blocker.  Respiratory: Patient is maintaining good sats on nasal cannula.  GI:  Patient is started on p.o. intake.  Advanced as tolerated.  Renal:  Creatinine is 2.0  Urine output is good.  Endocrine:  Glucose is well controlled.  Heme:  Hematocrit is 22 and platelet is 158.  Will hold off transfusions for now.  Id:  T-max is a 101.7°.  And white count is 10.  Will panculture the patient.  Started on broad spectrum antibiotics.  Activities:  Patient is out of bed to chair.  Advance  activities as tolerated.  Lines tubes and drains.  Mid level line, Pacer wires and kang    12/21/2018    The patient is postop day 3 status post coronary artery bypass grafting x4.  Overall patient is doing much better.  Fever curve is improving.  Will transfer patient to telemetry.  Neuro:  Patient is awake alert and  oriented x3.  Neuro exam is nonfocal.    Cardiac:  Patient is tolerating metoprolol.  Respiratory: Patient is maintaining good sats on nasal cannula.  GI:  Patient is started on p.o. intake.  Advanced as tolerated.  Renal:  Creatinine is 2.0  Urine output is good.  Endocrine:  Glucose is well controlled.  Heme:  Hematocrit was19 and platelet is 164.  Patient transfused 2 units packed red blood cells.  Id:  T-max is a 101.3.  And white count is 9.  Cultures are pending.  The no growth to date.  Started on broad spectrum antibiotics.  Activities:  Patient is out of bed to chair.  Advance  activities as tolerated.  Lines tubes and drains.  Mid level line, Pacer wires and kang    12/22/2018    The patient is postop day 4status post coronary artery bypass grafting x4.  Overall patient is doing much better.  Anticipate discharge in the next 24-48 hours to home or to skilled nursing facility.    Neuro:  Patient is awake alert and oriented x3.  Neuro exam is nonfocal.    Cardiac:  Patient is tolerating metoprolol.    Respiratory: Patient is maintaining good sats on nasal cannula.  GI:  Patient is started on p.o. intake.  Advanced as tolerated.  Renal:  Creatinine is down to 1.8  Urine output is good.  Endocrine:  Glucose is well controlled.  Heme:  Repeat hematocrit is pending Patient transfused 2 units packed red blood cells.  Id:  Patient is afebrile cultures are no growth to date.  Started on broad spectrum antibiotics.  Activities:  Patient is out of bed to chair.  Advance  activities as tolerated.  Lines tubes and drains.  Mid level line, Pacer wires     12/23/2018    The patient is postop day 5 status post coronary artery bypass grafting x4.  Overall patient is doing much better.  Anticipate discharge in the next 24-48 hours to home or to skilled nursing facility.    Neuro:  Patient is awake alert and oriented x3.  Neuro exam is nonfocal.    Cardiac:  Patient is tolerating metoprolol.    Respiratory: Patient has been  weaned down to room air.  Continue pulmonary toilet continue incentive spirometry.  GI:  Patient is tolerating a regular diet.  Renal:  Creatinine i is down to 1.6.  Urine output is good.  Endocrine:  Glucose is well controlled.  Heme:  Hematocrit is 30 and platelets are 288.    Id:  Patient is afebrile. cultures are no growth to date.  Final results of cultures pending.  Continue antibiotics for now.  Activities:  Patient is out of bed to chair.  Advance  activities as tolerated.  Lines tubes and drains.  Patient has a mid level line.      12/24/2018    The patient is postop day 6 status post coronary artery bypass grafting x4.  Overall patient is doing much better.  Anticipate discharge in the next 24-48 hours to home or to skilled nursing facility.    Neuro:  Patient is awake alert and oriented x3.  Neuro exam is nonfocal.    Cardiac:  Patient is tolerating metoprolol.  Will discontinue losartan in light of increasing creatinine.  Respiratory: Patient has been weaned down to room air.  Continue pulmonary toilet continue incentive spirometry.  GI:  Patient is tolerating a regular diet.  Renal:  Creatinine up to 1.8.  Urine output is good.  Endocrine:  Glucose is well controlled.  Heme:  Hematocrit is 31 and platelets are 346.    Id:  Patient is afebrile. cultures are no growth to date.  Final results of cultures pending.  Continue antibiotics for now.  Activities:  Patient is out of bed to chair.  Advance  activities as tolerated.  Lines tubes and drains.  Patient has a mid level line.

## 2018-12-24 NOTE — PLAN OF CARE
Problem: Adult Inpatient Plan of Care  Goal: Plan of Care Review  Outcome: Ongoing (interventions implemented as appropriate)    Recommendations    Recommendation: 1. Continue current diet order. 2. Will continue to monitor  Intervention: sodium/fat restricted diet, Cardiac diet education, coordination of care w/ primary team  Goals: Meet >85% EEN/EPN this admit  Nutrition Goal Status: goal met, continues  Communication of RD Recs: (POC review, sticky note)

## 2018-12-24 NOTE — PT/OT/SLP PROGRESS
Physical Therapy  Treatment    Filipe Agustin Jr.   MRN: [id number]   Admitting Diagnosis: Coronary artery disease involving native coronary artery of native heart with unstable angina pectoris    PT Received On: 12/24/18  PT Start Time: 0730     PT Stop Time: 0755    PT Total Time (min): 25 min       Billable Minutes:  Gait Training 15 and Therapeutic Exercise 10    Treatment Type: Treatment  PT/PTA: PT       General Precautions: Standard, fall, sternal  Orthopedic Precautions: N/A   Braces: N/A    Subjective:  Communicated with NURSE TRAMMELL prior to session.  Pain/Comfort  Pain Rating 1: 0/10    Objective:   Patient found with: telemetry, peripheral IV    Functional Mobility:  Therapeutic Activities and Exercises:  PT FOUND SEATED IN CHAIR UPON ARRIVAL, SBA FOR SEATED SCOOT TOWARD EDGE OF CHAIR, PT ABLE TO RECITE 3/5 STERNAL PRECAUTIONS SO ALL REVIEWED WITH PT, ONDINA FOR SIT<>STAND TF X 2 TRIALS, PT SAT BACK DOWN TO USE URINAL WITH SET UP, PT AMB 60' WITH MIN/CGA, SLOW PACED GAIT, MILDLY UNSTEADY BUT VERY QUICK TO FATIGUE, PT C/O VERY WEAK LEGS SO DECLINED FURTHER DISTANCE, PT RETURN TO ROOM TO BEDSIDE CHAIR, PT EDUCATED IN AND PERFORMED BLE THEREX X 20 REPS AROM WITH REST    AM-PAC 6 CLICK MOBILITY  How much help from another person does this patient currently need?   1 = Unable, Total/Dependent Assistance  2 = A lot, Maximum/Moderate Assistance  3 = A little, Minimum/Contact Guard/Supervision  4 = None, Modified [place]/Independent    Turning over in bed (including adjusting bedclothes, sheets and blankets)?: 1  Sitting down on and standing up from a chair with arms (e.g., wheelchair, bedside commode, etc.): 3  Moving from lying on back to sitting on the side of the bed?: 1  Moving to and from a bed to a chair (including a wheelchair)?: 3  Need to walk in hospital room?: 3  Climbing 3-5 steps with a railing?: 1  Basic Mobility Total Score: 12    AM-PAC Raw Score CMS G-Code Modifier Level of Impairment  Assistance   6 % Total / Unable   7 - 9 CM 80 - 100% Maximal Assist   10 - 14 CL 60 - 80% Moderate Assist   15 - 19 CK 40 - 60% Moderate Assist   20 - 22 CJ 20 - 40% Minimal Assist   23 CI 1-20% SBA / CGA   24 CH 0% Independent/ Mod I     Patient left up in chair with all lines intact, call button in reach and NURSE notified.    Assessment:  Filipe Agustin Jr. is a 73 y.o. male with a medical diagnosis of Coronary artery disease involving native coronary artery of native heart with unstable angina pectoris and presents with IMPAIRED FUNCTIONAL MOBILITY. PT WILL BENEFIT FROM CONT. SKILLED P.T. TO ADDRESS IMPAIRMENTS    Rehab identified problem list/impairments: Rehab identified problem list/impairments: weakness, impaired endurance, impaired functional mobilty, gait instability, impaired balance, decreased coordination, decreased safety awareness    Rehab potential is good.    Activity tolerance: Good    Discharge recommendations: Discharge Facility/Level of Care Needs: rehabilitation facility, nursing facility, skilled     Barriers to discharge:      Equipment recommendations: Equipment Needed After Discharge: tub bench     GOALS:   Multidisciplinary Problems     Physical Therapy Goals        Problem: Physical Therapy Goal    Goal Priority Disciplines Outcome Goal Variances Interventions   Physical Therapy Goal     PT, PT/OT Ongoing (interventions implemented as appropriate)     Description:  LTG'S TO BE MET IN 7 DAYS (12-26-18)  1. PT WILL REQUIRE CGA FOR BED MOBILITY  2. PT WILL REQUIRE SPV FOR TF'S  3. PT WILL ' WITH SPV  4. PT WILL DEMO G DYNAMIC BALANCE DURING GAIT                    PLAN:    Patient to be seen 5 x/week  to address the above listed problems via gait training, therapeutic activities, therapeutic exercises  Plan of Care expires: 12/26/18  Plan of Care reviewed with: patient    PT ENCOURAGED TO CALL FOR ASSISTANCE WITH ALL NEEDS DUE TO FALL RISK STATUS, PT AGREEABLE     Aruna  [place], PT  12/24/2018

## 2018-12-24 NOTE — PT/OT/SLP PROGRESS
Physical Therapy  Treatment    Filipe Agustin Jr.   MRN: [id number]   Admitting Diagnosis: Coronary artery disease involving native coronary artery of native heart with unstable angina pectoris       PT Start Time: 0930     PT Stop Time: 0955    PT Total Time (min): 25 min       Billable Minutes:  Gait Training 15 and Therapeutic Activity 10    Treatment Type: Treatment  PT/PTA: PT             General Precautions: Standard, fall, sternal, respiratory  Orthopedic Precautions: N/A   Braces:           Subjective:  Communicated with Janelle SPENCER prior to session.         Objective:        Functional Mobility:  Bed Mobility:        Transfers:       Gait:        Stairs:      Balance:       Therapeutic Activities and Exercises:  Patient able to come sit to stand with min A from bedside chair with tc/vc's for body mechanics/sternal precautions then amb 15ft x 2 handheld A and min A fro balance; patient required seated rest break r/t sob and fatigue; O2 in tow for amb. PT returned to chair and legs reclined. PT educated patient on POC, d/c recs, le exs to do in chair/bed, safety/sternal/fall precautions with mobility - especially no use of arms during transitions AND breathing exs/techs to manage sob.     AM-PAC 6 CLICK MOBILITY  How much help from another person does this patient currently need?   1 = Unable, Total/Dependent Assistance  2 = A lot, Maximum/Moderate Assistance  3 = A little, Minimum/Contact Guard/Supervision  4 = None, Modified [place]/Independent         AM-PAC Raw Score CMS G-Code Modifier Level of Impairment Assistance   6 % Total / Unable   7 - 9 CM 80 - 100% Maximal Assist   10 - 14 CL 60 - 80% Moderate Assist   15 - 19 CK 40 - 60% Moderate Assist   20 - 22 CJ 20 - 40% Minimal Assist   23 CI 1-20% SBA / CGA   24 CH 0% Independent/ Mod I     Patient left up in chair with all lines intact, call button in reach and RN notified.    Assessment:  Filipe Agustin Jr. is a 73 y.o. male with a medical  diagnosis of Coronary artery disease involving native coronary artery of native heart with unstable angina pectoris and presents with impaired mobility.    Rehab identified problem list/impairments: Rehab identified problem list/impairments: impaired endurance, gait instability, decreased coordination, pain, impaired functional mobilty, weakness, decreased safety awareness, impaired cardiopulmonary response to activity, impaired self care skills, decreased lower extremity function, impaired balance    Rehab potential is good.    Activity tolerance: Fair    Discharge recommendations: Discharge Facility/Level of Care Needs: nursing facility, skilled     Barriers to discharge:      Equipment recommendations: Equipment Needed After Discharge: tub bench     GOALS:   Multidisciplinary Problems     Physical Therapy Goals        Problem: Physical Therapy Goal    Goal Priority Disciplines Outcome Goal Variances Interventions   Physical Therapy Goal     PT, PT/OT Ongoing (interventions implemented as appropriate)     Description:  LTG'S TO BE MET IN 7 DAYS (12-26-18)  1. PT WILL REQUIRE [facility] FOR BED MOBILITY  2. PT WILL REQUIRE [facility] FOR TF'S  3. PT WILL ' WITH SPV  4. PT WILL DEMO G DYNAMIC BALANCE DURING GAIT                    PLAN:    Patient to be seen 5 x/week  to address the above listed problems via gait training, therapeutic activities, therapeutic exercises  Plan of Care expires: 12/26/18  Plan of Care reviewed with: patient         Chad Gar, PT  12/24/2018

## 2018-12-24 NOTE — PT/OT/SLP PROGRESS
"Occupational Therapy  Treatment    Filipe Agustin Jr.   MRN: [id number]   Admitting Diagnosis: Coronary artery disease involving native coronary artery of native heart with unstable angina pectoris    OT Date of Treatment: 12/24/18   OT Start Time: 0655  OT Stop Time: 0710  OT Total Time (min): 15 min    Billable Minutes:  Self Care/Home Management 15 min    General Precautions: Standard, fall, sternal  Orthopedic Precautions: N/A  Braces: N/A         Subjective:  Communicated with Nurse Luis and Epic chart review prior to session.  Pt found in recliner chair and agreeable to tx at this time. Pt stated that he is too tired to get out of the chair at this time.  Pain/Comfort  Pain Rating 1: 2/10  Location 1: chest  Pain Addressed 1: Reposition, Distraction  Pain Rating Post-Intervention 1: 2/10    Objective:  Patient found with: telemetry, peripheral IV, wound vac(external pacer)     Therapist reviewed sternal precautions with pt, pt able to recall 3 out of 5 precautions. OT educated pt on proper toileting hygiene in order to maintain precautions. Pt educated on UE dressing with t-shirt and button up shirts. Pt donned gown as a robe with Mod A. Pt had good understanding of proper dressing and hygiene techniques.    AM-PAC 6 CLICK ADL   How much help from another person does this patient currently need?   1 = Unable, Total/Dependent Assistance  2 = A lot, Maximum/Moderate Assistance  3 = A little, Minimum/Contact Guard/Supervision  4 = None, Modified [place]/Independent    Putting on and taking off regular lower body clothing? : 2  Bathing (including washing, rinsing, drying)?: 2  Toileting, which includes using toilet, bedpan, or urinal? : 3  Putting on and taking off regular upper body clothing?: 2  Taking care of personal grooming such as brushing teeth?: 4  Eating meals?: 4  Daily Activity Total Score: 17     AM-PAC Raw Score CMS "G-Code Modifier Level of Impairment Assistance   6 % Total / Unable   7 - " 8 CM 80 - 100% Maximal Assist   9-13 CL 60 - 80% Moderate Assist   14 - 19 CK 40 - 60% Moderate Assist   20 - 22 CJ 20 - 40% Minimal Assist   23 CI 1-20% SBA / CGA   24 CH 0% Independent/ Mod I       Patient left up in chair with all lines intact, call button in reach and Nurse Janelle notified    ASSESSMENT:  Filipe Agustin Jr. is a 73 y.o. male with a medical diagnosis of Coronary artery disease involving native coronary artery of native heart with unstable angina pectoris and presents with impaired functional mobility and ADLs. Pt will benefit from continued skilled OT in order to address impairments.    Rehab identified problem list/impairments: Rehab identified problem list/impairments: weakness, impaired endurance, impaired self care skills, impaired functional mobilty, decreased coordination, impaired balance, gait instability, decreased upper extremity function, decreased lower extremity function, decreased safety awareness, pain, edema, decreased ROM    Rehab potential is good.    Activity tolerance: Fair    Discharge recommendations: Discharge Facility/Level of Care Needs: nursing facility, skilled, other (see comments)(Home Health)     Barriers to discharge: Barriers to Discharge: None    Equipment recommendations: tub bench     GOALS:   Multidisciplinary Problems     Occupational Therapy Goals        Problem: Occupational Therapy Goal    Goal Priority Disciplines Outcome Interventions   Occupational Therapy Goal     OT, PT/OT Ongoing (interventions implemented as appropriate)    Description:  ot goals to be met by 12-26-18  Mod a with ue dressing  Mod a with le dressing  Mod a with toilet t/f's  Pt will verbalized and return demonstration with ae                    Plan:  Patient to be seen 3 x/week to address the above listed problems via self-care/home management, therapeutic activities, therapeutic exercises  Plan of Care expires: 12/27/18  Plan of Care reviewed with: patient    OT G-codes  Functional  Assessment Tool Used: [facility]  Score: 18    Lynda Glover, PT/OT  12/24/2018

## 2018-12-24 NOTE — SUBJECTIVE & OBJECTIVE
Interval History: no acute events o/n    Review of Systems   Constitution: Positive for weakness and malaise/fatigue.   HENT: Negative.    Eyes: Negative.    Cardiovascular: Negative.    Respiratory: Negative.    Endocrine: Negative.    Hematologic/Lymphatic: Negative.    Skin: Negative.    Musculoskeletal: Negative.    Gastrointestinal: Negative.    Genitourinary: Negative.    Psychiatric/Behavioral: Negative.    Allergic/Immunologic: Negative.      Objective:     Vital Signs (Most Recent):  Temp: 97.4 °F (36.3 °C) (12/24/18 1154)  Pulse: 71 (12/24/18 1336)  Resp: 18 (12/24/18 1154)  BP: 123/62 (12/24/18 1154)  SpO2: 95 % (12/24/18 1154) Vital Signs (24h Range):  Temp:  [97.4 °F (36.3 °C)-98.9 °F (37.2 °C)] 97.4 °F (36.3 °C)  Pulse:  [67-90] 71  Resp:  [16-20] 18  SpO2:  [93 %-97 %] 95 %  BP: (118-146)/(60-87) 123/62     Weight: 113.4 kg (250 lb 0 oz)  Body mass index is 36.92 kg/m².     SpO2: 95 %  O2 Device (Oxygen Therapy): room air      Intake/Output Summary (Last 24 hours) at 12/24/2018 1523  Last data filed at 12/24/2018 0200  Gross per 24 hour   Intake 440 ml   Output 1125 ml   Net -685 ml       Lines/Drains/Airways     Line                 Pacer Wires 12/18/18 1200 6 days          Pressure Ulcer                 Negative Pressure Wound Therapy  6 days          Peripheral Intravenous Line                 Midline Catheter Insertion/Assessment  - Double Lumen 12/19/18 1500 Left basilic vein (medial side of arm) 5 days                Physical Exam   Constitutional: He is oriented to person, place, and time. He appears well-developed and well-nourished. No distress.   HENT:   Head: Normocephalic and atraumatic.   Eyes: Pupils are equal, round, and reactive to light. Right eye exhibits no discharge. Left eye exhibits no discharge.   Neck: Neck supple. No JVD present.   Cardiovascular: Normal rate, regular rhythm, S1 normal, S2 normal and normal heart sounds.   No murmur heard.  Pulmonary/Chest: Effort normal and  breath sounds normal. No respiratory distress.   CABG site C/D/I; no bleeding erythema or drainage    Diminished BS at bases   Abdominal: Soft. He exhibits no distension. There is no tenderness. There is no rebound.   Musculoskeletal: He exhibits edema (BLE).   Neurological: He is alert and oriented to person, place, and time.   Skin: Skin is warm and dry. He is not diaphoretic. No erythema.   Psychiatric: He has a normal mood and affect. His behavior is normal.   Nursing note and vitals reviewed.      Significant Labs:   All pertinent lab results from the last 24 hours have been reviewed. and   Recent Lab Results       12/24/18  1133   12/24/18  0628   12/24/18  0426   12/23/18  2137   12/23/18  1608        Albumin     2.9         Alkaline Phosphatase     31         ALT     22         Anion Gap     11              11         AST     24         Baso #     0.04         Basophil%     0.5         Total Bilirubin     1.6  Comment:  For infants and newborns, interpretation of results should be based  on gestational age, weight and in agreement with clinical  observations.  Premature Infant recommended reference ranges:  Up to 24 hours.............<8.0 mg/dL  Up to 48 hours............<12.0 mg/dL  3-5 days..................<15.0 mg/dL  6-29 days.................<15.0 mg/dL           BUN, Bld     26              26         Calcium     9.0              9.1         Chloride     94              95         CO2     31              31         Creatinine     1.8              1.8         Differential Method     Automated         eGFR if      43              43         eGFR if non      37  Comment:  Calculation used to obtain the estimated glomerular filtration  rate (eGFR) is the CKD-EPI equation.                 37  Comment:  Calculation used to obtain the estimated glomerular filtration  rate (eGFR) is the CKD-EPI equation.            Eos #     0.2         Eosinophil%     2.8         Glucose      99              98         Gran # (ANC)     6.1         Gran%     71.0         Hematocrit     31.9         Hemoglobin     10.2         Lymph #     1.4         Lymph%     16.4         Magnesium     2.4         MCH     29.2         MCHC     32.0         MCV     91         Mono #     0.8         Mono%     9.3         MPV     10.4         Platelets     346         POCT Glucose 143 103   105 119     Potassium     4.2              4.2         Total Protein     6.8         RBC     3.49         RDW     14.9         Sodium     136              137         WBC     8.59                              Significant Imaging: Echocardiogram:   2D echo with color flow doppler:   Results for orders placed or performed during the hospital encounter of 12/15/18   2D echo with color flow doppler   Result Value Ref Range    QEF 60 55 - 65    Diastolic Dysfunction No     and X-Ray: CXR: X-Ray Chest 1 View (CXR): No results found for this visit on 12/15/18.

## 2018-12-24 NOTE — PLAN OF CARE
"0813 am [place] spoke with the patient regarding his full participation with therapy. [place] explained his d/c plan was home with HH. He is not ambulating enough and minimal participation with therapy. He has to continue to push and successfully reach therapeutic goals to go home or he will have to go inpatient skilled care. [place] was told per the patient " I can walk around the nurse's station. I can walk further than what was doing".  CM able to read therapy notes for the afternoon session. Patient did well and ambulated  Around the nurse's station with SOB / fatigue/ or tiring very easily. CM to f/u for safe transition     12/24/18 1411   Discharge Reassessment   Assessment Type Discharge Planning Reassessment   Provided patient/caregiver education on the expected discharge date and the discharge plan No   Do you have any problems affording any of your prescribed medications? No   Discharge Plan A Home Health;Home with family   Discharge Plan B Skilled Nursing Facility   Patient choice form signed by patient/caregiver N/A   Anticipated Discharge Disposition Home-Health   Can the patient answer the patient profile reliably? Yes, cognitively intact   How does the patient rate their overall health at the present time? Good   Describe the patient's ability to walk at the present time. Minor restrictions or changes   How often would a person be available to care for the patient? Whenever needed   Number of comorbid conditions (as recorded on the chart) Five or more   During the past month, has the patient often been bothered by feeling down, depressed or hopeless? No   During the past month, has the patient often been bothered by little interest or pleasure in doing things? No     "

## 2018-12-24 NOTE — SUBJECTIVE & OBJECTIVE
Interval History:  Patient is postop day 6.  Status post coronary bypass grafting x4.  The patient has no complaints.  He is making progress.  Looking forward to discharge.  ROS  Medications:  Continuous Infusions:  Scheduled Meds:   aspirin  81 mg Oral Daily    atorvastatin  80 mg Oral QHS    clopidogrel  75 mg Oral Daily    docusate sodium  100 mg Oral BID    metoprolol tartrate  50 mg Oral BID    nozaseptin   Each Nare BID    pantoprazole  40 mg Oral Daily    piperacillin-tazobactam (ZOSYN) IVPB  4.5 g Intravenous Q12H    polyethylene glycol  17 g Oral Daily    [START ON 12/30/2018] vancomycin (VANCOCIN) IVPB  1,250 mg Intravenous Q72H     PRN Meds:sodium chloride, acetaminophen, albuterol sulfate, dextrose 50%, dextrose 50%, glucagon (human recombinant), glucose, glucose, insulin aspart U-100, metoclopramide HCl, ondansetron, oxyCODONE     Objective:     Vital Signs (Most Recent):  Temp: 98.7 °F (37.1 °C) (12/24/18 0740)  Pulse: 85 (12/24/18 0839)  Resp: 20 (12/24/18 0839)  BP: 123/87 (12/24/18 0740)  SpO2: (!) 93 % (12/24/18 0839) Vital Signs (24h Range):  Temp:  [97.8 °F (36.6 °C)-98.9 °F (37.2 °C)] 98.7 °F (37.1 °C)  Pulse:  [67-90] 85  Resp:  [16-20] 20  SpO2:  [93 %-97 %] 93 %  BP: (113-146)/(60-87) 123/87     Weight: 113.4 kg (250 lb 0 oz)  Body mass index is 36.92 kg/m².    SpO2: (!) 93 %  O2 Device (Oxygen Therapy): room air    Intake/Output - Last 3 Shifts       12/22 0700 - 12/23 0659 12/23 0700 - 12/24 0659 12/24 0700 - 12/25 0659    P.O. 820 838     Blood       IV Piggyback 100 200     Total Intake(mL/kg) 920 (8.5) 1038 (9.2)     Urine (mL/kg/hr) 2350 (0.9) 1925 (0.7)     Stool 0 0     Total Output 2350 1925     Net -1432 -145            Stool Occurrence 1 x 1 x           Lines/Drains/Airways     Line                 Pacer Wires 12/18/18 1200 5 days          Pressure Ulcer                 Negative Pressure Wound Therapy  6 days          Peripheral Intravenous Line                 Midline  Catheter Insertion/Assessment  - Double Lumen 12/19/18 1500 Left basilic vein (medial side of arm) 4 days                Physical Exam   Constitutional: He is oriented to person, place, and time. He appears well-developed and well-nourished. No distress.   HENT:   Head: Normocephalic and atraumatic.   Cardiovascular: Normal rate, regular rhythm and normal heart sounds.   Pulmonary/Chest: Effort normal and breath sounds normal.   Abdominal: Soft. Bowel sounds are normal. He exhibits no distension.   Musculoskeletal: He exhibits edema. He exhibits no deformity.   Neurological: He is alert and oriented to person, place, and time.   Skin: Skin is warm and dry. He is not diaphoretic.   Psychiatric: He has a normal mood and affect.       Significant Labs:  All pertinent labs from the last 24 hours have been reviewed.    Significant Diagnostics:  I have reviewed all pertinent imaging results/findings within the past 24 hours.

## 2018-12-24 NOTE — PHYSICIAN QUERY
"PT Name: Filipe Agustin Jr.  MR #: [id number]    Physician Query Form - Hematology Clarification      CDS/: Mirta Stark               Contact information: [email or web address]      This form is a permanent document in the medical record.      Query Date: December 24, 2018    By submitting this query, we are merely seeking further clarification of documentation. Please utilize your independent clinical judgment when addressing the question(s) below.    The Medical record contains the following:   Indicators  Supporting Clinical Findings Location in Medical Record    "Anemia" documented     x H & H = 15.4 -> 6.1   46.9 - > 19.1 Labs 12/15 - 12/24    BP =                     HR=      "GI bleeding" documented      Acute bleeding (Non GI site)     x Transfusion(s) 2 units PRBC transfused  Blood bank 12/21     Treatment:      Other:  Estimated Blood Loss (EBL): * 750 mL      Op note 12/18      Provider, please specify diagnosis or diagnoses associated with above clinical findings.    [  ] Acute blood loss anemia expected post-operatively   [x  ] Acute blood loss anemia   [  ] Precipitous drop in Hematocrit     [  ] Other Hematological Diagnosis (please specify):     [  ] Clinically Undetermined       Please document in your progress notes daily for the duration of treatment, until resolved, and include in your discharge summary.                                                                                                      "

## 2018-12-25 VITALS
BODY MASS INDEX: 33.63 KG/M2 | TEMPERATURE: 98 F | DIASTOLIC BLOOD PRESSURE: 69 MMHG | OXYGEN SATURATION: 95 % | HEART RATE: 80 BPM | RESPIRATION RATE: 18 BRPM | HEIGHT: 69 IN | WEIGHT: 227.06 LBS | SYSTOLIC BLOOD PRESSURE: 106 MMHG

## 2018-12-25 LAB
ALBUMIN SERPL BCP-MCNC: 2.8 G/DL
ALP SERPL-CCNC: 36 U/L
ALT SERPL W/O P-5'-P-CCNC: 18 U/L
ANION GAP SERPL CALC-SCNC: 13 MMOL/L
ANION GAP SERPL CALC-SCNC: 13 MMOL/L
AST SERPL-CCNC: 21 U/L
BACTERIA BLD CULT: NORMAL
BASOPHILS # BLD AUTO: 0.04 K/UL
BASOPHILS NFR BLD: 0.4 %
BILIRUB SERPL-MCNC: 1.4 MG/DL
BUN SERPL-MCNC: 29 MG/DL
BUN SERPL-MCNC: 29 MG/DL
CALCIUM SERPL-MCNC: 9.3 MG/DL
CALCIUM SERPL-MCNC: 9.3 MG/DL
CHLORIDE SERPL-SCNC: 96 MMOL/L
CHLORIDE SERPL-SCNC: 96 MMOL/L
CO2 SERPL-SCNC: 27 MMOL/L
CO2 SERPL-SCNC: 27 MMOL/L
CREAT SERPL-MCNC: 1.7 MG/DL
CREAT SERPL-MCNC: 1.7 MG/DL
DIFFERENTIAL METHOD: ABNORMAL
EOSINOPHIL # BLD AUTO: 0.3 K/UL
EOSINOPHIL NFR BLD: 2.8 %
ERYTHROCYTE [DISTWIDTH] IN BLOOD BY AUTOMATED COUNT: 15 %
EST. GFR  (AFRICAN AMERICAN): 46 ML/MIN/1.73 M^2
EST. GFR  (AFRICAN AMERICAN): 46 ML/MIN/1.73 M^2
EST. GFR  (NON AFRICAN AMERICAN): 39 ML/MIN/1.73 M^2
EST. GFR  (NON AFRICAN AMERICAN): 39 ML/MIN/1.73 M^2
GLUCOSE SERPL-MCNC: 99 MG/DL
GLUCOSE SERPL-MCNC: 99 MG/DL
HCT VFR BLD AUTO: 31.4 %
HGB BLD-MCNC: 10.2 G/DL
LYMPHOCYTES # BLD AUTO: 1.5 K/UL
LYMPHOCYTES NFR BLD: 16.1 %
MAGNESIUM SERPL-MCNC: 2.2 MG/DL
MCH RBC QN AUTO: 29.5 PG
MCHC RBC AUTO-ENTMCNC: 32.5 G/DL
MCV RBC AUTO: 91 FL
MONOCYTES # BLD AUTO: 0.8 K/UL
MONOCYTES NFR BLD: 8.6 %
NEUTROPHILS # BLD AUTO: 6.8 K/UL
NEUTROPHILS NFR BLD: 72.1 %
PLATELET # BLD AUTO: 372 K/UL
PMV BLD AUTO: 10.3 FL
POCT GLUCOSE: 103 MG/DL (ref 70–110)
POTASSIUM SERPL-SCNC: 3.5 MMOL/L
POTASSIUM SERPL-SCNC: 3.5 MMOL/L
PROT SERPL-MCNC: 6.7 G/DL
RBC # BLD AUTO: 3.46 M/UL
SODIUM SERPL-SCNC: 136 MMOL/L
SODIUM SERPL-SCNC: 136 MMOL/L
VANCOMYCIN SERPL-MCNC: 16.5 UG/ML
WBC # BLD AUTO: 9.44 K/UL

## 2018-12-25 PROCEDURE — 80202 ASSAY OF VANCOMYCIN: CPT | Mod: HCNC

## 2018-12-25 PROCEDURE — 99233 PR SUBSEQUENT HOSPITAL CARE,LEVL III: ICD-10-PCS | Mod: HCNC,,, | Performed by: INTERNAL MEDICINE

## 2018-12-25 PROCEDURE — 99233 SBSQ HOSP IP/OBS HIGH 50: CPT | Mod: HCNC,,, | Performed by: INTERNAL MEDICINE

## 2018-12-25 PROCEDURE — 27000646 HC AEROBIKA DEVICE: Mod: HCNC

## 2018-12-25 PROCEDURE — 85025 COMPLETE CBC W/AUTO DIFF WBC: CPT | Mod: HCNC

## 2018-12-25 PROCEDURE — 83735 ASSAY OF MAGNESIUM: CPT | Mod: HCNC

## 2018-12-25 PROCEDURE — 94664 DEMO&/EVAL PT USE INHALER: CPT | Mod: HCNC

## 2018-12-25 PROCEDURE — 80053 COMPREHEN METABOLIC PANEL: CPT | Mod: HCNC

## 2018-12-25 PROCEDURE — 63600175 PHARM REV CODE 636 W HCPCS: Mod: HCNC | Performed by: THORACIC SURGERY (CARDIOTHORACIC VASCULAR SURGERY)

## 2018-12-25 PROCEDURE — 94761 N-INVAS EAR/PLS OXIMETRY MLT: CPT | Mod: HCNC

## 2018-12-25 PROCEDURE — 94799 UNLISTED PULMONARY SVC/PX: CPT | Mod: HCNC

## 2018-12-25 PROCEDURE — 25000003 PHARM REV CODE 250: Mod: HCNC | Performed by: THORACIC SURGERY (CARDIOTHORACIC VASCULAR SURGERY)

## 2018-12-25 PROCEDURE — 99900035 HC TECH TIME PER 15 MIN (STAT): Mod: HCNC

## 2018-12-25 PROCEDURE — 36415 COLL VENOUS BLD VENIPUNCTURE: CPT | Mod: HCNC

## 2018-12-25 RX ORDER — DOCUSATE SODIUM 100 MG/1
100 CAPSULE, LIQUID FILLED ORAL 2 TIMES DAILY
Refills: 0 | COMMUNITY
Start: 2018-12-25 | End: 2019-02-06

## 2018-12-25 RX ORDER — OXYCODONE HYDROCHLORIDE 5 MG/1
5 TABLET ORAL EVERY 6 HOURS PRN
Qty: 5 TABLET | Refills: 0 | Status: SHIPPED | OUTPATIENT
Start: 2018-12-25 | End: 2019-02-06

## 2018-12-25 RX ORDER — CLOPIDOGREL BISULFATE 75 MG/1
75 TABLET ORAL DAILY
Qty: 30 TABLET | Refills: 11 | Status: SHIPPED | OUTPATIENT
Start: 2018-12-26 | End: 2019-02-06

## 2018-12-25 RX ORDER — PANTOPRAZOLE SODIUM 40 MG/1
40 TABLET, DELAYED RELEASE ORAL DAILY
Qty: 30 TABLET | Refills: 0 | Status: SHIPPED | OUTPATIENT
Start: 2018-12-26 | End: 2019-04-30

## 2018-12-25 RX ORDER — POLYETHYLENE GLYCOL 3350 17 G/17G
17 POWDER, FOR SOLUTION ORAL DAILY
Qty: 255 G | Refills: 0 | Status: SHIPPED | OUTPATIENT
Start: 2018-12-26 | End: 2019-01-25

## 2018-12-25 RX ADMIN — POLYETHYLENE GLYCOL 3350 17 G: 17 POWDER, FOR SOLUTION ORAL at 08:12

## 2018-12-25 RX ADMIN — DOCUSATE SODIUM 100 MG: 100 CAPSULE, LIQUID FILLED ORAL at 08:12

## 2018-12-25 RX ADMIN — PIPERACILLIN SODIUM AND TAZOBACTAM SODIUM 4.5 G: 4; .5 INJECTION, POWDER, LYOPHILIZED, FOR SOLUTION INTRAVENOUS at 08:12

## 2018-12-25 RX ADMIN — CLOPIDOGREL BISULFATE 75 MG: 75 TABLET ORAL at 08:12

## 2018-12-25 RX ADMIN — PANTOPRAZOLE SODIUM 40 MG: 40 TABLET, DELAYED RELEASE ORAL at 08:12

## 2018-12-25 RX ADMIN — METOPROLOL TARTRATE 50 MG: 50 TABLET ORAL at 08:12

## 2018-12-25 RX ADMIN — ASPIRIN 81 MG: 81 TABLET, COATED ORAL at 08:12

## 2018-12-25 NOTE — PLAN OF CARE
Problem: Adult Inpatient Plan of Care  Goal: Plan of Care Review  Outcome: Ongoing (interventions implemented as appropriate)  POC reviewed with pt, verbalized understanding. SR on tele monitor, VSS, AAOx4. Sternal precautions reinforced and maintained. Cabg incisions x5 c/d/i. Turns/repositions with minimal assist. Denies SOB or pain this shift. Blood glucose monitored. HENRIK midline dsg c/d/i, abx given per order. Voids per urinal at bedside, I/Os monitored. Bed in low position, nonskid socks, call light and personal items within reach, room close to nurse station. Remains free from injury/falls this shift. Instructed to call for assistance.

## 2018-12-25 NOTE — PROGRESS NOTES
[facility]  Cardiology  Progress Note    Patient Name: Filipe Agustin Jr.  MRN: [id number]  Admission Date: 12/15/2018  Hospital Length of Stay: 10 days  Code Status: Full Code   Attending Physician: Rg Johnson MD   Primary Care Physician: Jojo Duque DO  Expected Discharge Date: 12/25/2018  Principal Problem:Coronary artery disease involving native coronary artery of native heart with unstable angina pectoris    Subjective:     Hospital Course:   12/16/18-Patient seen and examined in ICU, sitting in bedside chair. Denies chest pain or shortness of breath today on exam. IV heparin gtt in progress. Pending CABG on 12/18 per CVT. Labs reviewed, stable.     12/17/18-Patient seen and examined today, resting in bed. No acute events overnight. Denies chest pain or SOB. Labs stable. CABG planned for AM.    12/18/18-Patient seen and examined today, s/p CABG x 4. Hemodynamically stable.     12/19/18-Patient seen and examined today, POD # 1 s/p CABG x 4. Feels ok. Complains of fatigue and incisional soreness. Labs reviewed. Creatinine 1.7. H and H low.     12/20/18-Patient seen and examined today, POD # 2 s/p CABG x 4. Feels fatigued and weak. Pain fairly well controlled. No SOB. Labs reviewed. Creatinine bumped to 1.9. H and H remains low (7.4/22).    12/21/18-Patient seen and examined today, POD # 3 s/p CABG x 4. Feels ok. Still complains of fatigue. No chest pain or SOB. Being transfused. Creatinine 2.0.    12/22/18-Patient seen and examined today, POD # 4 s/p CABG x 4. Feeling ok. Still weak. Denies pain. Labs stable. Needs to ambulate.     12/23/18-Patient seen and examined today, POD # 5 s/p CABG x 4. Still complains of fatigue and weakness. No chest pain or SOB. Ambulated yesterday. Labs stable.     12/24/18 -  Patient seen and examined today, POD # 6 s/p CABG x 4. Mild fatigue, No chest pain or SOB. Walking with PT/OT.     12/25/18 - pt stable ready for DC per Dr. Johnson, no CP/SOB;     Interval  History: no acute events o/n    Review of Systems   Constitution: Positive for weakness and malaise/fatigue.   HENT: Negative.    Eyes: Negative.    Cardiovascular: Negative.    Respiratory: Negative.    Endocrine: Negative.    Hematologic/Lymphatic: Negative.    Skin: Negative.    Musculoskeletal: Negative.    Gastrointestinal: Negative.    Genitourinary: Negative.    Psychiatric/Behavioral: Negative.    Allergic/Immunologic: Negative.      Objective:     Vital Signs (Most Recent):  Temp: 98.2 °F (36.8 °C) (12/25/18 0732)  Pulse: 80 (12/25/18 1108)  Resp: 18 (12/25/18 0800)  BP: 106/69 (12/25/18 0732)  SpO2: 95 % (12/25/18 0800) Vital Signs (24h Range):  Temp:  [98 °F (36.7 °C)-98.6 °F (37 °C)] 98.2 °F (36.8 °C)  Pulse:  [68-82] 80  Resp:  [17-18] 18  SpO2:  [92 %-96 %] 95 %  BP: (102-118)/(57-69) 106/69     Weight: 103 kg (227 lb 1.2 oz)  Body mass index is 33.53 kg/m².     SpO2: 95 %  O2 Device (Oxygen Therapy): room air      Intake/Output Summary (Last 24 hours) at 12/25/2018 1311  Last data filed at 12/25/2018 0829  Gross per 24 hour   Intake 240 ml   Output 300 ml   Net -60 ml       Lines/Drains/Airways          None          Physical Exam   Constitutional: He is oriented to person, place, and time. He appears well-developed and well-nourished. No distress.   HENT:   Head: Normocephalic and atraumatic.   Eyes: Pupils are equal, round, and reactive to light. Right eye exhibits no discharge. Left eye exhibits no discharge.   Neck: Neck supple. No JVD present.   Cardiovascular: Normal rate, regular rhythm, S1 normal, S2 normal and normal heart sounds.   No murmur heard.  Pulmonary/Chest: Effort normal and breath sounds normal. No respiratory distress.   CABG site C/D/I; no bleeding erythema or drainage    Diminished BS at bases   Abdominal: Soft. He exhibits no distension. There is no tenderness. There is no rebound.   Musculoskeletal: He exhibits edema (BLE).   Neurological: He is alert and oriented to person,  place, and time.   Skin: Skin is warm and dry. He is not diaphoretic. No erythema.   Psychiatric: He has a normal mood and affect. His behavior is normal.   Nursing note and vitals reviewed.      Significant Labs:   All pertinent lab results from the last 24 hours have been reviewed. and   Recent Lab Results       12/25/18  0612   12/25/18  0406   12/24/18  2034   12/24/18  1655        Albumin   2.8         Alkaline Phosphatase   36         ALT   18         Anion Gap   13            13         AST   21         Baso #   0.04         Basophil%   0.4         Total Bilirubin   1.4  Comment:  For infants and newborns, interpretation of results should be based  on gestational age, weight and in agreement with clinical  observations.  Premature Infant recommended reference ranges:  Up to 24 hours.............<8.0 mg/dL  Up to 48 hours............<12.0 mg/dL  3-5 days..................<15.0 mg/dL  6-29 days.................<15.0 mg/dL           BUN, Bld   29            29         Calcium   9.3            9.3         Chloride   96            96         CO2   27            27         Creatinine   1.7            1.7         Differential Method   Automated         eGFR if    46            46         eGFR if non    39  Comment:  Calculation used to obtain the estimated glomerular filtration  rate (eGFR) is the CKD-EPI equation.               39  Comment:  Calculation used to obtain the estimated glomerular filtration  rate (eGFR) is the CKD-EPI equation.            Eos #   0.3         Eosinophil%   2.8         Glucose   99            99         Gran # (ANC)   6.8         Gran%   72.1         Hematocrit   31.4         Hemoglobin   10.2         Lymph #   1.5         Lymph%   16.1         Magnesium   2.2         MCH   29.5         MCHC   32.5         MCV   91         Mono #   0.8         Mono%   8.6         MPV   10.3         Platelets   372         POCT Glucose 103   113 109     Potassium   3.5             3.5         Total Protein   6.7         RBC   3.46         RDW   15.0         Sodium   136            136         Vancomycin, Random   16.5         WBC   9.44               Significant Imaging: Echocardiogram:   2D echo with color flow doppler:   Results for orders placed or performed during the hospital encounter of 12/15/18   2D echo with color flow doppler   Result Value Ref Range    QEF 60 55 - 65    Diastolic Dysfunction No     and X-Ray: CXR: X-Ray Chest 1 View (CXR): No results found for this visit on 12/15/18.    Assessment and Plan:     Brief HPI: ready for DC    * Coronary artery disease involving native coronary artery of native heart with unstable angina pectoris post ACS    -See plan under CABG     S/P CABG x 4    [place] [facility] to [place]  EKG revealed inferior ST elevation  NTG SL x 1 given in ED  Repeat EKG revealed persistently elevation in inferior leads  IV heparin 5,000 units given in ED  No BB due to Bradycardia in ED  TTE pending  FLP pending  Patient taken to Cath Lab for emergent LHC per Dr. Monroe.  Aggrastat bolus and Infusion to follow  Further recs to follow    12/16  -CABG recommended, CVT consulted and plan for CABG on 12/18  -Continue IV heparin gtt, ASA, Statin, BB  -Start low dose ARB today  -Transfer to telemetry today  -No chest pain on exam today.   -FLP not drawn, will reorder for AM  -ECHO revealed EF 60-65%, -WMA's, normal Bi-V function.    12/17/18  -Stable overnight  -No chest pain or SOB  -Continue IV heparin  -Continue ASA, BB, statin, ARB  -CABG planned for AM    12/18/18  -s/p CABG x 4  -Hemodynamically stable  -Continue current post-op care  -ASA, Plavix, statin, BB    12/19/18  -Recovering s/p CABG x 4  -Remains fatigued/weak  -Would benefit from transfusion, defer to CVT  -Continue ASA, Plavix, statin BB  -Ambulation and IS usage      12/19/18  -Stable overnight  -H and H low, defer transfusion decision to CVT  -Continue ASA, Plavix, statin,  BB    12/21/18  -Stable CV wise  -Being transfused  -Continue ASA, Plavix, statin, BB  -No ACEI/ARB given bumped creatinine  -IS usage and ambulation    12/22/18  -Stable overnight  -Continue ASA, Plavix, statin, BB  -IS usage and ambulation    12/23/18  -Remains stable  -Continue ASA, Plavix, statin, BB  -IS usage and ambulation    12/24/18  Cont meds - ASA, Plavix, statin, BB  Is stable     12/25/18  Cont meds - ASA, Plavix, statin, BB  Is stable for DC     CKD (chronic kidney disease) stage 3, GFR 30-59 ml/min    -Monitor       Hypertension goal BP (blood pressure) < 140/90    On medical therapy  Continue meds and titrate     Severe obesity with body mass index (BMI) of 35.0 to 39.9 with serious comorbidity    -Encourage weight loss         VTE Risk Mitigation (From admission, onward)        Ordered     IP VTE HIGH RISK PATIENT  Once      12/22/18 0800     Place NARCISA hose  Until discontinued      12/22/18 0800     Place sequential compression device  Until discontinued      12/22/18 0800     Place NARCISA hose  Until discontinued      12/18/18 1249     Place sequential compression device  Until discontinued      12/18/18 1249     heparin 25,000 units in dextrose 5% 250 mL (100 units/mL) infusion LOW INTENSITY nomogram - [facility]  Continuous      12/15/18 1121     heparin (porcine) injection 5,000 Units  ED 1 Time      12/15/18 0931          Hernandez Aguilera Md, MD  Cardiology  [facility]

## 2018-12-25 NOTE — DISCHARGE SUMMARY
[facility] -   Cardiothoracic Surgery  Discharge Summary      Patient Name: Filipe Agustin Jr.  MRN: [id number]  Admission Date: 12/15/2018  Hospital Length of Stay: 10 days  Discharge Date and Time: No discharge date for patient encounter.  Attending Physician: Rg Johnson MD   Discharging Provider: Rg Johnson MD  Primary Care Provider: Jojo Duque DO    HPI:   The patient is a 72-year-old male with a history of CAD status post stent placement in 2008, chronic kidney disease, hypertension, prostate cancer status post radiation treatment and hyperlipidemia who presented to the hospital with episode of midsternal chest pain that started this morning while patient was at rest.  The pain was associated diaphoresis and nausea.  The patient denies fever or chills, cough, PND or orthopnea, ankle swelling, palpitations, dizziness or lightheadedness.  The patient is currently pain free.  The patient was taken to the cardiac catheterization which showed severe 3 vessel coronary artery disease.    Procedure(s) (LRB):  CORONARY ARTERY BYPASS GRAFT (CABG) (N/A)  ECHOCARDIOGRAM,TRANSESOPHAGEAL (N/A)  SURGICAL PROCUREMENT, VEIN, ENDOSCOPIC (Bilateral)      Indwelling Lines/Drains at time of discharge:   Lines/Drains/Airways     Line                 Pacer Wires 12/18/18 1200 6 days          Pressure Ulcer                 Negative Pressure Wound Therapy  7 days              Hospital Course: 12/19/2018  The patient is postop day 1 status post coronary artery bypass grafting x4.    12/20/2018  The patient is postop day 2.  Status post coronary artery bypass grafting x4.    12/21/2018  The patient is postop day 3.  Status post coronary artery bypass grafting x4.    12/22/2018  The patient is postop day 4.  Status post coronary artery bypass grafting x4.    12/23/2018  The patient is postop day 5.  Status post coronary artery bypass grafting x4.    12/24/2018  The patient is postop day 6.  Status post coronary  artery bypass grafting x4.    10/25/2018  The patient is postop day 7.  Status post coronary artery bypass grafting x4.    Consults (From admission, onward)        Status Ordering Provider     Consult Case Management/Social Work  Once     Provider:  (Not yet assigned)    Completed STEFANY YI     Consult to Case Management/Social Work  Once     Provider:  (Not yet assigned)    Completed STEFANY YI     Inpatient consult to Cardiothoracic Surgery  Once     Provider:  (Not yet assigned)    Acknowledged JENNIFER LISA     Inpatient consult to Registered Dietitian/Nutritionist  Once     Provider:  (Not yet assigned)    Completed STEFANY YI     Pharmacy to dose Vancomycin consult  Once     Provider:  (Not yet assigned)    Acknowledged STEFANY YI          Significant Diagnostic Studies: Labs:   BMP:   Recent Labs   Lab 12/24/18  0426 12/25/18  0406   GLU 99  98 99  99     137 136  136   K 4.2  4.2 3.5  3.5   CL 94*  95 96  96   CO2 31*  31* 27  27   BUN 26*  26* 29*  29*   CREATININE 1.8*  1.8* 1.7*  1.7*   CALCIUM 9.0  9.1 9.3  9.3   MG 2.4 2.2    and CBC   Recent Labs   Lab 12/24/18  0426 12/25/18  0406   WBC 8.59 9.44   HGB 10.2* 10.2*   HCT 31.9* 31.4*    372*       Pending Diagnostic Studies:     Procedure Component Value Units Date/Time    Vancomycin, random [[id number]] Collected:  12/22/18 1018    Order Status:  Sent Lab Status:  In process Updated:  12/22/18 1019    Specimen:  Blood     Vancomycin, random [[id number]] Collected:  12/21/18 0335    Order Status:  Sent Lab Status:  No result     Specimen:  Blood           * Coronary artery disease involving native coronary artery of native heart with unstable angina pectoris post ACS    The patient is a 72-year-old male with history of CAD status post stent placement in the past, hypertension, hyperlipidemia who presented with acute onset of chest pain.  Patient's workup included cardiac catheterization which  showed severe multivessel coronary artery.  The risk and benefits of surgery was discussed with the patient and his wife.  The patient understands and has agreed to proceed with surgery which has been scheduled for 12/18/2018.     S/P CABG x 4    The patient is postop day 1 status post coronary artery bypass grafting x4.  Overall the patient is doing well.  Neuro:  Patient is awake alert and oriented x3.  Neuro exam is nonfocal.    Cardiac:  Patient is on low-dose vasopressin and epinephrine.  Cardiac index is good.  Wean drips to off.  And start beta-blockers.  Respiratory: Patient is maintaining good sats on nasal cannula.  GI:  Patient is started on p.o. intake.  Advanced as tolerated.  Renal:  Creatinine is 1.7.  Urine output is good.  Endocrine:  Glucose is well controlled.  Heme:  Hematocrit is 22 and platelet is 165.  Will hold off transfusions for now.  Id:  T-max is a 101.1 °.  And white count is 9.4.  Activities:  Patient is out of bed to chair.  Advanced a activities as tolerated.  Lines tubes and drains.  Discontinue chest tubes, DANNY, [place] and Cordis.  Placed mid level line and continue with pacer wires.    12/20/2018    The patient is postop day 2 status post coronary artery bypass grafting x4.  Over the past 24 hr patient has been febrile..  Neuro:  Patient is awake alert and oriented x3.  Neuro exam is nonfocal.    Cardiac:  Patient is tolerating beta-blocker.  Respiratory: Patient is maintaining good sats on nasal cannula.  GI:  Patient is started on p.o. intake.  Advanced as tolerated.  Renal:  Creatinine is 2.0  Urine output is good.  Endocrine:  Glucose is well controlled.  Heme:  Hematocrit is 22 and platelet is 158.  Will hold off transfusions for now.  Id:  T-max is a 101.7°.  And white count is 10.  Will panculture the patient.  Started on broad spectrum antibiotics.  Activities:  Patient is out of bed to chair.  Advance  activities as tolerated.  Lines tubes and drains.  Mid level line, Pacer  wires and kang    12/21/2018    The patient is postop day 3 status post coronary artery bypass grafting x4.  Overall patient is doing much better.  Fever curve is improving.  Will transfer patient to telemetry.  Neuro:  Patient is awake alert and oriented x3.  Neuro exam is nonfocal.    Cardiac:  Patient is tolerating metoprolol.  Respiratory: Patient is maintaining good sats on nasal cannula.  GI:  Patient is started on p.o. intake.  Advanced as tolerated.  Renal:  Creatinine is 2.0  Urine output is good.  Endocrine:  Glucose is well controlled.  Heme:  Hematocrit was19 and platelet is 164.  Patient transfused 2 units packed red blood cells.  Id:  T-max is a 101.3.  And white count is 9.  Cultures are pending.  The no growth to date.  Started on broad spectrum antibiotics.  Activities:  Patient is out of bed to chair.  Advance  activities as tolerated.  Lines tubes and drains.  Mid level line, Pacer wires and kang    12/22/2018    The patient is postop day 4status post coronary artery bypass grafting x4.  Overall patient is doing much better.  Anticipate discharge in the next 24-48 hours to home or to skilled nursing facility.    Neuro:  Patient is awake alert and oriented x3.  Neuro exam is nonfocal.    Cardiac:  Patient is tolerating metoprolol.    Respiratory: Patient is maintaining good sats on nasal cannula.  GI:  Patient is started on p.o. intake.  Advanced as tolerated.  Renal:  Creatinine is down to 1.8  Urine output is good.  Endocrine:  Glucose is well controlled.  Heme:  Repeat hematocrit is pending Patient transfused 2 units packed red blood cells.  Id:  Patient is afebrile cultures are no growth to date.  Started on broad spectrum antibiotics.  Activities:  Patient is out of bed to chair.  Advance  activities as tolerated.  Lines tubes and drains.  Mid level line, Pacer wires     12/23/2018    The patient is postop day 5 status post coronary artery bypass grafting x4.  Overall patient is doing much  better.  Anticipate discharge in the next 24-48 hours to home or to skilled nursing facility.    Neuro:  Patient is awake alert and oriented x3.  Neuro exam is nonfocal.    Cardiac:  Patient is tolerating metoprolol.    Respiratory: Patient has been weaned down to room air.  Continue pulmonary toilet continue incentive spirometry.  GI:  Patient is tolerating a regular diet.  Renal:  Creatinine i is down to 1.6.  Urine output is good.  Endocrine:  Glucose is well controlled.  Heme:  Hematocrit is 30 and platelets are 288.    Id:  Patient is afebrile. cultures are no growth to date.  Final results of cultures pending.  Continue antibiotics for now.  Activities:  Patient is out of bed to chair.  Advance  activities as tolerated.  Lines tubes and drains.  Patient has a mid level line.      12/24/2018    The patient is postop day 6 status post coronary artery bypass grafting x4.  Overall patient is doing much better.  Anticipate discharge in the next 24-48 hours to home or to skilled nursing facility.    Neuro:  Patient is awake alert and oriented x3.  Neuro exam is nonfocal.    Cardiac:  Patient is tolerating metoprolol.  Will discontinue losartan in light of increasing creatinine.  Respiratory: Patient has been weaned down to room air.  Continue pulmonary toilet continue incentive spirometry.  GI:  Patient is tolerating a regular diet.  Renal:  Creatinine up to 1.8.  Urine output is good.  Endocrine:  Glucose is well controlled.  Heme:  Hematocrit is 31 and platelets are 346.    Id:  Patient is afebrile. cultures are no growth to date.  Final results of cultures pending.  Continue antibiotics for now.  Activities:  Patient is out of bed to chair.  Advance  activities as tolerated.  Lines tubes and drains.  Patient has a mid level line.      12/25/2018    The patient is postop day 7 status post coronary artery bypass grafting x4.  Overall patient is doing much better.  Discharge home today with home health.    Neuro:   Patient is awake alert and oriented x3.  Neuro exam is nonfocal.    Cardiac:  Patient is tolerating metoprolol.  Losartan discontinued for worsening renal function  Respiratory: Patient has been weaned down to room air.  Continue pulmonary toilet continue incentive spirometry.  GI:  Patient is tolerating a regular diet.  Renal:  Creatinine up to 1.7.  Urine output is good.  Endocrine:  Glucose is well controlled.  Heme:  Hematocrit is 31 and platelets are 372.    Id:  Patient is afebrile. cultures are no growth for 5 days .  Will discontinue antibiotics  Activities:  Patient is out of bed to chair.  Advance  activities as tolerated.  Lines tubes and drains.  Patient has a midlevel line. Discontinue mid level line.                     Final Active Diagnoses:    Diagnosis Date Noted POA    PRINCIPAL PROBLEM:  Coronary artery disease involving native coronary artery of native heart with unstable angina pectoris post ACS [I25.110] 09/19/2014 Yes     Chronic    Post-operative state [Z98.890]  Not Applicable    Atelectasis, left [J98.11] 12/19/2018 No    Morbid (severe) obesity due to excess calories [E66.01]  Yes    S/P CABG x 4 [Z95.1] 12/15/2018 Not Applicable    Heart attack [I21.9] 12/15/2018 Yes    Cancer of prostate with intermediate recurrence risk (stage T2b-c or Jonathon 7 or PSA 10-20) [C61] 01/08/2018 Yes     Chronic    CKD (chronic kidney disease) stage 3, GFR 30-59 ml/min [N18.3] 09/24/2015 Yes     Chronic    Hypertension goal BP (blood pressure) < 140/90 [I10] 06/26/2015 Yes     Chronic    Severe obesity with body mass index (BMI) of 35.0 to 39.9 with serious comorbidity [E66.01] 06/06/2014 Yes     Chronic      Problems Resolved During this Admission:    Diagnosis Date Noted Date Resolved POA    On mechanically assisted ventilation [Z99.11] 12/18/2018 12/19/2018 Not Applicable      Discharged Condition: stable    Disposition: Home or Self Care    Follow Up:  Follow-up Information     Jojo  DO Neto In 1 week.    Specialty:  Internal Medicine  Contact information:  [address] [place] [address] [address] [place] [address]  [phone]             Rg Johnson MD On 12/11/2019.    Specialty:  Cardiothoracic Surgery  Contact information:  [address] [place] [address] [address] [place] [id number]  [phone]             Jose Armando Monroe MD In 1 week.    Specialties:  Cardiology, INTERVENTIONAL CARDIOLOGY  Contact information:  [address]  [place] [id number] [phone]                 Patient Instructions:      Referral to Home health   Referral Priority: Routine Referral Type: Home Health Care   Referral Reason: Specialty Services Required   Requested Specialty: Home Health Services   Number of Visits Requested: 1     Notify your health care provider if you experience any of the following:  temperature >100.4     Notify your health care provider if you experience any of the following:  persistent nausea and vomiting or diarrhea     Notify your health care provider if you experience any of the following:  severe uncontrolled pain     Notify your health care provider if you experience any of the following:  redness, tenderness, or signs of infection (pain, swelling, redness, odor or green/yellow discharge around incision site)     Notify your health care provider if you experience any of the following:  difficulty breathing or increased cough     Notify your health care provider if you experience any of the following:  severe persistent headache     Notify your health care provider if you experience any of the following:  worsening rash     Notify your health care provider if you experience any of the following:  persistent dizziness, light-headedness, or visual disturbances     Notify your health care provider if you experience any of the following:  increased confusion or weakness     Activity as tolerated     Medications:  Reconciled Home Medications:      Medication List      START taking these  medications    clopidogrel 75 mg tablet  Commonly known as:  PLAVIX  Take 1 tablet (75 mg total) by mouth once daily.  Start taking on:  12/26/2018     docusate sodium 100 MG capsule  Commonly known as:  COLACE  Take 1 capsule (100 mg total) by mouth 2 (two) times daily.     oxyCODONE 5 MG immediate release tablet  Commonly known as:  ROXICODONE  Take 1 tablet (5 mg total) by mouth every 6 (six) hours as needed.     pantoprazole 40 MG tablet  Commonly known as:  PROTONIX  Take 1 tablet (40 mg total) by mouth once daily.  Start taking on:  12/26/2018     polyethylene glycol 17 gram Pwpk  Commonly known as:  GLYCOLAX  Take 17 g by mouth once daily.  Start taking on:  12/26/2018        CONTINUE taking these medications    aspirin 81 MG EC tablet  Commonly known as:  ECOTRIN  Take 81 mg by mouth once daily.     atorvastatin 40 MG tablet  Commonly known as:  LIPITOR  TAKE 1 TABLET EVERY DAY     fenofibrate 160 MG Tab  TAKE 1 TABLET EVERY OTHER DAY     fish oil-omega-3 fatty acids 300-1,000 mg capsule  Take 2 g by mouth once daily.     FLUZONE HIGH-DOSE 2018-19 (PF) 180 mcg/0.5 mL vaccine  Generic drug:  influenza  ADMINISTERED BY [facility]     metoprolol tartrate 50 MG tablet  Commonly known as:  LOPRESSOR  TAKE 1 TABLET TWICE DAILY     tamsulosin 0.4 mg Cap  Commonly known as:  FLOMAX  Take 1 capsule (0.4 mg total) by mouth once daily.     VITAMIN B-12 100 MCG tablet  Generic drug:  cyanocobalamin  Take 100 mcg by mouth once daily.     vitamin D 1000 units Tab  Commonly known as:  VITAMIN D3  Take 2,000 Units by mouth once daily.        STOP taking these medications    amLODIPine 5 MG tablet  Commonly known as:  NORVASC          Time spent on the discharge of patient: 30 minutes    Rg Johnson MD  Cardiothoracic Surgery  [facility]

## 2018-12-25 NOTE — ASSESSMENT & PLAN NOTE
The patient is postop day 1 status post coronary artery bypass grafting x4.  Overall the patient is doing well.  Neuro:  Patient is awake alert and oriented x3.  Neuro exam is nonfocal.    Cardiac:  Patient is on low-dose vasopressin and epinephrine.  Cardiac index is good.  Wean drips to off.  And start beta-blockers.  Respiratory: Patient is maintaining good sats on nasal cannula.  GI:  Patient is started on p.o. intake.  Advanced as tolerated.  Renal:  Creatinine is 1.7.  Urine output is good.  Endocrine:  Glucose is well controlled.  Heme:  Hematocrit is 22 and platelet is 165.  Will hold off transfusions for now.  Id:  T-max is a 101.1 °.  And white count is 9.4.  Activities:  Patient is out of bed to chair.  Advanced a activities as tolerated.  Lines tubes and drains.  Discontinue chest tubes, DANNY, [place] and Cordis.  Placed mid level line and continue with pacer wires.    12/20/2018    The patient is postop day 2 status post coronary artery bypass grafting x4.  Over the past 24 hr patient has been febrile..  Neuro:  Patient is awake alert and oriented x3.  Neuro exam is nonfocal.    Cardiac:  Patient is tolerating beta-blocker.  Respiratory: Patient is maintaining good sats on nasal cannula.  GI:  Patient is started on p.o. intake.  Advanced as tolerated.  Renal:  Creatinine is 2.0  Urine output is good.  Endocrine:  Glucose is well controlled.  Heme:  Hematocrit is 22 and platelet is 158.  Will hold off transfusions for now.  Id:  T-max is a 101.7°.  And white count is 10.  Will panculture the patient.  Started on broad spectrum antibiotics.  Activities:  Patient is out of bed to chair.  Advance  activities as tolerated.  Lines tubes and drains.  Mid level line, Pacer wires and kang    12/21/2018    The patient is postop day 3 status post coronary artery bypass grafting x4.  Overall patient is doing much better.  Fever curve is improving.  Will transfer patient to telemetry.  Neuro:  Patient is awake alert and  oriented x3.  Neuro exam is nonfocal.    Cardiac:  Patient is tolerating metoprolol.  Respiratory: Patient is maintaining good sats on nasal cannula.  GI:  Patient is started on p.o. intake.  Advanced as tolerated.  Renal:  Creatinine is 2.0  Urine output is good.  Endocrine:  Glucose is well controlled.  Heme:  Hematocrit was19 and platelet is 164.  Patient transfused 2 units packed red blood cells.  Id:  T-max is a 101.3.  And white count is 9.  Cultures are pending.  The no growth to date.  Started on broad spectrum antibiotics.  Activities:  Patient is out of bed to chair.  Advance  activities as tolerated.  Lines tubes and drains.  Mid level line, Pacer wires and kang    12/22/2018    The patient is postop day 4status post coronary artery bypass grafting x4.  Overall patient is doing much better.  Anticipate discharge in the next 24-48 hours to home or to skilled nursing facility.    Neuro:  Patient is awake alert and oriented x3.  Neuro exam is nonfocal.    Cardiac:  Patient is tolerating metoprolol.    Respiratory: Patient is maintaining good sats on nasal cannula.  GI:  Patient is started on p.o. intake.  Advanced as tolerated.  Renal:  Creatinine is down to 1.8  Urine output is good.  Endocrine:  Glucose is well controlled.  Heme:  Repeat hematocrit is pending Patient transfused 2 units packed red blood cells.  Id:  Patient is afebrile cultures are no growth to date.  Started on broad spectrum antibiotics.  Activities:  Patient is out of bed to chair.  Advance  activities as tolerated.  Lines tubes and drains.  Mid level line, Pacer wires     12/23/2018    The patient is postop day 5 status post coronary artery bypass grafting x4.  Overall patient is doing much better.  Anticipate discharge in the next 24-48 hours to home or to skilled nursing facility.    Neuro:  Patient is awake alert and oriented x3.  Neuro exam is nonfocal.    Cardiac:  Patient is tolerating metoprolol.    Respiratory: Patient has been  weaned down to room air.  Continue pulmonary toilet continue incentive spirometry.  GI:  Patient is tolerating a regular diet.  Renal:  Creatinine i is down to 1.6.  Urine output is good.  Endocrine:  Glucose is well controlled.  Heme:  Hematocrit is 30 and platelets are 288.    Id:  Patient is afebrile. cultures are no growth to date.  Final results of cultures pending.  Continue antibiotics for now.  Activities:  Patient is out of bed to chair.  Advance  activities as tolerated.  Lines tubes and drains.  Patient has a mid level line.      12/24/2018    The patient is postop day 6 status post coronary artery bypass grafting x4.  Overall patient is doing much better.  Anticipate discharge in the next 24-48 hours to home or to skilled nursing facility.    Neuro:  Patient is awake alert and oriented x3.  Neuro exam is nonfocal.    Cardiac:  Patient is tolerating metoprolol.  Will discontinue losartan in light of increasing creatinine.  Respiratory: Patient has been weaned down to room air.  Continue pulmonary toilet continue incentive spirometry.  GI:  Patient is tolerating a regular diet.  Renal:  Creatinine up to 1.8.  Urine output is good.  Endocrine:  Glucose is well controlled.  Heme:  Hematocrit is 31 and platelets are 346.    Id:  Patient is afebrile. cultures are no growth to date.  Final results of cultures pending.  Continue antibiotics for now.  Activities:  Patient is out of bed to chair.  Advance  activities as tolerated.  Lines tubes and drains.  Patient has a mid level line.      12/25/2018    The patient is postop day 7 status post coronary artery bypass grafting x4.  Overall patient is doing much better.  Discharge home today with home health.    Neuro:  Patient is awake alert and oriented x3.  Neuro exam is nonfocal.    Cardiac:  Patient is tolerating metoprolol.  Losartan discontinued for worsening renal function  Respiratory: Patient has been weaned down to room air.  Continue pulmonary toilet  continue incentive spirometry.  GI:  Patient is tolerating a regular diet.  Renal:  Creatinine up to 1.7.  Urine output is good.  Endocrine:  Glucose is well controlled.  Heme:  Hematocrit is 31 and platelets are 372.    Id:  Patient is afebrile. cultures are no growth for 5 days .  Will discontinue antibiotics  Activities:  Patient is out of bed to chair.  Advance  activities as tolerated.  Lines tubes and drains.  Patient has a midlevel line. Discontinue mid level line.

## 2018-12-25 NOTE — ASSESSMENT & PLAN NOTE
The patient is postop day 1 status post coronary artery bypass grafting x4.  Overall the patient is doing well.  Neuro:  Patient is awake alert and oriented x3.  Neuro exam is nonfocal.    Cardiac:  Patient is on low-dose vasopressin and epinephrine.  Cardiac index is good.  Wean drips to off.  And start beta-blockers.  Respiratory: Patient is maintaining good sats on nasal cannula.  GI:  Patient is started on p.o. intake.  Advanced as tolerated.  Renal:  Creatinine is 1.7.  Urine output is good.  Endocrine:  Glucose is well controlled.  Heme:  Hematocrit is 22 and platelet is 165.  Will hold off transfusions for now.  Id:  T-max is a 101.1 °.  And white count is 9.4.  Activities:  Patient is out of bed to chair.  Advanced a activities as tolerated.  Lines tubes and drains.  Discontinue chest tubes, DANNY, [place] and Cordis.  Placed mid level line and continue with pacer wires.    12/20/2018    The patient is postop day 2 status post coronary artery bypass grafting x4.  Over the past 24 hr patient has been febrile..  Neuro:  Patient is awake alert and oriented x3.  Neuro exam is nonfocal.    Cardiac:  Patient is tolerating beta-blocker.  Respiratory: Patient is maintaining good sats on nasal cannula.  GI:  Patient is started on p.o. intake.  Advanced as tolerated.  Renal:  Creatinine is 2.0  Urine output is good.  Endocrine:  Glucose is well controlled.  Heme:  Hematocrit is 22 and platelet is 158.  Will hold off transfusions for now.  Id:  T-max is a 101.7°.  And white count is 10.  Will panculture the patient.  Started on broad spectrum antibiotics.  Activities:  Patient is out of bed to chair.  Advance  activities as tolerated.  Lines tubes and drains.  Mid level line, Pacer wires and kang    12/21/2018    The patient is postop day 3 status post coronary artery bypass grafting x4.  Overall patient is doing much better.  Fever curve is improving.  Will transfer patient to telemetry.  Neuro:  Patient is awake alert and  oriented x3.  Neuro exam is nonfocal.    Cardiac:  Patient is tolerating metoprolol.  Respiratory: Patient is maintaining good sats on nasal cannula.  GI:  Patient is started on p.o. intake.  Advanced as tolerated.  Renal:  Creatinine is 2.0  Urine output is good.  Endocrine:  Glucose is well controlled.  Heme:  Hematocrit was19 and platelet is 164.  Patient transfused 2 units packed red blood cells.  Id:  T-max is a 101.3.  And white count is 9.  Cultures are pending.  The no growth to date.  Started on broad spectrum antibiotics.  Activities:  Patient is out of bed to chair.  Advance  activities as tolerated.  Lines tubes and drains.  Mid level line, Pacer wires and kang    12/22/2018    The patient is postop day 4status post coronary artery bypass grafting x4.  Overall patient is doing much better.  Anticipate discharge in the next 24-48 hours to home or to skilled nursing facility.    Neuro:  Patient is awake alert and oriented x3.  Neuro exam is nonfocal.    Cardiac:  Patient is tolerating metoprolol.    Respiratory: Patient is maintaining good sats on nasal cannula.  GI:  Patient is started on p.o. intake.  Advanced as tolerated.  Renal:  Creatinine is down to 1.8  Urine output is good.  Endocrine:  Glucose is well controlled.  Heme:  Repeat hematocrit is pending Patient transfused 2 units packed red blood cells.  Id:  Patient is afebrile cultures are no growth to date.  Started on broad spectrum antibiotics.  Activities:  Patient is out of bed to chair.  Advance  activities as tolerated.  Lines tubes and drains.  Mid level line, Pacer wires     12/23/2018    The patient is postop day 5 status post coronary artery bypass grafting x4.  Overall patient is doing much better.  Anticipate discharge in the next 24-48 hours to home or to skilled nursing facility.    Neuro:  Patient is awake alert and oriented x3.  Neuro exam is nonfocal.    Cardiac:  Patient is tolerating metoprolol.    Respiratory: Patient has been  weaned down to room air.  Continue pulmonary toilet continue incentive spirometry.  GI:  Patient is tolerating a regular diet.  Renal:  Creatinine i is down to 1.6.  Urine output is good.  Endocrine:  Glucose is well controlled.  Heme:  Hematocrit is 30 and platelets are 288.    Id:  Patient is afebrile. cultures are no growth to date.  Final results of cultures pending.  Continue antibiotics for now.  Activities:  Patient is out of bed to chair.  Advance  activities as tolerated.  Lines tubes and drains.  Patient has a mid level line.      12/24/2018    The patient is postop day 6 status post coronary artery bypass grafting x4.  Overall patient is doing much better.  Anticipate discharge in the next 24-48 hours to home or to skilled nursing facility.    Neuro:  Patient is awake alert and oriented x3.  Neuro exam is nonfocal.    Cardiac:  Patient is tolerating metoprolol.  Will discontinue losartan in light of increasing creatinine.  Respiratory: Patient has been weaned down to room air.  Continue pulmonary toilet continue incentive spirometry.  GI:  Patient is tolerating a regular diet.  Renal:  Creatinine up to 1.8.  Urine output is good.  Endocrine:  Glucose is well controlled.  Heme:  Hematocrit is 31 and platelets are 346.    Id:  Patient is afebrile. cultures are no growth to date.  Final results of cultures pending.  Continue antibiotics for now.  Activities:  Patient is out of bed to chair.  Advance  activities as tolerated.  Lines tubes and drains.  Patient has a mid level line.      12/25/2018    The patient is postop day 7 status post coronary artery bypass grafting x4.  Overall patient is doing much better.  Discharge home today with home health.    Neuro:  Patient is awake alert and oriented x3.  Neuro exam is nonfocal.    Cardiac:  Patient is tolerating metoprolol.  Losartan discontinued for worsening renal function  Respiratory: Patient has been weaned down to room air.  Continue pulmonary toilet  continue incentive spirometry.  GI:  Patient is tolerating a regular diet.  Renal:  Creatinine up to 1.7.  Urine output is good.  Endocrine:  Glucose is well controlled.  Heme:  Hematocrit is 31 and platelets are 372.    Id:  Patient is afebrile. cultures are no growth for 5 days .  Will discontinue antibiotics  Activities:  Patient is out of bed to chair.  Advance  activities as tolerated.  Lines tubes and drains.  Patient has a mid level line.

## 2018-12-25 NOTE — NURSING
Discharge instructions reviewed with the patient and his wife, both verbalized understanding.  Patient given paper prescription.  Patient educated on the importance of slowly increasing activity and continuing to perform IS, coughing and deep breathing.

## 2018-12-25 NOTE — SUBJECTIVE & OBJECTIVE
No current facility-administered medications on file prior to encounter.      Current Outpatient Medications on File Prior to Encounter   Medication Sig    aspirin (ECOTRIN) 81 MG EC tablet Take 81 mg by mouth once daily.    atorvastatin (LIPITOR) 40 MG tablet TAKE 1 TABLET EVERY DAY    cyanocobalamin (VITAMIN B-12) 100 MCG tablet Take 100 mcg by mouth once daily.    fenofibrate 160 MG Tab TAKE 1 TABLET EVERY OTHER DAY    fish oil-omega-3 fatty acids 300-1,000 mg capsule Take 2 g by mouth once daily.    metoprolol tartrate (LOPRESSOR) 50 MG tablet TAKE 1 TABLET TWICE DAILY    tamsulosin (FLOMAX) 0.4 mg Cap Take 1 capsule (0.4 mg total) by mouth once daily.    vitamin D 1000 units Tab Take 2,000 Units by mouth once daily.     [DISCONTINUED] amLODIPine (NORVASC) 5 MG tablet TAKE 1 TABLET EVERY DAY    influenza (FLUZONE HIGH-DOSE) 180 mcg/0.5 mL vaccine ADMINISTERED BY [facility]       Review of patient's allergies indicates:   Allergen Reactions    Paragoric Other (See Comments)     sneeze       Past Medical History:   Diagnosis Date    Acute coronary syndrome     Cancer of prostate with intermediate recurrence risk (stage T2b-c or [place] 7 or PSA 10-20) 1/8/2018    CKD (chronic kidney disease) stage 3, GFR 30-59 ml/min 9/24/2015    Coronary artery disease     Coronary artery disease involving native coronary artery of native heart with unstable angina pectoris     Elevated PSA 6/27/2017    History of coronary angioplasty 9/19/2014    Hyperlipidemia     Hypertension     Myocardial infarction 2008    Obesity, Class II, BMI 35.0-39.9, with comorbidity (see actual BMI) 6/6/2014    Polio     as a child    Tobacco dependence     resolved     Past Surgical History:   Procedure Laterality Date    CARDIAC CATHETERIZATION  2008    CORONARY ANGIOPLASTY  2008    acute PCI- RCA- RUI    CORONARY ARTERY BYPASS GRAFT (CABG) N/A 12/18/2018    [facility]    CORONARY STENT  PLACEMENT  2008    ECHOCARDIOGRAM,TRANSESOPHAGEAL N/A 12/18/2018    Per[facility] at [facility] OR    SURGICAL PROCUREMENT, VEIN, ENDOSCOPIC Bilateral 12/18/2018    Performed by Rg Johnson [facility] at [facility] OR     Family History     Problem Relation (Age of Onset)    Cancer Brother    Heart disease Father, Brother        Tobacco Use    Smoking status: Former Smoker     Packs/day: 2.00     Years: 20.00     Pack years: 40.00     Types: Cigarettes     Last attempt to quit: 7/12/2008     Years since quitting: 10.4    Smokeless tobacco: Never Used   Substance and Sexual Activity    Alcohol use: Yes     Comment: rarely    Drug use: No    Sexual activity: Not Currently     Partners: Female     Review of Systems  Objective:     Vital Signs (Most Recent):  Temp: 98.2 °F (36.8 °C) (12/25/18 0732)  Pulse: 76 (12/25/18 0800)  Resp: 18 (12/25/18 0800)  BP: 106/69 (12/25/18 0732)  SpO2: 95 % (12/25/18 0800) Vital Signs (24h Range):  Temp:  [97.4 °F (36.3 °C)-98.6 °F (37 °C)] 98.2 °F (36.8 °C)  Pulse:  [68-76] 76  Resp:  [17-18] 18  SpO2:  [92 %-96 %] 95 %  BP: (102-123)/(57-69) 106/69     Weight: 103 kg (227 lb 1.2 oz)  Body mass index is 33.53 kg/m².    SpO2: 95 %  O2 Device (Oxygen Therapy): room air     Intake/Output - Last 3 Shifts       12/23 0700 - 12/24 0659 12/24 0700 - 12/25 0659 12/25 0700 - 12/26 0659    P.O. 838      IV Piggyback 200      Total Intake(mL/kg) 1038 (9.2)      Urine (mL/kg/hr) 1925 (0.7) 300 (0.1)     Stool 0      Total Output 1925 300     Net -887 -300            Stool Occurrence 1 x             Lines/Drains/Airways     Line                 Pacer Wires 12/18/18 1200 6 days          Pressure Ulcer                 Negative Pressure Wound Therapy  7 days          Peripheral Intravenous Line                 Midline Catheter Insertion/Assessment  - Double Lumen 12/19/18 1500 Left basilic vein (medial side of arm) 5 days                 STS Risk Score:   Physical Exam   Constitutional: He  is oriented to person, place, and time. He appears well-developed and well-nourished.   HENT:   Head: Normocephalic and atraumatic.   Neck: Normal range of motion. Neck supple.   Cardiovascular: Normal rate and regular rhythm.   Pulmonary/Chest: Effort normal and breath sounds normal.   Abdominal: Soft. Bowel sounds are normal.   Musculoskeletal: He exhibits no edema.   Neurological: He is alert and oriented to person, place, and time.   Skin: Skin is warm.       Significant Labs:  All pertinent labs from the last 24 hours have been reviewed.    Significant Diagnostics:  I have reviewed all pertinent imaging results/findings within the past 24 hours.

## 2018-12-25 NOTE — PROGRESS NOTES
[facility] -   Cardiothoracic Surgery  Progress Note    Patient Name: Filipe Agustin Jr.  MRN: [id number]  Admission Date: 12/15/2018  Hospital Length of Stay: 10 days  Code Status: Full Code   Attending Physician: Rg Johnson MD   Referring Provider: Self, Aaareferral  Principal Problem:Coronary artery disease involving native coronary artery of native heart with unstable angina pectoris            Subjective:     Post-Op Info:  Procedure(s) (LRB):  CORONARY ARTERY BYPASS GRAFT (CABG) (N/A)  ECHOCARDIOGRAM,TRANSESOPHAGEAL (N/A)  SURGICAL PROCUREMENT, VEIN, ENDOSCOPIC (Bilateral)   7 Days Post-Op     No current facility-administered medications on file prior to encounter.      Current Outpatient Medications on File Prior to Encounter   Medication Sig    aspirin (ECOTRIN) 81 MG EC tablet Take 81 mg by mouth once daily.    atorvastatin (LIPITOR) 40 MG tablet TAKE 1 TABLET EVERY DAY    cyanocobalamin (VITAMIN B-12) 100 MCG tablet Take 100 mcg by mouth once daily.    fenofibrate 160 MG Tab TAKE 1 TABLET EVERY OTHER DAY    fish oil-omega-3 fatty acids 300-1,000 mg capsule Take 2 g by mouth once daily.    metoprolol tartrate (LOPRESSOR) 50 MG tablet TAKE 1 TABLET TWICE DAILY    tamsulosin (FLOMAX) 0.4 mg Cap Take 1 capsule (0.4 mg total) by mouth once daily.    vitamin D 1000 units Tab Take 2,000 Units by mouth once daily.     [DISCONTINUED] amLODIPine (NORVASC) 5 MG tablet TAKE 1 TABLET EVERY DAY    influenza (FLUZONE HIGH-DOSE) 180 mcg/0.5 mL vaccine ADMINISTERED BY [facility]       Review of patient's allergies indicates:   Allergen Reactions    Paragoric Other (See Comments)     sneeze       Past Medical History:   Diagnosis Date    Acute coronary syndrome     Cancer of prostate with intermediate recurrence risk (stage T2b-c or [place] 7 or PSA 10-20) 1/8/2018    CKD (chronic kidney disease) stage 3, GFR 30-59 ml/min 9/24/2015    Coronary artery disease     Coronary artery disease involving  native coronary artery of native heart with unstable angina pectoris     Elevated PSA 6/27/2017    History of coronary angioplasty 9/19/2014    Hyperlipidemia     Hypertension     Myocardial infarction 2008    Obesity, Class II, BMI 35.0-39.9, with comorbidity (see actual BMI) 6/6/2014    Polio     as a child    Tobacco dependence     resolved     Past Surgical History:   Procedure Laterality Date    CARDIAC CATHETERIZATION  2008    CORONARY ANGIOPLASTY  2008    acute PCI- RCA- RUI    CORONARY ARTERY BYPASS GRAFT (CABG) N/A 12/18/2018    Performed [facility] at [facility] OR    CORONARY STENT PLACEMENT  2008    ECHOCARDIOGRAM,TRANSESOPHAGEAL N/A 12/18/2018    Performed by [facility] at [facility] OR    SURGICAL PROCUREMENT, VEIN, ENDOSCOPIC Bilateral 12/18/2018    Performed by Rg Johnson [facility] at [facility] OR     Family History     Problem Relation (Age of Onset)    Cancer Brother    Heart disease Father, Brother        Tobacco Use    Smoking status: Former Smoker     Packs/day: 2.00     Years: 20.00     Pack years: 40.00     Types: Cigarettes     Last attempt to quit: 7/12/2008     Years since quitting: 10.4    Smokeless tobacco: Never Used   Substance and Sexual Activity    Alcohol use: Yes     Comment: rarely    Drug use: No    Sexual activity: Not Currently     Partners: Female     Review of Systems  Objective:     Vital Signs (Most Recent):  Temp: 98.2 °F (36.8 °C) (12/25/18 0732)  Pulse: 76 (12/25/18 0800)  Resp: 18 (12/25/18 0800)  BP: 106/69 (12/25/18 0732)  SpO2: 95 % (12/25/18 0800) Vital Signs (24h Range):  Temp:  [97.4 °F (36.3 °C)-98.6 °F (37 °C)] 98.2 °F (36.8 °C)  Pulse:  [68-76] 76  Resp:  [17-18] 18  SpO2:  [92 %-96 %] 95 %  BP: (102-123)/(57-69) 106/69     Weight: 103 kg (227 lb 1.2 oz)  Body mass index is 33.53 kg/m².    SpO2: 95 %  O2 Device (Oxygen Therapy): room air     Intake/Output - Last 3 Shifts       12/23 0700 - 12/24 0659 12/24 0700 - 12/25 0659 12/25 0700 -  12/26 0659    P.O. 838      IV Piggyback 200      Total Intake(mL/kg) 1038 (9.2)      Urine (mL/kg/hr) 1925 (0.7) 300 (0.1)     Stool 0      Total Output 1925 300     Net -887 -300            Stool Occurrence 1 x             Lines/Drains/Airways     Line                 Pacer Wires 12/18/18 1200 6 days          Pressure Ulcer                 Negative Pressure Wound Therapy  7 days          Peripheral Intravenous Line                 Midline Catheter Insertion/Assessment  - Double Lumen 12/19/18 1500 Left basilic vein (medial side of arm) 5 days                 STS Risk Score:   Physical Exam   Constitutional: He is oriented to person, place, and time. He appears well-developed and well-nourished.   HENT:   Head: Normocephalic and atraumatic.   Neck: Normal range of motion. Neck supple.   Cardiovascular: Normal rate and regular rhythm.   Pulmonary/Chest: Effort normal and breath sounds normal.   Abdominal: Soft. Bowel sounds are normal.   Musculoskeletal: He exhibits no edema.   Neurological: He is alert and oriented to person, place, and time.   Skin: Skin is warm.       Significant Labs:  All pertinent labs from the last 24 hours have been reviewed.    Significant Diagnostics:  I have reviewed all pertinent imaging results/findings within the past 24 hours.    Assessment/Plan:     * Coronary artery disease involving native coronary artery of native heart with unstable angina pectoris post ACS    The patient is a 72-year-old male with history of CAD status post stent placement in the past, hypertension, hyperlipidemia who presented with acute onset of chest pain.  Patient's workup included cardiac catheterization which showed severe multivessel coronary artery.  The risk and benefits of surgery was discussed with the patient and his wife.  The patient understands and has agreed to proceed with surgery which has been scheduled for 12/18/2018.     S/P CABG x 4    The patient is postop day 1 status post coronary artery  bypass grafting x4.  Overall the patient is doing well.  Neuro:  Patient is awake alert and oriented x3.  Neuro exam is nonfocal.    Cardiac:  Patient is on low-dose vasopressin and epinephrine.  Cardiac index is good.  Wean drips to off.  And start beta-blockers.  Respiratory: Patient is maintaining good sats on nasal cannula.  GI:  Patient is started on p.o. intake.  Advanced as tolerated.  Renal:  Creatinine is 1.7.  Urine output is good.  Endocrine:  Glucose is well controlled.  Heme:  Hematocrit is 22 and platelet is 165.  Will hold off transfusions for now.  Id:  T-max is a 101.1 °.  And white count is 9.4.  Activities:  Patient is out of bed to chair.  Advanced a activities as tolerated.  Lines tubes and drains.  Discontinue chest tubes, DANNY, [place] and Cordis.  Placed mid level line and continue with pacer wires.    12/20/2018    The patient is postop day 2 status post coronary artery bypass grafting x4.  Over the past 24 hr patient has been febrile..  Neuro:  Patient is awake alert and oriented x3.  Neuro exam is nonfocal.    Cardiac:  Patient is tolerating beta-blocker.  Respiratory: Patient is maintaining good sats on nasal cannula.  GI:  Patient is started on p.o. intake.  Advanced as tolerated.  Renal:  Creatinine is 2.0  Urine output is good.  Endocrine:  Glucose is well controlled.  Heme:  Hematocrit is 22 and platelet is 158.  Will hold off transfusions for now.  Id:  T-max is a 101.7°.  And white count is 10.  Will panculture the patient.  Started on broad spectrum antibiotics.  Activities:  Patient is out of bed to chair.  Advance  activities as tolerated.  Lines tubes and drains.  Mid level line, Pacer wires and kang    12/21/2018    The patient is postop day 3 status post coronary artery bypass grafting x4.  Overall patient is doing much better.  Fever curve is improving.  Will transfer patient to telemetry.  Neuro:  Patient is awake alert and oriented x3.  Neuro exam is nonfocal.    Cardiac:   Patient is tolerating metoprolol.  Respiratory: Patient is maintaining good sats on nasal cannula.  GI:  Patient is started on p.o. intake.  Advanced as tolerated.  Renal:  Creatinine is 2.0  Urine output is good.  Endocrine:  Glucose is well controlled.  Heme:  Hematocrit was19 and platelet is 164.  Patient transfused 2 units packed red blood cells.  Id:  T-max is a 101.3.  And white count is 9.  Cultures are pending.  The no growth to date.  Started on broad spectrum antibiotics.  Activities:  Patient is out of bed to chair.  Advance  activities as tolerated.  Lines tubes and drains.  Mid level line, Pacer wires and kang    12/22/2018    The patient is postop day 4status post coronary artery bypass grafting x4.  Overall patient is doing much better.  Anticipate discharge in the next 24-48 hours to home or to skilled nursing facility.    Neuro:  Patient is awake alert and oriented x3.  Neuro exam is nonfocal.    Cardiac:  Patient is tolerating metoprolol.    Respiratory: Patient is maintaining good sats on nasal cannula.  GI:  Patient is started on p.o. intake.  Advanced as tolerated.  Renal:  Creatinine is down to 1.8  Urine output is good.  Endocrine:  Glucose is well controlled.  Heme:  Repeat hematocrit is pending Patient transfused 2 units packed red blood cells.  Id:  Patient is afebrile cultures are no growth to date.  Started on broad spectrum antibiotics.  Activities:  Patient is out of bed to chair.  Advance  activities as tolerated.  Lines tubes and drains.  Mid level line, Pacer wires     12/23/2018    The patient is postop day 5 status post coronary artery bypass grafting x4.  Overall patient is doing much better.  Anticipate discharge in the next 24-48 hours to home or to skilled nursing facility.    Neuro:  Patient is awake alert and oriented x3.  Neuro exam is nonfocal.    Cardiac:  Patient is tolerating metoprolol.    Respiratory: Patient has been weaned down to room air.  Continue pulmonary  toilet continue incentive spirometry.  GI:  Patient is tolerating a regular diet.  Renal:  Creatinine i is down to 1.6.  Urine output is good.  Endocrine:  Glucose is well controlled.  Heme:  Hematocrit is 30 and platelets are 288.    Id:  Patient is afebrile. cultures are no growth to date.  Final results of cultures pending.  Continue antibiotics for now.  Activities:  Patient is out of bed to chair.  Advance  activities as tolerated.  Lines tubes and drains.  Patient has a mid level line.      12/24/2018    The patient is postop day 6 status post coronary artery bypass grafting x4.  Overall patient is doing much better.  Anticipate discharge in the next 24-48 hours to home or to skilled nursing facility.    Neuro:  Patient is awake alert and oriented x3.  Neuro exam is nonfocal.    Cardiac:  Patient is tolerating metoprolol.  Will discontinue losartan in light of increasing creatinine.  Respiratory: Patient has been weaned down to room air.  Continue pulmonary toilet continue incentive spirometry.  GI:  Patient is tolerating a regular diet.  Renal:  Creatinine up to 1.8.  Urine output is good.  Endocrine:  Glucose is well controlled.  Heme:  Hematocrit is 31 and platelets are 346.    Id:  Patient is afebrile. cultures are no growth to date.  Final results of cultures pending.  Continue antibiotics for now.  Activities:  Patient is out of bed to chair.  Advance  activities as tolerated.  Lines tubes and drains.  Patient has a mid level line.      12/25/2018    The patient is postop day 7 status post coronary artery bypass grafting x4.  Overall patient is doing much better.  Discharge home today with home health.    Neuro:  Patient is awake alert and oriented x3.  Neuro exam is nonfocal.    Cardiac:  Patient is tolerating metoprolol.  Losartan discontinued for worsening renal function  Respiratory: Patient has been weaned down to room air.  Continue pulmonary toilet continue incentive spirometry.  GI:  Patient is  tolerating a regular diet.  Renal:  Creatinine up to 1.7.  Urine output is good.  Endocrine:  Glucose is well controlled.  Heme:  Hematocrit is 31 and platelets are 372.    Id:  Patient is afebrile. cultures are no growth for 5 days .  Will discontinue antibiotics  Activities:  Patient is out of bed to chair.  Advance  activities as tolerated.  Lines tubes and drains.  Patient has a mid level line.                       Rg Johnson MD  Cardiothoracic Surgery  [facility] -

## 2018-12-25 NOTE — ASSESSMENT & PLAN NOTE
Patient presented to [facility]- due to chest pain  EKG revealed inferior ST elevation  NTG SL x 1 given in ED  Repeat EKG revealed persistently elevation in inferior leads  IV heparin 5,000 units given in ED  No BB due to Bradycardia in ED  TTE pending  FLP pending  Patient taken to Cath Lab for emergent LHC per Dr. Monroe.  Aggrastat bolus and Infusion to follow  Further recs to follow    12/16  -CABG recommended, CVT consulted and plan for CABG on 12/18  -Continue IV heparin gtt, ASA, Statin, BB  -Start low dose ARB today  -Transfer to telemetry today  -No chest pain on exam today.   -FLP not drawn, will reorder for AM  -ECHO revealed EF 60-65%, -WMA's, normal Bi-V function.    12/17/18  -Stable overnight  -No chest pain or SOB  -Continue IV heparin  -Continue ASA, BB, statin, ARB  -CABG planned for AM    12/18/18  -s/p CABG x 4  -Hemodynamically stable  -Continue current post-op care  -ASA, Plavix, statin, BB    12/19/18  -Recovering s/p CABG x 4  -Remains fatigued/weak  -Would benefit from transfusion, defer to CVT  -Continue ASA, Plavix, statin BB  -Ambulation and IS usage      12/19/18  -Stable overnight  -H and H low, defer transfusion decision to CVT  -Continue ASA, Plavix, statin, BB    12/21/18  -Stable CV wise  -Being transfused  -Continue ASA, Plavix, statin, BB  -No ACEI/ARB given bumped creatinine  -IS usage and ambulation    12/22/18  -Stable overnight  -Continue ASA, Plavix, statin, BB  -IS usage and ambulation    12/23/18  -Remains stable  -Continue ASA, Plavix, statin, BB  -IS usage and ambulation    12/24/18  Cont meds - ASA, Plavix, statin, BB  Is stable     12/25/18  Cont meds - ASA, Plavix, statin, BB  Is stable for DC

## 2018-12-25 NOTE — SUBJECTIVE & OBJECTIVE
Interval History: no acute events o/n    Review of Systems   Constitution: Positive for weakness and malaise/fatigue.   HENT: Negative.    Eyes: Negative.    Cardiovascular: Negative.    Respiratory: Negative.    Endocrine: Negative.    Hematologic/Lymphatic: Negative.    Skin: Negative.    Musculoskeletal: Negative.    Gastrointestinal: Negative.    Genitourinary: Negative.    Psychiatric/Behavioral: Negative.    Allergic/Immunologic: Negative.      Objective:     Vital Signs (Most Recent):  Temp: 98.2 °F (36.8 °C) (12/25/18 0732)  Pulse: 80 (12/25/18 1108)  Resp: 18 (12/25/18 0800)  BP: 106/69 (12/25/18 0732)  SpO2: 95 % (12/25/18 0800) Vital Signs (24h Range):  Temp:  [98 °F (36.7 °C)-98.6 °F (37 °C)] 98.2 °F (36.8 °C)  Pulse:  [68-82] 80  Resp:  [17-18] 18  SpO2:  [92 %-96 %] 95 %  BP: (102-118)/(57-69) 106/69     Weight: 103 kg (227 lb 1.2 oz)  Body mass index is 33.53 kg/m².     SpO2: 95 %  O2 Device (Oxygen Therapy): room air      Intake/Output Summary (Last 24 hours) at 12/25/2018 1311  Last data filed at 12/25/2018 0829  Gross per 24 hour   Intake 240 ml   Output 300 ml   Net -60 ml       Lines/Drains/Airways          None          Physical Exam   Constitutional: He is oriented to person, place, and time. He appears well-developed and well-nourished. No distress.   HENT:   Head: Normocephalic and atraumatic.   Eyes: Pupils are equal, round, and reactive to light. Right eye exhibits no discharge. Left eye exhibits no discharge.   Neck: Neck supple. No JVD present.   Cardiovascular: Normal rate, regular rhythm, S1 normal, S2 normal and normal heart sounds.   No murmur heard.  Pulmonary/Chest: Effort normal and breath sounds normal. No respiratory distress.   CABG site C/D/I; no bleeding erythema or drainage    Diminished BS at bases   Abdominal: Soft. He exhibits no distension. There is no tenderness. There is no rebound.   Musculoskeletal: He exhibits edema (BLE).   Neurological: He is alert and oriented to  person, place, and time.   Skin: Skin is warm and dry. He is not diaphoretic. No erythema.   Psychiatric: He has a normal mood and affect. His behavior is normal.   Nursing note and vitals reviewed.      Significant Labs:   All pertinent lab results from the last 24 hours have been reviewed. and   Recent Lab Results       12/25/18  0612   12/25/18  0406   12/24/18  2034   12/24/18  1655        Albumin   2.8         Alkaline Phosphatase   36         ALT   18         Anion Gap   13            13         AST   21         Baso #   0.04         Basophil%   0.4         Total Bilirubin   1.4  Comment:  For infants and newborns, interpretation of results should be based  on gestational age, weight and in agreement with clinical  observations.  Premature Infant recommended reference ranges:  Up to 24 hours.............<8.0 mg/dL  Up to 48 hours............<12.0 mg/dL  3-5 days..................<15.0 mg/dL  6-29 days.................<15.0 mg/dL           BUN, Bld   29            29         Calcium   9.3            9.3         Chloride   96            96         CO2   27            27         Creatinine   1.7            1.7         Differential Method   Automated         eGFR if    46            46         eGFR if non    39  Comment:  Calculation used to obtain the estimated glomerular filtration  rate (eGFR) is the CKD-EPI equation.               39  Comment:  Calculation used to obtain the estimated glomerular filtration  rate (eGFR) is the CKD-EPI equation.            Eos #   0.3         Eosinophil%   2.8         Glucose   99            99         Gran # (ANC)   6.8         Gran%   72.1         Hematocrit   31.4         Hemoglobin   10.2         Lymph #   1.5         Lymph%   16.1         Magnesium   2.2         MCH   29.5         MCHC   32.5         MCV   91         Mono #   0.8         Mono%   8.6         MPV   10.3         Platelets   372         POCT Glucose 103   113 109     Potassium    3.5            3.5         Total Protein   6.7         RBC   3.46         RDW   15.0         Sodium   136            136         Vancomycin, Random   16.5         WBC   9.44               Significant Imaging: Echocardiogram:   2D echo with color flow doppler:   Results for orders placed or performed during the hospital encounter of 12/15/18   2D echo with color flow doppler   Result Value Ref Range    QEF 60 55 - 65    Diastolic Dysfunction No     and X-Ray: CXR: X-Ray Chest 1 View (CXR): No results found for this visit on 12/15/18.

## 2018-12-25 NOTE — PLAN OF CARE
Problem: Adult Inpatient Plan of Care  Goal: Plan of Care Review  Pt able to ambulate in [facility] X4 today. Pt tolerating activity w/o 02. Skin intact.  Plan of care reviewed with patient and caregiver.

## 2018-12-26 NOTE — PLAN OF CARE
12/26/18 1528   Final Note   Assessment Type Final Discharge Note   Anticipated Discharge Disposition Home-Health   Discharge plans and expectations educations in teach back method with documentation complete? Yes   Right Care Referral Info   Post Acute Recommendation Home-care  ([facility])

## 2018-12-28 ENCOUNTER — PATIENT OUTREACH (OUTPATIENT)
Dept: ADMINISTRATIVE | Facility: CLINIC | Age: 73
End: 2018-12-28

## 2018-12-28 ENCOUNTER — TELEPHONE (OUTPATIENT)
Dept: SURGERY | Facility: CLINIC | Age: 73
End: 2018-12-28

## 2018-12-28 NOTE — TELEPHONE ENCOUNTER
----- Message from Ellie Ray sent at 12/28/2018  3:49 PM CST -----  Contact: [facility]  Calling in regards to wound has started bleeding and please advise [phone]

## 2018-12-28 NOTE — TELEPHONE ENCOUNTER
Has physical therapy today and the incisions started bleeding a little bit. Home health nursed cleaned the area up with normal saline and put some steri strips over the area. It stopped bleeding but just wanted us to be aware of it.

## 2018-12-28 NOTE — PATIENT INSTRUCTIONS
After Coronary Artery Bypass Surgery  Your healthcare provider performed coronary artery bypass graft surgery (also called CABG, pronounced cabbage). This surgery created new pathways around blocked parts of your hearts blood vessels, allowing blood to reach your heart muscle. Your healthcare provider used a healthy blood vessel from another part of your body (a graft) to restore blood flow.  Activity  · Discuss with your healthcare doctor what you can and cant do as you recover. You will have good and bad days. This is normal. But tell your healthcare provider if you feel depressed, have trouble sleeping, or have a persistent decrease in appetite. Although these problems are common after surgery, they can slow your recovery. Its important to seek help.  · Let others drive you wherever you need to go for the first 3 to 6 weeks after your surgery.  · Ask someone to stand nearby while you shower or do other activities, just in case you need help.  · Avoid using very hot water while showering. It can affect your circulation and make you dizzy.  · Weigh yourself every day, at the same time of day, and in the same kind of clothes. Quick weight gain can be a sign of a problem that needs your healthcare providers attention.  · You may start doing light work around the house and yard after 2 to 3 weeks at home. Dont lift anything heavier than 5 pounds. Your healthcare provider may give you a more specific weight restriction. Until approved by your healthcare provider, avoid mowing the lawn, vacuuming, driving, and doing other activities that could strain your breastbone.  · Ask your healthcare provider when you can expect to return to work.  Pain relief  You will recover faster after surgery if your pain is kept under control:  · Dont be surprised if you feel sharp pains as your breastbone heals or if you have soreness in your incision during changes in weather.  · Tell your healthcare provider if you have  questions about what youre feeling, if your medicines dont reduce your pain, or if you suddenly feel worse.  Incision care  Healing takes several weeks. The bandage or dressing on your chest will likely be removed before you go home. If it is still in place, ask your healthcare provider how you should care for it after you return home. Do the following to care for your incision:  · If there are any steri-strips still on your incision, you can remove them after a week if they haven't already fallen off.  · Clean your incision every day with soap and water.  · Gently pat the area of the incision to dry it.  · Dont use any powders, lotions, or oils on your incision until it is well healed.  Lifestyle changes  · Ask your healthcare provider when you can start a walking program:  ¨ If you havent already started a walking program in the hospital, begin with short walks (about 5 minutes) at home. Go a little longer each day.  ¨ Choose a safe place with a level surface, such as a local park or mall.  ¨ Wear supportive shoes to prevent injury to your knees and ankles.  ¨ Walk with someone. Its more fun and helps you stay with it.  · Take your medicines exactly as directed. Dont skip doses.  · Maintain a healthy weight. Get help to lose any extra pounds.  · Avoid fatty and fried foods. Stick to lean meats, such as chicken or fish.   · Cut back on salt:  ¨ Limit canned, dried, packaged, and fast foods.  ¨ Dont add salt to your food at the table.  ¨ Season foods with herbs instead of salt when you cook.  · Break the smoking habit. Enroll in a stop-smoking program to improve your chances of success.  When to call your healthcare provider  Call your healthcare provider immediately if you have any of the following:  · Chest pain or a return of the heart symptoms you had before your surgery  · Fever of 100.4°F (38°C) or higher, or as directed by your healthcare provider  · Signs of infection (redness, swelling, drainage, or  warmth) at the incision site  · Shortness of breath  · Fainting  · Weight gain of more than 3 pounds in 1 day, more than 5 pounds in 1 week, or whatever weight gain you were told to report by your healthcare provider  · New or increased swelling in your hands, feet, or ankles  · Unrelieved pain at the incision site(s)  · Changes in the location, type, or severity of pain  · Fast or irregular pulse  · Persistent abdominal pain  · Nausea  · Trouble urinating  · Any unusual bleeding   Date Last Reviewed: 10/1/2016  © 3512-7504 [employer]. [address] [place] [address]. All rights reserved. This information is not intended as a substitute for professional medical care. Always follow your healthcare professional's instructions.

## 2019-01-01 NOTE — PROGRESS NOTES
[facility]  Cardiology  Progress Note    Patient Name: Filipe Agustin Jr.  MRN: [id number]  Admission Date: 12/15/2018  Hospital Length of Stay: 7 days  Code Status: Full Code   Attending Physician: Rg Johnson MD   Primary Care Physician: Jojo Dquue DO  Expected Discharge Date:   Principal Problem:Coronary artery disease involving native coronary artery of native heart with unstable angina pectoris    Subjective:   HPI:  Mr. Agustin is a 72 year old male with PMHx of CAD s/p coronary stenting at [facility] per patient report, HTN, HLP who presented to [facility] ED due to chest pain which started abruptly this AM. Initial EKG revealed ST elevated in inferior leads, CODE STEMI activated at that time. Patient received IV heparin bolus and NTG SL x 1 and repeat EKG which continued to reveal ST elevation in inferior leads. Patient transported to cath lab for emergent LHC per Dr. Monroe. IV aggrastat bolus per Dr. Monroe's recommendations. TTE pending. Further recs to follow. Labs pending.     Hospital Course:   12/16/18-Patient seen and examined in ICU, sitting in bedside chair. Denies chest pain or shortness of breath today on exam. IV heparin gtt in progress. Pending CABG on 12/18 per CVT. Labs reviewed, stable.     12/17/18-Patient seen and examined today, resting in bed. No acute events overnight. Denies chest pain or SOB. Labs stable. CABG planned for AM.    12/18/18-Patient seen and examined today, s/p CABG x 4. Hemodynamically stable.     12/19/18-Patient seen and examined today, POD # 1 s/p CABG x 4. Feels ok. Complains of fatigue and incisional soreness. Labs reviewed. Creatinine 1.7. H and H low.     12/20/18-Patient seen and examined today, POD # 2 s/p CABG x 4. Feels fatigued and weak. Pain fairly well controlled. No SOB. Labs reviewed. Creatinine bumped to 1.9. H and H remains low (7.4/22).    12/21/18-Patient seen and examined today, POD # 3 s/p CABG x 4. Feels ok. Still complains of fatigue.  Patient is a 1 day old female infant, delivered at Gestational Age: 41w0d Vaginal, Spontaneous [250].          Zapata is currently being fed Breast milk only.  I am aware that mother's feeding choice upon admission was the following:   Information for the patient's mother:  Demi Baird [[id number]]   Exclusive breast milk feeding  There are no medical contraindications to exclusive breast milk feeding, and the benefits of exclusively breastfeeding were discussed/reinforced with the mother.    Feeding Tolerance: Tolerates well (19)    Urine Occurrence: 1 (19)  Stool Occurrence: 1 (19)    Hearing exam:  Hearing Test Machine: Auditory Brainstem Response (Algo) (19)   Hearing Test Results: Pass R;Pass L (19)    Congenital Heart Disease Screening: Congenital Heart Disease Screening Result: Normal (19)    State Screen done, results pending    Immunizations   Most Recent Immunizations   Administered Date(s) Administered   • Hep B, adolescent or pediatric 2019   Pended Date(s) Pended   • Hepatitis B Immune Globulin 2019       TCB Result: 7.3 (19)  Hours of age-Transcutaneous Biliribin: 24 Hrs    PHYSICAL EXAM    VITALS:   Visit Vitals  Pulse 132   Temp 98 °F (36.7 °C)   Resp 36   Ht 20\" (50.8 cm)   Wt 3.29 kg   HC 34 cm (13.39\") Comment: Filed from Delivery Summary   BMI 12.75 kg/m²       Weight change since birth: -3%    General: Patient is an alert, vigorous female with appropriate behavior. She is in no acute distress.  Skin: Her skin is warm with normal turgor. The color of the skin is pink. There is no rash. There are no bruises or other signs of injury.  No significant jaundice. Erythema toxicum rash   Head: The head is atraumatic and normocephalic. The anterior fontanel is open and flat; the posterior fontanel is open.  Eyes: The conjunctivae appear normal with neither icterus nor subconjunctival  No chest pain or SOB. Being transfused. Creatinine 2.0.    12/22/18-Patient seen and examined today, POD # 4 s/p CABG x 4. Feeling ok. Still weak. Denies pain. Labs stable. Needs to ambulate.         Review of Systems   Constitution: Positive for weakness and malaise/fatigue.   HENT: Negative.    Eyes: Negative.    Cardiovascular: Negative.    Respiratory: Negative.    Endocrine: Negative.    Hematologic/Lymphatic: Negative.    Skin: Negative.    Musculoskeletal: Negative.    Gastrointestinal: Negative.    Genitourinary: Negative.    Psychiatric/Behavioral: Negative.    Allergic/Immunologic: Negative.      Objective:     Vital Signs (Most Recent):  Temp: 98.3 °F (36.8 °C) (12/22/18 0744)  Pulse: 90 (12/22/18 0914)  Resp: 18 (12/22/18 0830)  BP: 108/63 (12/22/18 0744)  SpO2: (!) 93 % (12/22/18 0830) Vital Signs (24h Range):  Temp:  [96.7 °F (35.9 °C)-99 °F (37.2 °C)] 98.3 °F (36.8 °C)  Pulse:  [90-93] 90  Resp:  [12-21] 18  SpO2:  [87 %-99 %] 93 %  BP: ()/(45-73) 108/63     Weight: 109.6 kg (241 lb 10 oz)  Body mass index is 35.68 kg/m².     SpO2: (!) 93 %  O2 Device (Oxygen Therapy): nasal cannula      Intake/Output Summary (Last 24 hours) at 12/22/2018 1107  Last data filed at 12/22/2018 0913  Gross per 24 hour   Intake 1073.75 ml   Output 1200 ml   Net -126.25 ml       Lines/Drains/Airways     Line                 Pacer Wires 12/18/18 1200 3 days          Pressure Ulcer                 Negative Pressure Wound Therapy  4 days          Peripheral Intravenous Line                 Midline Catheter Insertion/Assessment  - Double Lumen 12/19/18 1500 Left basilic vein (medial side of arm) 2 days                Physical Exam   Constitutional: He is oriented to person, place, and time. He appears well-developed and well-nourished. No distress.   HENT:   Head: Normocephalic and atraumatic.   Eyes: Pupils are equal, round, and reactive to light. Right eye exhibits no discharge. Left eye exhibits no discharge.   Neck:  hemorrhage.   Red reflexes are seen bilaterally.  Ears: Pinnae and external ear canals normal.  Nose: There is no nasal flaring, nares patent bilaterally.  Throat:  The oropharynx is normal.  There is no cleft of the palate.  Neck: Clavicles without crepitus.  Trunk & Thorax: There are no lesions on the trunk; no sacral dimples. There are no retractions.  Lungs: The lung fields are clear to auscultation.  Heart: The precordium is quiet. The heart rhythm is grossly regular. S1 and S2 are normal. There are no murmurs. The femoral pulses are normal.  Abdomen: The umbilical cord stump is normal. There is not an umbilical hernia. The abdomen is flat and soft.   Genitalia: normal female genitalia   Rectal: anus patent  Extremities: Moving all 4 extremities. The hips are stable.   Neurologic: She displays normal tone throughout. She is not jittery and she has normal  reflexes. Sun reflex present.    Lab:  Cord Blood:   Recent Labs   Lab 01/15/19  0544   ABR O POSITIVE   DATANTIIGG NEGATIVE     No results found for: BILIRUBIN      ASSESSMENT: Well 1 day old female infant.  Normal growth and development.    Procedures: None    Consults: None.    PLAN:  Routine  care.    Follow up: With Pediatrician in 1 day.     Instructions: Discharge teaching done on sleep position, fever, feedings and jaundice.  Baby will be discharged home with mom.         Signed by: Ellen Toribio MD   2019     Neck supple. No JVD present.   Cardiovascular: Normal rate, regular rhythm, S1 normal, S2 normal and normal heart sounds.   No murmur heard.  Pulmonary/Chest: Effort normal and breath sounds normal. No respiratory distress.   CABG site C/D/I; no bleeding erythema or drainage    Diminished BS   Abdominal: Soft. He exhibits no distension. There is no tenderness. There is no rebound.   Musculoskeletal: He exhibits no edema.   Neurological: He is alert and oriented to person, place, and time.   Skin: Skin is warm and dry. He is not diaphoretic. No erythema.   Psychiatric: He has a normal mood and affect. His behavior is normal. Thought content normal.   Nursing note and vitals reviewed.      Significant Labs:   CMP   Recent Labs   Lab 12/20/18  1616 12/21/18  0335 12/22/18  0557   * 136  136 137   K 4.3 3.8  3.8 3.5   CL 95 94*  94* 92*   CO2 31* 33*  33* 36*    106  106 95   BUN 29* 31*  31* 31*   CREATININE 2.0* 2.0*  2.0* 1.8*   CALCIUM 8.6* 8.3*  8.3* 8.3*   PROT  --  5.7* 6.0   ALBUMIN  --  2.7* 2.7*   BILITOT  --  1.0 1.2*   ALKPHOS  --  21* 27*   AST  --  39 29   ALT  --  27 24   ANIONGAP 9 9  9 9   ESTGFRAFRICA 37* 37*  37* 43*   EGFRNONAA 32* 32*  32* 37*   , CBC   Recent Labs   Lab 12/21/18  0335 12/21/18  0515   WBC 10.06 9.75   HGB 6.4* 6.1*   HCT 19.8* 19.1*    164   , Troponin No results for input(s): TROPONINI in the last 48 hours. and All pertinent lab results from the last 24 hours have been reviewed.    Significant Imaging: Echocardiogram:   2D echo with color flow doppler:   Results for orders placed or performed during the hospital encounter of 12/15/18   2D echo with color flow doppler   Result Value Ref Range    QEF 60 55 - 65    Diastolic Dysfunction No      Assessment and Plan:   Patient recovering  s/p CABG x 4. Continue same meds and post-op care.    * Coronary artery disease involving native coronary artery of native heart with unstable angina pectoris post ACS     -See plan under CABG     S/P CABG x 4    Patient presented to [facility] due to chest pain  EKG revealed inferior ST elevation  NTG SL x 1 given in ED  Repeat EKG revealed persistently elevation in inferior leads  IV heparin 5,000 units given in ED  No BB due to Bradycardia in ED  TTE pending  FLP pending  Patient taken to Cath Lab for emergent LHC per Dr. Monroe.  Aggrastat bolus and Infusion to follow  Further recs to follow    12/16  -CABG recommended, CVT consulted and plan for CABG on 12/18  -Continue IV heparin gtt, ASA, Statin, BB  -Start low dose ARB today  -Transfer to telemetry today  -No chest pain on exam today.   -FLP not drawn, will reorder for AM  -ECHO revealed EF 60-65%, -WMA's, normal Bi-V function.    12/17/18  -Stable overnight  -No chest pain or SOB  -Continue IV heparin  -Continue ASA, BB, statin, ARB  -CABG planned for AM    12/18/18  -s/p CABG x 4  -Hemodynamically stable  -Continue current post-op care  -ASA, Plavix, statin, BB    12/19/18  -Recovering s/p CABG x 4  -Remains fatigued/weak  -Would benefit from transfusion, defer to CVT  -Continue ASA, Plavix, statin BB  -Ambulation and IS usage      12/19/18  -Stable overnight  -H and H low, defer transfusion decision to CVT  -Continue ASA, Plavix, statin, BB    12/21/18  -Stable CV wise  -Being transfused  -Continue ASA, Plavix, statin, BB  -No ACEI/ARB given bumped creatinine  -IS usage and ambulation    12/22/18  -Stable overnight  -Continue ASA, Plavix, statin, BB  -IS usage and ambulation     CKD (chronic kidney disease) stage 3, GFR 30-59 ml/min    -Monitor  -Creatinine 1.8       Hypertension goal BP (blood pressure) < 140/90    On medical therapy  Continue BB  Start ARB today     Severe obesity with body mass index (BMI) of 35.0 to 39.9 with serious comorbidity    -Encourage weight loss         VTE Risk Mitigation (From admission, onward)        Ordered     IP VTE HIGH RISK PATIENT  Once      12/22/18 0800     Place NARCISA hose  Until  discontinued      12/22/18 0800     Place sequential compression device  Until discontinued      12/22/18 0800     Place NARCISA hose  Until discontinued      12/18/18 1249     Place sequential compression device  Until discontinued      12/18/18 1249     heparin 25,000 units in dextrose 5% 250 mL (100 units/mL) infusion LOW INTENSITY nomogram - [facility]  Continuous      12/15/18 1121     heparin (porcine) injection 5,000 Units  ED 1 Time      12/15/18 0931          Cheryl Farrell PA-C  Cardiology  [facility] -     Chart reviewed. Dr. Aguilera examined patient and agrees with plan as outlined above.

## 2019-01-09 ENCOUNTER — TELEPHONE (OUTPATIENT)
Dept: ADMINISTRATIVE | Facility: CLINIC | Age: 74
End: 2019-01-09

## 2019-01-09 NOTE — TELEPHONE ENCOUNTER
Home Health SOC 12/26/2018 - 02/23/2019 with [facility] ([facility] - Dr. Rg Johnson. Patient received SN, PT, OT and HHA services.

## 2019-01-14 ENCOUNTER — OFFICE VISIT (OUTPATIENT)
Dept: CARDIOLOGY | Facility: CLINIC | Age: 74
End: 2019-01-14
Payer: MEDICARE

## 2019-01-14 VITALS
WEIGHT: 222.44 LBS | DIASTOLIC BLOOD PRESSURE: 68 MMHG | HEIGHT: 69 IN | SYSTOLIC BLOOD PRESSURE: 128 MMHG | HEART RATE: 80 BPM | BODY MASS INDEX: 32.95 KG/M2

## 2019-01-14 DIAGNOSIS — Z98.890 POST-OPERATIVE STATE: ICD-10-CM

## 2019-01-14 DIAGNOSIS — E78.5 HYPERLIPIDEMIA LDL GOAL <70: Chronic | ICD-10-CM

## 2019-01-14 DIAGNOSIS — I10 HYPERTENSION GOAL BP (BLOOD PRESSURE) < 140/90: Chronic | ICD-10-CM

## 2019-01-14 DIAGNOSIS — I42.9 CARDIOMYOPATHY: ICD-10-CM

## 2019-01-14 DIAGNOSIS — N18.30 CKD (CHRONIC KIDNEY DISEASE) STAGE 3, GFR 30-59 ML/MIN: Chronic | ICD-10-CM

## 2019-01-14 DIAGNOSIS — Z95.1 S/P CABG X 4: Primary | ICD-10-CM

## 2019-01-14 PROCEDURE — 3074F SYST BP LT 130 MM HG: CPT | Mod: CPTII,HCNC,S$GLB, | Performed by: INTERNAL MEDICINE

## 2019-01-14 PROCEDURE — 1101F PR PT FALLS ASSESS DOC 0-1 FALLS W/OUT INJ PAST YR: ICD-10-PCS | Mod: CPTII,HCNC,S$GLB, | Performed by: INTERNAL MEDICINE

## 2019-01-14 PROCEDURE — 3074F PR MOST RECENT SYSTOLIC BLOOD PRESSURE < 130 MM HG: ICD-10-PCS | Mod: CPTII,HCNC,S$GLB, | Performed by: INTERNAL MEDICINE

## 2019-01-14 PROCEDURE — 1101F PT FALLS ASSESS-DOCD LE1/YR: CPT | Mod: CPTII,HCNC,S$GLB, | Performed by: INTERNAL MEDICINE

## 2019-01-14 PROCEDURE — 99999 PR PBB SHADOW E&M-EST. PATIENT-LVL III: ICD-10-PCS | Mod: PBBFAC,HCNC,, | Performed by: INTERNAL MEDICINE

## 2019-01-14 PROCEDURE — 3078F DIAST BP <80 MM HG: CPT | Mod: CPTII,HCNC,S$GLB, | Performed by: INTERNAL MEDICINE

## 2019-01-14 PROCEDURE — 99204 PR OFFICE/OUTPT VISIT, NEW, LEVL IV, 45-59 MIN: ICD-10-PCS | Mod: HCNC,S$GLB,, | Performed by: INTERNAL MEDICINE

## 2019-01-14 PROCEDURE — 99999 PR PBB SHADOW E&M-EST. PATIENT-LVL III: CPT | Mod: PBBFAC,HCNC,, | Performed by: INTERNAL MEDICINE

## 2019-01-14 PROCEDURE — 3078F PR MOST RECENT DIASTOLIC BLOOD PRESSURE < 80 MM HG: ICD-10-PCS | Mod: CPTII,HCNC,S$GLB, | Performed by: INTERNAL MEDICINE

## 2019-01-14 PROCEDURE — 99204 OFFICE O/P NEW MOD 45 MIN: CPT | Mod: HCNC,S$GLB,, | Performed by: INTERNAL MEDICINE

## 2019-01-14 NOTE — PROGRESS NOTES
Subjective:   Patient ID:  Filipe Agustin Jr. is a 73 y.o. male who presents for follow-up of Post-op Evaluation (quad By Pass)  Pt is s/p CABG. Patient denies CP, angina or anginal equivalent.    Hypertension   This is a chronic problem. The current episode started more than 1 year ago. The problem has been gradually improving since onset. The problem is controlled. Pertinent negatives include no chest pain, palpitations or shortness of breath. Past treatments include beta blockers. The current treatment provides moderate improvement.   Hyperlipidemia   This is a chronic problem. The current episode started more than 1 year ago. The problem is controlled. Recent lipid tests were reviewed and are variable. Pertinent negatives include no chest pain or shortness of breath. Current antihyperlipidemic treatment includes statins. The current treatment provides moderate improvement of lipids. There are no compliance problems.    Coronary Artery Disease   Presents for follow-up visit. Pertinent negatives include no chest pain, chest pressure, chest tightness, dizziness, leg swelling, muscle weakness, palpitations, shortness of breath or weight gain. Risk factors include hyperlipidemia. The symptoms have been stable. Compliance with diet is variable. Compliance with exercise is variable. Compliance with medications is good.       Review of Systems   Constitution: Negative. Negative for weight gain.   HENT: Negative.    Eyes: Negative.    Cardiovascular: Negative.  Negative for chest pain, leg swelling and palpitations.   Respiratory: Negative.  Negative for chest tightness and shortness of breath.    Endocrine: Negative.    Hematologic/Lymphatic: Negative.    Skin: Negative.    Musculoskeletal: Negative for muscle weakness.   Gastrointestinal: Negative.    Genitourinary: Negative.    Neurological: Negative.  Negative for dizziness.   Psychiatric/Behavioral: Negative.    Allergic/Immunologic: Negative.      Family History    Problem Relation Age of Onset    Heart disease Father         MI    Cancer Brother     Heart disease Brother     Diabetes Neg Hx     Kidney disease Neg Hx     Stroke Neg Hx      Past Medical History:   Diagnosis Date    Acute coronary syndrome     Cancer of prostate with intermediate recurrence risk (stage T2b-c or Jonathon 7 or PSA 10-20) 1/8/2018    CKD (chronic kidney disease) stage 3, GFR 30-59 ml/min 9/24/2015    Coronary artery disease     Coronary artery disease involving native coronary artery of native heart with unstable angina pectoris     Elevated PSA 6/27/2017    History of coronary angioplasty 9/19/2014    Hyperlipidemia     Hypertension     Myocardial infarction 2008    Obesity, Class II, BMI 35.0-39.9, with comorbidity (see actual BMI) 6/6/2014    Polio     as a child    Tobacco dependence     resolved     Current Outpatient Medications on File Prior to Visit   Medication Sig Dispense Refill    aspirin (ECOTRIN) 81 MG EC tablet Take 81 mg by mouth once daily.      atorvastatin (LIPITOR) 40 MG tablet TAKE 1 TABLET EVERY DAY 90 tablet 1    clopidogrel (PLAVIX) 75 mg tablet Take 1 tablet (75 mg total) by mouth once daily. 30 tablet 11    cyanocobalamin (VITAMIN B-12) 100 MCG tablet Take 100 mcg by mouth once daily.      fenofibrate 160 MG Tab TAKE 1 TABLET EVERY OTHER DAY 45 tablet 1    fish oil-omega-3 fatty acids 300-1,000 mg capsule Take 2 g by mouth once daily.      influenza (FLUZONE HIGH-DOSE) 180 mcg/0.5 mL vaccine ADMINISTERED BY [facility] 0.5 mL 0    metoprolol tartrate (LOPRESSOR) 50 MG tablet TAKE 1 TABLET TWICE DAILY 180 tablet 2    pantoprazole (PROTONIX) 40 MG tablet Take 1 tablet (40 mg total) by mouth once daily. 30 tablet 0    tamsulosin (FLOMAX) 0.4 mg Cap Take 1 capsule (0.4 mg total) by mouth once daily. 30 capsule 11    vitamin D 1000 units Tab Take 2,000 Units by mouth once daily.       docusate sodium (COLACE) 100 MG capsule Take 1 capsule (100 mg total)  by mouth 2 (two) times daily.  0    oxyCODONE (ROXICODONE) 5 MG immediate release tablet Take 1 tablet (5 mg total) by mouth every 6 (six) hours as needed. 5 tablet 0    polyethylene glycol (GLYCOLAX) 17 gram/dose powder Take 17 g by mouth once daily. 255 g 0     No current facility-administered medications on file prior to visit.      Review of patient's allergies indicates:   Allergen Reactions    Paragoric Other (See Comments)     sneeze       Objective:     Physical Exam   Constitutional: He is oriented to person, place, and time. He appears well-developed and well-nourished.   HENT:   Head: Normocephalic and atraumatic.   Eyes: Conjunctivae are normal. Pupils are equal, round, and reactive to light.   Neck: Normal range of motion. Neck supple.   Cardiovascular: Normal rate, regular rhythm, normal heart sounds and intact distal pulses.   Pulmonary/Chest: Effort normal and breath sounds normal.   Abdominal: Soft. Bowel sounds are normal.   Neurological: He is alert and oriented to person, place, and time.   Skin: Skin is warm and dry.   Psychiatric: He has a normal mood and affect.   Nursing note and vitals reviewed.      Assessment:     1. S/P CABG x 4    2. Hypertension goal BP (blood pressure) < 140/90    3. Hyperlipidemia LDL goal <70    4. CKD (chronic kidney disease) stage 3, GFR 30-59 ml/min    5. Post-operative state        Plan:     S/P CABG x 4    Hypertension goal BP (blood pressure) < 140/90    Hyperlipidemia LDL goal <70    CKD (chronic kidney disease) stage 3, GFR 30-59 ml/min    Post-operative state      Continue asa, plavix- cad  Continue statin-hlp  Continue metorpolol-htn

## 2019-01-22 ENCOUNTER — LAB VISIT (OUTPATIENT)
Dept: LAB | Facility: HOSPITAL | Age: 74
End: 2019-01-22
Attending: UROLOGY
Payer: MEDICARE

## 2019-01-22 DIAGNOSIS — N32.81 OAB (OVERACTIVE BLADDER): ICD-10-CM

## 2019-01-22 DIAGNOSIS — C61 PROSTATE CANCER: ICD-10-CM

## 2019-01-22 LAB — COMPLEXED PSA SERPL-MCNC: 0.33 NG/ML

## 2019-01-22 PROCEDURE — 84153 ASSAY OF PSA TOTAL: CPT | Mod: HCNC

## 2019-01-22 PROCEDURE — 36415 COLL VENOUS BLD VENIPUNCTURE: CPT | Mod: HCNC

## 2019-02-05 ENCOUNTER — CLINICAL SUPPORT (OUTPATIENT)
Dept: CARDIOLOGY | Facility: CLINIC | Age: 74
End: 2019-02-05
Attending: INTERNAL MEDICINE
Payer: MEDICARE

## 2019-02-05 LAB
DIASTOLIC DYSFUNCTION: YES
ESTIMATED PA SYSTOLIC PRESSURE: 23.79
MITRAL VALVE REGURGITATION: ABNORMAL
RETIRED EF AND QEF - SEE NOTES: 45 (ref 55–65)
TRICUSPID VALVE REGURGITATION: ABNORMAL

## 2019-02-05 PROCEDURE — 93306 TTE W/DOPPLER COMPLETE: CPT | Mod: HCNC,S$GLB,, | Performed by: INTERNAL MEDICINE

## 2019-02-05 PROCEDURE — 93306 2D ECHO WITH COLOR FLOW DOPPLER: ICD-10-PCS | Mod: HCNC,S$GLB,, | Performed by: INTERNAL MEDICINE

## 2019-02-06 ENCOUNTER — OFFICE VISIT (OUTPATIENT)
Dept: CARDIOTHORACIC SURGERY | Facility: CLINIC | Age: 74
End: 2019-02-06
Payer: MEDICARE

## 2019-02-06 VITALS
HEART RATE: 64 BPM | DIASTOLIC BLOOD PRESSURE: 76 MMHG | TEMPERATURE: 98 F | BODY MASS INDEX: 32.59 KG/M2 | WEIGHT: 220.69 LBS | SYSTOLIC BLOOD PRESSURE: 124 MMHG

## 2019-02-06 DIAGNOSIS — Z95.1 S/P CABG X 4: Primary | ICD-10-CM

## 2019-02-06 PROCEDURE — 99024 POSTOP FOLLOW-UP VISIT: CPT | Mod: HCNC,S$GLB,, | Performed by: THORACIC SURGERY (CARDIOTHORACIC VASCULAR SURGERY)

## 2019-02-06 PROCEDURE — 99999 PR PBB SHADOW E&M-EST. PATIENT-LVL II: ICD-10-PCS | Mod: PBBFAC,HCNC,, | Performed by: THORACIC SURGERY (CARDIOTHORACIC VASCULAR SURGERY)

## 2019-02-06 PROCEDURE — 99024 PR POST-OP FOLLOW-UP VISIT: ICD-10-PCS | Mod: HCNC,S$GLB,, | Performed by: THORACIC SURGERY (CARDIOTHORACIC VASCULAR SURGERY)

## 2019-02-06 PROCEDURE — 99999 PR PBB SHADOW E&M-EST. PATIENT-LVL II: CPT | Mod: PBBFAC,HCNC,, | Performed by: THORACIC SURGERY (CARDIOTHORACIC VASCULAR SURGERY)

## 2019-02-06 RX ORDER — CLOPIDOGREL BISULFATE 75 MG/1
75 TABLET ORAL DAILY
Qty: 30 TABLET | Refills: 11 | Status: SHIPPED | OUTPATIENT
Start: 2019-02-06 | End: 2020-02-25 | Stop reason: SDUPTHER

## 2019-02-06 NOTE — PROGRESS NOTES
Filipe Agustin Jr. is a 73 y.o. male patient.   1. S/P CABG x 4      HPI:  The pt is a 73 year old male, status-post CABG x 4 on 12/18/18. Pt presents to the clinic for his first post-operative follow-up. Pt reports that he was unaware that he was scheduled to follow-up 2 weeks after discharge from the hospital. Pt denies all complaints. Pt reports that he is completely independent at home, walks around his neighborhood multiple times per day, and is feeling stronger everyday. The pt denies pain. Denies chest pain, SOB, palpitations, cough, congestion, fever, malaise, fatigue, abdominal pain, constipation, diarrhea, vomiting, and nausea.     Past Medical History:   Diagnosis Date    Acute coronary syndrome     Cancer of prostate with intermediate recurrence risk (stage T2b-c or Jonathon 7 or PSA 10-20) 1/8/2018    CKD (chronic kidney disease) stage 3, GFR 30-59 ml/min 9/24/2015    Coronary artery disease     Coronary artery disease involving native coronary artery of native heart with unstable angina pectoris     Elevated PSA 6/27/2017    History of coronary angioplasty 9/19/2014    Hyperlipidemia     Hypertension     Myocardial infarction 2008    Obesity, Class II, BMI 35.0-39.9, with comorbidity (see actual BMI) 6/6/2014    Polio     as a child    Tobacco dependence     resolved     No past surgical history pertinent negatives on file.  Scheduled Meds:  Continuous Infusions:  PRN Meds:    Review of patient's allergies indicates:   Allergen Reactions    Paragoric Other (See Comments)     sneeze     There are no hospital problems to display for this patient.    Blood pressure 124/76, pulse 64, temperature 98.3 °F (36.8 °C), temperature source Oral, weight 100.1 kg (220 lb 10.9 oz).    Subjective:   Diet: Adequate intake.  Patient reports no nausea or vomiting.    Activity level: Normal.    Pain control: Well controlled.      Objective:  Vital signs (most recent): Blood pressure 124/76, pulse 64,  temperature 98.3 °F (36.8 °C), temperature source Oral, weight 100.1 kg (220 lb 10.9 oz).  General appearance: Comfortable, well-appearing, in no acute distress and not in pain.    Lungs:  Normal respiratory rate and normal effort.  He is in no respiratory distress.  Breath sounds normal.  There are no decreased breath sounds, wheezes, rales or rhonchi.    Heart: Normal rate.  Regular rhythm.  S1 normal and S2 normal.  There are no distant heart sounds.  No murmur, gallop or friction rub.   Chest: No crepitus.    Abdomen: Abdomen is soft.  No distension or ascites.    Bowel sounds:  Bowel sounds are normal.    Tenderness: There is no abdominal tenderness tenderness.  There is no rebound tenderness.  There is no guarding.    Wound:  Clean.  There is no drainage.    Extremities: There is normal range of motion.  There is no deformity, dependent edema or local swelling.    Neurological: The patient is alert and oriented to person, place and time.  GCS score: 15.  Normal strength.  Pupils are equal, round, and reactive to light.       Assessment & Plan    The pt is a 73 year old male, status-post CABG x 4 on 12/18/18. Overall the pt is doing very well. The pt has met all of the requirements to be discharged from clinic. Instructed the pt to begin taking iron, vitamin B12, and folic acid OTC. Reinforced sternal precautions, lifting restrictions, and driving restrictions.     Follow-up  The pt will be discharged from clinic. The pt is instructed to follow-up PRN.    Kodi Torres NP  2/6/2019

## 2019-02-07 ENCOUNTER — TELEPHONE (OUTPATIENT)
Dept: CARDIOLOGY | Facility: CLINIC | Age: 74
End: 2019-02-07

## 2019-02-07 NOTE — TELEPHONE ENCOUNTER
02/07 Called pt and left v/m that I will call back this afternoon with test results. Cm    ----- Message from Jose Armando Monroe MD sent at 2/6/2019  9:50 PM CST -----  Eco reveiwed, EF=45-50% , continue current meds

## 2019-02-08 ENCOUNTER — TELEPHONE (OUTPATIENT)
Dept: CARDIOLOGY | Facility: CLINIC | Age: 74
End: 2019-02-08

## 2019-02-08 NOTE — TELEPHONE ENCOUNTER
02/08 Called pt and he put me on speaker phone. Rev'd test results and to cont current meds. Follow up as scheduled and call if any new symptom. They both agreed.cm----- Message from Jose Armando Monroe MD sent at 2/6/2019  9:50 PM CST -----  Eco reveiwed, EF=45-50% , continue current meds

## 2019-03-07 ENCOUNTER — LAB VISIT (OUTPATIENT)
Dept: LAB | Facility: HOSPITAL | Age: 74
End: 2019-03-07
Attending: INTERNAL MEDICINE
Payer: MEDICARE

## 2019-03-07 DIAGNOSIS — N32.81 OAB (OVERACTIVE BLADDER): ICD-10-CM

## 2019-03-07 DIAGNOSIS — C61 PROSTATE CANCER: ICD-10-CM

## 2019-03-07 DIAGNOSIS — I10 HYPERTENSION GOAL BP (BLOOD PRESSURE) < 140/90: ICD-10-CM

## 2019-03-07 LAB
ALBUMIN SERPL BCP-MCNC: 3.5 G/DL
ALP SERPL-CCNC: 34 U/L
ALT SERPL W/O P-5'-P-CCNC: 15 U/L
ANION GAP SERPL CALC-SCNC: 6 MMOL/L
AST SERPL-CCNC: 17 U/L
BILIRUB SERPL-MCNC: 0.6 MG/DL
BUN SERPL-MCNC: 17 MG/DL
CALCIUM SERPL-MCNC: 9.5 MG/DL
CHLORIDE SERPL-SCNC: 107 MMOL/L
CHOLEST SERPL-MCNC: 140 MG/DL
CHOLEST/HDLC SERPL: 2.4 {RATIO}
CO2 SERPL-SCNC: 28 MMOL/L
CREAT SERPL-MCNC: 1.3 MG/DL
EST. GFR  (AFRICAN AMERICAN): >60 ML/MIN/1.73 M^2
EST. GFR  (NON AFRICAN AMERICAN): 54.5 ML/MIN/1.73 M^2
GLUCOSE SERPL-MCNC: 96 MG/DL
HDLC SERPL-MCNC: 58 MG/DL
HDLC SERPL: 41.4 %
LDLC SERPL CALC-MCNC: 65.6 MG/DL
NONHDLC SERPL-MCNC: 82 MG/DL
POTASSIUM SERPL-SCNC: 4.5 MMOL/L
PROT SERPL-MCNC: 7 G/DL
SODIUM SERPL-SCNC: 141 MMOL/L
TRIGL SERPL-MCNC: 82 MG/DL

## 2019-03-07 PROCEDURE — 36415 COLL VENOUS BLD VENIPUNCTURE: CPT | Mod: HCNC

## 2019-03-07 PROCEDURE — 80053 COMPREHEN METABOLIC PANEL: CPT | Mod: HCNC

## 2019-03-07 PROCEDURE — 80061 LIPID PANEL: CPT | Mod: HCNC

## 2019-03-07 PROCEDURE — 84153 ASSAY OF PSA TOTAL: CPT | Mod: HCNC

## 2019-03-08 LAB — COMPLEXED PSA SERPL-MCNC: 0.39 NG/ML

## 2019-03-20 ENCOUNTER — OFFICE VISIT (OUTPATIENT)
Dept: INTERNAL MEDICINE | Facility: CLINIC | Age: 74
End: 2019-03-20
Payer: MEDICARE

## 2019-03-20 VITALS
OXYGEN SATURATION: 98 % | WEIGHT: 225.06 LBS | BODY MASS INDEX: 33.34 KG/M2 | HEIGHT: 69 IN | TEMPERATURE: 97 F | DIASTOLIC BLOOD PRESSURE: 60 MMHG | HEART RATE: 66 BPM | SYSTOLIC BLOOD PRESSURE: 118 MMHG

## 2019-03-20 DIAGNOSIS — E78.5 HYPERLIPIDEMIA LDL GOAL <70: Chronic | ICD-10-CM

## 2019-03-20 DIAGNOSIS — E66.9 OBESITY (BMI 30.0-34.9): ICD-10-CM

## 2019-03-20 DIAGNOSIS — Z12.2 ENCOUNTER FOR SCREENING FOR LUNG CANCER: ICD-10-CM

## 2019-03-20 DIAGNOSIS — Z87.891 PERSONAL HISTORY OF NICOTINE DEPENDENCE: ICD-10-CM

## 2019-03-20 DIAGNOSIS — I25.10 CAD, MULTIPLE VESSEL: ICD-10-CM

## 2019-03-20 DIAGNOSIS — C61 PROSTATE CANCER: ICD-10-CM

## 2019-03-20 DIAGNOSIS — D64.9 NORMOCYTIC ANEMIA: ICD-10-CM

## 2019-03-20 DIAGNOSIS — I10 HYPERTENSION GOAL BP (BLOOD PRESSURE) < 140/90: Primary | Chronic | ICD-10-CM

## 2019-03-20 PROBLEM — I21.9 HEART ATTACK: Status: RESOLVED | Noted: 2018-12-15 | Resolved: 2019-03-20

## 2019-03-20 PROCEDURE — 3074F SYST BP LT 130 MM HG: CPT | Mod: HCNC,CPTII,S$GLB, | Performed by: INTERNAL MEDICINE

## 2019-03-20 PROCEDURE — 99499 RISK ADDL DX/OHS AUDIT: ICD-10-PCS | Mod: S$GLB,,, | Performed by: INTERNAL MEDICINE

## 2019-03-20 PROCEDURE — 1101F PR PT FALLS ASSESS DOC 0-1 FALLS W/OUT INJ PAST YR: ICD-10-PCS | Mod: HCNC,CPTII,S$GLB, | Performed by: INTERNAL MEDICINE

## 2019-03-20 PROCEDURE — 99499 UNLISTED E&M SERVICE: CPT | Mod: S$GLB,,, | Performed by: INTERNAL MEDICINE

## 2019-03-20 PROCEDURE — 3078F DIAST BP <80 MM HG: CPT | Mod: HCNC,CPTII,S$GLB, | Performed by: INTERNAL MEDICINE

## 2019-03-20 PROCEDURE — 1101F PT FALLS ASSESS-DOCD LE1/YR: CPT | Mod: HCNC,CPTII,S$GLB, | Performed by: INTERNAL MEDICINE

## 2019-03-20 PROCEDURE — 99999 PR PBB SHADOW E&M-EST. PATIENT-LVL III: CPT | Mod: PBBFAC,HCNC,, | Performed by: INTERNAL MEDICINE

## 2019-03-20 PROCEDURE — 3078F PR MOST RECENT DIASTOLIC BLOOD PRESSURE < 80 MM HG: ICD-10-PCS | Mod: HCNC,CPTII,S$GLB, | Performed by: INTERNAL MEDICINE

## 2019-03-20 PROCEDURE — 3074F PR MOST RECENT SYSTOLIC BLOOD PRESSURE < 130 MM HG: ICD-10-PCS | Mod: HCNC,CPTII,S$GLB, | Performed by: INTERNAL MEDICINE

## 2019-03-20 PROCEDURE — 99214 PR OFFICE/OUTPT VISIT, EST, LEVL IV, 30-39 MIN: ICD-10-PCS | Mod: HCNC,S$GLB,, | Performed by: INTERNAL MEDICINE

## 2019-03-20 PROCEDURE — 99214 OFFICE O/P EST MOD 30 MIN: CPT | Mod: HCNC,S$GLB,, | Performed by: INTERNAL MEDICINE

## 2019-03-20 PROCEDURE — 99999 PR PBB SHADOW E&M-EST. PATIENT-LVL III: ICD-10-PCS | Mod: PBBFAC,HCNC,, | Performed by: INTERNAL MEDICINE

## 2019-03-20 NOTE — PROGRESS NOTES
Subjective:       Patient ID: Filipe Agustin Jr. is a 73 y.o. male.    Chief Complaint: Follow-up (6 month)    Filipe Agustin Jr.  73 y.o. White male    Patient presents with:  Follow-up: 6 month    HPI: Here today to follow up on chronic conditions.  HTN--stable on metoprolol.   HLD--compliant with fenofibrate and atorvastatin.                     CHOL                     140                 03/07/2019                 HDL                      58                  03/07/2019                 LDLCALC                  65.6                03/07/2019                 TRIG                     82                  03/07/2019            CAD--s/p CABG x 4 in December 2018. He is asymptomatic. He has made dietary changes and has lost weight.   Anemia--normocytic. He states he was told it was due to blood loss during surgery. He took iron for a short period of time but stopped it when it caused constipation. He is asymptomatic.   Prostate cancer--management per urology.       Past Medical History:  Acute coronary syndrome  1/8/2018: Cancer of prostate with intermediate recurrence risk (stage   T2b-c or [place] 7 or PSA 10-20)  9/24/2015: CKD (chronic kidney disease) stage 3, GFR 30-59 ml/min  Coronary artery disease  6/27/2017: Elevated PSA  9/19/2014: History of coronary angioplasty  Hyperlipidemia  Hypertension  2008: Myocardial infarction  6/6/2014: Obesity, Class II, BMI 35.0-39.9, with comorbidity (see   actual BMI)  Polio      Comment:  as a child  Tobacco dependence      Comment:  resolved    Current Outpatient Medications on File Prior to Visit:  aspirin (ECOTRIN) 81 MG EC tablet, Take 81 mg by mouth once daily., Disp: , Rfl:   atorvastatin (LIPITOR) 40 MG tablet, TAKE 1 TABLET EVERY DAY, Disp: 90 tablet, Rfl: 1  clopidogrel (PLAVIX) 75 mg tablet, Take 1 tablet (75 mg total) by mouth once daily., Disp: 30 tablet, Rfl: 11  cyanocobalamin (VITAMIN B-12) 100 MCG tablet, Take 100 mcg by mouth once daily., Disp: , Rfl:    fenofibrate 160 MG Tab, TAKE 1 TABLET EVERY OTHER DAY, Disp: 45 tablet, Rfl: 1  fish oil-omega-3 fatty acids 300-1,000 mg capsule, Take 2 g by mouth once daily., Disp: , Rfl:   metoprolol tartrate (LOPRESSOR) 50 MG tablet, TAKE 1 TABLET TWICE DAILY, Disp: 180 tablet, Rfl: 2  tamsulosin (FLOMAX) 0.4 mg Cap, Take 1 capsule (0.4 mg total) by mouth once daily., Disp: 30 capsule, Rfl: 11  vitamin D 1000 units Tab, Take 2,000 Units by mouth once daily. , Disp: , Rfl:   influenza (FLUZONE HIGH-DOSE) 180 mcg/0.5 mL vaccine, ADMINISTERED BY [facility], Disp: 0.5 mL, Rfl: 0  pantoprazole (PROTONIX) 40 MG tablet, Take 1 tablet (40 mg total) by mouth once daily., Disp: 30 tablet, Rfl: 0    Allergies:  Review of patient's allergies indicates:   -- Paragoric -- Other (See Comments)    --  sneeze        Review of Systems   Constitutional: Negative for fatigue, fever and unexpected weight change.   Respiratory: Negative for cough and shortness of breath.    Cardiovascular: Negative for chest pain and leg swelling.   Gastrointestinal: Negative for abdominal pain, constipation (resolved after stopping iron) and diarrhea.   Genitourinary: Negative for difficulty urinating.   Musculoskeletal: Negative for gait problem.   Neurological: Negative for dizziness and headaches.       Objective:      Physical Exam   Constitutional: He is oriented to person, place, and time. He appears well-developed and well-nourished. No distress.   Eyes: No scleral icterus.   Cardiovascular: Normal rate, regular rhythm and normal heart sounds.   Pulmonary/Chest: Effort normal and breath sounds normal. No respiratory distress. He has no wheezes. He has no rales.   Abdominal: Soft. Bowel sounds are normal.   Musculoskeletal: He exhibits no edema.   Neurological: He is alert and oriented to person, place, and time.   Skin: Skin is warm and dry.   Healed chest, abdomen and bilateral leg surgical incisions.    Psychiatric: He has a normal mood and affect.   Vitals  reviewed.      Assessment:       1. Hypertension goal BP (blood pressure) < 140/90    2. Hyperlipidemia LDL goal <70    3. CAD, multiple vessel    4. Normocytic anemia    5. Prostate cancer    6. Obesity (BMI 30.0-34.9)    7. Encounter for screening for lung cancer    8. Personal history of nicotine dependence         Plan:       Filipe was seen today for follow-up.    Diagnoses and all orders for this visit:    Hypertension goal BP (blood pressure) < 140/90  -     Continue current management    Hyperlipidemia LDL goal <70  -     Continue current management    CAD, multiple vessel  -     F/U with cardiology     Normocytic anemia  -     CBC auto differential; Future  -     Ferritin; Future    Prostate cancer  -     F/U with urology    Obesity (BMI 30.0-34.9)  -     Lifestyle modifications discussed    Encounter for screening for lung cancer  -     CT Chest Lung Screening Low Dose; Future    Personal history of nicotine dependence   -     CT Chest Lung Screening Low Dose; Future    Labs and f/u in 6 months and as needed.

## 2019-04-01 ENCOUNTER — OFFICE VISIT (OUTPATIENT)
Dept: UROLOGY | Facility: CLINIC | Age: 74
End: 2019-04-01
Payer: MEDICARE

## 2019-04-01 VITALS — BODY MASS INDEX: 33.13 KG/M2 | WEIGHT: 223.69 LBS | HEIGHT: 69 IN

## 2019-04-01 DIAGNOSIS — C61 CANCER OF PROSTATE WITH INTERMEDIATE RECURRENCE RISK (STAGE T2B-C OR GLEASON 7 OR PSA 10-20): ICD-10-CM

## 2019-04-01 DIAGNOSIS — N40.0 BENIGN PROSTATIC HYPERPLASIA, UNSPECIFIED WHETHER LOWER URINARY TRACT SYMPTOMS PRESENT: ICD-10-CM

## 2019-04-01 DIAGNOSIS — C61 PROSTATE CANCER: Primary | ICD-10-CM

## 2019-04-01 LAB
BILIRUB SERPL-MCNC: NEGATIVE MG/DL
BLOOD URINE, POC: NEGATIVE
COLOR, POC UA: NORMAL
GLUCOSE UR QL STRIP: NEGATIVE
KETONES UR QL STRIP: NEGATIVE
LEUKOCYTE ESTERASE URINE, POC: NEGATIVE
NITRITE, POC UA: NEGATIVE
PH, POC UA: 5
PROTEIN, POC: NEGATIVE
SPECIFIC GRAVITY, POC UA: 1.02
UROBILINOGEN, POC UA: NORMAL

## 2019-04-01 PROCEDURE — 81002 POCT URINE DIPSTICK WITHOUT MICROSCOPE: ICD-10-PCS | Mod: HCNC,S$GLB,, | Performed by: UROLOGY

## 2019-04-01 PROCEDURE — 99214 PR OFFICE/OUTPT VISIT, EST, LEVL IV, 30-39 MIN: ICD-10-PCS | Mod: 25,HCNC,S$GLB, | Performed by: UROLOGY

## 2019-04-01 PROCEDURE — 99214 OFFICE O/P EST MOD 30 MIN: CPT | Mod: 25,HCNC,S$GLB, | Performed by: UROLOGY

## 2019-04-01 PROCEDURE — 99499 RISK ADDL DX/OHS AUDIT: ICD-10-PCS | Mod: HCNC,S$GLB,, | Performed by: UROLOGY

## 2019-04-01 PROCEDURE — 99499 UNLISTED E&M SERVICE: CPT | Mod: HCNC,S$GLB,, | Performed by: UROLOGY

## 2019-04-01 PROCEDURE — 1101F PR PT FALLS ASSESS DOC 0-1 FALLS W/OUT INJ PAST YR: ICD-10-PCS | Mod: HCNC,CPTII,S$GLB, | Performed by: UROLOGY

## 2019-04-01 PROCEDURE — 81002 URINALYSIS NONAUTO W/O SCOPE: CPT | Mod: HCNC,S$GLB,, | Performed by: UROLOGY

## 2019-04-01 PROCEDURE — 1101F PT FALLS ASSESS-DOCD LE1/YR: CPT | Mod: HCNC,CPTII,S$GLB, | Performed by: UROLOGY

## 2019-04-01 PROCEDURE — 99999 PR PBB SHADOW E&M-EST. PATIENT-LVL III: CPT | Mod: PBBFAC,HCNC,, | Performed by: UROLOGY

## 2019-04-01 PROCEDURE — 99999 PR PBB SHADOW E&M-EST. PATIENT-LVL III: ICD-10-PCS | Mod: PBBFAC,HCNC,, | Performed by: UROLOGY

## 2019-04-01 RX ORDER — TAMSULOSIN HYDROCHLORIDE 0.4 MG/1
0.4 CAPSULE ORAL DAILY
Qty: 30 CAPSULE | Refills: 11 | Status: SHIPPED | OUTPATIENT
Start: 2019-04-01 | End: 2019-10-17 | Stop reason: SDUPTHER

## 2019-04-01 NOTE — PROGRESS NOTES
Chief Complaint: T1c Prostate Cancer    HPI:   4/1/19: PSA continues to drop.  Flomax is optional he is taking it.  Had a CABG since last visit.  10/22/18: Is taking the flomax finds it helps but will stop it after this month is done.  Sudden urgency.  Having some bowel urgency for BM with double trips.  6/6/18: XRT complete.  Has been taking flomax and recently quit it.  Missed it when he held it.  Reviewed history in detail.  3/1/18: Fiducials placed today  1/25/18: Reviewed all the reccs and pt concerns again.  He does not want to go to the OR for the SpaceOAR procedure.  Would rather just have office marker placement and no SpaceOAR.  We discussed that it might be possible to do it without anesthesia but that discomfort may demand it.  Fully reviewed indications for many combinations of therapy.  1/4/17: Had his MRI in [place], and has no extracapsular extension.  Reviewed options in detail.  Wary of RALP.  Considering XRT or brachy.  Long discussion.  11/14/17: Biopsy shows Gl 3+3=6 in 8 cores mainly on the left side in 30-50% of the samples.  In the right apex it was 3+4=7 so there is a bit of Gl4 present.    10/30/17: TRUS/Bx today 27 gm  9/6/17: 72 yo man referred for elevated PSA and review of T labs.  No abd/pelvic pain and no exac/rel factors.  No hematuria.  No urolithiasis.  No urinary bother except nocturia x2 sometimes.  No  history.  Normal sexual function.  T was checked out of curiosity no specific symptoms.  He used a T gel 10-20 years ago stopped and didn't feel it did anything.  Libido is normal he says.    Allergies:  Paragoric    Medications:  has a current medication list which includes the following prescription(s): aspirin, atorvastatin, clopidogrel, cyanocobalamin, fenofibrate, fish oil-omega-3 fatty acids, influenza, metoprolol tartrate, pantoprazole, tamsulosin, and vitamin d.    Review of Systems:  General: No fever, chills, fatigability, or weight loss.  Skin: No rashes, itching,  or changes in color or texture of skin.  Chest: Denies ALVARADO, cyanosis, wheezing, cough, and sputum production.  Abdomen: Appetite fine. No weight loss. Denies diarrhea, abdominal pain, hematemesis, or blood in stool.  Musculoskeletal: No joint stiffness or swelling. Denies back pain.  : As above.  All other review of systems negative.    PMH:   has a past medical history of Acute coronary syndrome, Cancer of prostate with intermediate recurrence risk (stage T2b-c or Jonathon 7 or PSA 10-20) (1/8/2018), CKD (chronic kidney disease) stage 3, GFR 30-59 ml/min (9/24/2015), Coronary artery disease, Elevated PSA (6/27/2017), History of coronary angioplasty (9/19/2014), Hyperlipidemia, Hypertension, Myocardial infarction (2008), Obesity, Class II, BMI 35.0-39.9, with comorbidity (see actual BMI) (6/6/2014), Polio, and Tobacco dependence.    PSH:   has a past surgical history that includes Coronary stent placement (2008); Cardiac catheterization (2008); Coronary angioplasty (2008); Coronary Artery Bypass Graft (CABG) (N/A, 12/18/2018); Endoscopic harvest of vein (Bilateral, 12/18/2018); and Left heart catheterization (Left, 12/15/2018).    FamHx: family history includes Cancer in his brother; Heart disease in his brother and father.    SocHx:  reports that he quit smoking about 10 years ago. His smoking use included cigarettes. He has a 40.00 pack-year smoking history. He has never used smokeless tobacco. He reports that he drinks alcohol. He reports that he does not use drugs.      Physical Exam:  There were no vitals filed for this visit.  General: A&Ox3, no apparent distress, no deformities  Neck: No masses, normal thyroid  Lungs: normal inspiration, no use of accessory muscles  Heart: normal pulse, no arrhythmias  Abdomen: Soft, NT, ND  Skin: The skin is warm and dry. No jaundice.  Ext: No c/c/e.  :   11/14/17: Test desc arturo, no abnormalities of epididymus. Penis normal, with normal penile and scrotal skin. Meatus  normal. Normal rectal tone, no hemorrhoids. Prost 10 gm no nodules or masses appreciated. SV not palpable. Perineum and anus normal.    Labs/Studies:   Urinalysis performed in clinic, summary: UA normal exc tr blood  PSA    12/16: 6.3    6/17: 6.7    6/18: 2.7   10/18: 0.58    3/19 0.39    Impression/Plan:   1. PSA/RTC 3/6 mo RTC 6 mo.  Flomax is optional will refill.

## 2019-04-30 ENCOUNTER — CLINICAL SUPPORT (OUTPATIENT)
Dept: CARDIOLOGY | Facility: CLINIC | Age: 74
End: 2019-04-30
Payer: MEDICARE

## 2019-04-30 ENCOUNTER — OFFICE VISIT (OUTPATIENT)
Dept: CARDIOLOGY | Facility: CLINIC | Age: 74
End: 2019-04-30
Payer: MEDICARE

## 2019-04-30 VITALS
WEIGHT: 224.19 LBS | HEIGHT: 69 IN | DIASTOLIC BLOOD PRESSURE: 70 MMHG | SYSTOLIC BLOOD PRESSURE: 142 MMHG | BODY MASS INDEX: 33.2 KG/M2 | HEART RATE: 60 BPM

## 2019-04-30 DIAGNOSIS — I10 BENIGN ESSENTIAL HTN: ICD-10-CM

## 2019-04-30 DIAGNOSIS — I25.10 CAD, MULTIPLE VESSEL: Primary | ICD-10-CM

## 2019-04-30 DIAGNOSIS — I25.10 CAD, MULTIPLE VESSEL: ICD-10-CM

## 2019-04-30 DIAGNOSIS — I10 ESSENTIAL HYPERTENSION: Chronic | ICD-10-CM

## 2019-04-30 DIAGNOSIS — Z95.1 S/P CABG X 4: ICD-10-CM

## 2019-04-30 DIAGNOSIS — E78.5 HYPERLIPIDEMIA LDL GOAL <70: Chronic | ICD-10-CM

## 2019-04-30 PROCEDURE — 99214 PR OFFICE/OUTPT VISIT, EST, LEVL IV, 30-39 MIN: ICD-10-PCS | Mod: HCNC,S$GLB,, | Performed by: NUCLEAR MEDICINE

## 2019-04-30 PROCEDURE — 93010 ELECTROCARDIOGRAM REPORT: CPT | Mod: HCNC,S$GLB,, | Performed by: INTERNAL MEDICINE

## 2019-04-30 PROCEDURE — 1101F PR PT FALLS ASSESS DOC 0-1 FALLS W/OUT INJ PAST YR: ICD-10-PCS | Mod: HCNC,CPTII,S$GLB, | Performed by: NUCLEAR MEDICINE

## 2019-04-30 PROCEDURE — 99999 PR PBB SHADOW E&M-EST. PATIENT-LVL III: ICD-10-PCS | Mod: PBBFAC,HCNC,, | Performed by: NUCLEAR MEDICINE

## 2019-04-30 PROCEDURE — 93010 EKG 12-LEAD: ICD-10-PCS | Mod: HCNC,S$GLB,, | Performed by: INTERNAL MEDICINE

## 2019-04-30 PROCEDURE — 93005 ELECTROCARDIOGRAM TRACING: CPT | Mod: HCNC,S$GLB,, | Performed by: NUCLEAR MEDICINE

## 2019-04-30 PROCEDURE — 3077F SYST BP >= 140 MM HG: CPT | Mod: HCNC,CPTII,S$GLB, | Performed by: NUCLEAR MEDICINE

## 2019-04-30 PROCEDURE — 3078F PR MOST RECENT DIASTOLIC BLOOD PRESSURE < 80 MM HG: ICD-10-PCS | Mod: HCNC,CPTII,S$GLB, | Performed by: NUCLEAR MEDICINE

## 2019-04-30 PROCEDURE — 93005 EKG 12-LEAD: ICD-10-PCS | Mod: HCNC,S$GLB,, | Performed by: NUCLEAR MEDICINE

## 2019-04-30 PROCEDURE — 99999 PR PBB SHADOW E&M-EST. PATIENT-LVL III: CPT | Mod: PBBFAC,HCNC,, | Performed by: NUCLEAR MEDICINE

## 2019-04-30 PROCEDURE — 3077F PR MOST RECENT SYSTOLIC BLOOD PRESSURE >= 140 MM HG: ICD-10-PCS | Mod: HCNC,CPTII,S$GLB, | Performed by: NUCLEAR MEDICINE

## 2019-04-30 PROCEDURE — 3078F DIAST BP <80 MM HG: CPT | Mod: HCNC,CPTII,S$GLB, | Performed by: NUCLEAR MEDICINE

## 2019-04-30 PROCEDURE — 99214 OFFICE O/P EST MOD 30 MIN: CPT | Mod: HCNC,S$GLB,, | Performed by: NUCLEAR MEDICINE

## 2019-04-30 PROCEDURE — 99499 UNLISTED E&M SERVICE: CPT | Mod: HCNC,S$GLB,, | Performed by: NUCLEAR MEDICINE

## 2019-04-30 PROCEDURE — 1101F PT FALLS ASSESS-DOCD LE1/YR: CPT | Mod: HCNC,CPTII,S$GLB, | Performed by: NUCLEAR MEDICINE

## 2019-04-30 PROCEDURE — 99499 RISK ADDL DX/OHS AUDIT: ICD-10-PCS | Mod: HCNC,S$GLB,, | Performed by: NUCLEAR MEDICINE

## 2019-04-30 NOTE — PROGRESS NOTES
Subjective:   Patient ID:  Filipe Agustin Jr. is a 73 y.o. male who presents for follow-up of Coronary Artery Disease (CABG); Hypertension; and Hyperlipidemia      HPI  CABG 12/18/2018-  NO CARD REHAB.  NO RECURRENT CHEST PAIN OR EQUIVALENT  NO UNUSUAL ALVARADO. WITH ORDINARY DAILY ACTIVITIES  NO ORTHOPNEA OR PND  NO PALPITATIONS  NO NEAR SYNCOPE OR SYNCOPE    Review of Systems   Constitution: Negative for chills, fever, night sweats, weight gain and weight loss.   HENT: Negative for nosebleeds.    Eyes: Negative for blurred vision, double vision and visual disturbance.   Cardiovascular: Negative for chest pain, dyspnea on exertion, irregular heartbeat, leg swelling, orthopnea, palpitations, paroxysmal nocturnal dyspnea and syncope.   Respiratory: Negative for cough, hemoptysis and wheezing.    Endocrine: Negative for polydipsia and polyuria.   Hematologic/Lymphatic: Does not bruise/bleed easily.   Skin: Negative for rash.   Musculoskeletal: Negative for joint pain, joint swelling, muscle weakness and myalgias.   Gastrointestinal: Negative for abdominal pain, hematemesis, jaundice and melena.   Genitourinary: Negative for dysuria, hematuria and nocturia.   Neurological: Negative for dizziness, focal weakness, headaches, sensory change and weakness.   Psychiatric/Behavioral: Negative for depression. The patient does not have insomnia and is not nervous/anxious.      Family History   Problem Relation Age of Onset    Heart disease Father         MI    Cancer Brother     Heart disease Brother     Diabetes Neg Hx     Kidney disease Neg Hx     Stroke Neg Hx      Past Medical History:   Diagnosis Date    Acute coronary syndrome     Cancer of prostate with intermediate recurrence risk (stage T2b-c or Jonathon 7 or PSA 10-20) 1/8/2018    CKD (chronic kidney disease) stage 3, GFR 30-59 ml/min 9/24/2015    Coronary artery disease     Elevated PSA 6/27/2017    History of coronary angioplasty 9/19/2014    Hyperlipidemia      Hypertension     Myocardial infarction 2008    Obesity, Class II, BMI 35.0-39.9, with comorbidity (see actual BMI) 6/6/2014    Polio     as a child    Tobacco dependence     resolved     Social History     Socioeconomic History    Marital status:      Spouse name: Not on file    Number of children: Not on file    Years of education: Not on file    Highest education level: Not on file   Occupational History    Not on file   Social Needs    Financial resource strain: Not on file    Food insecurity:     Worry: Not on file     Inability: Not on file    Transportation needs:     Medical: Not on file     Non-medical: Not on file   Tobacco Use    Smoking status: Former Smoker     Packs/day: 2.00     Years: 20.00     Pack years: 40.00     Types: Cigarettes     Last attempt to quit: 7/12/2008     Years since quitting: 10.8    Smokeless tobacco: Never Used   Substance and Sexual Activity    Alcohol use: Yes     Comment: rarely    Drug use: No    Sexual activity: Not Currently     Partners: Female   Lifestyle    Physical activity:     Days per week: Not on file     Minutes per session: Not on file    Stress: Not on file   Relationships    Social connections:     Talks on phone: Not on file     Gets together: Not on file     Attends [employer] service: Not on file     Active member of club or organization: Not on file     Attends meetings of clubs or organizations: Not on file     Relationship status: Not on file   Other Topics Concern    Not on file   Social History Narrative    Not on file     Current Outpatient Medications on File Prior to Visit   Medication Sig Dispense Refill    aspirin (ECOTRIN) 81 MG EC tablet Take 81 mg by mouth once daily.      atorvastatin (LIPITOR) 40 MG tablet TAKE 1 TABLET EVERY DAY 90 tablet 1    clopidogrel (PLAVIX) 75 mg tablet Take 1 tablet (75 mg total) by mouth once daily. 30 tablet 11    cyanocobalamin (VITAMIN B-12) 100 MCG tablet Take 100 mcg by mouth  once daily.      fenofibrate 160 MG Tab TAKE 1 TABLET EVERY OTHER DAY 45 tablet 1    fish oil-omega-3 fatty acids 300-1,000 mg capsule Take 2 g by mouth once daily.      metoprolol tartrate (LOPRESSOR) 50 MG tablet TAKE 1 TABLET TWICE DAILY 180 tablet 2    tamsulosin (FLOMAX) 0.4 mg Cap Take 1 capsule (0.4 mg total) by mouth once daily. 30 capsule 11    vitamin D 1000 units Tab Take 2,000 Units by mouth once daily.       influenza (FLUZONE HIGH-DOSE) 180 mcg/0.5 mL vaccine ADMINISTERED BY [facility] 0.5 mL 0    [DISCONTINUED] pantoprazole (PROTONIX) 40 MG tablet Take 1 tablet (40 mg total) by mouth once daily. 30 tablet 0     No current facility-administered medications on file prior to visit.      Review of patient's allergies indicates:   Allergen Reactions    Paragoric Other (See Comments)     sneeze       Objective:     Physical Exam   Constitutional: He is oriented to person, place, and time. He appears well-developed. No distress.   HENT:   Head: Normocephalic.   Eyes: Pupils are equal, round, and reactive to light. Conjunctivae are normal.   Neck: Neck supple. No JVD present. No thyromegaly present.   Cardiovascular: Normal rate, regular rhythm, normal heart sounds and intact distal pulses. Exam reveals no gallop and no friction rub.   No murmur heard.  Pulses:       Carotid pulses are 2+ on the right side, and 2+ on the left side.       Radial pulses are 2+ on the right side, and 2+ on the left side.        Femoral pulses are 2+ on the right side, and 2+ on the left side.       Popliteal pulses are 2+ on the right side, and 2+ on the left side.        Dorsalis pedis pulses are 2+ on the right side, and 2+ on the left side.        Posterior tibial pulses are 2+ on the right side, and 2+ on the left side.   Pulmonary/Chest: Breath sounds normal. He has no wheezes. He has no rales. He exhibits no tenderness.   STERNOTOMY- HEALING WELL   Abdominal: Soft. Bowel sounds are normal. He exhibits no mass. There is no  hepatosplenomegaly. There is no tenderness.   Musculoskeletal: He exhibits no edema or tenderness.        Cervical back: Normal.        Thoracic back: Normal.        Lumbar back: Normal.   Lymphadenopathy:     He has no cervical adenopathy.     He has no axillary adenopathy.        Right: No supraclavicular adenopathy present.        Left: No supraclavicular adenopathy present.   Neurological: He is alert and oriented to person, place, and time. He has normal strength. No sensory deficit. Gait normal.   Skin: Skin is warm. No cyanosis. No pallor. Nails show no clubbing.   Psychiatric: He has a normal mood and affect. His speech is normal and behavior is normal. Cognition and memory are normal.       Assessment:     1. CAD, multiple vessel    2. S/P CABG x 4    3. Hyperlipidemia LDL goal <70    4. Essential hypertension        Plan:     CAD, multiple vessel  NO ACTIVE MYOCARDIAL ISCHEMIA  ECG TODAY- SB, 58 BMP- RBBB, INF SCAR  NO EVIDENCE OF ADHF  NO ARRHYTHMIAS    S/P CABG x 4  STERNOTOMY HEALING WELL    Hyperlipidemia LDL goal <70  STATIN WELL TOLERATED    Essential hypertension  WELL CONTROLLED BP    SCHEDULE TST FOR CARD REHAB    PHONE CALL TO REVIEW RESULTS AND FURTHER RECOMMENDATIONS

## 2019-05-05 ENCOUNTER — PATIENT MESSAGE (OUTPATIENT)
Dept: CARDIOLOGY | Facility: CLINIC | Age: 74
End: 2019-05-05

## 2019-05-06 ENCOUNTER — TELEPHONE (OUTPATIENT)
Dept: CARDIOLOGY | Facility: CLINIC | Age: 74
End: 2019-05-06

## 2019-05-06 NOTE — TELEPHONE ENCOUNTER
Patient was called and offered earlier date of  ETT on 05/08/2019 at 0930, patient declined stating too early in the morning.  Decided to keep original appt 05/21/2019.

## 2019-05-11 DIAGNOSIS — E78.5 HYPERLIPIDEMIA LDL GOAL <100: ICD-10-CM

## 2019-05-13 RX ORDER — FENOFIBRATE 160 MG/1
TABLET ORAL
Qty: 45 TABLET | Refills: 1 | Status: SHIPPED | OUTPATIENT
Start: 2019-05-13 | End: 2020-02-24

## 2019-05-13 RX ORDER — ATORVASTATIN CALCIUM 40 MG/1
TABLET, FILM COATED ORAL
Qty: 90 TABLET | Refills: 1 | Status: SHIPPED | OUTPATIENT
Start: 2019-05-13 | End: 2020-02-24

## 2019-05-21 ENCOUNTER — CLINICAL SUPPORT (OUTPATIENT)
Dept: CARDIOLOGY | Facility: CLINIC | Age: 74
End: 2019-05-21
Attending: NUCLEAR MEDICINE
Payer: MEDICARE

## 2019-05-21 DIAGNOSIS — I25.10 CAD, MULTIPLE VESSEL: ICD-10-CM

## 2019-05-21 DIAGNOSIS — Z95.1 S/P CABG X 4: ICD-10-CM

## 2019-05-21 LAB — DIASTOLIC DYSFUNCTION: NO

## 2019-05-21 PROCEDURE — 93015 CARDIAC TREADMILL STRESS TEST: ICD-10-PCS | Mod: HCNC,S$GLB,, | Performed by: INTERNAL MEDICINE

## 2019-05-21 PROCEDURE — 93015 CV STRESS TEST SUPVJ I&R: CPT | Mod: HCNC,S$GLB,, | Performed by: INTERNAL MEDICINE

## 2019-05-22 ENCOUNTER — TELEPHONE (OUTPATIENT)
Dept: CARDIOLOGY | Facility: CLINIC | Age: 74
End: 2019-05-22

## 2019-05-22 NOTE — TELEPHONE ENCOUNTER
Patient was called and informed all required paper work for Cardiac Rehab    Your fax has been successfully sent to [phone] at [phone].  ------------------------------------------------------------  From: indyell  ------------------------------------------------------------  5/22/2019 9:37:24 AM Transmission Record  Sent to [id number] with remote ID "  Result: (0/339;0/0) Success  Page record: 1 - 10  Elapsed time: 03:34 on channel 0

## 2019-06-06 ENCOUNTER — TELEPHONE (OUTPATIENT)
Dept: CARDIOLOGY | Facility: CLINIC | Age: 74
End: 2019-06-06

## 2019-06-06 NOTE — TELEPHONE ENCOUNTER
----- Message from Chikis Garland sent at 6/6/2019  2:04 PM CDT -----  Contact: Argenis SPENCER at [facility]  Please give Argenis a call at [phone] regarding the pt she really needs to speak with someone and has left several messages already

## 2019-06-06 NOTE — TELEPHONE ENCOUNTER
----- Message from Mark Thurston sent at 6/6/2019 11:48 AM CDT -----  Contact: dirk joseph ( RN [facility])   Requesting per to per review in reference to denial, has to answered by 6/10             [phone]

## 2019-06-07 NOTE — TELEPHONE ENCOUNTER
Called Abby at [facility] and informed of the following, Peer To Peer intiated, will receive decision today or 06/10/2019.

## 2019-06-27 ENCOUNTER — PATIENT MESSAGE (OUTPATIENT)
Dept: ADMINISTRATIVE | Facility: OTHER | Age: 74
End: 2019-06-27

## 2019-07-01 ENCOUNTER — LAB VISIT (OUTPATIENT)
Dept: LAB | Facility: HOSPITAL | Age: 74
End: 2019-07-01
Attending: UROLOGY
Payer: MEDICARE

## 2019-07-01 DIAGNOSIS — C61 PROSTATE CANCER: ICD-10-CM

## 2019-07-01 DIAGNOSIS — N40.0 BENIGN PROSTATIC HYPERPLASIA, UNSPECIFIED WHETHER LOWER URINARY TRACT SYMPTOMS PRESENT: ICD-10-CM

## 2019-07-01 DIAGNOSIS — C61 CANCER OF PROSTATE WITH INTERMEDIATE RECURRENCE RISK (STAGE T2B-C OR GLEASON 7 OR PSA 10-20): ICD-10-CM

## 2019-07-01 LAB — COMPLEXED PSA SERPL-MCNC: 0.26 NG/ML (ref 0–4)

## 2019-07-01 PROCEDURE — 84153 ASSAY OF PSA TOTAL: CPT | Mod: HCNC

## 2019-07-01 PROCEDURE — 36415 COLL VENOUS BLD VENIPUNCTURE: CPT | Mod: HCNC

## 2019-08-15 RX ORDER — METOPROLOL TARTRATE 50 MG/1
TABLET ORAL
Qty: 180 TABLET | Refills: 1 | Status: SHIPPED | OUTPATIENT
Start: 2019-08-15 | End: 2020-02-24

## 2019-09-16 LAB — HEMOCCULT STL QL IA: NEGATIVE

## 2019-09-20 ENCOUNTER — LAB VISIT (OUTPATIENT)
Dept: LAB | Facility: HOSPITAL | Age: 74
End: 2019-09-20
Attending: INTERNAL MEDICINE
Payer: MEDICARE

## 2019-09-20 DIAGNOSIS — D64.9 NORMOCYTIC ANEMIA: ICD-10-CM

## 2019-09-20 DIAGNOSIS — I10 HYPERTENSION GOAL BP (BLOOD PRESSURE) < 140/90: ICD-10-CM

## 2019-09-20 LAB
ALBUMIN SERPL BCP-MCNC: 3.7 G/DL (ref 3.5–5.2)
ALP SERPL-CCNC: 32 U/L (ref 55–135)
ALT SERPL W/O P-5'-P-CCNC: 19 U/L (ref 10–44)
ANION GAP SERPL CALC-SCNC: 9 MMOL/L (ref 8–16)
AST SERPL-CCNC: 19 U/L (ref 10–40)
BASOPHILS # BLD AUTO: 0.05 K/UL (ref 0–0.2)
BASOPHILS NFR BLD: 1.1 % (ref 0–1.9)
BILIRUB SERPL-MCNC: 0.6 MG/DL (ref 0.1–1)
BUN SERPL-MCNC: 18 MG/DL (ref 8–23)
CALCIUM SERPL-MCNC: 9.3 MG/DL (ref 8.7–10.5)
CHLORIDE SERPL-SCNC: 106 MMOL/L (ref 95–110)
CHOLEST SERPL-MCNC: 146 MG/DL (ref 120–199)
CHOLEST/HDLC SERPL: 2.9 {RATIO} (ref 2–5)
CO2 SERPL-SCNC: 26 MMOL/L (ref 23–29)
CREAT SERPL-MCNC: 1.5 MG/DL (ref 0.5–1.4)
DIFFERENTIAL METHOD: ABNORMAL
EOSINOPHIL # BLD AUTO: 0.2 K/UL (ref 0–0.5)
EOSINOPHIL NFR BLD: 3.8 % (ref 0–8)
ERYTHROCYTE [DISTWIDTH] IN BLOOD BY AUTOMATED COUNT: 14.4 % (ref 11.5–14.5)
EST. GFR  (AFRICAN AMERICAN): 53 ML/MIN/1.73 M^2
EST. GFR  (NON AFRICAN AMERICAN): 45.9 ML/MIN/1.73 M^2
FERRITIN SERPL-MCNC: 61 NG/ML (ref 20–300)
GLUCOSE SERPL-MCNC: 89 MG/DL (ref 70–110)
HCT VFR BLD AUTO: 47.9 % (ref 40–54)
HDLC SERPL-MCNC: 50 MG/DL (ref 40–75)
HDLC SERPL: 34.2 % (ref 20–50)
HGB BLD-MCNC: 14.5 G/DL (ref 14–18)
IMM GRANULOCYTES # BLD AUTO: 0.01 K/UL (ref 0–0.04)
IMM GRANULOCYTES NFR BLD AUTO: 0.2 % (ref 0–0.5)
LDLC SERPL CALC-MCNC: 80.6 MG/DL (ref 63–159)
LYMPHOCYTES # BLD AUTO: 1.7 K/UL (ref 1–4.8)
LYMPHOCYTES NFR BLD: 38.3 % (ref 18–48)
MCH RBC QN AUTO: 28.8 PG (ref 27–31)
MCHC RBC AUTO-ENTMCNC: 30.3 G/DL (ref 32–36)
MCV RBC AUTO: 95 FL (ref 82–98)
MONOCYTES # BLD AUTO: 0.4 K/UL (ref 0.3–1)
MONOCYTES NFR BLD: 9.4 % (ref 4–15)
NEUTROPHILS # BLD AUTO: 2.1 K/UL (ref 1.8–7.7)
NEUTROPHILS NFR BLD: 47.2 % (ref 38–73)
NONHDLC SERPL-MCNC: 96 MG/DL
NRBC BLD-RTO: 0 /100 WBC
PLATELET # BLD AUTO: 215 K/UL (ref 150–350)
PMV BLD AUTO: 11.5 FL (ref 9.2–12.9)
POTASSIUM SERPL-SCNC: 4.5 MMOL/L (ref 3.5–5.1)
PROT SERPL-MCNC: 6.9 G/DL (ref 6–8.4)
RBC # BLD AUTO: 5.04 M/UL (ref 4.6–6.2)
SODIUM SERPL-SCNC: 141 MMOL/L (ref 136–145)
TRIGL SERPL-MCNC: 77 MG/DL (ref 30–150)
WBC # BLD AUTO: 4.49 K/UL (ref 3.9–12.7)

## 2019-09-20 PROCEDURE — 80053 COMPREHEN METABOLIC PANEL: CPT | Mod: HCNC

## 2019-09-20 PROCEDURE — 85025 COMPLETE CBC W/AUTO DIFF WBC: CPT | Mod: HCNC

## 2019-09-20 PROCEDURE — 36415 COLL VENOUS BLD VENIPUNCTURE: CPT | Mod: HCNC

## 2019-09-20 PROCEDURE — 82728 ASSAY OF FERRITIN: CPT | Mod: HCNC

## 2019-09-20 PROCEDURE — 80061 LIPID PANEL: CPT | Mod: HCNC

## 2019-09-27 ENCOUNTER — OFFICE VISIT (OUTPATIENT)
Dept: INTERNAL MEDICINE | Facility: CLINIC | Age: 74
End: 2019-09-27
Payer: MEDICARE

## 2019-09-27 VITALS
HEIGHT: 69 IN | OXYGEN SATURATION: 95 % | BODY MASS INDEX: 33.05 KG/M2 | DIASTOLIC BLOOD PRESSURE: 68 MMHG | HEART RATE: 56 BPM | SYSTOLIC BLOOD PRESSURE: 120 MMHG | WEIGHT: 223.13 LBS | TEMPERATURE: 96 F

## 2019-09-27 DIAGNOSIS — Z23 IMMUNIZATION DUE: ICD-10-CM

## 2019-09-27 DIAGNOSIS — E78.5 HYPERLIPIDEMIA LDL GOAL <70: Chronic | ICD-10-CM

## 2019-09-27 DIAGNOSIS — I25.10 CAD, MULTIPLE VESSEL: ICD-10-CM

## 2019-09-27 DIAGNOSIS — N18.30 CKD (CHRONIC KIDNEY DISEASE) STAGE 3, GFR 30-59 ML/MIN: Chronic | ICD-10-CM

## 2019-09-27 DIAGNOSIS — I10 ESSENTIAL HYPERTENSION: Primary | Chronic | ICD-10-CM

## 2019-09-27 PROCEDURE — G0008 ADMIN INFLUENZA VIRUS VAC: HCPCS | Mod: HCNC,S$GLB,, | Performed by: INTERNAL MEDICINE

## 2019-09-27 PROCEDURE — 90662 IIV NO PRSV INCREASED AG IM: CPT | Mod: HCNC,S$GLB,, | Performed by: INTERNAL MEDICINE

## 2019-09-27 PROCEDURE — 1101F PR PT FALLS ASSESS DOC 0-1 FALLS W/OUT INJ PAST YR: ICD-10-PCS | Mod: HCNC,CPTII,S$GLB, | Performed by: INTERNAL MEDICINE

## 2019-09-27 PROCEDURE — 99214 OFFICE O/P EST MOD 30 MIN: CPT | Mod: 25,HCNC,S$GLB, | Performed by: INTERNAL MEDICINE

## 2019-09-27 PROCEDURE — 3074F PR MOST RECENT SYSTOLIC BLOOD PRESSURE < 130 MM HG: ICD-10-PCS | Mod: HCNC,CPTII,S$GLB, | Performed by: INTERNAL MEDICINE

## 2019-09-27 PROCEDURE — 99999 PR PBB SHADOW E&M-EST. PATIENT-LVL III: CPT | Mod: PBBFAC,HCNC,, | Performed by: INTERNAL MEDICINE

## 2019-09-27 PROCEDURE — 99214 PR OFFICE/OUTPT VISIT, EST, LEVL IV, 30-39 MIN: ICD-10-PCS | Mod: 25,HCNC,S$GLB, | Performed by: INTERNAL MEDICINE

## 2019-09-27 PROCEDURE — G0008 FLU VACCINE - HIGH DOSE (65+) PRESERVATIVE FREE IM: ICD-10-PCS | Mod: HCNC,S$GLB,, | Performed by: INTERNAL MEDICINE

## 2019-09-27 PROCEDURE — 3078F PR MOST RECENT DIASTOLIC BLOOD PRESSURE < 80 MM HG: ICD-10-PCS | Mod: HCNC,CPTII,S$GLB, | Performed by: INTERNAL MEDICINE

## 2019-09-27 PROCEDURE — 99999 PR PBB SHADOW E&M-EST. PATIENT-LVL III: ICD-10-PCS | Mod: PBBFAC,HCNC,, | Performed by: INTERNAL MEDICINE

## 2019-09-27 PROCEDURE — 1101F PT FALLS ASSESS-DOCD LE1/YR: CPT | Mod: HCNC,CPTII,S$GLB, | Performed by: INTERNAL MEDICINE

## 2019-09-27 PROCEDURE — 90662 FLU VACCINE - HIGH DOSE (65+) PRESERVATIVE FREE IM: ICD-10-PCS | Mod: HCNC,S$GLB,, | Performed by: INTERNAL MEDICINE

## 2019-09-27 PROCEDURE — 3074F SYST BP LT 130 MM HG: CPT | Mod: HCNC,CPTII,S$GLB, | Performed by: INTERNAL MEDICINE

## 2019-09-27 PROCEDURE — 3078F DIAST BP <80 MM HG: CPT | Mod: HCNC,CPTII,S$GLB, | Performed by: INTERNAL MEDICINE

## 2019-09-27 NOTE — PROGRESS NOTES
Subjective:       Patient ID: Filipe Agustin Jr. is a 73 y.o. male.    Chief Complaint: Follow-up (6 month)    Filipe Agustin Jr.  73 y.o. White male    Patient presents with:  Follow-up: 6 month    HPI: Here today to follow up on chronic conditions. He is here with his wife.   HTN--stable on metoprolol. He denies symptoms.   CKD III--he denies symptoms. He admits to not drinking a lot of water. He is not on an ARB or an ACE inhibitor.   HLD--compliant with atorvastatin and fenofibrate.                     CHOL                     146                 09/20/2019                       HDL                      50                  09/20/2019                 LDLCALC                  80.6                09/20/2019                 TRIG                     77                  09/20/2019                    CAD--asymptomatic. Management per cardiology.   His record indicates that he had a flu shot on August 2, however, he and his wife are adamant that he has not received a flu shot this year. He would like one today.     Past Medical History:  Acute coronary syndrome  1/8/2018: Cancer of prostate with intermediate recurrence risk (stage   T2b-c or Jonathon 7 or PSA 10-20)  9/24/2015: CKD (chronic kidney disease) stage 3, GFR 30-59 ml/min  Coronary artery disease  6/27/2017: Elevated PSA  9/19/2014: History of coronary angioplasty  Hyperlipidemia  Hypertension  2008: Myocardial infarction  6/6/2014: Obesity, Class II, BMI 35.0-39.9, with comorbidity (see   actual BMI)  Polio      Comment:  as a child  Tobacco dependence      Comment:  resolved    Current Outpatient Medications on File Prior to Visit:  aspirin (ECOTRIN) 81 MG EC tablet, Take 81 mg by mouth once daily., Disp: , Rfl:   atorvastatin (LIPITOR) 40 MG tablet, TAKE 1 TABLET EVERY DAY, Disp: 90 tablet, Rfl: 1  clopidogrel (PLAVIX) 75 mg tablet, Take 1 tablet (75 mg total) by mouth once daily., Disp: 30 tablet, Rfl: 11  cyanocobalamin (VITAMIN B-12) 100 MCG tablet, Take  100 mcg by mouth once daily., Disp: , Rfl:   fenofibrate 160 MG Tab, TAKE 1 TABLET EVERY OTHER DAY, Disp: 45 tablet, Rfl: 1  fish oil-omega-3 fatty acids 300-1,000 mg capsule, Take 2 g by mouth once daily., Disp: , Rfl:   metoprolol tartrate (LOPRESSOR) 50 MG tablet, TAKE 1 TABLET TWICE DAILY, Disp: 180 tablet, Rfl: 1  vitamin D 1000 units Tab, Take 2,000 Units by mouth once daily. , Disp: , Rfl:   influenza (FLUZONE HIGH-DOSE) 180 mcg/0.5 mL vaccine, ADMINISTERED BY [facility] (Patient not taking: Reported on 9/27/2019), Disp: 0.5 mL, Rfl: 0  tamsulosin (FLOMAX) 0.4 mg Cap, Take 1 capsule (0.4 mg total) by mouth once daily. (Patient not taking: Reported on 9/27/2019), Disp: 30 capsule, Rfl: 11    Allergies:  Review of patient's allergies indicates:   -- Paragoric -- Other (See Comments)    --  sneeze      Review of Systems   Constitutional: Negative for activity change, fever and unexpected weight change.   HENT: Negative for hearing loss.    Eyes: Negative for visual disturbance (wears glasses).   Respiratory: Negative for cough and shortness of breath.    Cardiovascular: Negative for chest pain and leg swelling.   Gastrointestinal: Negative for abdominal pain, blood in stool, constipation and diarrhea.   Genitourinary: Negative for difficulty urinating.   Musculoskeletal: Negative for gait problem.   Neurological: Positive for numbness (right leg--chronic). Negative for dizziness and headaches.       Objective:      Physical Exam   Constitutional: He is oriented to person, place, and time. He appears well-developed and well-nourished. No distress.   Eyes: No scleral icterus.   Cardiovascular: Normal rate, regular rhythm and normal heart sounds.   Pulmonary/Chest: Effort normal and breath sounds normal. No respiratory distress.   Abdominal: Soft. Bowel sounds are normal.   Musculoskeletal: He exhibits no edema.   Neurological: He is alert and oriented to person, place, and time.   Vitals reviewed.      Assessment:        1. Essential hypertension    2. CKD (chronic kidney disease) stage 3, GFR 30-59 ml/min    3. Hyperlipidemia LDL goal <70    4. CAD, multiple vessel    5. Immunization due        Plan:       Filipe was seen today for follow-up.    Diagnoses and all orders for this visit:    Essential hypertension  -     Continue metoprolol    CKD (chronic kidney disease) stage 3, GFR 30-59 ml/min  -     Advised to avoid NSAID's  -     Recommend increased water intake    Hyperlipidemia LDL goal <70  -     Continue current management    CAD, multiple vessel  -     Continue current management    Immunization due  -     Influenza - High Dose (65+) (PF) (IM)    Labs and f/u in 6 months.

## 2019-10-01 ENCOUNTER — LAB VISIT (OUTPATIENT)
Dept: LAB | Facility: HOSPITAL | Age: 74
End: 2019-10-01
Attending: UROLOGY
Payer: MEDICARE

## 2019-10-01 DIAGNOSIS — C61 CANCER OF PROSTATE WITH INTERMEDIATE RECURRENCE RISK (STAGE T2B-C OR GLEASON 7 OR PSA 10-20): ICD-10-CM

## 2019-10-01 DIAGNOSIS — C61 PROSTATE CANCER: ICD-10-CM

## 2019-10-01 DIAGNOSIS — N40.0 BENIGN PROSTATIC HYPERPLASIA, UNSPECIFIED WHETHER LOWER URINARY TRACT SYMPTOMS PRESENT: ICD-10-CM

## 2019-10-01 PROCEDURE — 84153 ASSAY OF PSA TOTAL: CPT | Mod: HCNC

## 2019-10-01 PROCEDURE — 36415 COLL VENOUS BLD VENIPUNCTURE: CPT | Mod: HCNC

## 2019-10-02 LAB — COMPLEXED PSA SERPL-MCNC: 0.18 NG/ML (ref 0–4)

## 2019-10-03 ENCOUNTER — DOCUMENTATION ONLY (OUTPATIENT)
Dept: ADMINISTRATIVE | Facility: HOSPITAL | Age: 74
End: 2019-10-03

## 2019-10-17 ENCOUNTER — OFFICE VISIT (OUTPATIENT)
Dept: UROLOGY | Facility: CLINIC | Age: 74
End: 2019-10-17
Payer: MEDICARE

## 2019-10-17 VITALS
DIASTOLIC BLOOD PRESSURE: 76 MMHG | SYSTOLIC BLOOD PRESSURE: 128 MMHG | WEIGHT: 224.88 LBS | HEIGHT: 69 IN | HEART RATE: 57 BPM | BODY MASS INDEX: 33.31 KG/M2

## 2019-10-17 DIAGNOSIS — C61 PROSTATE CANCER: Primary | ICD-10-CM

## 2019-10-17 DIAGNOSIS — N32.81 OAB (OVERACTIVE BLADDER): ICD-10-CM

## 2019-10-17 DIAGNOSIS — C61 CANCER OF PROSTATE WITH INTERMEDIATE RECURRENCE RISK (STAGE T2B-C OR GLEASON 7 OR PSA 10-20): ICD-10-CM

## 2019-10-17 LAB
BILIRUB SERPL-MCNC: NORMAL MG/DL
BLOOD URINE, POC: 250
COLOR, POC UA: YELLOW
GLUCOSE UR QL STRIP: NORMAL
KETONES UR QL STRIP: NORMAL
LEUKOCYTE ESTERASE URINE, POC: NORMAL
NITRITE, POC UA: NORMAL
PH, POC UA: 7
PROTEIN, POC: NORMAL
SPECIFIC GRAVITY, POC UA: 1.01
UROBILINOGEN, POC UA: NORMAL

## 2019-10-17 PROCEDURE — 81002 POCT URINE DIPSTICK WITHOUT MICROSCOPE: ICD-10-PCS | Mod: HCNC,S$GLB,, | Performed by: UROLOGY

## 2019-10-17 PROCEDURE — 3078F DIAST BP <80 MM HG: CPT | Mod: HCNC,CPTII,S$GLB, | Performed by: UROLOGY

## 2019-10-17 PROCEDURE — 99999 PR PBB SHADOW E&M-EST. PATIENT-LVL III: ICD-10-PCS | Mod: PBBFAC,HCNC,, | Performed by: UROLOGY

## 2019-10-17 PROCEDURE — 99999 PR PBB SHADOW E&M-EST. PATIENT-LVL III: CPT | Mod: PBBFAC,HCNC,, | Performed by: UROLOGY

## 2019-10-17 PROCEDURE — 1101F PR PT FALLS ASSESS DOC 0-1 FALLS W/OUT INJ PAST YR: ICD-10-PCS | Mod: HCNC,CPTII,S$GLB, | Performed by: UROLOGY

## 2019-10-17 PROCEDURE — 99214 OFFICE O/P EST MOD 30 MIN: CPT | Mod: 25,HCNC,S$GLB, | Performed by: UROLOGY

## 2019-10-17 PROCEDURE — 1101F PT FALLS ASSESS-DOCD LE1/YR: CPT | Mod: HCNC,CPTII,S$GLB, | Performed by: UROLOGY

## 2019-10-17 PROCEDURE — 3078F PR MOST RECENT DIASTOLIC BLOOD PRESSURE < 80 MM HG: ICD-10-PCS | Mod: HCNC,CPTII,S$GLB, | Performed by: UROLOGY

## 2019-10-17 PROCEDURE — 3074F PR MOST RECENT SYSTOLIC BLOOD PRESSURE < 130 MM HG: ICD-10-PCS | Mod: HCNC,CPTII,S$GLB, | Performed by: UROLOGY

## 2019-10-17 PROCEDURE — 3074F SYST BP LT 130 MM HG: CPT | Mod: HCNC,CPTII,S$GLB, | Performed by: UROLOGY

## 2019-10-17 PROCEDURE — 99214 PR OFFICE/OUTPT VISIT, EST, LEVL IV, 30-39 MIN: ICD-10-PCS | Mod: 25,HCNC,S$GLB, | Performed by: UROLOGY

## 2019-10-17 PROCEDURE — 81002 URINALYSIS NONAUTO W/O SCOPE: CPT | Mod: HCNC,S$GLB,, | Performed by: UROLOGY

## 2019-10-17 RX ORDER — TAMSULOSIN HYDROCHLORIDE 0.4 MG/1
0.4 CAPSULE ORAL DAILY
Qty: 30 CAPSULE | Refills: 11 | Status: SHIPPED | OUTPATIENT
Start: 2019-10-17 | End: 2020-04-22 | Stop reason: SDUPTHER

## 2019-10-17 NOTE — PROGRESS NOTES
Chief Complaint: T1c Prostate Cancer    HPI:   10/17/19: Discussed flomax again, still optional.  OAB still a mild problem.  Reviewed history in detail.   4/1/19: PSA continues to drop.  Flomax is optional he is taking it.  Had a CABG since last visit.  10/22/18: Is taking the flomax finds it helps but will stop it after this month is done.  Sudden urgency.  Having some bowel urgency for BM with double trips.  6/6/18: XRT complete.  Has been taking flomax and recently quit it.  Missed it when he held it.  Reviewed history in detail.  3/1/18: Fiducials placed today  1/25/18: Reviewed all the reccs and pt concerns again.  He does not want to go to the OR for the SpaceOAR procedure.  Would rather just have office marker placement and no SpaceOAR.  We discussed that it might be possible to do it without anesthesia but that discomfort may demand it.  Fully reviewed indications for many combinations of therapy.  1/4/17: Had his MRI in [place], and has no extracapsular extension.  Reviewed options in detail.  Wary of RALP.  Considering XRT or brachy.  Long discussion.  11/14/17: Biopsy shows Gl 3+3=6 in 8 cores mainly on the left side in 30-50% of the samples.  In the right apex it was 3+4=7 so there is a bit of Gl4 present.    10/30/17: TRUS/Bx today 27 gm  9/6/17: 72 yo man referred for elevated PSA and review of T labs.  No abd/pelvic pain and no exac/rel factors.  No hematuria.  No urolithiasis.  No urinary bother except nocturia x2 sometimes.  No  history.  Normal sexual function.  T was checked out of curiosity no specific symptoms.  He used a T gel 10-20 years ago stopped and didn't feel it did anything.  Libido is normal he says.    Allergies:  Paragoric    Medications:  has a current medication list which includes the following prescription(s): aspirin, atorvastatin, clopidogrel, cyanocobalamin, fenofibrate, fish oil-omega-3 fatty acids, influenza, metoprolol tartrate, tamsulosin, and vitamin d.    Review of  Systems:  General: No fever, chills, fatigability, or weight loss.  Skin: No rashes, itching, or changes in color or texture of skin.  Chest: Denies ALVARADO, cyanosis, wheezing, cough, and sputum production.  Abdomen: Appetite fine. No weight loss. Denies diarrhea, abdominal pain, hematemesis, or blood in stool.  Musculoskeletal: No joint stiffness or swelling. Denies back pain.  : As above.  All other review of systems negative.    PMH:   has a past medical history of Acute coronary syndrome, Cancer of prostate with intermediate recurrence risk (stage T2b-c or Jonathon 7 or PSA 10-20) (1/8/2018), CKD (chronic kidney disease) stage 3, GFR 30-59 ml/min (9/24/2015), Coronary artery disease, Elevated PSA (6/27/2017), History of coronary angioplasty (9/19/2014), Hyperlipidemia, Hypertension, Myocardial infarction (2008), Obesity, Class II, BMI 35.0-39.9, with comorbidity (see actual BMI) (6/6/2014), Polio, and Tobacco dependence.    PSH:   has a past surgical history that includes Coronary stent placement (2008); Cardiac catheterization (2008); Coronary angioplasty (2008); Coronary Artery Bypass Graft (CABG) (N/A, 12/18/2018); Endoscopic harvest of vein (Bilateral, 12/18/2018); and Left heart catheterization (Left, 12/15/2018).    FamHx: family history includes Cancer in his brother; Heart disease in his brother and father.    SocHx:  reports that he quit smoking about 11 years ago. His smoking use included cigarettes. He has a 40.00 pack-year smoking history. He has never used smokeless tobacco. He reports that he drinks alcohol. He reports that he does not use drugs.      Physical Exam:  Vitals:    10/17/19 1311   BP: 128/76   Pulse: (!) 57     General: A&Ox3, no apparent distress, no deformities  Neck: No masses, normal thyroid  Lungs: normal inspiration, no use of accessory muscles  Heart: normal pulse, no arrhythmias  Abdomen: Soft, NT, ND  Skin: The skin is warm and dry. No jaundice.  Ext: No c/c/e.  :   11/14/17:  Anxiety    Asperger's disorder    Depression    OCD (obsessive compulsive disorder) Test desc arturo, no abnormalities of epididymus. Penis normal, with normal penile and scrotal skin. Meatus normal. Normal rectal tone, no hemorrhoids. Prost 10 gm no nodules or masses appreciated. SV not palpable. Perineum and anus normal.    Labs/Studies:   Urinalysis performed in clinic, summary: UA normal exc tr blood  PSA    12/16: 6.3    6/17: 6.7    6/18: 2.7   10/18: 0.58    3/19 0.39    10/19: 0.18    Impression/Plan:   1. PSA/RTC 3/6 mo RTC 6 mo.  Flomax is optional will refill.

## 2019-11-06 DIAGNOSIS — E78.5 HYPERLIPIDEMIA LDL GOAL <100: ICD-10-CM

## 2019-11-07 RX ORDER — ATORVASTATIN CALCIUM 40 MG/1
TABLET, FILM COATED ORAL
Qty: 90 TABLET | Refills: 1 | OUTPATIENT
Start: 2019-11-07

## 2019-11-07 RX ORDER — FENOFIBRATE 160 MG/1
TABLET ORAL
Qty: 45 TABLET | Refills: 1 | OUTPATIENT
Start: 2019-11-07

## 2020-01-17 ENCOUNTER — LAB VISIT (OUTPATIENT)
Dept: LAB | Facility: HOSPITAL | Age: 75
End: 2020-01-17
Attending: UROLOGY
Payer: MEDICARE

## 2020-01-17 DIAGNOSIS — C61 CANCER OF PROSTATE WITH INTERMEDIATE RECURRENCE RISK (STAGE T2B-C OR GLEASON 7 OR PSA 10-20): ICD-10-CM

## 2020-01-17 DIAGNOSIS — N32.81 OAB (OVERACTIVE BLADDER): ICD-10-CM

## 2020-01-17 DIAGNOSIS — C61 PROSTATE CANCER: ICD-10-CM

## 2020-01-17 PROCEDURE — 36415 COLL VENOUS BLD VENIPUNCTURE: CPT | Mod: HCNC

## 2020-01-17 PROCEDURE — 84153 ASSAY OF PSA TOTAL: CPT | Mod: HCNC

## 2020-01-18 LAB — COMPLEXED PSA SERPL-MCNC: 0.12 NG/ML (ref 0–4)

## 2020-02-22 DIAGNOSIS — E78.5 HYPERLIPIDEMIA LDL GOAL <100: ICD-10-CM

## 2020-02-24 RX ORDER — ATORVASTATIN CALCIUM 40 MG/1
TABLET, FILM COATED ORAL
Qty: 90 TABLET | Refills: 1 | Status: SHIPPED | OUTPATIENT
Start: 2020-02-24 | End: 2020-08-19 | Stop reason: SDUPTHER

## 2020-02-24 RX ORDER — METOPROLOL TARTRATE 50 MG/1
TABLET ORAL
Qty: 180 TABLET | Refills: 1 | Status: SHIPPED | OUTPATIENT
Start: 2020-02-24 | End: 2020-08-19 | Stop reason: SDUPTHER

## 2020-02-24 RX ORDER — FENOFIBRATE 160 MG/1
TABLET ORAL
Qty: 45 TABLET | Refills: 1 | Status: SHIPPED | OUTPATIENT
Start: 2020-02-24 | End: 2020-08-19 | Stop reason: SDUPTHER

## 2020-02-25 RX ORDER — CLOPIDOGREL BISULFATE 75 MG/1
75 TABLET ORAL DAILY
Qty: 90 TABLET | Refills: 1 | Status: SHIPPED | OUTPATIENT
Start: 2020-02-25 | End: 2020-08-12

## 2020-02-25 NOTE — TELEPHONE ENCOUNTER
----- Message from Gladys Douglas sent at 2/25/2020 11:18 AM CST -----  Contact: Wife- Josefina   Requesting callback in reference to clopedegril (plavix) prescription being denied at pharmacy. Patient out of meds. [facility] stating no response from providers office; Fax 1 [phone].  Patient callback at .

## 2020-03-05 ENCOUNTER — PES CALL (OUTPATIENT)
Dept: ADMINISTRATIVE | Facility: CLINIC | Age: 75
End: 2020-03-05

## 2020-03-20 ENCOUNTER — PATIENT MESSAGE (OUTPATIENT)
Dept: ADMINISTRATIVE | Facility: OTHER | Age: 75
End: 2020-03-20

## 2020-04-22 DIAGNOSIS — C61 CANCER OF PROSTATE WITH INTERMEDIATE RECURRENCE RISK (STAGE T2B-C OR GLEASON 7 OR PSA 10-20): ICD-10-CM

## 2020-04-22 RX ORDER — TAMSULOSIN HYDROCHLORIDE 0.4 MG/1
0.4 CAPSULE ORAL DAILY
Qty: 90 CAPSULE | Refills: 3 | Status: SHIPPED | OUTPATIENT
Start: 2020-04-22 | End: 2020-11-25 | Stop reason: SDUPTHER

## 2020-04-22 RX ORDER — TAMSULOSIN HYDROCHLORIDE 0.4 MG/1
0.4 CAPSULE ORAL DAILY
Qty: 30 CAPSULE | Refills: 11 | Status: SHIPPED | OUTPATIENT
Start: 2020-04-22 | End: 2020-04-22 | Stop reason: SDUPTHER

## 2020-08-19 DIAGNOSIS — E78.5 HYPERLIPIDEMIA LDL GOAL <100: ICD-10-CM

## 2020-08-20 RX ORDER — ATORVASTATIN CALCIUM 40 MG/1
40 TABLET, FILM COATED ORAL DAILY
Qty: 90 TABLET | Refills: 0 | Status: SHIPPED | OUTPATIENT
Start: 2020-08-20 | End: 2020-10-19 | Stop reason: SDUPTHER

## 2020-08-20 RX ORDER — METOPROLOL TARTRATE 50 MG/1
50 TABLET ORAL 2 TIMES DAILY
Qty: 180 TABLET | Refills: 0 | Status: SHIPPED | OUTPATIENT
Start: 2020-08-20 | End: 2020-10-19 | Stop reason: SDUPTHER

## 2020-08-20 RX ORDER — FENOFIBRATE 160 MG/1
160 TABLET ORAL EVERY OTHER DAY
Qty: 45 TABLET | Refills: 0 | Status: SHIPPED | OUTPATIENT
Start: 2020-08-20 | End: 2020-10-19 | Stop reason: SDUPTHER

## 2020-09-24 ENCOUNTER — TELEPHONE (OUTPATIENT)
Dept: INTERNAL MEDICINE | Facility: CLINIC | Age: 75
End: 2020-09-24

## 2020-09-24 ENCOUNTER — PES CALL (OUTPATIENT)
Dept: ADMINISTRATIVE | Facility: CLINIC | Age: 75
End: 2020-09-24

## 2020-09-24 DIAGNOSIS — N18.30 CKD (CHRONIC KIDNEY DISEASE) STAGE 3, GFR 30-59 ML/MIN: Primary | ICD-10-CM

## 2020-09-24 DIAGNOSIS — I10 ESSENTIAL HYPERTENSION: ICD-10-CM

## 2020-09-24 DIAGNOSIS — E78.5 HYPERLIPIDEMIA LDL GOAL <70: ICD-10-CM

## 2020-09-25 NOTE — TELEPHONE ENCOUNTER
LM for pt that his labs have been scheduled for the week prior to his upcoming appt.       Pt should view them on the pt portal.

## 2020-10-13 ENCOUNTER — LAB VISIT (OUTPATIENT)
Dept: LAB | Facility: HOSPITAL | Age: 75
End: 2020-10-13
Attending: INTERNAL MEDICINE
Payer: MEDICARE

## 2020-10-13 DIAGNOSIS — C61 CANCER OF PROSTATE WITH INTERMEDIATE RECURRENCE RISK (STAGE T2B-C OR GLEASON 7 OR PSA 10-20): ICD-10-CM

## 2020-10-13 DIAGNOSIS — C61 PROSTATE CANCER: ICD-10-CM

## 2020-10-13 DIAGNOSIS — N18.30 CKD (CHRONIC KIDNEY DISEASE) STAGE 3, GFR 30-59 ML/MIN: ICD-10-CM

## 2020-10-13 DIAGNOSIS — N32.81 OAB (OVERACTIVE BLADDER): ICD-10-CM

## 2020-10-13 DIAGNOSIS — I10 ESSENTIAL HYPERTENSION: ICD-10-CM

## 2020-10-13 DIAGNOSIS — E78.5 HYPERLIPIDEMIA LDL GOAL <70: ICD-10-CM

## 2020-10-13 LAB
ALBUMIN SERPL BCP-MCNC: 3.8 G/DL (ref 3.5–5.2)
ALP SERPL-CCNC: 24 U/L (ref 55–135)
ALT SERPL W/O P-5'-P-CCNC: 16 U/L (ref 10–44)
ANION GAP SERPL CALC-SCNC: 8 MMOL/L (ref 8–16)
AST SERPL-CCNC: 17 U/L (ref 10–40)
BASOPHILS # BLD AUTO: 0.05 K/UL (ref 0–0.2)
BASOPHILS NFR BLD: 1.1 % (ref 0–1.9)
BILIRUB SERPL-MCNC: 0.7 MG/DL (ref 0.1–1)
BUN SERPL-MCNC: 13 MG/DL (ref 8–23)
CALCIUM SERPL-MCNC: 9.3 MG/DL (ref 8.7–10.5)
CHLORIDE SERPL-SCNC: 106 MMOL/L (ref 95–110)
CHOLEST SERPL-MCNC: 130 MG/DL (ref 120–199)
CHOLEST/HDLC SERPL: 2.2 {RATIO} (ref 2–5)
CO2 SERPL-SCNC: 27 MMOL/L (ref 23–29)
CREAT SERPL-MCNC: 1.3 MG/DL (ref 0.5–1.4)
DIFFERENTIAL METHOD: ABNORMAL
EOSINOPHIL # BLD AUTO: 0.1 K/UL (ref 0–0.5)
EOSINOPHIL NFR BLD: 2.8 % (ref 0–8)
ERYTHROCYTE [DISTWIDTH] IN BLOOD BY AUTOMATED COUNT: 14.3 % (ref 11.5–14.5)
EST. GFR  (AFRICAN AMERICAN): >60 ML/MIN/1.73 M^2
EST. GFR  (NON AFRICAN AMERICAN): 54.1 ML/MIN/1.73 M^2
GLUCOSE SERPL-MCNC: 87 MG/DL (ref 70–110)
HCT VFR BLD AUTO: 48.3 % (ref 40–54)
HDLC SERPL-MCNC: 58 MG/DL (ref 40–75)
HDLC SERPL: 44.6 % (ref 20–50)
HGB BLD-MCNC: 14.9 G/DL (ref 14–18)
IMM GRANULOCYTES # BLD AUTO: 0.01 K/UL (ref 0–0.04)
IMM GRANULOCYTES NFR BLD AUTO: 0.2 % (ref 0–0.5)
LDLC SERPL CALC-MCNC: 60.8 MG/DL (ref 63–159)
LYMPHOCYTES # BLD AUTO: 1.4 K/UL (ref 1–4.8)
LYMPHOCYTES NFR BLD: 29.2 % (ref 18–48)
MCH RBC QN AUTO: 29.5 PG (ref 27–31)
MCHC RBC AUTO-ENTMCNC: 30.8 G/DL (ref 32–36)
MCV RBC AUTO: 96 FL (ref 82–98)
MONOCYTES # BLD AUTO: 0.3 K/UL (ref 0.3–1)
MONOCYTES NFR BLD: 6.9 % (ref 4–15)
NEUTROPHILS # BLD AUTO: 2.8 K/UL (ref 1.8–7.7)
NEUTROPHILS NFR BLD: 59.8 % (ref 38–73)
NONHDLC SERPL-MCNC: 72 MG/DL
NRBC BLD-RTO: 0 /100 WBC
PLATELET # BLD AUTO: 226 K/UL (ref 150–350)
PMV BLD AUTO: 11.6 FL (ref 9.2–12.9)
POTASSIUM SERPL-SCNC: 4.6 MMOL/L (ref 3.5–5.1)
PROT SERPL-MCNC: 6.8 G/DL (ref 6–8.4)
RBC # BLD AUTO: 5.05 M/UL (ref 4.6–6.2)
SODIUM SERPL-SCNC: 141 MMOL/L (ref 136–145)
TRIGL SERPL-MCNC: 56 MG/DL (ref 30–150)
WBC # BLD AUTO: 4.66 K/UL (ref 3.9–12.7)

## 2020-10-13 PROCEDURE — 80061 LIPID PANEL: CPT | Mod: HCNC

## 2020-10-13 PROCEDURE — 85025 COMPLETE CBC W/AUTO DIFF WBC: CPT | Mod: HCNC

## 2020-10-13 PROCEDURE — 36415 COLL VENOUS BLD VENIPUNCTURE: CPT | Mod: HCNC

## 2020-10-13 PROCEDURE — 80053 COMPREHEN METABOLIC PANEL: CPT | Mod: HCNC

## 2020-10-13 PROCEDURE — 84153 ASSAY OF PSA TOTAL: CPT | Mod: HCNC

## 2020-10-14 LAB — COMPLEXED PSA SERPL-MCNC: 0.05 NG/ML (ref 0–4)

## 2020-10-19 ENCOUNTER — OFFICE VISIT (OUTPATIENT)
Dept: INTERNAL MEDICINE | Facility: CLINIC | Age: 75
End: 2020-10-19
Payer: MEDICARE

## 2020-10-19 VITALS
HEIGHT: 69 IN | WEIGHT: 222 LBS | TEMPERATURE: 99 F | DIASTOLIC BLOOD PRESSURE: 72 MMHG | SYSTOLIC BLOOD PRESSURE: 128 MMHG | OXYGEN SATURATION: 95 % | BODY MASS INDEX: 32.88 KG/M2 | HEART RATE: 71 BPM

## 2020-10-19 DIAGNOSIS — N18.30 STAGE 3 CHRONIC KIDNEY DISEASE, UNSPECIFIED WHETHER STAGE 3A OR 3B CKD: Chronic | ICD-10-CM

## 2020-10-19 DIAGNOSIS — C61 PROSTATE CANCER: ICD-10-CM

## 2020-10-19 DIAGNOSIS — Z23 IMMUNIZATION DUE: ICD-10-CM

## 2020-10-19 DIAGNOSIS — E66.9 OBESITY (BMI 30.0-34.9): ICD-10-CM

## 2020-10-19 DIAGNOSIS — E78.5 HYPERLIPIDEMIA LDL GOAL <70: Chronic | ICD-10-CM

## 2020-10-19 DIAGNOSIS — I10 ESSENTIAL HYPERTENSION: Primary | Chronic | ICD-10-CM

## 2020-10-19 DIAGNOSIS — I25.10 CAD, MULTIPLE VESSEL: ICD-10-CM

## 2020-10-19 PROCEDURE — 99999 PR PBB SHADOW E&M-EST. PATIENT-LVL IV: ICD-10-PCS | Mod: PBBFAC,HCNC,, | Performed by: INTERNAL MEDICINE

## 2020-10-19 PROCEDURE — 3008F PR BODY MASS INDEX (BMI) DOCUMENTED: ICD-10-PCS | Mod: HCNC,CPTII,S$GLB, | Performed by: INTERNAL MEDICINE

## 2020-10-19 PROCEDURE — 99499 UNLISTED E&M SERVICE: CPT | Mod: S$GLB,,, | Performed by: INTERNAL MEDICINE

## 2020-10-19 PROCEDURE — 99499 RISK ADDL DX/OHS AUDIT: ICD-10-PCS | Mod: S$GLB,,, | Performed by: INTERNAL MEDICINE

## 2020-10-19 PROCEDURE — 1101F PR PT FALLS ASSESS DOC 0-1 FALLS W/OUT INJ PAST YR: ICD-10-PCS | Mod: HCNC,CPTII,S$GLB, | Performed by: INTERNAL MEDICINE

## 2020-10-19 PROCEDURE — G0008 ADMIN INFLUENZA VIRUS VAC: HCPCS | Mod: HCNC,S$GLB,, | Performed by: INTERNAL MEDICINE

## 2020-10-19 PROCEDURE — 90694 VACC AIIV4 NO PRSRV 0.5ML IM: CPT | Mod: HCNC,S$GLB,, | Performed by: INTERNAL MEDICINE

## 2020-10-19 PROCEDURE — 99999 PR PBB SHADOW E&M-EST. PATIENT-LVL IV: CPT | Mod: PBBFAC,HCNC,, | Performed by: INTERNAL MEDICINE

## 2020-10-19 PROCEDURE — 99214 PR OFFICE/OUTPT VISIT, EST, LEVL IV, 30-39 MIN: ICD-10-PCS | Mod: HCNC,25,S$GLB, | Performed by: INTERNAL MEDICINE

## 2020-10-19 PROCEDURE — 3008F BODY MASS INDEX DOCD: CPT | Mod: HCNC,CPTII,S$GLB, | Performed by: INTERNAL MEDICINE

## 2020-10-19 PROCEDURE — 3078F PR MOST RECENT DIASTOLIC BLOOD PRESSURE < 80 MM HG: ICD-10-PCS | Mod: HCNC,CPTII,S$GLB, | Performed by: INTERNAL MEDICINE

## 2020-10-19 PROCEDURE — 3074F PR MOST RECENT SYSTOLIC BLOOD PRESSURE < 130 MM HG: ICD-10-PCS | Mod: HCNC,CPTII,S$GLB, | Performed by: INTERNAL MEDICINE

## 2020-10-19 PROCEDURE — 1159F MED LIST DOCD IN RCRD: CPT | Mod: HCNC,S$GLB,, | Performed by: INTERNAL MEDICINE

## 2020-10-19 PROCEDURE — 1101F PT FALLS ASSESS-DOCD LE1/YR: CPT | Mod: HCNC,CPTII,S$GLB, | Performed by: INTERNAL MEDICINE

## 2020-10-19 PROCEDURE — 90694 FLU VACCINE - QUADRIVALENT - ADJUVANTED: ICD-10-PCS | Mod: HCNC,S$GLB,, | Performed by: INTERNAL MEDICINE

## 2020-10-19 PROCEDURE — 1126F AMNT PAIN NOTED NONE PRSNT: CPT | Mod: HCNC,S$GLB,, | Performed by: INTERNAL MEDICINE

## 2020-10-19 PROCEDURE — G0008 FLU VACCINE - QUADRIVALENT - ADJUVANTED: ICD-10-PCS | Mod: HCNC,S$GLB,, | Performed by: INTERNAL MEDICINE

## 2020-10-19 PROCEDURE — 99214 OFFICE O/P EST MOD 30 MIN: CPT | Mod: HCNC,25,S$GLB, | Performed by: INTERNAL MEDICINE

## 2020-10-19 PROCEDURE — 1126F PR PAIN SEVERITY QUANTIFIED, NO PAIN PRESENT: ICD-10-PCS | Mod: HCNC,S$GLB,, | Performed by: INTERNAL MEDICINE

## 2020-10-19 PROCEDURE — 3078F DIAST BP <80 MM HG: CPT | Mod: HCNC,CPTII,S$GLB, | Performed by: INTERNAL MEDICINE

## 2020-10-19 PROCEDURE — 1159F PR MEDICATION LIST DOCUMENTED IN MEDICAL RECORD: ICD-10-PCS | Mod: HCNC,S$GLB,, | Performed by: INTERNAL MEDICINE

## 2020-10-19 PROCEDURE — 3074F SYST BP LT 130 MM HG: CPT | Mod: HCNC,CPTII,S$GLB, | Performed by: INTERNAL MEDICINE

## 2020-10-19 RX ORDER — ATORVASTATIN CALCIUM 40 MG/1
40 TABLET, FILM COATED ORAL DAILY
Qty: 90 TABLET | Refills: 3 | Status: SHIPPED | OUTPATIENT
Start: 2020-10-19 | End: 2020-10-26

## 2020-10-19 RX ORDER — FENOFIBRATE 160 MG/1
160 TABLET ORAL EVERY OTHER DAY
Qty: 45 TABLET | Refills: 3 | Status: SHIPPED | OUTPATIENT
Start: 2020-10-19 | End: 2020-10-26

## 2020-10-19 RX ORDER — METOPROLOL TARTRATE 50 MG/1
50 TABLET ORAL 2 TIMES DAILY
Qty: 180 TABLET | Refills: 3 | Status: SHIPPED | OUTPATIENT
Start: 2020-10-19 | End: 2020-10-26

## 2020-10-19 RX ORDER — CLOPIDOGREL BISULFATE 75 MG/1
75 TABLET ORAL DAILY
Qty: 90 TABLET | Refills: 3 | Status: SHIPPED | OUTPATIENT
Start: 2020-10-19 | End: 2020-10-27

## 2020-10-19 NOTE — PROGRESS NOTES
Subjective:       Patient ID: Filipe Agustin Jr. is a 74 y.o. male.    Chief Complaint: Annual Exam and Immunizations (Flu shot )    Filipe Agustin Jr.  74 y.o. White male     Patient presents with:  Annual Exam  Immunizations: Flu shot     HPI: Here today for an annual physical.   HTN--stable on metoprolol.   HLD--compliant with atorvastatin and fenofibrate.                    LDLCALC                  60.8 (L)            10/13/2020                 CHOL                     130                 10/13/2020                 TRIG                     56                  10/13/2020                 HDL                      58                  10/13/2020                 ALT                      16                  10/13/2020                 AST                      17                  10/13/2020                 NA                       141                 10/13/2020                 K                        4.6                 10/13/2020                 CL                       106                 10/13/2020                 CREATININE               1.3                 10/13/2020                 BUN                      13                  10/13/2020                 CO2                      27                  10/13/2020            CAD--asymptomatic. Compliant with aspirin, clopidogrel, metoprolol and atorvastatin.   CKD III--asymptomatic. He denies taking NSAID's.   Prostate cancer--management per urology.     LDCT Lung Screen due on 12/20/2001  High Dose Statin due on 10/19/2021  Aspirin/Antiplatelet Therapy due on 10/19/2021  Lipid Panel due on 10/13/2025  TETANUS VACCINE due on 06/28/2026  Pneumococcal Vaccine (65+ High/Highest Risk) Completed  Abdominal Aortic Aneurysm Screening Completed  Hepatitis C Screening Completed    Past Medical History:  Acute coronary syndrome  1/8/2018: Cancer of prostate with intermediate recurrence risk (stage   T2b-c or [place] 7 or PSA 10-20)  9/24/2015: CKD (chronic kidney disease) stage 3,  GFR 30-59 ml/min  Coronary artery disease  6/27/2017: Elevated PSA  9/19/2014: History of coronary angioplasty  Hyperlipidemia  Hypertension  2008: Myocardial infarction  6/6/2014: Obesity, Class II, BMI 35.0-39.9, with comorbidity (see   actual BMI)  Polio      Comment:  as a child  Tobacco dependence      Comment:  resolved    Past Surgical History:  2008: CARDIAC CATHETERIZATION  2008: CORONARY ANGIOPLASTY      Comment:  acute PCI- RCA- RUI  12/18/2018: CORONARY ARTERY BYPASS GRAFT (CABG); N/A      Comment:  Procedure: CORONARY ARTERY BYPASS GRAFT (CABG);                 Surgeon: Rg Johnson MD;  Location: [facility] OR;                 Service: Cardiothoracic;  Laterality: N/A;  2008: CORONARY STENT PLACEMENT  12/18/2018: ENDOSCOPIC HARVEST OF VEIN; Bilateral      Comment:  Procedure: SURGICAL PROCUREMENT, VEIN, ENDOSCOPIC;                 Surgeon: Rg Johnson MD;  Location: [facility] OR;                 Service: Cardiothoracic;  Laterality: Bilateral;  12/15/2018: LEFT HEART CATHETERIZATION; Left      Comment:  Procedure: CATHETERIZATION, HEART, LEFT;  Surgeon:                Jose Armando Monroe MD;  Location: [facility] CATH LAB;                 Service: Cardiology;  Laterality: Left;    Review of patient's family history indicates:  Problem: Heart disease      Relation: Father          Age of Onset: (Not Specified)          Comment: MI  Problem: Cancer      Relation: Brother          Age of Onset: (Not Specified)  Problem: Heart disease      Relation: Brother          Age of Onset: (Not Specified)  Problem: Diabetes      Relation: Neg Hx          Age of Onset: (Not Specified)  Problem: Kidney disease      Relation: Neg Hx          Age of Onset: (Not Specified)  Problem: Stroke      Relation: Neg Hx          Age of Onset: (Not Specified)      Current Outpatient Medications on File Prior to Visit:  aspirin (ECOTRIN) 81 MG EC tablet, Take 81 mg by mouth once daily., Disp: , Rfl:   tamsulosin (FLOMAX) 0.4 mg Cap, Take 1  capsule (0.4 mg total) by mouth once daily. (Patient taking differently: Take 0.4 mg by mouth once daily. For bladder), Disp: 90 capsule, Rfl: 3  atorvastatin (LIPITOR) 40 MG tablet, Take 1 tablet (40 mg total) by mouth once daily., Disp: 90 tablet, Rfl: 0  clopidogreL (PLAVIX) 75 mg tablet, TAKE 1 TABLET (75 MG TOTAL) BY MOUTH ONCE DAILY., Disp: 90 tablet, Rfl: 0  fenofibrate 160 MG Tab, Take 1 tablet (160 mg total) by mouth every other day., Disp: 45 tablet, Rfl: 0  metoprolol tartrate (LOPRESSOR) 50 MG tablet, Take 1 tablet (50 mg total) by mouth 2 (two) times daily., Disp: 180 tablet, Rfl: 0  cyanocobalamin (VITAMIN B-12) 100 MCG tablet, Take 100 mcg by mouth once daily., Disp: , Rfl:   fish oil-omega-3 fatty acids 300-1,000 mg capsule, Take 2 g by mouth once daily., Disp: , Rfl:   influenza (FLUZONE HIGH-DOSE) 180 mcg/0.5 mL vaccine, ADMINISTERED BY [facility], Disp: 0.5 mL, Rfl: 0  vitamin D 1000 units Tab, Take 2,000 Units by mouth once daily. , Disp: , Rfl:     Allergies:  Review of patient's allergies indicates:   -- Paragoric -- Other (See Comments)    --  sneeze              Review of Systems   Constitutional: Negative for fever and unexpected weight change.   Respiratory: Negative for shortness of breath.    Cardiovascular: Negative for chest pain.   Gastrointestinal: Negative for abdominal pain.   Musculoskeletal: Negative for gait problem.   Neurological: Negative for dizziness and headaches.         Objective:      Physical Exam  Vitals signs reviewed.   Constitutional:       General: He is not in acute distress.     Appearance: He is well-developed. He is obese. He is not ill-appearing.   Eyes:      General: No scleral icterus.  Cardiovascular:      Rate and Rhythm: Normal rate.   Pulmonary:      Effort: Pulmonary effort is normal. No respiratory distress.   Neurological:      Mental Status: He is alert and oriented to person, place, and time.   Psychiatric:         Behavior: Behavior normal.          Thought Content: Thought content normal.         Judgment: Judgment normal.         Assessment:       1. Essential hypertension    2. Hyperlipidemia LDL goal <70    3. CAD, multiple vessel    4. Stage 3 chronic kidney disease, unspecified whether stage 3a or 3b CKD    5. Prostate cancer    6. Obesity (BMI 30.0-34.9)    7. Immunization due        Plan:       Filipe was seen today for annual exam and immunizations.    Diagnoses and all orders for this visit:    Refill medications.     Essential hypertension  -     metoprolol tartrate (LOPRESSOR) 50 MG tablet; Take 1 tablet (50 mg total) by mouth 2 (two) times daily.    Hyperlipidemia LDL goal <70  -     atorvastatin (LIPITOR) 40 MG tablet; Take 1 tablet (40 mg total) by mouth once daily.  -     fenofibrate 160 MG Tab; Take 1 tablet (160 mg total) by mouth every other day.    CAD, multiple vessel  -     atorvastatin (LIPITOR) 40 MG tablet; Take 1 tablet (40 mg total) by mouth once daily.  -     clopidogreL (PLAVIX) 75 mg tablet; Take 1 tablet (75 mg total) by mouth once daily.  -     metoprolol tartrate (LOPRESSOR) 50 MG tablet; Take 1 tablet (50 mg total) by mouth 2 (two) times daily.    Stage 3 chronic kidney disease, unspecified whether stage 3a or 3b CKD  -     Advised to avoid NSAID's    Prostate cancer  -     F/U with urology    Obesity (BMI 30.0-34.9)  -     Lifestyle modifications discussed    Immunization due  -     Influenza - Quadrivalent (Adjuvanted)    RTC annually and as needed.

## 2020-11-10 ENCOUNTER — PATIENT MESSAGE (OUTPATIENT)
Dept: INTERNAL MEDICINE | Facility: CLINIC | Age: 75
End: 2020-11-10

## 2020-11-10 ENCOUNTER — PATIENT MESSAGE (OUTPATIENT)
Dept: UROLOGY | Facility: CLINIC | Age: 75
End: 2020-11-10

## 2020-11-10 ENCOUNTER — OFFICE VISIT (OUTPATIENT)
Dept: INTERNAL MEDICINE | Facility: CLINIC | Age: 75
End: 2020-11-10
Payer: MEDICARE

## 2020-11-10 VITALS
SYSTOLIC BLOOD PRESSURE: 122 MMHG | HEIGHT: 69 IN | HEART RATE: 68 BPM | OXYGEN SATURATION: 95 % | WEIGHT: 220.69 LBS | DIASTOLIC BLOOD PRESSURE: 76 MMHG | BODY MASS INDEX: 32.69 KG/M2

## 2020-11-10 DIAGNOSIS — Z12.2 ENCOUNTER FOR SCREENING FOR MALIGNANT NEOPLASM OF RESPIRATORY ORGANS: ICD-10-CM

## 2020-11-10 DIAGNOSIS — E78.5 HYPERLIPIDEMIA LDL GOAL <70: Chronic | ICD-10-CM

## 2020-11-10 DIAGNOSIS — I25.10 CORONARY ARTERY DISEASE, ANGINA PRESENCE UNSPECIFIED, UNSPECIFIED VESSEL OR LESION TYPE, UNSPECIFIED WHETHER NATIVE OR TRANSPLANTED HEART: ICD-10-CM

## 2020-11-10 DIAGNOSIS — Z87.891 HISTORY OF NICOTINE DEPENDENCE: Primary | ICD-10-CM

## 2020-11-10 DIAGNOSIS — R63.4 WEIGHT LOSS: ICD-10-CM

## 2020-11-10 DIAGNOSIS — I10 ESSENTIAL HYPERTENSION: Chronic | ICD-10-CM

## 2020-11-10 DIAGNOSIS — Z12.11 COLON CANCER SCREENING: ICD-10-CM

## 2020-11-10 DIAGNOSIS — Z85.46 HISTORY OF PROSTATE CANCER: ICD-10-CM

## 2020-11-10 DIAGNOSIS — I70.0 AORTIC ATHEROSCLEROSIS: ICD-10-CM

## 2020-11-10 DIAGNOSIS — R15.9 INCONTINENCE OF FECES, UNSPECIFIED FECAL INCONTINENCE TYPE: ICD-10-CM

## 2020-11-10 DIAGNOSIS — E66.9 OBESITY (BMI 30.0-34.9): ICD-10-CM

## 2020-11-10 DIAGNOSIS — Z91.89 POTENTIAL FOR COGNITIVE IMPAIRMENT: ICD-10-CM

## 2020-11-10 DIAGNOSIS — Z00.00 ENCOUNTER FOR PREVENTIVE HEALTH EXAMINATION: Primary | ICD-10-CM

## 2020-11-10 DIAGNOSIS — N18.31 STAGE 3A CHRONIC KIDNEY DISEASE: Chronic | ICD-10-CM

## 2020-11-10 PROCEDURE — 3074F PR MOST RECENT SYSTOLIC BLOOD PRESSURE < 130 MM HG: ICD-10-PCS | Mod: HCNC,CPTII,S$GLB, | Performed by: NURSE PRACTITIONER

## 2020-11-10 PROCEDURE — 3078F DIAST BP <80 MM HG: CPT | Mod: HCNC,CPTII,S$GLB, | Performed by: NURSE PRACTITIONER

## 2020-11-10 PROCEDURE — 99999 PR PBB SHADOW E&M-EST. PATIENT-LVL IV: CPT | Mod: PBBFAC,HCNC,, | Performed by: NURSE PRACTITIONER

## 2020-11-10 PROCEDURE — 99499 UNLISTED E&M SERVICE: CPT | Mod: S$GLB,,, | Performed by: NURSE PRACTITIONER

## 2020-11-10 PROCEDURE — 3078F PR MOST RECENT DIASTOLIC BLOOD PRESSURE < 80 MM HG: ICD-10-PCS | Mod: HCNC,CPTII,S$GLB, | Performed by: NURSE PRACTITIONER

## 2020-11-10 PROCEDURE — G0439 PPPS, SUBSEQ VISIT: HCPCS | Mod: HCNC,S$GLB,, | Performed by: NURSE PRACTITIONER

## 2020-11-10 PROCEDURE — 99999 PR PBB SHADOW E&M-EST. PATIENT-LVL IV: ICD-10-PCS | Mod: PBBFAC,HCNC,, | Performed by: NURSE PRACTITIONER

## 2020-11-10 PROCEDURE — G0439 PR MEDICARE ANNUAL WELLNESS SUBSEQUENT VISIT: ICD-10-PCS | Mod: HCNC,S$GLB,, | Performed by: NURSE PRACTITIONER

## 2020-11-10 PROCEDURE — 3074F SYST BP LT 130 MM HG: CPT | Mod: HCNC,CPTII,S$GLB, | Performed by: NURSE PRACTITIONER

## 2020-11-10 PROCEDURE — 99499 RISK ADDL DX/OHS AUDIT: ICD-10-PCS | Mod: S$GLB,,, | Performed by: NURSE PRACTITIONER

## 2020-11-10 NOTE — PROGRESS NOTES
"  Filipe Agustin presented for a  Medicare AWV and comprehensive Health Risk Assessment today. The following components were reviewed and updated:    · Medical history  · Family History  · Social history  · Allergies and Current Medications  · Health Risk Assessment  · Health Maintenance  · Care Team     ** See Completed Assessments for Annual Wellness Visit within the encounter summary.**         The following assessments were completed:  · Living Situation  · CAGE  · Depression Screening  · Timed Get Up and Go  · Whisper Test  · Cognitive Function Screening  · Nutrition Screening  · ADL Screening  · PAQ Screening        Vitals:    11/10/20 1439   BP: 122/76   BP Location: Right arm   Patient Position: Sitting   Pulse: 68   SpO2: 95%   Weight: 100.1 kg (220 lb 10.9 oz)   Height: 5' 9" (1.753 m)     Body mass index is 32.59 kg/m².  Physical Exam  Vitals signs and nursing note reviewed.   Constitutional:       Appearance: He is well-developed.   HENT:      Head: Normocephalic.   Cardiovascular:      Rate and Rhythm: Normal rate and regular rhythm.      Heart sounds: Normal heart sounds. No murmur.   Pulmonary:      Effort: Pulmonary effort is normal. No respiratory distress.      Breath sounds: Normal breath sounds.   Abdominal:      Palpations: Abdomen is soft. There is no mass.      Tenderness: There is no abdominal tenderness.   Musculoskeletal: Normal range of motion.   Skin:     General: Skin is warm and dry.   Neurological:      Mental Status: He is alert and oriented to person, place, and time.      Motor: No abnormal muscle tone.   Psychiatric:         Speech: Speech normal.         Behavior: Behavior normal.               Diagnoses and health risks identified today and associated recommendations/orders:    1. Encounter for preventive health examination  He declines Shingrix.   He will discuss lung cancer screening with PCP.   He will contact urologist via [employer] to schedule appointment.   MA to " schedule  Cardiology      2. Coronary artery disease, angina presence unspecified, unspecified vessel or lesion type, unspecified whether native or transplanted heart  Continue current treatment plan as previously prescribed with your cardiologist.     3. Essential hypertension  Stable and controlled. Continue current treatment plan as previously prescribed with your PCP and cardiologist.     4. Hyperlipidemia LDL goal <70  Stable and controlled. Continue current treatment plan as previously prescribed with your PCP and cardiologist.     5. Aortic atherosclerosis  cxr 12/2018  Discussed diagnosis and risk reduction.   Advised to follow up with PCP/cardiologist for further recommendations. Patient expressed understanding.       6. Stage 3a chronic kidney disease  Stable. Continue current treatment plan as previously prescribed with your PCP.     7. Obesity (BMI 30.0-34.9)  Encouraged healthy diet and exercise as tolerated to help bring BMI into normal range.   Continue current treatment plan as previously prescribed with your PCP.    8. Weight loss  Reports about a 4 pound weight loss in the last 3 months, 2 of those occurring this week. He then reports he has been snacking less this week, intentionally.   Discussed unplanned weight loss is a red flag symptom. He expressed understanding.    Advised to monitor weight (he is already logging) and follow up with PCP for any continued unplanned weight loss. He expressed understanding.      9. Potential for cognitive impairment  Abnormal cognitive function screening.   Denies memory difficulties.   Advised to follow up with PCP for further evaluation and recommendations. He expressed understanding.      10. Incontinence of feces, unspecified fecal incontinence type  Advised to follow up with PCP for further evaluation and treatment. He expressed understanding.      11. History of prostate cancer  Continue current treatment plan as previously prescribed with your urologist.      12. Colon cancer screening  Declines colonoscopy. Denies history of colon polyps or family history of colon cancer.   [employer] was given to patient on 11/10/2020 3:32 PM per CTA  - Fecal Immunochemical Test (iFOBT); Future      Provided Filipe with a 5-10 year written screening schedule and personal prevention plan. Recommendations were developed using the USPSTF age appropriate recommendations. Education, counseling, and referrals were provided as needed. After Visit Summary printed and given to patient which includes a list of additional screenings\tests needed.    Follow up in about 1 year (around 11/10/2021) for awv.    Mouna Chavarria NP    I offered to discuss end of life issues, including information on how to make advance directives that the patient could use to name someone who would make medical decisions on their behalf if they became too ill to make themselves.    ___Patient declined  _X_Patient is interested, I provided paper work and offered to discuss.

## 2020-11-10 NOTE — PATIENT INSTRUCTIONS
Counseling and Referral of Other Preventative  (Italic type indicates deductible and co-insurance are waived)    Patient Name: Filipe Agustin  Today's Date: 11/10/2020    Health Maintenance       Date Due Completion Date    LDCT Lung Screen 12/20/2001 ---    Colorectal Cancer Screening 09/16/2020 9/16/2019    Override on 5/7/2007: Done (Done in 2007, exact date unknown--normal per patient)    Shingles Vaccine (1 of 2) 03/10/2021 (Originally 12/20/1996) ---    High Dose Statin 11/10/2021 11/10/2020    Aspirin/Antiplatelet Therapy 11/10/2021 11/10/2020    Lipid Panel 10/13/2025 10/13/2020    TETANUS VACCINE 06/28/2026 6/28/2016 (ClinicallyNA)    Override on 6/28/2016: Not Clinically Appropriate        Orders Placed This Encounter   Procedures    Fecal Immunochemical Test (iFOBT)     The following information is provided to all patients.  This information is to help you find resources for any of the problems found today that may be affecting your health:                Living healthy guide: www.[facility].[place].gov      Understanding Diabetes: www.diabetes.org      Eating healthy: www.cdc.gov/healthyweight      CDC home safety checklist: www.cdc.gov/steadi/patient.html      Agency on Aging: www.goea.[place].gov      Alcoholics anonymous (AA): www.aa.org      Physical Activity: www.[place].nih.gov/yn4rhbg      Tobacco use: www.quitwithusla.org

## 2020-11-11 DIAGNOSIS — I25.10 CAD, MULTIPLE VESSEL: Primary | ICD-10-CM

## 2020-11-19 ENCOUNTER — LAB VISIT (OUTPATIENT)
Dept: LAB | Facility: HOSPITAL | Age: 75
End: 2020-11-19
Attending: INTERNAL MEDICINE
Payer: MEDICARE

## 2020-11-19 DIAGNOSIS — Z12.11 COLON CANCER SCREENING: ICD-10-CM

## 2020-11-19 PROCEDURE — 82274 ASSAY TEST FOR BLOOD FECAL: CPT | Mod: HCNC

## 2020-11-23 ENCOUNTER — OFFICE VISIT (OUTPATIENT)
Dept: CARDIOLOGY | Facility: CLINIC | Age: 75
End: 2020-11-23
Payer: MEDICARE

## 2020-11-23 ENCOUNTER — HOSPITAL ENCOUNTER (OUTPATIENT)
Dept: CARDIOLOGY | Facility: HOSPITAL | Age: 75
Discharge: HOME | End: 2020-11-23
Attending: NUCLEAR MEDICINE
Payer: MEDICARE

## 2020-11-23 VITALS
HEIGHT: 69 IN | SYSTOLIC BLOOD PRESSURE: 148 MMHG | OXYGEN SATURATION: 96 % | HEART RATE: 59 BPM | BODY MASS INDEX: 33.14 KG/M2 | WEIGHT: 223.75 LBS | DIASTOLIC BLOOD PRESSURE: 82 MMHG

## 2020-11-23 DIAGNOSIS — I25.10 CAD, MULTIPLE VESSEL: ICD-10-CM

## 2020-11-23 DIAGNOSIS — I25.10 CAD, MULTIPLE VESSEL: Primary | ICD-10-CM

## 2020-11-23 DIAGNOSIS — E78.5 HYPERLIPIDEMIA LDL GOAL <70: Chronic | ICD-10-CM

## 2020-11-23 DIAGNOSIS — I10 ESSENTIAL HYPERTENSION: Chronic | ICD-10-CM

## 2020-11-23 DIAGNOSIS — Z95.1 S/P CABG X 4: ICD-10-CM

## 2020-11-23 PROCEDURE — 99999 PR PBB SHADOW E&M-EST. PATIENT-LVL IV: ICD-10-PCS | Mod: PBBFAC,HCNC,, | Performed by: NUCLEAR MEDICINE

## 2020-11-23 PROCEDURE — 3008F PR BODY MASS INDEX (BMI) DOCUMENTED: ICD-10-PCS | Mod: HCNC,CPTII,S$GLB, | Performed by: NUCLEAR MEDICINE

## 2020-11-23 PROCEDURE — 3077F SYST BP >= 140 MM HG: CPT | Mod: HCNC,CPTII,S$GLB, | Performed by: NUCLEAR MEDICINE

## 2020-11-23 PROCEDURE — 99999 PR PBB SHADOW E&M-EST. PATIENT-LVL IV: CPT | Mod: PBBFAC,HCNC,, | Performed by: NUCLEAR MEDICINE

## 2020-11-23 PROCEDURE — 1159F MED LIST DOCD IN RCRD: CPT | Mod: HCNC,S$GLB,, | Performed by: NUCLEAR MEDICINE

## 2020-11-23 PROCEDURE — 3079F DIAST BP 80-89 MM HG: CPT | Mod: HCNC,CPTII,S$GLB, | Performed by: NUCLEAR MEDICINE

## 2020-11-23 PROCEDURE — 3008F BODY MASS INDEX DOCD: CPT | Mod: HCNC,CPTII,S$GLB, | Performed by: NUCLEAR MEDICINE

## 2020-11-23 PROCEDURE — 3079F PR MOST RECENT DIASTOLIC BLOOD PRESSURE 80-89 MM HG: ICD-10-PCS | Mod: HCNC,CPTII,S$GLB, | Performed by: NUCLEAR MEDICINE

## 2020-11-23 PROCEDURE — 93005 ELECTROCARDIOGRAM TRACING: CPT | Mod: HCNC

## 2020-11-23 PROCEDURE — 3077F PR MOST RECENT SYSTOLIC BLOOD PRESSURE >= 140 MM HG: ICD-10-PCS | Mod: HCNC,CPTII,S$GLB, | Performed by: NUCLEAR MEDICINE

## 2020-11-23 PROCEDURE — 93010 EKG 12-LEAD: ICD-10-PCS | Mod: HCNC,,, | Performed by: INTERNAL MEDICINE

## 2020-11-23 PROCEDURE — 93010 ELECTROCARDIOGRAM REPORT: CPT | Mod: HCNC,,, | Performed by: INTERNAL MEDICINE

## 2020-11-23 PROCEDURE — 99214 OFFICE O/P EST MOD 30 MIN: CPT | Mod: HCNC,S$GLB,, | Performed by: NUCLEAR MEDICINE

## 2020-11-23 PROCEDURE — 99214 PR OFFICE/OUTPT VISIT, EST, LEVL IV, 30-39 MIN: ICD-10-PCS | Mod: HCNC,S$GLB,, | Performed by: NUCLEAR MEDICINE

## 2020-11-23 PROCEDURE — 1159F PR MEDICATION LIST DOCUMENTED IN MEDICAL RECORD: ICD-10-PCS | Mod: HCNC,S$GLB,, | Performed by: NUCLEAR MEDICINE

## 2020-11-23 NOTE — PROGRESS NOTES
Subjective:   Patient ID:  Filipe Agustin Jr. is a 74 y.o. male who presents for follow-up of Carotid Artery Disease (CABG) and Hypertension (ESSENTIAL)      HPI DOING WELL. NO RECENT HOSPITALIZATIONS FOR ACS,  ADHF , OR ARRHYTHMIAS  NO RECURRENT ANGINA OR EQUIVALENT  NO UNUSUAL ALVARADO. NO ORTHOPNEA OR PND  NO PALPITATIONS  NO NEAR SYNCOPE OR SYNCOPE  NO EDEMA. NO CALVE TENDERNESS  NO INTERMITTENT CLAUDICATION  NO FOCAL CNS SYMPTOMS OR SIGNS TO SUGGEST TIA OR STROKE  ECG TODAY- SR, 60,  CHRONIC RBBB.  NO ACUTE ST T CHANGES  CARD MED GOOD COMPLIANCE, NO SIDE EFFECTS    Review of Systems   Constitution: Negative for chills, fever, night sweats, weight gain and weight loss.   HENT: Negative for nosebleeds.    Eyes: Negative for blurred vision, double vision and visual disturbance.   Cardiovascular: Negative for chest pain, dyspnea on exertion, irregular heartbeat, leg swelling, orthopnea, palpitations, paroxysmal nocturnal dyspnea and syncope.   Respiratory: Negative for cough, hemoptysis and wheezing.    Endocrine: Negative for polydipsia and polyuria.   Hematologic/Lymphatic: Does not bruise/bleed easily.   Skin: Negative for rash.   Musculoskeletal: Negative for joint pain, joint swelling, muscle weakness and myalgias.   Gastrointestinal: Negative for abdominal pain, hematemesis, jaundice and melena.   Genitourinary: Negative for dysuria, hematuria and nocturia.   Neurological: Negative for dizziness, focal weakness, headaches, sensory change and weakness.   Psychiatric/Behavioral: Negative for depression. The patient does not have insomnia and is not nervous/anxious.      Family History   Problem Relation Age of Onset    Heart disease Father         MI    Cancer Brother     Heart disease Brother     Diabetes Neg Hx     Kidney disease Neg Hx     Stroke Neg Hx      Past Medical History:   Diagnosis Date    Acute coronary syndrome     Cancer of prostate with intermediate recurrence risk (stage T2b-c or [place] 7  or PSA 10-20) 2018    CKD (chronic kidney disease) stage 3, GFR 30-59 ml/min 2015    Coronary artery disease     Elevated PSA 2017    History of coronary angioplasty 2014    Hyperlipidemia     Hypertension     Myocardial infarction     Obesity, Class II, BMI 35.0-39.9, with comorbidity (see actual BMI) 2014    Polio     as a child    Tobacco dependence     resolved     Social History     Socioeconomic History    Marital status:      Spouse name: Not on file    Number of children: 3    Years of education: Not on file    Highest education level: High school graduate   Occupational History    Occupation: Retired   Social Needs    Financial resource strain: Not hard at all    Food insecurity     Worry: Never true     Inability: Never true    Transportation needs     Medical: No     Non-medical: No   Tobacco Use    Smoking status: Former Smoker     Packs/day: 2.00     Years: 20.00     Pack years: 40.00     Types: Cigarettes     Quit date: 2008     Years since quittin.3    Smokeless tobacco: Never Used   Substance and Sexual Activity    Alcohol use: Yes     Frequency: Never     Comment: rarely    Drug use: No    Sexual activity: Not Currently     Partners: Female   Lifestyle    Physical activity     Days per week: 0 days     Minutes per session: 0 min    Stress: Not at all   Relationships    Social connections     Talks on phone: More than three times a week     Gets together: Three times a week     Attends Yazdanism service: More than 4 times per year     Active member of club or organization: Yes     Attends meetings of clubs or organizations: More than 4 times per year     Relationship status:    Other Topics Concern    Not on file   Social History Narrative    Not on file     Current Outpatient Medications on File Prior to Visit   Medication Sig Dispense Refill    aspirin (ECOTRIN) 81 MG EC tablet Take 81 mg by mouth once daily.       atorvastatin (LIPITOR) 40 MG tablet TAKE 1 TABLET EVERY DAY 90 tablet 3    clopidogreL (PLAVIX) 75 mg tablet TAKE 1 TABLET (75 MG TOTAL) BY MOUTH ONCE DAILY. 90 tablet 3    cyanocobalamin (VITAMIN B-12) 100 MCG tablet Take 100 mcg by mouth once daily.      fenofibrate 160 MG Tab TAKE 1 TABLET EVERY OTHER DAY 45 tablet 3    fish oil-omega-3 fatty acids 300-1,000 mg capsule Take 2 g by mouth once daily.      metoprolol tartrate (LOPRESSOR) 50 MG tablet TAKE 1 TABLET TWICE DAILY 180 tablet 3    tamsulosin (FLOMAX) 0.4 mg Cap Take 1 capsule (0.4 mg total) by mouth once daily. (Patient taking differently: Take 0.4 mg by mouth once daily. For bladder) 90 capsule 3    vitamin D 1000 units Tab Take 2,000 Units by mouth once daily.       influenza (FLUZONE HIGH-DOSE) 180 mcg/0.5 mL vaccine ADMINISTERED BY [facility] 0.5 mL 0     No current facility-administered medications on file prior to visit.      Review of patient's allergies indicates:   Allergen Reactions    Paragoric Other (See Comments)     sneeze       Objective:     Physical Exam   Constitutional: He is oriented to person, place, and time. He appears well-developed. No distress.   HENT:   Head: Normocephalic.   Eyes: Pupils are equal, round, and reactive to light. Conjunctivae are normal.   Neck: Neck supple. No JVD present. No thyromegaly present.   Cardiovascular: Normal rate, regular rhythm, normal heart sounds and intact distal pulses. Exam reveals no gallop and no friction rub.   No murmur heard.  Pulses:       Carotid pulses are 2+ on the right side and 2+ on the left side.       Radial pulses are 2+ on the right side and 2+ on the left side.        Femoral pulses are 2+ on the right side and 2+ on the left side.       Popliteal pulses are 2+ on the right side and 2+ on the left side.        Dorsalis pedis pulses are 2+ on the right side and 2+ on the left side.        Posterior tibial pulses are 2+ on the right side and 2+ on the left side.    Pulmonary/Chest: Breath sounds normal. He has no wheezes. He has no rales. He exhibits no tenderness.   Abdominal: Soft. Bowel sounds are normal. He exhibits no mass. There is no hepatosplenomegaly. There is no abdominal tenderness.   Musculoskeletal:         General: No tenderness or edema.      Cervical back: Normal.      Thoracic back: Normal.      Lumbar back: Normal.   Lymphadenopathy:     He has no cervical adenopathy.     He has no axillary adenopathy.        Right: No supraclavicular adenopathy present.        Left: No supraclavicular adenopathy present.   Neurological: He is alert and oriented to person, place, and time. He has normal strength. No sensory deficit. Gait normal.   Skin: Skin is warm. No cyanosis. No pallor. Nails show no clubbing.   Psychiatric: He has a normal mood and affect. His speech is normal and behavior is normal. Cognition and memory are normal.       Assessment:     1. CAD, multiple vessel    2. S/P CABG x 4    3. Essential hypertension    4. Hyperlipidemia LDL goal <70        Plan:     CAD, multiple vessel  NO CLINICAL EVIDENCE OF ACTIVE MYOCARDIAL ISCHEMIA  NO ARRHYTHMIAS  NO ADHF  CARD MED WELL TOLERATED    S/P CABG x 4  ON DAPT- NO ABNORMAL BLEEDING    Essential hypertension  WELL CONTROLED BP  CNS STATUS STABLE    Hyperlipidemia LDL goal <70  LIPIDS AT GOAL- RECENT LAB RESULTS WERE REVIEWED AND DISCUSSED  STATIN WELL TOLERATED    CONTINUE PRESENT CARD MANAGEMENT  RETURN IN 6 MONTHS.

## 2020-11-25 ENCOUNTER — PATIENT MESSAGE (OUTPATIENT)
Dept: CARDIOLOGY | Facility: CLINIC | Age: 75
End: 2020-11-25

## 2020-11-25 ENCOUNTER — OFFICE VISIT (OUTPATIENT)
Dept: UROLOGY | Facility: CLINIC | Age: 75
End: 2020-11-25
Payer: MEDICARE

## 2020-11-25 VITALS
WEIGHT: 223.69 LBS | SYSTOLIC BLOOD PRESSURE: 152 MMHG | BODY MASS INDEX: 33.03 KG/M2 | DIASTOLIC BLOOD PRESSURE: 82 MMHG | TEMPERATURE: 98 F

## 2020-11-25 DIAGNOSIS — I10 HYPERTENSION, UNSPECIFIED TYPE: ICD-10-CM

## 2020-11-25 DIAGNOSIS — C61 PROSTATE CANCER: Primary | ICD-10-CM

## 2020-11-25 DIAGNOSIS — N40.0 BENIGN PROSTATIC HYPERPLASIA, UNSPECIFIED WHETHER LOWER URINARY TRACT SYMPTOMS PRESENT: ICD-10-CM

## 2020-11-25 DIAGNOSIS — N32.81 OAB (OVERACTIVE BLADDER): ICD-10-CM

## 2020-11-25 DIAGNOSIS — C61 CANCER OF PROSTATE WITH INTERMEDIATE RECURRENCE RISK (STAGE T2B-C OR GLEASON 7 OR PSA 10-20): ICD-10-CM

## 2020-11-25 LAB
BILIRUB SERPL-MCNC: NORMAL MG/DL
BLOOD URINE, POC: NORMAL
CLARITY, POC UA: CLEAR
COLOR, POC UA: YELLOW
GLUCOSE UR QL STRIP: NORMAL
HEMOCCULT STL QL IA: NEGATIVE
KETONES UR QL STRIP: NORMAL
LEUKOCYTE ESTERASE URINE, POC: NORMAL
NITRITE, POC UA: NORMAL
PH, POC UA: 8
PROTEIN, POC: NORMAL
SPECIFIC GRAVITY, POC UA: 1.01
UROBILINOGEN, POC UA: NORMAL

## 2020-11-25 PROCEDURE — 1126F AMNT PAIN NOTED NONE PRSNT: CPT | Mod: HCNC,S$GLB,, | Performed by: UROLOGY

## 2020-11-25 PROCEDURE — 99999 PR PBB SHADOW E&M-EST. PATIENT-LVL III: CPT | Mod: PBBFAC,HCNC,, | Performed by: UROLOGY

## 2020-11-25 PROCEDURE — 1159F PR MEDICATION LIST DOCUMENTED IN MEDICAL RECORD: ICD-10-PCS | Mod: HCNC,S$GLB,, | Performed by: UROLOGY

## 2020-11-25 PROCEDURE — 1159F MED LIST DOCD IN RCRD: CPT | Mod: HCNC,S$GLB,, | Performed by: UROLOGY

## 2020-11-25 PROCEDURE — 3077F PR MOST RECENT SYSTOLIC BLOOD PRESSURE >= 140 MM HG: ICD-10-PCS | Mod: HCNC,CPTII,S$GLB, | Performed by: UROLOGY

## 2020-11-25 PROCEDURE — 3079F PR MOST RECENT DIASTOLIC BLOOD PRESSURE 80-89 MM HG: ICD-10-PCS | Mod: HCNC,CPTII,S$GLB, | Performed by: UROLOGY

## 2020-11-25 PROCEDURE — 3079F DIAST BP 80-89 MM HG: CPT | Mod: HCNC,CPTII,S$GLB, | Performed by: UROLOGY

## 2020-11-25 PROCEDURE — 81002 POCT URINE DIPSTICK WITHOUT MICROSCOPE: ICD-10-PCS | Mod: HCNC,S$GLB,, | Performed by: UROLOGY

## 2020-11-25 PROCEDURE — 3008F BODY MASS INDEX DOCD: CPT | Mod: HCNC,CPTII,S$GLB, | Performed by: UROLOGY

## 2020-11-25 PROCEDURE — 81002 URINALYSIS NONAUTO W/O SCOPE: CPT | Mod: HCNC,S$GLB,, | Performed by: UROLOGY

## 2020-11-25 PROCEDURE — 1101F PT FALLS ASSESS-DOCD LE1/YR: CPT | Mod: HCNC,CPTII,S$GLB, | Performed by: UROLOGY

## 2020-11-25 PROCEDURE — 99214 OFFICE O/P EST MOD 30 MIN: CPT | Mod: HCNC,25,S$GLB, | Performed by: UROLOGY

## 2020-11-25 PROCEDURE — 99214 PR OFFICE/OUTPT VISIT, EST, LEVL IV, 30-39 MIN: ICD-10-PCS | Mod: HCNC,25,S$GLB, | Performed by: UROLOGY

## 2020-11-25 PROCEDURE — 3008F PR BODY MASS INDEX (BMI) DOCUMENTED: ICD-10-PCS | Mod: HCNC,CPTII,S$GLB, | Performed by: UROLOGY

## 2020-11-25 PROCEDURE — 1126F PR PAIN SEVERITY QUANTIFIED, NO PAIN PRESENT: ICD-10-PCS | Mod: HCNC,S$GLB,, | Performed by: UROLOGY

## 2020-11-25 PROCEDURE — 99999 PR PBB SHADOW E&M-EST. PATIENT-LVL III: ICD-10-PCS | Mod: PBBFAC,HCNC,, | Performed by: UROLOGY

## 2020-11-25 PROCEDURE — 3288F PR FALLS RISK ASSESSMENT DOCUMENTED: ICD-10-PCS | Mod: HCNC,CPTII,S$GLB, | Performed by: UROLOGY

## 2020-11-25 PROCEDURE — 99499 UNLISTED E&M SERVICE: CPT | Mod: S$GLB,,, | Performed by: UROLOGY

## 2020-11-25 PROCEDURE — 3288F FALL RISK ASSESSMENT DOCD: CPT | Mod: HCNC,CPTII,S$GLB, | Performed by: UROLOGY

## 2020-11-25 PROCEDURE — 99499 RISK ADDL DX/OHS AUDIT: ICD-10-PCS | Mod: S$GLB,,, | Performed by: UROLOGY

## 2020-11-25 PROCEDURE — 1101F PR PT FALLS ASSESS DOC 0-1 FALLS W/OUT INJ PAST YR: ICD-10-PCS | Mod: HCNC,CPTII,S$GLB, | Performed by: UROLOGY

## 2020-11-25 PROCEDURE — 3077F SYST BP >= 140 MM HG: CPT | Mod: HCNC,CPTII,S$GLB, | Performed by: UROLOGY

## 2020-11-25 RX ORDER — TAMSULOSIN HYDROCHLORIDE 0.4 MG/1
0.4 CAPSULE ORAL DAILY
Qty: 30 CAPSULE | Refills: 11 | Status: SHIPPED | OUTPATIENT
Start: 2020-11-25 | End: 2022-03-21 | Stop reason: SDUPTHER

## 2020-11-25 NOTE — PROGRESS NOTES
Chief Complaint: T1c Prostate Cancer    HPI:   11/25/20: Taking the flomax, discussed it again.  Still optional. Reviewed history in detail.  Daytime OAB about the same.   10/17/19: Discussed flomax again, still optional.  OAB still a mild problem.  Reviewed history in detail.   4/1/19: PSA continues to drop.  Flomax is optional he is taking it.  Had a CABG since last visit.  10/22/18: Is taking the flomax finds it helps but will stop it after this month is done.  Sudden urgency.  Having some bowel urgency for BM with double trips.  6/6/18: XRT complete.  Has been taking flomax and recently quit it.  Missed it when he held it.  Reviewed history in detail.  3/1/18: Fiducials placed today  1/25/18: Reviewed all the reccs and pt concerns again.  He does not want to go to the OR for the SpaceOAR procedure.  Would rather just have office marker placement and no SpaceOAR.  We discussed that it might be possible to do it without anesthesia but that discomfort may demand it.  Fully reviewed indications for many combinations of therapy.  1/4/17: Had his MRI in [place], and has no extracapsular extension.  Reviewed options in detail.  Wary of RALP.  Considering XRT or brachy.  Long discussion.  11/14/17: Biopsy shows Gl 3+3=6 in 8 cores mainly on the left side in 30-50% of the samples.  In the right apex it was 3+4=7 so there is a bit of Gl4 present.    10/30/17: TRUS/Bx today 27 gm  9/6/17: 70 yo man referred for elevated PSA and review of T labs.  No abd/pelvic pain and no exac/rel factors.  No hematuria.  No urolithiasis.  No urinary bother except nocturia x2 sometimes.  No  history.  Normal sexual function.  T was checked out of curiosity no specific symptoms.  He used a T gel 10-20 years ago stopped and didn't feel it did anything.  Libido is normal he says.    Allergies:  Paragoric    Medications:  has a current medication list which includes the following prescription(s): aspirin, atorvastatin, clopidogrel,  cyanocobalamin, fenofibrate, fish oil-omega-3 fatty acids, metoprolol tartrate, tamsulosin, vitamin d, and influenza.    Review of Systems:  General: No fever, chills, fatigability, or weight loss.  Skin: No rashes, itching, or changes in color or texture of skin.  Chest: Denies ALVARADO, cyanosis, wheezing, cough, and sputum production.  Abdomen: Appetite fine. No weight loss. Denies diarrhea, abdominal pain, hematemesis, or blood in stool.  Musculoskeletal: No joint stiffness or swelling. Denies back pain.  : As above.  All other review of systems negative.    PMH:   has a past medical history of Acute coronary syndrome, Cancer of prostate with intermediate recurrence risk (stage T2b-c or [place] 7 or PSA 10-20) (1/8/2018), CKD (chronic kidney disease) stage 3, GFR 30-59 ml/min (9/24/2015), Coronary artery disease, Elevated PSA (6/27/2017), History of coronary angioplasty (9/19/2014), Hyperlipidemia, Hypertension, Myocardial infarction (2008), Obesity, Class II, BMI 35.0-39.9, with comorbidity (see actual BMI) (6/6/2014), Polio, and Tobacco dependence.    PSH:   has a past surgical history that includes Coronary stent placement (2008); Cardiac catheterization (2008); Coronary angioplasty (2008); Coronary Artery Bypass Graft (CABG) (N/A, 12/18/2018); Endoscopic harvest of vein (Bilateral, 12/18/2018); and Left heart catheterization (Left, 12/15/2018).    FamHx: family history includes Cancer in his brother; Heart disease in his brother and father.    SocHx:  reports that he quit smoking about 12 years ago. His smoking use included cigarettes. He has a 40.00 pack-year smoking history. He has never used smokeless tobacco. He reports current alcohol use. He reports that he does not use drugs.      Physical Exam:  Vitals:    11/25/20 1525   BP: (!) 152/82   Temp: 97.5 °F (36.4 °C)     General: A&Ox3, no apparent distress, no deformities  Neck: No masses, normal thyroid  Lungs: normal inspiration, no use of accessory  muscles  Heart: normal pulse, no arrhythmias  Abdomen: Soft, NT, ND  Skin: The skin is warm and dry. No jaundice.  Ext: No c/c/e.  :   11/14/17: Test desc arturo, no abnormalities of epididymus. Penis normal, with normal penile and scrotal skin. Meatus normal. Normal rectal tone, no hemorrhoids. Prost 10 gm no nodules or masses appreciated. SV not palpable. Perineum and anus normal.    Labs/Studies:   Urinalysis performed in clinic, summary: UA normal exc tr blood  PSA    12/16: 6.3    6/17: 6.7    6/18: 2.7   10/18: 0.58    3/19 0.39    10/19: 0.18    10/20: 0.05    Impression/Plan:   1. CaP well controlled  2. PSA/RTC 6 mo.    3. Flomax is optional will refill.  4. OAB mild and will manage conservatively  5. HTN: elev; discussed

## 2021-01-09 ENCOUNTER — IMMUNIZATION (OUTPATIENT)
Dept: INTERNAL MEDICINE | Facility: CLINIC | Age: 76
End: 2021-01-09
Payer: MEDICARE

## 2021-01-09 DIAGNOSIS — Z23 NEED FOR VACCINATION: ICD-10-CM

## 2021-01-09 PROCEDURE — 91300 COVID-19, MRNA, LNP-S, PF, 30 MCG/0.3 ML DOSE VACCINE: CPT | Mod: PBBFAC | Performed by: FAMILY MEDICINE

## 2021-01-30 ENCOUNTER — IMMUNIZATION (OUTPATIENT)
Dept: INTERNAL MEDICINE | Facility: CLINIC | Age: 76
End: 2021-01-30
Payer: MEDICARE

## 2021-01-30 DIAGNOSIS — Z23 NEED FOR VACCINATION: Primary | ICD-10-CM

## 2021-01-30 PROCEDURE — 0002A COVID-19, MRNA, LNP-S, PF, 30 MCG/0.3 ML DOSE VACCINE: CPT | Mod: PBBFAC | Performed by: FAMILY MEDICINE

## 2021-01-30 PROCEDURE — 91300 COVID-19, MRNA, LNP-S, PF, 30 MCG/0.3 ML DOSE VACCINE: CPT | Mod: PBBFAC | Performed by: FAMILY MEDICINE

## 2021-04-13 ENCOUNTER — TELEPHONE (OUTPATIENT)
Dept: INTERNAL MEDICINE | Facility: CLINIC | Age: 76
End: 2021-04-13

## 2021-04-13 ENCOUNTER — PATIENT OUTREACH (OUTPATIENT)
Dept: ADMINISTRATIVE | Facility: HOSPITAL | Age: 76
End: 2021-04-13

## 2021-04-19 ENCOUNTER — PATIENT MESSAGE (OUTPATIENT)
Dept: UROLOGY | Facility: CLINIC | Age: 76
End: 2021-04-19

## 2021-05-17 ENCOUNTER — LAB VISIT (OUTPATIENT)
Dept: LAB | Facility: HOSPITAL | Age: 76
End: 2021-05-17
Attending: UROLOGY
Payer: MEDICARE

## 2021-05-17 DIAGNOSIS — I10 HYPERTENSION, UNSPECIFIED TYPE: ICD-10-CM

## 2021-05-17 DIAGNOSIS — N40.0 BENIGN PROSTATIC HYPERPLASIA, UNSPECIFIED WHETHER LOWER URINARY TRACT SYMPTOMS PRESENT: ICD-10-CM

## 2021-05-17 DIAGNOSIS — N32.81 OAB (OVERACTIVE BLADDER): ICD-10-CM

## 2021-05-17 DIAGNOSIS — C61 PROSTATE CANCER: ICD-10-CM

## 2021-05-17 PROCEDURE — 36415 COLL VENOUS BLD VENIPUNCTURE: CPT | Performed by: UROLOGY

## 2021-05-17 PROCEDURE — 84153 ASSAY OF PSA TOTAL: CPT | Performed by: UROLOGY

## 2021-05-18 LAB — COMPLEXED PSA SERPL-MCNC: 0.04 NG/ML (ref 0–4)

## 2021-05-19 ENCOUNTER — TELEPHONE (OUTPATIENT)
Dept: ADMINISTRATIVE | Facility: HOSPITAL | Age: 76
End: 2021-05-19

## 2021-05-24 ENCOUNTER — OFFICE VISIT (OUTPATIENT)
Dept: TRANSPLANT | Facility: CLINIC | Age: 76
End: 2021-05-24
Payer: MEDICARE

## 2021-05-24 VITALS
HEIGHT: 69 IN | DIASTOLIC BLOOD PRESSURE: 60 MMHG | SYSTOLIC BLOOD PRESSURE: 120 MMHG | HEART RATE: 61 BPM | OXYGEN SATURATION: 95 % | BODY MASS INDEX: 32.58 KG/M2 | WEIGHT: 220 LBS

## 2021-05-24 DIAGNOSIS — I25.10 CAD, MULTIPLE VESSEL: Primary | ICD-10-CM

## 2021-05-24 DIAGNOSIS — E78.5 HYPERLIPIDEMIA LDL GOAL <70: Chronic | ICD-10-CM

## 2021-05-24 DIAGNOSIS — I10 ESSENTIAL HYPERTENSION: Chronic | ICD-10-CM

## 2021-05-24 DIAGNOSIS — Z95.1 S/P CABG X 4: ICD-10-CM

## 2021-05-24 PROCEDURE — 99999 PR PBB SHADOW E&M-EST. PATIENT-LVL IV: CPT | Mod: PBBFAC,,, | Performed by: NUCLEAR MEDICINE

## 2021-05-24 PROCEDURE — 3008F PR BODY MASS INDEX (BMI) DOCUMENTED: ICD-10-PCS | Mod: CPTII,S$GLB,, | Performed by: NUCLEAR MEDICINE

## 2021-05-24 PROCEDURE — 99999 PR PBB SHADOW E&M-EST. PATIENT-LVL IV: ICD-10-PCS | Mod: PBBFAC,,, | Performed by: NUCLEAR MEDICINE

## 2021-05-24 PROCEDURE — 3008F BODY MASS INDEX DOCD: CPT | Mod: CPTII,S$GLB,, | Performed by: NUCLEAR MEDICINE

## 2021-05-24 PROCEDURE — 1159F MED LIST DOCD IN RCRD: CPT | Mod: S$GLB,,, | Performed by: NUCLEAR MEDICINE

## 2021-05-24 PROCEDURE — 99214 PR OFFICE/OUTPT VISIT, EST, LEVL IV, 30-39 MIN: ICD-10-PCS | Mod: S$GLB,,, | Performed by: NUCLEAR MEDICINE

## 2021-05-24 PROCEDURE — 1159F PR MEDICATION LIST DOCUMENTED IN MEDICAL RECORD: ICD-10-PCS | Mod: S$GLB,,, | Performed by: NUCLEAR MEDICINE

## 2021-05-24 PROCEDURE — 99214 OFFICE O/P EST MOD 30 MIN: CPT | Mod: S$GLB,,, | Performed by: NUCLEAR MEDICINE

## 2021-05-27 ENCOUNTER — OFFICE VISIT (OUTPATIENT)
Dept: UROLOGY | Facility: CLINIC | Age: 76
End: 2021-05-27
Payer: MEDICARE

## 2021-05-27 VITALS
TEMPERATURE: 99 F | SYSTOLIC BLOOD PRESSURE: 114 MMHG | DIASTOLIC BLOOD PRESSURE: 82 MMHG | BODY MASS INDEX: 32.52 KG/M2 | WEIGHT: 220.25 LBS

## 2021-05-27 DIAGNOSIS — C61 PROSTATE CANCER: Primary | ICD-10-CM

## 2021-05-27 PROCEDURE — 99214 PR OFFICE/OUTPT VISIT, EST, LEVL IV, 30-39 MIN: ICD-10-PCS | Mod: S$GLB,,, | Performed by: UROLOGY

## 2021-05-27 PROCEDURE — 99499 UNLISTED E&M SERVICE: CPT | Mod: HCNC,S$GLB,, | Performed by: UROLOGY

## 2021-05-27 PROCEDURE — 99499 RISK ADDL DX/OHS AUDIT: ICD-10-PCS | Mod: HCNC,S$GLB,, | Performed by: UROLOGY

## 2021-05-27 PROCEDURE — 1126F PR PAIN SEVERITY QUANTIFIED, NO PAIN PRESENT: ICD-10-PCS | Mod: S$GLB,,, | Performed by: UROLOGY

## 2021-05-27 PROCEDURE — 99999 PR PBB SHADOW E&M-EST. PATIENT-LVL III: ICD-10-PCS | Mod: PBBFAC,,, | Performed by: UROLOGY

## 2021-05-27 PROCEDURE — 1159F MED LIST DOCD IN RCRD: CPT | Mod: S$GLB,,, | Performed by: UROLOGY

## 2021-05-27 PROCEDURE — 1126F AMNT PAIN NOTED NONE PRSNT: CPT | Mod: S$GLB,,, | Performed by: UROLOGY

## 2021-05-27 PROCEDURE — 1159F PR MEDICATION LIST DOCUMENTED IN MEDICAL RECORD: ICD-10-PCS | Mod: S$GLB,,, | Performed by: UROLOGY

## 2021-05-27 PROCEDURE — 3008F BODY MASS INDEX DOCD: CPT | Mod: CPTII,S$GLB,, | Performed by: UROLOGY

## 2021-05-27 PROCEDURE — 3008F PR BODY MASS INDEX (BMI) DOCUMENTED: ICD-10-PCS | Mod: CPTII,S$GLB,, | Performed by: UROLOGY

## 2021-05-27 PROCEDURE — 99214 OFFICE O/P EST MOD 30 MIN: CPT | Mod: S$GLB,,, | Performed by: UROLOGY

## 2021-05-27 PROCEDURE — 99999 PR PBB SHADOW E&M-EST. PATIENT-LVL III: CPT | Mod: PBBFAC,,, | Performed by: UROLOGY

## 2021-06-02 ENCOUNTER — TELEPHONE (OUTPATIENT)
Dept: ADMINISTRATIVE | Facility: HOSPITAL | Age: 76
End: 2021-06-02

## 2021-07-08 ENCOUNTER — OFFICE VISIT (OUTPATIENT)
Dept: UROLOGY | Facility: CLINIC | Age: 76
End: 2021-07-08
Payer: MEDICARE

## 2021-07-08 VITALS
HEIGHT: 69 IN | SYSTOLIC BLOOD PRESSURE: 118 MMHG | DIASTOLIC BLOOD PRESSURE: 84 MMHG | HEART RATE: 67 BPM | WEIGHT: 214.31 LBS | BODY MASS INDEX: 31.74 KG/M2

## 2021-07-08 DIAGNOSIS — N30.00 ACUTE CYSTITIS WITHOUT HEMATURIA: Primary | ICD-10-CM

## 2021-07-08 LAB
BILIRUB SERPL-MCNC: NORMAL MG/DL
BLOOD URINE, POC: 250
CLARITY, POC UA: NORMAL
COLOR, POC UA: NORMAL
GLUCOSE UR QL STRIP: NORMAL
KETONES UR QL STRIP: NORMAL
LEUKOCYTE ESTERASE URINE, POC: NORMAL
NITRITE, POC UA: POSITIVE
PH, POC UA: 6
PROTEIN, POC: NORMAL
SPECIFIC GRAVITY, POC UA: 1.01
UROBILINOGEN, POC UA: 1

## 2021-07-08 PROCEDURE — 1159F MED LIST DOCD IN RCRD: CPT | Mod: S$GLB,,, | Performed by: UROLOGY

## 2021-07-08 PROCEDURE — 3288F FALL RISK ASSESSMENT DOCD: CPT | Mod: CPTII,S$GLB,, | Performed by: UROLOGY

## 2021-07-08 PROCEDURE — 99999 PR PBB SHADOW E&M-EST. PATIENT-LVL III: CPT | Mod: PBBFAC,,, | Performed by: UROLOGY

## 2021-07-08 PROCEDURE — 1126F PR PAIN SEVERITY QUANTIFIED, NO PAIN PRESENT: ICD-10-PCS | Mod: S$GLB,,, | Performed by: UROLOGY

## 2021-07-08 PROCEDURE — 3008F BODY MASS INDEX DOCD: CPT | Mod: CPTII,S$GLB,, | Performed by: UROLOGY

## 2021-07-08 PROCEDURE — 1159F PR MEDICATION LIST DOCUMENTED IN MEDICAL RECORD: ICD-10-PCS | Mod: S$GLB,,, | Performed by: UROLOGY

## 2021-07-08 PROCEDURE — 81002 POCT URINE DIPSTICK WITHOUT MICROSCOPE: ICD-10-PCS | Mod: S$GLB,,, | Performed by: UROLOGY

## 2021-07-08 PROCEDURE — 99499 RISK ADDL DX/OHS AUDIT: ICD-10-PCS | Mod: HCNC,S$GLB,, | Performed by: UROLOGY

## 2021-07-08 PROCEDURE — 1101F PR PT FALLS ASSESS DOC 0-1 FALLS W/OUT INJ PAST YR: ICD-10-PCS | Mod: CPTII,S$GLB,, | Performed by: UROLOGY

## 2021-07-08 PROCEDURE — 87086 URINE CULTURE/COLONY COUNT: CPT | Performed by: UROLOGY

## 2021-07-08 PROCEDURE — 81002 URINALYSIS NONAUTO W/O SCOPE: CPT | Mod: S$GLB,,, | Performed by: UROLOGY

## 2021-07-08 PROCEDURE — 3008F PR BODY MASS INDEX (BMI) DOCUMENTED: ICD-10-PCS | Mod: CPTII,S$GLB,, | Performed by: UROLOGY

## 2021-07-08 PROCEDURE — 3288F PR FALLS RISK ASSESSMENT DOCUMENTED: ICD-10-PCS | Mod: CPTII,S$GLB,, | Performed by: UROLOGY

## 2021-07-08 PROCEDURE — 99999 PR PBB SHADOW E&M-EST. PATIENT-LVL III: ICD-10-PCS | Mod: PBBFAC,,, | Performed by: UROLOGY

## 2021-07-08 PROCEDURE — 99213 OFFICE O/P EST LOW 20 MIN: CPT | Mod: 25,S$GLB,, | Performed by: UROLOGY

## 2021-07-08 PROCEDURE — 99213 PR OFFICE/OUTPT VISIT, EST, LEVL III, 20-29 MIN: ICD-10-PCS | Mod: 25,S$GLB,, | Performed by: UROLOGY

## 2021-07-08 PROCEDURE — 1126F AMNT PAIN NOTED NONE PRSNT: CPT | Mod: S$GLB,,, | Performed by: UROLOGY

## 2021-07-08 PROCEDURE — 1101F PT FALLS ASSESS-DOCD LE1/YR: CPT | Mod: CPTII,S$GLB,, | Performed by: UROLOGY

## 2021-07-08 PROCEDURE — 99499 UNLISTED E&M SERVICE: CPT | Mod: HCNC,S$GLB,, | Performed by: UROLOGY

## 2021-07-08 RX ORDER — SULFAMETHOXAZOLE AND TRIMETHOPRIM 800; 160 MG/1; MG/1
1 TABLET ORAL 2 TIMES DAILY
Qty: 10 TABLET | Refills: 0 | Status: SHIPPED | OUTPATIENT
Start: 2021-07-08 | End: 2021-07-15

## 2021-07-08 RX ORDER — SULFAMETHOXAZOLE AND TRIMETHOPRIM 800; 160 MG/1; MG/1
1 TABLET ORAL 2 TIMES DAILY
Qty: 10 TABLET | Refills: 0 | Status: SHIPPED | OUTPATIENT
Start: 2021-07-08 | End: 2021-07-08

## 2021-07-10 LAB — BACTERIA UR CULT: NORMAL

## 2021-08-25 DIAGNOSIS — E78.5 HYPERLIPIDEMIA LDL GOAL <70: Chronic | ICD-10-CM

## 2021-08-25 DIAGNOSIS — I25.10 CAD, MULTIPLE VESSEL: ICD-10-CM

## 2021-08-25 DIAGNOSIS — I10 ESSENTIAL HYPERTENSION: Chronic | ICD-10-CM

## 2021-08-26 RX ORDER — CLOPIDOGREL BISULFATE 75 MG/1
TABLET ORAL
Qty: 90 TABLET | Refills: 0 | Status: SHIPPED | OUTPATIENT
Start: 2021-08-26 | End: 2021-11-08

## 2021-08-26 RX ORDER — METOPROLOL TARTRATE 50 MG/1
TABLET ORAL
Qty: 180 TABLET | Refills: 0 | Status: SHIPPED | OUTPATIENT
Start: 2021-08-26 | End: 2021-11-08

## 2021-08-26 RX ORDER — ATORVASTATIN CALCIUM 40 MG/1
TABLET, FILM COATED ORAL
Qty: 90 TABLET | Refills: 0 | Status: SHIPPED | OUTPATIENT
Start: 2021-08-26 | End: 2021-11-08

## 2021-10-12 ENCOUNTER — LAB VISIT (OUTPATIENT)
Dept: LAB | Facility: HOSPITAL | Age: 76
End: 2021-10-12
Attending: PHYSICIAN ASSISTANT
Payer: MEDICARE

## 2021-10-12 DIAGNOSIS — N18.30 CKD (CHRONIC KIDNEY DISEASE) STAGE 3, GFR 30-59 ML/MIN: ICD-10-CM

## 2021-10-12 DIAGNOSIS — I10 ESSENTIAL HYPERTENSION: ICD-10-CM

## 2021-10-12 DIAGNOSIS — E78.5 HYPERLIPIDEMIA LDL GOAL <70: ICD-10-CM

## 2021-10-12 LAB
ALBUMIN SERPL BCP-MCNC: 3.6 G/DL (ref 3.5–5.2)
ALP SERPL-CCNC: 25 U/L (ref 55–135)
ALT SERPL W/O P-5'-P-CCNC: 14 U/L (ref 10–44)
ANION GAP SERPL CALC-SCNC: 11 MMOL/L (ref 8–16)
AST SERPL-CCNC: 16 U/L (ref 10–40)
BILIRUB SERPL-MCNC: 0.8 MG/DL (ref 0.1–1)
BUN SERPL-MCNC: 12 MG/DL (ref 8–23)
CALCIUM SERPL-MCNC: 9.4 MG/DL (ref 8.7–10.5)
CHLORIDE SERPL-SCNC: 107 MMOL/L (ref 95–110)
CHOLEST SERPL-MCNC: 138 MG/DL (ref 120–199)
CHOLEST/HDLC SERPL: 2.1 {RATIO} (ref 2–5)
CO2 SERPL-SCNC: 23 MMOL/L (ref 23–29)
CREAT SERPL-MCNC: 1.4 MG/DL (ref 0.5–1.4)
EST. GFR  (AFRICAN AMERICAN): 56.8 ML/MIN/1.73 M^2
EST. GFR  (NON AFRICAN AMERICAN): 49.1 ML/MIN/1.73 M^2
GLUCOSE SERPL-MCNC: 90 MG/DL (ref 70–110)
HDLC SERPL-MCNC: 66 MG/DL (ref 40–75)
HDLC SERPL: 47.8 % (ref 20–50)
LDLC SERPL CALC-MCNC: 62 MG/DL (ref 63–159)
NONHDLC SERPL-MCNC: 72 MG/DL
POTASSIUM SERPL-SCNC: 4 MMOL/L (ref 3.5–5.1)
PROT SERPL-MCNC: 6.8 G/DL (ref 6–8.4)
SODIUM SERPL-SCNC: 141 MMOL/L (ref 136–145)
TRIGL SERPL-MCNC: 50 MG/DL (ref 30–150)

## 2021-10-12 PROCEDURE — 80053 COMPREHEN METABOLIC PANEL: CPT | Mod: HCNC | Performed by: INTERNAL MEDICINE

## 2021-10-12 PROCEDURE — 36415 COLL VENOUS BLD VENIPUNCTURE: CPT | Mod: HCNC | Performed by: INTERNAL MEDICINE

## 2021-10-12 PROCEDURE — 80061 LIPID PANEL: CPT | Mod: HCNC | Performed by: INTERNAL MEDICINE

## 2021-10-19 ENCOUNTER — IMMUNIZATION (OUTPATIENT)
Dept: INTERNAL MEDICINE | Facility: CLINIC | Age: 76
End: 2021-10-19
Payer: MEDICARE

## 2021-10-19 PROCEDURE — G0008 ADMIN INFLUENZA VIRUS VAC: HCPCS | Mod: HCNC,S$GLB,, | Performed by: FAMILY MEDICINE

## 2021-10-19 PROCEDURE — G0008 FLU VACCINE - QUADRIVALENT - ADJUVANTED: ICD-10-PCS | Mod: HCNC,S$GLB,, | Performed by: FAMILY MEDICINE

## 2021-10-19 PROCEDURE — 90694 FLU VACCINE - QUADRIVALENT - ADJUVANTED: ICD-10-PCS | Mod: HCNC,S$GLB,, | Performed by: FAMILY MEDICINE

## 2021-10-19 PROCEDURE — 90694 VACC AIIV4 NO PRSRV 0.5ML IM: CPT | Mod: HCNC,S$GLB,, | Performed by: FAMILY MEDICINE

## 2021-11-07 DIAGNOSIS — I25.10 CAD, MULTIPLE VESSEL: ICD-10-CM

## 2021-11-07 DIAGNOSIS — E78.5 HYPERLIPIDEMIA LDL GOAL <70: Chronic | ICD-10-CM

## 2021-11-07 DIAGNOSIS — I10 ESSENTIAL HYPERTENSION: Chronic | ICD-10-CM

## 2021-11-08 RX ORDER — CLOPIDOGREL BISULFATE 75 MG/1
75 TABLET ORAL DAILY
Qty: 90 TABLET | Refills: 0 | Status: SHIPPED | OUTPATIENT
Start: 2021-11-08 | End: 2022-01-25

## 2021-11-08 RX ORDER — METOPROLOL TARTRATE 50 MG/1
50 TABLET ORAL 2 TIMES DAILY
Qty: 180 TABLET | Refills: 0 | Status: SHIPPED | OUTPATIENT
Start: 2021-11-08 | End: 2022-01-25

## 2021-11-08 RX ORDER — ATORVASTATIN CALCIUM 40 MG/1
40 TABLET, FILM COATED ORAL DAILY
Qty: 90 TABLET | Refills: 0 | Status: SHIPPED | OUTPATIENT
Start: 2021-11-08 | End: 2022-01-25

## 2021-11-09 ENCOUNTER — TELEPHONE (OUTPATIENT)
Dept: CARDIOLOGY | Facility: CLINIC | Age: 76
End: 2021-11-09
Payer: MEDICARE

## 2021-11-09 DIAGNOSIS — I25.10 CAD, MULTIPLE VESSEL: Primary | ICD-10-CM

## 2021-11-15 ENCOUNTER — OFFICE VISIT (OUTPATIENT)
Dept: TRANSPLANT | Facility: CLINIC | Age: 76
End: 2021-11-15
Payer: MEDICARE

## 2021-11-15 ENCOUNTER — HOSPITAL ENCOUNTER (OUTPATIENT)
Dept: CARDIOLOGY | Facility: HOSPITAL | Age: 76
Discharge: HOME | End: 2021-11-15
Attending: NUCLEAR MEDICINE
Payer: MEDICARE

## 2021-11-15 VITALS
WEIGHT: 217.13 LBS | HEIGHT: 69 IN | BODY MASS INDEX: 32.16 KG/M2 | SYSTOLIC BLOOD PRESSURE: 134 MMHG | HEART RATE: 61 BPM | DIASTOLIC BLOOD PRESSURE: 72 MMHG

## 2021-11-15 DIAGNOSIS — Z95.1 S/P CABG X 4: ICD-10-CM

## 2021-11-15 DIAGNOSIS — E78.5 HYPERLIPIDEMIA LDL GOAL <70: Chronic | ICD-10-CM

## 2021-11-15 DIAGNOSIS — I25.10 CAD, MULTIPLE VESSEL: ICD-10-CM

## 2021-11-15 DIAGNOSIS — I10 ESSENTIAL HYPERTENSION: Primary | Chronic | ICD-10-CM

## 2021-11-15 PROCEDURE — 93005 ELECTROCARDIOGRAM TRACING: CPT | Mod: HCNC

## 2021-11-15 PROCEDURE — 1159F MED LIST DOCD IN RCRD: CPT | Mod: HCNC,CPTII,S$GLB, | Performed by: NUCLEAR MEDICINE

## 2021-11-15 PROCEDURE — 3008F BODY MASS INDEX DOCD: CPT | Mod: HCNC,CPTII,S$GLB, | Performed by: NUCLEAR MEDICINE

## 2021-11-15 PROCEDURE — 99214 OFFICE O/P EST MOD 30 MIN: CPT | Mod: HCNC,S$GLB,, | Performed by: NUCLEAR MEDICINE

## 2021-11-15 PROCEDURE — 3078F DIAST BP <80 MM HG: CPT | Mod: HCNC,CPTII,S$GLB, | Performed by: NUCLEAR MEDICINE

## 2021-11-15 PROCEDURE — 99214 PR OFFICE/OUTPT VISIT, EST, LEVL IV, 30-39 MIN: ICD-10-PCS | Mod: HCNC,S$GLB,, | Performed by: NUCLEAR MEDICINE

## 2021-11-15 PROCEDURE — 99999 PR PBB SHADOW E&M-EST. PATIENT-LVL IV: ICD-10-PCS | Mod: PBBFAC,HCNC,, | Performed by: NUCLEAR MEDICINE

## 2021-11-15 PROCEDURE — 93010 EKG 12-LEAD: ICD-10-PCS | Mod: HCNC,,, | Performed by: INTERNAL MEDICINE

## 2021-11-15 PROCEDURE — 3075F SYST BP GE 130 - 139MM HG: CPT | Mod: HCNC,CPTII,S$GLB, | Performed by: NUCLEAR MEDICINE

## 2021-11-15 PROCEDURE — 99499 UNLISTED E&M SERVICE: CPT | Mod: HCNC,S$GLB,, | Performed by: NUCLEAR MEDICINE

## 2021-11-15 PROCEDURE — 3075F PR MOST RECENT SYSTOLIC BLOOD PRESS GE 130-139MM HG: ICD-10-PCS | Mod: HCNC,CPTII,S$GLB, | Performed by: NUCLEAR MEDICINE

## 2021-11-15 PROCEDURE — 99499 RISK ADDL DX/OHS AUDIT: ICD-10-PCS | Mod: HCNC,S$GLB,, | Performed by: NUCLEAR MEDICINE

## 2021-11-15 PROCEDURE — 99999 PR PBB SHADOW E&M-EST. PATIENT-LVL IV: CPT | Mod: PBBFAC,HCNC,, | Performed by: NUCLEAR MEDICINE

## 2021-11-15 PROCEDURE — 1126F AMNT PAIN NOTED NONE PRSNT: CPT | Mod: HCNC,CPTII,S$GLB, | Performed by: NUCLEAR MEDICINE

## 2021-11-15 PROCEDURE — 1126F PR PAIN SEVERITY QUANTIFIED, NO PAIN PRESENT: ICD-10-PCS | Mod: HCNC,CPTII,S$GLB, | Performed by: NUCLEAR MEDICINE

## 2021-11-15 PROCEDURE — 1159F PR MEDICATION LIST DOCUMENTED IN MEDICAL RECORD: ICD-10-PCS | Mod: HCNC,CPTII,S$GLB, | Performed by: NUCLEAR MEDICINE

## 2021-11-15 PROCEDURE — 1160F PR REVIEW ALL MEDS BY PRESCRIBER/CLIN PHARMACIST DOCUMENTED: ICD-10-PCS | Mod: HCNC,CPTII,S$GLB, | Performed by: NUCLEAR MEDICINE

## 2021-11-15 PROCEDURE — 93010 ELECTROCARDIOGRAM REPORT: CPT | Mod: HCNC,,, | Performed by: INTERNAL MEDICINE

## 2021-11-15 PROCEDURE — 3008F PR BODY MASS INDEX (BMI) DOCUMENTED: ICD-10-PCS | Mod: HCNC,CPTII,S$GLB, | Performed by: NUCLEAR MEDICINE

## 2021-11-15 PROCEDURE — 3078F PR MOST RECENT DIASTOLIC BLOOD PRESSURE < 80 MM HG: ICD-10-PCS | Mod: HCNC,CPTII,S$GLB, | Performed by: NUCLEAR MEDICINE

## 2021-11-15 PROCEDURE — 1160F RVW MEDS BY RX/DR IN RCRD: CPT | Mod: HCNC,CPTII,S$GLB, | Performed by: NUCLEAR MEDICINE

## 2021-11-19 ENCOUNTER — OFFICE VISIT (OUTPATIENT)
Dept: INTERNAL MEDICINE | Facility: CLINIC | Age: 76
End: 2021-11-19
Payer: MEDICARE

## 2021-11-19 VITALS
DIASTOLIC BLOOD PRESSURE: 72 MMHG | TEMPERATURE: 99 F | WEIGHT: 215.38 LBS | HEART RATE: 72 BPM | BODY MASS INDEX: 31.9 KG/M2 | OXYGEN SATURATION: 97 % | HEIGHT: 69 IN | SYSTOLIC BLOOD PRESSURE: 110 MMHG

## 2021-11-19 DIAGNOSIS — I10 ESSENTIAL HYPERTENSION: Chronic | ICD-10-CM

## 2021-11-19 DIAGNOSIS — Z00.00 ANNUAL PHYSICAL EXAM: Primary | ICD-10-CM

## 2021-11-19 DIAGNOSIS — I70.0 AORTIC ATHEROSCLEROSIS: ICD-10-CM

## 2021-11-19 DIAGNOSIS — E78.5 HYPERLIPIDEMIA LDL GOAL <70: Chronic | ICD-10-CM

## 2021-11-19 DIAGNOSIS — N18.30 STAGE 3 CHRONIC KIDNEY DISEASE, UNSPECIFIED WHETHER STAGE 3A OR 3B CKD: ICD-10-CM

## 2021-11-19 DIAGNOSIS — I25.10 CORONARY ARTERY DISEASE INVOLVING NATIVE CORONARY ARTERY OF NATIVE HEART WITHOUT ANGINA PECTORIS: ICD-10-CM

## 2021-11-19 PROCEDURE — 3288F PR FALLS RISK ASSESSMENT DOCUMENTED: ICD-10-PCS | Mod: HCNC,CPTII,S$GLB, | Performed by: INTERNAL MEDICINE

## 2021-11-19 PROCEDURE — 3078F DIAST BP <80 MM HG: CPT | Mod: HCNC,CPTII,S$GLB, | Performed by: INTERNAL MEDICINE

## 2021-11-19 PROCEDURE — 1126F PR PAIN SEVERITY QUANTIFIED, NO PAIN PRESENT: ICD-10-PCS | Mod: HCNC,CPTII,S$GLB, | Performed by: INTERNAL MEDICINE

## 2021-11-19 PROCEDURE — 3074F PR MOST RECENT SYSTOLIC BLOOD PRESSURE < 130 MM HG: ICD-10-PCS | Mod: HCNC,CPTII,S$GLB, | Performed by: INTERNAL MEDICINE

## 2021-11-19 PROCEDURE — 1160F PR REVIEW ALL MEDS BY PRESCRIBER/CLIN PHARMACIST DOCUMENTED: ICD-10-PCS | Mod: HCNC,CPTII,S$GLB, | Performed by: INTERNAL MEDICINE

## 2021-11-19 PROCEDURE — 1101F PT FALLS ASSESS-DOCD LE1/YR: CPT | Mod: HCNC,CPTII,S$GLB, | Performed by: INTERNAL MEDICINE

## 2021-11-19 PROCEDURE — 3008F PR BODY MASS INDEX (BMI) DOCUMENTED: ICD-10-PCS | Mod: HCNC,CPTII,S$GLB, | Performed by: INTERNAL MEDICINE

## 2021-11-19 PROCEDURE — 3078F PR MOST RECENT DIASTOLIC BLOOD PRESSURE < 80 MM HG: ICD-10-PCS | Mod: HCNC,CPTII,S$GLB, | Performed by: INTERNAL MEDICINE

## 2021-11-19 PROCEDURE — 3008F BODY MASS INDEX DOCD: CPT | Mod: HCNC,CPTII,S$GLB, | Performed by: INTERNAL MEDICINE

## 2021-11-19 PROCEDURE — 1159F MED LIST DOCD IN RCRD: CPT | Mod: HCNC,CPTII,S$GLB, | Performed by: INTERNAL MEDICINE

## 2021-11-19 PROCEDURE — 99499 RISK ADDL DX/OHS AUDIT: ICD-10-PCS | Mod: HCNC,S$GLB,, | Performed by: INTERNAL MEDICINE

## 2021-11-19 PROCEDURE — 3074F SYST BP LT 130 MM HG: CPT | Mod: HCNC,CPTII,S$GLB, | Performed by: INTERNAL MEDICINE

## 2021-11-19 PROCEDURE — 1101F PR PT FALLS ASSESS DOC 0-1 FALLS W/OUT INJ PAST YR: ICD-10-PCS | Mod: HCNC,CPTII,S$GLB, | Performed by: INTERNAL MEDICINE

## 2021-11-19 PROCEDURE — 3288F FALL RISK ASSESSMENT DOCD: CPT | Mod: HCNC,CPTII,S$GLB, | Performed by: INTERNAL MEDICINE

## 2021-11-19 PROCEDURE — 99999 PR PBB SHADOW E&M-EST. PATIENT-LVL IV: ICD-10-PCS | Mod: PBBFAC,HCNC,, | Performed by: INTERNAL MEDICINE

## 2021-11-19 PROCEDURE — 1159F PR MEDICATION LIST DOCUMENTED IN MEDICAL RECORD: ICD-10-PCS | Mod: HCNC,CPTII,S$GLB, | Performed by: INTERNAL MEDICINE

## 2021-11-19 PROCEDURE — 99499 UNLISTED E&M SERVICE: CPT | Mod: HCNC,S$GLB,, | Performed by: INTERNAL MEDICINE

## 2021-11-19 PROCEDURE — 99214 PR OFFICE/OUTPT VISIT, EST, LEVL IV, 30-39 MIN: ICD-10-PCS | Mod: HCNC,S$GLB,, | Performed by: INTERNAL MEDICINE

## 2021-11-19 PROCEDURE — 99999 PR PBB SHADOW E&M-EST. PATIENT-LVL IV: CPT | Mod: PBBFAC,HCNC,, | Performed by: INTERNAL MEDICINE

## 2021-11-19 PROCEDURE — 99214 OFFICE O/P EST MOD 30 MIN: CPT | Mod: HCNC,S$GLB,, | Performed by: INTERNAL MEDICINE

## 2021-11-19 PROCEDURE — 1160F RVW MEDS BY RX/DR IN RCRD: CPT | Mod: HCNC,CPTII,S$GLB, | Performed by: INTERNAL MEDICINE

## 2021-11-19 PROCEDURE — 1126F AMNT PAIN NOTED NONE PRSNT: CPT | Mod: HCNC,CPTII,S$GLB, | Performed by: INTERNAL MEDICINE

## 2021-11-30 ENCOUNTER — IMMUNIZATION (OUTPATIENT)
Dept: PRIMARY CARE CLINIC | Facility: CLINIC | Age: 76
End: 2021-11-30
Payer: MEDICARE

## 2021-11-30 DIAGNOSIS — Z23 NEED FOR VACCINATION: Primary | ICD-10-CM

## 2021-11-30 PROCEDURE — 0004A COVID-19, MRNA, LNP-S, PF, 30 MCG/0.3 ML DOSE VACCINE: CPT | Mod: CV19,PBBFAC | Performed by: FAMILY MEDICINE

## 2021-12-15 DIAGNOSIS — E78.5 HYPERLIPIDEMIA LDL GOAL <70: Chronic | ICD-10-CM

## 2021-12-15 RX ORDER — FENOFIBRATE 160 MG/1
TABLET ORAL
Qty: 45 TABLET | Refills: 3 | Status: SHIPPED | OUTPATIENT
Start: 2021-12-15 | End: 2022-10-05

## 2022-01-21 ENCOUNTER — PATIENT OUTREACH (OUTPATIENT)
Dept: ADMINISTRATIVE | Facility: HOSPITAL | Age: 77
End: 2022-01-21
Payer: MEDICARE

## 2022-01-21 DIAGNOSIS — I10 ESSENTIAL HYPERTENSION: Chronic | ICD-10-CM

## 2022-01-21 DIAGNOSIS — I25.10 CAD, MULTIPLE VESSEL: ICD-10-CM

## 2022-01-21 DIAGNOSIS — E78.5 HYPERLIPIDEMIA LDL GOAL <70: Chronic | ICD-10-CM

## 2022-01-21 DIAGNOSIS — Z12.11 SCREENING FOR COLON CANCER: Primary | ICD-10-CM

## 2022-01-21 NOTE — PROGRESS NOTES
Received message pt would like fitkit order. He said he had the kit and will drop it off.   Order placed.

## 2022-01-21 NOTE — PROGRESS NOTES
Working [employer] colon report; No colonoscopy/fitkit/cologuard results found; Called and spoke with patient who states that he has fitkit at home, will collect and drop off.     [facility] to be placed.

## 2022-01-21 NOTE — TELEPHONE ENCOUNTER
Care Due:                  Date            Visit Type   Department     Provider  --------------------------------------------------------------------------------                                             [facility]  Last Visit: 11-      None         RIN Duque  Next Visit: None Scheduled  None         None Found                                                            Last  Test          Frequency    Reason                     Performed    Due Date  --------------------------------------------------------------------------------    CBC.........  12 months..  clopidogreL, fenofibrate.  10-   10-    Powered by [email or web address] by [employer]. Reference number: [id number].   1/21/2022 2:33:46 AM CST

## 2022-01-25 RX ORDER — METOPROLOL TARTRATE 50 MG/1
TABLET ORAL
Qty: 180 TABLET | Refills: 3 | Status: SHIPPED | OUTPATIENT
Start: 2022-01-25 | End: 2022-11-17

## 2022-01-25 RX ORDER — ATORVASTATIN CALCIUM 40 MG/1
TABLET, FILM COATED ORAL
Qty: 90 TABLET | Refills: 2 | Status: SHIPPED | OUTPATIENT
Start: 2022-01-25 | End: 2022-09-03

## 2022-01-25 NOTE — TELEPHONE ENCOUNTER
Refill Routing Note   Medication(s) are not appropriate for processing by [facility] for the following reason(s):      - Required laboratory values are outdated    [facility] action(s):  Approve       Medication Therapy Plan: labs/overdue; please advise; defer(plavix) ; approve other meds   --->Care Gap information included in message below if applicable.   Medication reconciliation completed: No   Automatic Epic Generated Protocol Data:    Orders Placed This Encounter    metoprolol tartrate (LOPRESSOR) 50 MG tablet    atorvastatin (LIPITOR) 40 MG tablet      Requested Prescriptions   Pending Prescriptions Disp Refills    clopidogreL (PLAVIX) 75 mg tablet [Pharmacy Med Name: CLOPIDOGREL 75 MG Tablet] 90 tablet 2     Sig: TAKE 1 TABLET EVERY DAY       Hematology: Antiplatelets - clopidogrel Failed - 1/25/2022  4:49 PM        Failed - HCT is between 33 and 75 and within 360 days     POC Hematocrit   Date Value Ref Range Status   12/18/2018 22 (L) 36 - 54 %PCV Final   12/18/2018 21 (L) 36 - 54 %PCV Final   12/18/2018 20 (LL) 36 - 54 %PCV Final     Hematocrit   Date Value Ref Range Status   10/13/2020 48.3 40.0 - 54.0 % Final   09/20/2019 47.9 40.0 - 54.0 % Final   12/25/2018 31.4 (L) 40.0 - 54.0 % Final              Failed - HGB is 11 or above and within 360 days     Hemoglobin   Date Value Ref Range Status   10/13/2020 14.9 14.0 - 18.0 g/dL Final   09/20/2019 14.5 14.0 - 18.0 g/dL Final   12/25/2018 10.2 (L) 14.0 - 18.0 g/dL Final              Failed - PLT in normal range and within 360 days     Platelets   Date Value Ref Range Status   10/13/2020 226 150 - 350 K/uL Final   09/20/2019 215 150 - 350 K/uL Final   12/25/2018 372 (H) 150 - 350 K/uL Final              Passed - Patient is at least 18 years old        Passed - No contraindicated proton pump inhibitors on active medication list        Passed - Valid encounter within last 15 months     Recent Visits  Date Type Provider Dept   11/19/21 Office Visit  Jojo Duque DO [facility] Internal Medicine   10/19/20 Office Visit Jojo Duque DO [facility] Internal Medicine   Showing recent visits within past 720 days and meeting all other requirements  Future Appointments  No visits were found meeting these conditions.  Showing future appointments within next 150 days and meeting all other requirements      Future Appointments              In 3 months LABORATORY, [place] [place] [place]    In 3 months MD Crow Keenan - Cardiology,  Medical [facility]    In 3 months DONAL Pereira - Internal Medicine,  Medical C    In 3 months LABORATORY, [facility] [place] - [facility], [place]    In 4 months MD Crow Aguilar - Urology, [facility] C                 Signed Prescriptions Disp Refills    metoprolol tartrate (LOPRESSOR) 50 MG tablet 180 tablet 3     Sig: TAKE 1 TABLET TWICE DAILY       Cardiovascular:  Beta Blockers Passed - 1/25/2022  4:50 PM        Passed - Patient is at least 18 years old        Passed - Last BP in normal range within 360 days     BP Readings from Last 1 Encounters:   11/19/21 110/72               Passed - Last Heart Rate in normal range within 360 days     Pulse Readings from Last 1 Encounters:   11/19/21 72              Passed - Valid encounter within last 15 months     Recent Visits  Date Type Provider Dept   11/19/21 Office Visit Jojo Duque DO [facility] Internal Medicine   10/19/20 Office Visit Jojo Duque DO [facility] Internal Medicine   Showing recent visits within past 720 days and meeting all other requirements  Future Appointments  No visits were found meeting these conditions.  Showing future appointments within next 150 days and meeting all other requirements      Future Appointments              In 3 months LABORATORY, [facility] [place] - Lab, [place]    In 3 months MD Crow Keenan - Cardiology,  Medical [facility]    In 3 months DONAL Pereira - Internal Medicine,  Medical C    In 3  months LABORATORY, [facility] [place] - Lab, [place]    In 4 months MD Crow Aguilar - Urology,  Medical C                  atorvastatin (LIPITOR) 40 MG tablet 90 tablet 2     Sig: TAKE 1 TABLET EVERY DAY       Cardiovascular:  Antilipid - Statins Passed - 1/21/2022  2:33 AM        Passed - Patient is at least 18 years old        Passed - Valid encounter within last 15 months     Recent Visits  Date Type Provider Dept   11/19/21 Office Visit Jojo Duque DO [facility]   10/19/20 Office Visit Jojo Duque DO [facility]   Showing recent visits within past 720 days and meeting all other requirements  Future Appointments  No visits were found meeting these conditions.  Showing future appointments within next 150 days and meeting all other requirements      Future Appointments              In 3 months LABORATORY, [facility] - Lab, [place]    In 3 months MD Crow Keenan - Cardiology,  Medical C    In 3 months JAMI PereiraC Crow - Internal Medicine,  Medical C    In 3 months LABORATORY, [facility] [place] - Lab, [place]    In 4 months MD Crow Aguilar - Urology,  Medical C                Passed - ALT is 131 or below and within 360 days     ALT   Date Value Ref Range Status   10/12/2021 14 10 - 44 U/L Final   10/13/2020 16 10 - 44 U/L Final   09/20/2019 19 10 - 44 U/L Final              Passed - AST is 119 or below and within 360 days     AST   Date Value Ref Range Status   10/12/2021 16 10 - 40 U/L Final   10/13/2020 17 10 - 40 U/L Final   09/20/2019 19 10 - 40 U/L Final              Passed - Total Cholesterol within 360 days     Lab Results   Component Value Date    CHOL 138 10/12/2021    CHOL 130 10/13/2020    CHOL 146 09/20/2019              Passed - LDL within 360 days     LDL Cholesterol   Date Value Ref Range Status   10/12/2021 62.0 (L) 63.0 - 159.0 mg/dL Final     Comment:     The National Cholesterol Education  Program (NCEP) has set the  following guidelines (reference values) for LDL Cholesterol:  Optimal.......................<130 mg/dL  Borderline High...............130-159 mg/dL  High..........................160-189 mg/dL  Very High.....................>190 mg/dL              Passed - HDL within 360 days     HDL   Date Value Ref Range Status   10/12/2021 66 40 - 75 mg/dL Final     Comment:     The National Cholesterol Education Program (NCEP) has set the  following guidelines (reference values) for HDL Cholesterol:  Low...............<40 mg/dL  Optimal...........>60 mg/dL              Passed - Triglycerides within 360 days     Lab Results   Component Value Date    TRIG 50 10/12/2021    TRIG 56 10/13/2020    TRIG 77 09/20/2019                    Appointments  past 12m or future 3m with PCP    Date Provider   Last Visit   11/19/2021 Jojo Duque DO   Next Visit   Visit date not found Jojo Duque DO   ED visits in past 90 days: 0        Note composed:4:53 PM 01/25/2022

## 2022-01-25 NOTE — TELEPHONE ENCOUNTER
Provider Staff:     Action is required for this patient.   Please see care gap opportunities below in Care Due Message.     Thanks!  [facility]     Appointments      Date Provider   Last Visit   11/19/2021 Jojo Duque DO   Next Visit   Visit date not found Jojo Duque DO     Note composed:4:54 PM 01/25/2022

## 2022-01-26 RX ORDER — CLOPIDOGREL BISULFATE 75 MG/1
TABLET ORAL
Qty: 90 TABLET | Refills: 2 | Status: SHIPPED | OUTPATIENT
Start: 2022-01-26 | End: 2022-09-06

## 2022-01-28 NOTE — PROGRESS NOTES
Pt dropped off fitkit he had received. Was notified it was not an [facility] fitkit, but came from [employer]. Pt was notified he would need to pick it up and mail to in.

## 2022-03-14 ENCOUNTER — PATIENT OUTREACH (OUTPATIENT)
Dept: ADMINISTRATIVE | Facility: HOSPITAL | Age: 77
End: 2022-03-14
Payer: MEDICARE

## 2022-03-14 LAB — HEMOCCULT STL QL IA: NEGATIVE

## 2022-03-21 ENCOUNTER — PATIENT MESSAGE (OUTPATIENT)
Dept: UROLOGY | Facility: CLINIC | Age: 77
End: 2022-03-21
Payer: MEDICARE

## 2022-03-21 DIAGNOSIS — C61 CANCER OF PROSTATE WITH INTERMEDIATE RECURRENCE RISK (STAGE T2B-C OR GLEASON 7 OR PSA 10-20): ICD-10-CM

## 2022-03-21 RX ORDER — TAMSULOSIN HYDROCHLORIDE 0.4 MG/1
0.4 CAPSULE ORAL DAILY
Qty: 30 CAPSULE | Refills: 11 | Status: SHIPPED | OUTPATIENT
Start: 2022-03-21 | End: 2023-01-11

## 2022-05-12 ENCOUNTER — LAB VISIT (OUTPATIENT)
Dept: LAB | Facility: HOSPITAL | Age: 77
End: 2022-05-12
Attending: INTERNAL MEDICINE
Payer: MEDICARE

## 2022-05-12 ENCOUNTER — OFFICE VISIT (OUTPATIENT)
Dept: CARDIOLOGY | Facility: CLINIC | Age: 77
End: 2022-05-12
Payer: MEDICARE

## 2022-05-12 VITALS
OXYGEN SATURATION: 97 % | SYSTOLIC BLOOD PRESSURE: 111 MMHG | WEIGHT: 211.88 LBS | HEART RATE: 96 BPM | HEIGHT: 69 IN | BODY MASS INDEX: 31.38 KG/M2 | DIASTOLIC BLOOD PRESSURE: 72 MMHG

## 2022-05-12 DIAGNOSIS — E78.5 HYPERLIPIDEMIA LDL GOAL <70: ICD-10-CM

## 2022-05-12 DIAGNOSIS — I70.0 AORTIC ATHEROSCLEROSIS: ICD-10-CM

## 2022-05-12 DIAGNOSIS — N18.30 CKD (CHRONIC KIDNEY DISEASE) STAGE 3, GFR 30-59 ML/MIN: ICD-10-CM

## 2022-05-12 DIAGNOSIS — C61 PROSTATE CANCER: ICD-10-CM

## 2022-05-12 DIAGNOSIS — Z95.1 S/P CABG X 4: ICD-10-CM

## 2022-05-12 DIAGNOSIS — I10 ESSENTIAL HYPERTENSION: ICD-10-CM

## 2022-05-12 DIAGNOSIS — E66.9 OBESITY (BMI 30.0-34.9): ICD-10-CM

## 2022-05-12 DIAGNOSIS — I25.10 CAD, MULTIPLE VESSEL: Primary | ICD-10-CM

## 2022-05-12 LAB
ALBUMIN SERPL BCP-MCNC: 3.6 G/DL (ref 3.5–5.2)
ALP SERPL-CCNC: 25 U/L (ref 55–135)
ALT SERPL W/O P-5'-P-CCNC: 15 U/L (ref 10–44)
ANION GAP SERPL CALC-SCNC: 8 MMOL/L (ref 8–16)
AST SERPL-CCNC: 15 U/L (ref 10–40)
BILIRUB SERPL-MCNC: 0.8 MG/DL (ref 0.1–1)
BUN SERPL-MCNC: 14 MG/DL (ref 8–23)
CALCIUM SERPL-MCNC: 9.4 MG/DL (ref 8.7–10.5)
CHLORIDE SERPL-SCNC: 103 MMOL/L (ref 95–110)
CHOLEST SERPL-MCNC: 127 MG/DL (ref 120–199)
CHOLEST/HDLC SERPL: 2.3 {RATIO} (ref 2–5)
CO2 SERPL-SCNC: 29 MMOL/L (ref 23–29)
CREAT SERPL-MCNC: 1.6 MG/DL (ref 0.5–1.4)
EST. GFR  (AFRICAN AMERICAN): 48 ML/MIN/1.73 M^2
EST. GFR  (NON AFRICAN AMERICAN): 41.5 ML/MIN/1.73 M^2
GLUCOSE SERPL-MCNC: 91 MG/DL (ref 70–110)
HDLC SERPL-MCNC: 55 MG/DL (ref 40–75)
HDLC SERPL: 43.3 % (ref 20–50)
LDLC SERPL CALC-MCNC: 60.2 MG/DL (ref 63–159)
NONHDLC SERPL-MCNC: 72 MG/DL
POTASSIUM SERPL-SCNC: 4.7 MMOL/L (ref 3.5–5.1)
PROSTATE SPECIFIC ANTIGEN, TOTAL: 0.02 NG/ML (ref 0–4)
PROT SERPL-MCNC: 6.8 G/DL (ref 6–8.4)
PSA FREE MFR SERPL: NORMAL %
PSA FREE SERPL-MCNC: <0.1 NG/ML (ref 0–1.5)
SODIUM SERPL-SCNC: 140 MMOL/L (ref 136–145)
TRIGL SERPL-MCNC: 59 MG/DL (ref 30–150)

## 2022-05-12 PROCEDURE — 1101F PR PT FALLS ASSESS DOC 0-1 FALLS W/OUT INJ PAST YR: ICD-10-PCS | Mod: CPTII,S$GLB,, | Performed by: STUDENT IN AN ORGANIZED HEALTH CARE EDUCATION/TRAINING PROGRAM

## 2022-05-12 PROCEDURE — 80061 LIPID PANEL: CPT | Performed by: INTERNAL MEDICINE

## 2022-05-12 PROCEDURE — 1159F PR MEDICATION LIST DOCUMENTED IN MEDICAL RECORD: ICD-10-PCS | Mod: CPTII,S$GLB,, | Performed by: STUDENT IN AN ORGANIZED HEALTH CARE EDUCATION/TRAINING PROGRAM

## 2022-05-12 PROCEDURE — 99214 PR OFFICE/OUTPT VISIT, EST, LEVL IV, 30-39 MIN: ICD-10-PCS | Mod: S$GLB,,, | Performed by: STUDENT IN AN ORGANIZED HEALTH CARE EDUCATION/TRAINING PROGRAM

## 2022-05-12 PROCEDURE — 36415 COLL VENOUS BLD VENIPUNCTURE: CPT | Performed by: INTERNAL MEDICINE

## 2022-05-12 PROCEDURE — 3074F PR MOST RECENT SYSTOLIC BLOOD PRESSURE < 130 MM HG: ICD-10-PCS | Mod: CPTII,S$GLB,, | Performed by: STUDENT IN AN ORGANIZED HEALTH CARE EDUCATION/TRAINING PROGRAM

## 2022-05-12 PROCEDURE — 84153 ASSAY OF PSA TOTAL: CPT | Performed by: UROLOGY

## 2022-05-12 PROCEDURE — 1126F AMNT PAIN NOTED NONE PRSNT: CPT | Mod: CPTII,S$GLB,, | Performed by: STUDENT IN AN ORGANIZED HEALTH CARE EDUCATION/TRAINING PROGRAM

## 2022-05-12 PROCEDURE — 3288F PR FALLS RISK ASSESSMENT DOCUMENTED: ICD-10-PCS | Mod: CPTII,S$GLB,, | Performed by: STUDENT IN AN ORGANIZED HEALTH CARE EDUCATION/TRAINING PROGRAM

## 2022-05-12 PROCEDURE — 99499 UNLISTED E&M SERVICE: CPT | Mod: HCNC,S$GLB,, | Performed by: STUDENT IN AN ORGANIZED HEALTH CARE EDUCATION/TRAINING PROGRAM

## 2022-05-12 PROCEDURE — 99499 RISK ADDL DX/OHS AUDIT: ICD-10-PCS | Mod: HCNC,S$GLB,, | Performed by: STUDENT IN AN ORGANIZED HEALTH CARE EDUCATION/TRAINING PROGRAM

## 2022-05-12 PROCEDURE — 84154 ASSAY OF PSA FREE: CPT | Performed by: UROLOGY

## 2022-05-12 PROCEDURE — 99999 PR PBB SHADOW E&M-EST. PATIENT-LVL IV: ICD-10-PCS | Mod: PBBFAC,,, | Performed by: STUDENT IN AN ORGANIZED HEALTH CARE EDUCATION/TRAINING PROGRAM

## 2022-05-12 PROCEDURE — 99999 PR PBB SHADOW E&M-EST. PATIENT-LVL IV: CPT | Mod: PBBFAC,,, | Performed by: STUDENT IN AN ORGANIZED HEALTH CARE EDUCATION/TRAINING PROGRAM

## 2022-05-12 PROCEDURE — 80053 COMPREHEN METABOLIC PANEL: CPT | Performed by: INTERNAL MEDICINE

## 2022-05-12 PROCEDURE — 1101F PT FALLS ASSESS-DOCD LE1/YR: CPT | Mod: CPTII,S$GLB,, | Performed by: STUDENT IN AN ORGANIZED HEALTH CARE EDUCATION/TRAINING PROGRAM

## 2022-05-12 PROCEDURE — 99214 OFFICE O/P EST MOD 30 MIN: CPT | Mod: S$GLB,,, | Performed by: STUDENT IN AN ORGANIZED HEALTH CARE EDUCATION/TRAINING PROGRAM

## 2022-05-12 PROCEDURE — 3074F SYST BP LT 130 MM HG: CPT | Mod: CPTII,S$GLB,, | Performed by: STUDENT IN AN ORGANIZED HEALTH CARE EDUCATION/TRAINING PROGRAM

## 2022-05-12 PROCEDURE — 3078F PR MOST RECENT DIASTOLIC BLOOD PRESSURE < 80 MM HG: ICD-10-PCS | Mod: CPTII,S$GLB,, | Performed by: STUDENT IN AN ORGANIZED HEALTH CARE EDUCATION/TRAINING PROGRAM

## 2022-05-12 PROCEDURE — 1159F MED LIST DOCD IN RCRD: CPT | Mod: CPTII,S$GLB,, | Performed by: STUDENT IN AN ORGANIZED HEALTH CARE EDUCATION/TRAINING PROGRAM

## 2022-05-12 PROCEDURE — 3078F DIAST BP <80 MM HG: CPT | Mod: CPTII,S$GLB,, | Performed by: STUDENT IN AN ORGANIZED HEALTH CARE EDUCATION/TRAINING PROGRAM

## 2022-05-12 PROCEDURE — 3288F FALL RISK ASSESSMENT DOCD: CPT | Mod: CPTII,S$GLB,, | Performed by: STUDENT IN AN ORGANIZED HEALTH CARE EDUCATION/TRAINING PROGRAM

## 2022-05-12 PROCEDURE — 1126F PR PAIN SEVERITY QUANTIFIED, NO PAIN PRESENT: ICD-10-PCS | Mod: CPTII,S$GLB,, | Performed by: STUDENT IN AN ORGANIZED HEALTH CARE EDUCATION/TRAINING PROGRAM

## 2022-05-12 NOTE — PROGRESS NOTES
Section of Cardiology                  Cardiac Clinic Note    Chief Complaint/Reason for consultation:  Follow-up      HPI:   Filipe Agustin Jr. is a 76 y.o. male with h/o CAD s/p 4vCABG 2018, HLD, hypertension, CKD, obesity who comes in for follow-up.    5/12/2022  Was seen Dr. Camarillo in Cardiology clinic  Doing well, no complaints  Does not exercise regularly  Still taking ASA and plavix  Not very active at home     Denies orthopnea, PND, chest pain, LE swelling, syncope.       EKG 11/21 NSR, RBBB, qtc 422 ms     ECHO      STRESS TEST    [facility]      ROS: All 10 systems reviewed. Please refer to the HPI for pertinent positives. All other systems negative.     Past Medical History  Past Medical History:   Diagnosis Date    Acute coronary syndrome     Cancer of prostate with intermediate recurrence risk (stage T2b-c or [place] 7 or PSA 10-20) 1/8/2018    CKD (chronic kidney disease) stage 3, GFR 30-59 ml/min 9/24/2015    Coronary artery disease     Elevated PSA 6/27/2017    History of coronary angioplasty 9/19/2014    Hyperlipidemia     Hypertension     Myocardial infarction 2008    Obesity, Class II, BMI 35.0-39.9, with comorbidity (see actual BMI) 6/6/2014    Polio     as a child    Tobacco dependence     resolved       Surgical History  Past Surgical History:   Procedure Laterality Date    CARDIAC CATHETERIZATION  2008    CORONARY ANGIOPLASTY  2008    acute PCI- RCA- RUI    CORONARY ARTERY BYPASS GRAFT (CABG) N/A 12/18/2018    Procedure: CORONARY ARTERY BYPASS GRAFT (CABG);  Surgeon: Rg Johnson MD;  Location: [facility] OR;  Service: Cardiothoracic;  Laterality: N/A;    CORONARY STENT PLACEMENT  2008    ENDOSCOPIC HARVEST OF VEIN Bilateral 12/18/2018    Procedure: SURGICAL PROCUREMENT, VEIN, ENDOSCOPIC;  Surgeon: Rg Johnson MD;  Location: [facility] OR;  Service: Cardiothoracic;  Laterality: Bilateral;    LEFT HEART CATHETERIZATION Left 12/15/2018    Procedure: CATHETERIZATION,  Keep up the great work!!  #20 lbs of weight loss!      PLAN:  Continue with medications: phentermine 37.5mg (cut 1/2 tablet in half for 4 days) and then take the full dose, metformin, victoza 1.8mg and prozac  Vitamin d lab  Follow up with me in 1 month  S HEART, LEFT;  Surgeon: Jose Armando Monroe MD;  Location: [facility] CATH LAB;  Service: Cardiology;  Laterality: Left;          Allergies:   Review of patient's allergies indicates:   Allergen Reactions    Paragoric Other (See Comments)     sneeze       Social History:  Social History     Socioeconomic History    Marital status:     Number of children: 3    Highest education level: High school graduate   Occupational History    Occupation: Retired   Tobacco Use    Smoking status: Former Smoker     Packs/day: 2.00     Years: 20.00     Pack years: 40.00     Types: Cigarettes     Quit date: 2008     Years since quittin.8    Smokeless tobacco: Never Used   Substance and Sexual Activity    Alcohol use: Yes     Comment: rarely    Drug use: No    Sexual activity: Not Currently     Partners: Female       Family History:  family history includes Cancer in his brother; Heart disease in his brother and father.    Home Medications:  Current Outpatient Medications on File Prior to Visit   Medication Sig Dispense Refill    aspirin (ECOTRIN) 81 MG EC tablet Take 81 mg by mouth once daily.      atorvastatin (LIPITOR) 40 MG tablet TAKE 1 TABLET EVERY DAY 90 tablet 2    clopidogreL (PLAVIX) 75 mg tablet TAKE 1 TABLET EVERY DAY 90 tablet 2    cyanocobalamin (VITAMIN B-12) 100 MCG tablet Take 100 mcg by mouth once daily.      fenofibrate 160 MG Tab TAKE 1 TABLET EVERY OTHER DAY 45 tablet 3    fish oil-omega-3 fatty acids 300-1,000 mg capsule Take 2 g by mouth once daily.      influenza (FLUZONE HIGH-DOSE) 180 mcg/0.5 mL vaccine ADMINISTERED BY [facility] 0.5 mL 0    metoprolol tartrate (LOPRESSOR) 50 MG tablet TAKE 1 TABLET TWICE DAILY 180 tablet 3    tamsulosin (FLOMAX) 0.4 mg Cap Take 1 capsule (0.4 mg total) by mouth once daily. For bladder 30 capsule 11    vitamin D 1000 units Tab Take 2,000 Units by mouth once daily.        No current facility-administered medications on file prior to visit.       Physical  of your day and that you can do at home! 3. \"Calm\" or \"Headspace\" which helps with mindfulness, meditation, clarity, sleep, and jovon to your daily life. "exam:  /72 (BP Location: Right arm, Patient Position: Sitting, BP Method: Medium (Automatic))   Pulse 96   Ht 5' 9" (1.753 m)   Wt 96.1 kg (211 lb 13.8 oz)   SpO2 97%   BMI 31.29 kg/m²         General: Pt is a 76 y.o. year old male who is AAOx3, in NAD, is pleasant, well nourished, looks stated age  HEENT: PERRL, EOMI, Oral mucosa pink & moist  CVS  No abnormal cardiac pulsations noted on inspection. JVP not raised. The apical impulse is normal on palpation, and is located in the left 5th intercostal space in the mid - clavicular line. No palpable thrills or abnormal pulsations noted. RR, S1 - S2 heard, no murmurs, rubs or gallops appreciated.   PUL : CTA B/L. No wheezes/crackles heard   ABD : BS +, soft. No tenderness elicited   LE : No C/C/E. Distal Pulses palpable B/L         LABS:    Chemistry:   Lab Results   Component Value Date     10/12/2021    K 4.0 10/12/2021     10/12/2021    CO2 23 10/12/2021    BUN 12 10/12/2021    CREATININE 1.4 10/12/2021    CALCIUM 9.4 10/12/2021     Cardiac Markers:   Lab Results   Component Value Date    TROPONINI 1.379 (H) 12/15/2018     Cardiac Markers (Last 3):   Lab Results   Component Value Date    TROPONINI 1.379 (H) 12/15/2018     CBC:   Lab Results   Component Value Date    WBC 4.66 10/13/2020    HGB 14.9 10/13/2020    HCT 48.3 10/13/2020    HCT 22 (L) 12/18/2018    MCV 96 10/13/2020     10/13/2020     Lipids:   Lab Results   Component Value Date    CHOL 138 10/12/2021    TRIG 50 10/12/2021    HDL 66 10/12/2021     Coagulation:   Lab Results   Component Value Date    INR 1.0 12/19/2018    APTT 24.2 12/19/2018           Assessment      1. CAD, multiple vessel    2. Aortic atherosclerosis    3. S/P CABG x 4    4. Essential hypertension    5. Hyperlipidemia LDL goal <70    6. Obesity (BMI 30.0-34.9)         Plan:    HLD  Lipid panel in process  Continue statin and fenofibrate    Hypertension  Stable  Continue Lopressor    CAD s/p CABG  Continue " aspirin, statin, Plavix    Obesity, BMI 30-34.9  Low-salt, low-fat diet  Exercise as tolerated, at least 30 minutes daily  Weight loss encouraged      This note was prepared using voice recognition system and is likely to have sound alike errors that may have been overlooked even after proofreading.     I have reviewed all pertinent chart information.  Plans and recommendations have been formulated under my direct supervision. All questions answered and patient voiced understanding.   If symptoms persist go to the ED.    RTC in 6 months         Shant Díaz MD  Cardiology

## 2022-05-19 ENCOUNTER — OFFICE VISIT (OUTPATIENT)
Dept: INTERNAL MEDICINE | Facility: CLINIC | Age: 77
End: 2022-05-19
Payer: MEDICARE

## 2022-05-19 VITALS
OXYGEN SATURATION: 95 % | TEMPERATURE: 99 F | HEIGHT: 69 IN | SYSTOLIC BLOOD PRESSURE: 130 MMHG | WEIGHT: 213.38 LBS | HEART RATE: 75 BPM | RESPIRATION RATE: 18 BRPM | BODY MASS INDEX: 31.6 KG/M2 | DIASTOLIC BLOOD PRESSURE: 68 MMHG

## 2022-05-19 DIAGNOSIS — I25.10 CAD, MULTIPLE VESSEL: ICD-10-CM

## 2022-05-19 DIAGNOSIS — E78.5 HYPERLIPIDEMIA LDL GOAL <70: Chronic | ICD-10-CM

## 2022-05-19 DIAGNOSIS — Z95.1 S/P CABG X 4: ICD-10-CM

## 2022-05-19 DIAGNOSIS — Z87.891 PERSONAL HISTORY OF NICOTINE DEPENDENCE: ICD-10-CM

## 2022-05-19 DIAGNOSIS — C61 CANCER OF PROSTATE WITH INTERMEDIATE RECURRENCE RISK (STAGE T2B-C OR GLEASON 7 OR PSA 10-20): Chronic | ICD-10-CM

## 2022-05-19 DIAGNOSIS — E66.9 OBESITY (BMI 30.0-34.9): ICD-10-CM

## 2022-05-19 DIAGNOSIS — N18.31 STAGE 3A CHRONIC KIDNEY DISEASE: Chronic | ICD-10-CM

## 2022-05-19 DIAGNOSIS — I10 ESSENTIAL HYPERTENSION: Chronic | ICD-10-CM

## 2022-05-19 DIAGNOSIS — I07.1 TRICUSPID VALVE INSUFFICIENCY, UNSPECIFIED ETIOLOGY: ICD-10-CM

## 2022-05-19 DIAGNOSIS — N28.9 FUNCTION KIDNEY DECREASED: Primary | ICD-10-CM

## 2022-05-19 PROCEDURE — 99214 OFFICE O/P EST MOD 30 MIN: CPT | Mod: S$GLB,,, | Performed by: PHYSICIAN ASSISTANT

## 2022-05-19 PROCEDURE — 1159F MED LIST DOCD IN RCRD: CPT | Mod: CPTII,S$GLB,, | Performed by: PHYSICIAN ASSISTANT

## 2022-05-19 PROCEDURE — 99999 PR PBB SHADOW E&M-EST. PATIENT-LVL IV: ICD-10-PCS | Mod: PBBFAC,,, | Performed by: PHYSICIAN ASSISTANT

## 2022-05-19 PROCEDURE — 3288F PR FALLS RISK ASSESSMENT DOCUMENTED: ICD-10-PCS | Mod: CPTII,S$GLB,, | Performed by: PHYSICIAN ASSISTANT

## 2022-05-19 PROCEDURE — 3288F FALL RISK ASSESSMENT DOCD: CPT | Mod: CPTII,S$GLB,, | Performed by: PHYSICIAN ASSISTANT

## 2022-05-19 PROCEDURE — 3078F PR MOST RECENT DIASTOLIC BLOOD PRESSURE < 80 MM HG: ICD-10-PCS | Mod: CPTII,S$GLB,, | Performed by: PHYSICIAN ASSISTANT

## 2022-05-19 PROCEDURE — 1159F PR MEDICATION LIST DOCUMENTED IN MEDICAL RECORD: ICD-10-PCS | Mod: CPTII,S$GLB,, | Performed by: PHYSICIAN ASSISTANT

## 2022-05-19 PROCEDURE — 99499 RISK ADDL DX/OHS AUDIT: ICD-10-PCS | Mod: HCNC,S$GLB,, | Performed by: PHYSICIAN ASSISTANT

## 2022-05-19 PROCEDURE — 3075F PR MOST RECENT SYSTOLIC BLOOD PRESS GE 130-139MM HG: ICD-10-PCS | Mod: CPTII,S$GLB,, | Performed by: PHYSICIAN ASSISTANT

## 2022-05-19 PROCEDURE — 1160F PR REVIEW ALL MEDS BY PRESCRIBER/CLIN PHARMACIST DOCUMENTED: ICD-10-PCS | Mod: CPTII,S$GLB,, | Performed by: PHYSICIAN ASSISTANT

## 2022-05-19 PROCEDURE — 1101F PT FALLS ASSESS-DOCD LE1/YR: CPT | Mod: CPTII,S$GLB,, | Performed by: PHYSICIAN ASSISTANT

## 2022-05-19 PROCEDURE — 3075F SYST BP GE 130 - 139MM HG: CPT | Mod: CPTII,S$GLB,, | Performed by: PHYSICIAN ASSISTANT

## 2022-05-19 PROCEDURE — 3078F DIAST BP <80 MM HG: CPT | Mod: CPTII,S$GLB,, | Performed by: PHYSICIAN ASSISTANT

## 2022-05-19 PROCEDURE — 1160F RVW MEDS BY RX/DR IN RCRD: CPT | Mod: CPTII,S$GLB,, | Performed by: PHYSICIAN ASSISTANT

## 2022-05-19 PROCEDURE — 1101F PR PT FALLS ASSESS DOC 0-1 FALLS W/OUT INJ PAST YR: ICD-10-PCS | Mod: CPTII,S$GLB,, | Performed by: PHYSICIAN ASSISTANT

## 2022-05-19 PROCEDURE — 99214 PR OFFICE/OUTPT VISIT, EST, LEVL IV, 30-39 MIN: ICD-10-PCS | Mod: S$GLB,,, | Performed by: PHYSICIAN ASSISTANT

## 2022-05-19 PROCEDURE — 99999 PR PBB SHADOW E&M-EST. PATIENT-LVL IV: CPT | Mod: PBBFAC,,, | Performed by: PHYSICIAN ASSISTANT

## 2022-05-19 PROCEDURE — 99499 UNLISTED E&M SERVICE: CPT | Mod: HCNC,S$GLB,, | Performed by: PHYSICIAN ASSISTANT

## 2022-05-19 NOTE — PROGRESS NOTES
"Subjective:      Patient ID: Filipe Agustin Jr. is a 76 y.o. male.    Chief Complaint: Follow-up    Patient is new to me, being seen today for 6mth f/u.   Denies current concerns/complaints    HTN- toprol 50mg  HLD- on statin and fenofibrate     Labs recently completed  Kidney function decreased slightly   Admits does not drink adequate water, no NSAIDs     Last visit Nov 2021 with Dr. Duque     Review of Systems   Constitutional: Negative for chills, diaphoresis and fever.   HENT: Negative for congestion, rhinorrhea and sore throat.    Respiratory: Negative for cough, shortness of breath and wheezing.         PMH smoker, stopped in 2000, 1-1.5pk/day   Cardiovascular: Negative for chest pain and leg swelling.   Gastrointestinal: Negative for abdominal pain, constipation, diarrhea, nausea and vomiting.   Skin: Negative for rash.   Neurological: Negative for dizziness, light-headedness and headaches.       Objective:   /68   Pulse 75   Temp 98.5 °F (36.9 °C) (Tympanic)   Resp 18   Ht 5' 9" (1.753 m)   Wt 96.8 kg (213 lb 6.5 oz)   SpO2 95%   BMI 31.51 kg/m²   Physical Exam  Constitutional:       General: He is not in acute distress.     Appearance: Normal appearance. He is well-developed. He is not ill-appearing.   HENT:      Head: Normocephalic and atraumatic.   Cardiovascular:      Rate and Rhythm: Normal rate and regular rhythm.      Heart sounds: Normal heart sounds. No murmur heard.  Pulmonary:      Effort: Pulmonary effort is normal. No respiratory distress.      Breath sounds: Normal breath sounds. No decreased breath sounds.   Musculoskeletal:      Right lower leg: No edema.      Left lower leg: No edema.   Skin:     General: Skin is warm and dry.      Findings: No rash.   Psychiatric:         Speech: Speech normal.         Behavior: Behavior normal.         Thought Content: Thought content normal.       Assessment:      1. Function kidney decreased    2. Essential hypertension    3. Hyperlipidemia " LDL goal <70    4. CAD, multiple vessel    5. S/P CABG x 4    6. Tricuspid valve insufficiency, unspecified etiology    7. Stage 3a chronic kidney disease    8. Cancer of prostate with intermediate recurrence risk (stage T2b-c or [place] 7 or PSA 10-20)    9. Obesity (BMI 30.0-34.9)    10. Personal history of nicotine dependence       Plan:   Function kidney decreased  -     Basic Metabolic Panel; Future; Expected date: 05/19/2022    Essential hypertension   Controlled     Hyperlipidemia LDL goal <70   Continue statin     CAD, multiple vessel   Followed by Cardiology    S/P CABG x 4   Followed by Cardiology    Tricuspid valve insufficiency, unspecified etiology   Followed by Cardiology    Stage 3a chronic kidney disease   Recheck in 2wks     Cancer of prostate with intermediate recurrence risk (stage T2b-c or [place] 7 or PSA 10-20)   Followed by Urology     Obesity (BMI 30.0-34.9)    Personal history of nicotine dependence  -     CT Chest Lung Screening Low Dose; Future; Expected date: 05/19/2022      Discussed need for vaccine, declines shingles     6mth f/u or sooner if needed

## 2022-06-02 ENCOUNTER — LAB VISIT (OUTPATIENT)
Dept: LAB | Facility: HOSPITAL | Age: 77
End: 2022-06-02
Attending: PHYSICIAN ASSISTANT
Payer: MEDICARE

## 2022-06-02 DIAGNOSIS — N28.9 FUNCTION KIDNEY DECREASED: ICD-10-CM

## 2022-06-02 DIAGNOSIS — N18.32 STAGE 3B CHRONIC KIDNEY DISEASE: Primary | ICD-10-CM

## 2022-06-02 LAB
ANION GAP SERPL CALC-SCNC: 10 MMOL/L (ref 8–16)
BUN SERPL-MCNC: 12 MG/DL (ref 8–23)
CALCIUM SERPL-MCNC: 9.3 MG/DL (ref 8.7–10.5)
CHLORIDE SERPL-SCNC: 105 MMOL/L (ref 95–110)
CO2 SERPL-SCNC: 26 MMOL/L (ref 23–29)
CREAT SERPL-MCNC: 1.6 MG/DL (ref 0.5–1.4)
EST. GFR  (AFRICAN AMERICAN): 48 ML/MIN/1.73 M^2
EST. GFR  (NON AFRICAN AMERICAN): 42 ML/MIN/1.73 M^2
GLUCOSE SERPL-MCNC: 96 MG/DL (ref 70–110)
POTASSIUM SERPL-SCNC: 4.3 MMOL/L (ref 3.5–5.1)
SODIUM SERPL-SCNC: 141 MMOL/L (ref 136–145)

## 2022-06-02 PROCEDURE — 36415 COLL VENOUS BLD VENIPUNCTURE: CPT | Performed by: PHYSICIAN ASSISTANT

## 2022-06-02 PROCEDURE — 80048 BASIC METABOLIC PNL TOTAL CA: CPT | Performed by: PHYSICIAN ASSISTANT

## 2022-06-14 ENCOUNTER — TELEPHONE (OUTPATIENT)
Dept: NEPHROLOGY | Facility: CLINIC | Age: 77
End: 2022-06-14
Payer: MEDICARE

## 2022-06-14 DIAGNOSIS — N28.9 RENAL INSUFFICIENCY: Primary | ICD-10-CM

## 2022-07-12 ENCOUNTER — PATIENT MESSAGE (OUTPATIENT)
Dept: NEPHROLOGY | Facility: CLINIC | Age: 77
End: 2022-07-12
Payer: MEDICARE

## 2022-07-27 ENCOUNTER — LAB VISIT (OUTPATIENT)
Dept: LAB | Facility: HOSPITAL | Age: 77
End: 2022-07-27
Attending: INTERNAL MEDICINE
Payer: MEDICARE

## 2022-07-27 DIAGNOSIS — N28.9 RENAL INSUFFICIENCY: ICD-10-CM

## 2022-07-27 LAB
ALBUMIN SERPL BCP-MCNC: 3.6 G/DL (ref 3.5–5.2)
ANION GAP SERPL CALC-SCNC: 8 MMOL/L (ref 8–16)
BUN SERPL-MCNC: 14 MG/DL (ref 8–23)
CALCIUM SERPL-MCNC: 9.5 MG/DL (ref 8.7–10.5)
CHLORIDE SERPL-SCNC: 107 MMOL/L (ref 95–110)
CO2 SERPL-SCNC: 29 MMOL/L (ref 23–29)
CREAT SERPL-MCNC: 1.6 MG/DL (ref 0.5–1.4)
EST. GFR  (AFRICAN AMERICAN): 48 ML/MIN/1.73 M^2
EST. GFR  (NON AFRICAN AMERICAN): 41.5 ML/MIN/1.73 M^2
GLUCOSE SERPL-MCNC: 93 MG/DL (ref 70–110)
PHOSPHATE SERPL-MCNC: 3.5 MG/DL (ref 2.7–4.5)
POTASSIUM SERPL-SCNC: 4.7 MMOL/L (ref 3.5–5.1)
SODIUM SERPL-SCNC: 144 MMOL/L (ref 136–145)

## 2022-07-27 PROCEDURE — 36415 COLL VENOUS BLD VENIPUNCTURE: CPT | Performed by: INTERNAL MEDICINE

## 2022-07-27 PROCEDURE — 80069 RENAL FUNCTION PANEL: CPT | Performed by: INTERNAL MEDICINE

## 2022-08-03 ENCOUNTER — OFFICE VISIT (OUTPATIENT)
Dept: NEPHROLOGY | Facility: CLINIC | Age: 77
End: 2022-08-03
Payer: MEDICARE

## 2022-08-03 VITALS
HEART RATE: 74 BPM | HEIGHT: 69 IN | SYSTOLIC BLOOD PRESSURE: 124 MMHG | BODY MASS INDEX: 31.21 KG/M2 | WEIGHT: 210.75 LBS | DIASTOLIC BLOOD PRESSURE: 70 MMHG

## 2022-08-03 DIAGNOSIS — I12.9 PARENCHYMAL RENAL HYPERTENSION, STAGE 1 THROUGH STAGE 4 OR UNSPECIFIED CHRONIC KIDNEY DISEASE: ICD-10-CM

## 2022-08-03 DIAGNOSIS — N18.30 STAGE 3 CHRONIC KIDNEY DISEASE, UNSPECIFIED WHETHER STAGE 3A OR 3B CKD: Primary | ICD-10-CM

## 2022-08-03 DIAGNOSIS — N18.32 STAGE 3B CHRONIC KIDNEY DISEASE: ICD-10-CM

## 2022-08-03 PROCEDURE — 99204 OFFICE O/P NEW MOD 45 MIN: CPT | Mod: S$GLB,,, | Performed by: INTERNAL MEDICINE

## 2022-08-03 PROCEDURE — 1101F PT FALLS ASSESS-DOCD LE1/YR: CPT | Mod: CPTII,S$GLB,, | Performed by: INTERNAL MEDICINE

## 2022-08-03 PROCEDURE — 3288F PR FALLS RISK ASSESSMENT DOCUMENTED: ICD-10-PCS | Mod: CPTII,S$GLB,, | Performed by: INTERNAL MEDICINE

## 2022-08-03 PROCEDURE — 1160F PR REVIEW ALL MEDS BY PRESCRIBER/CLIN PHARMACIST DOCUMENTED: ICD-10-PCS | Mod: CPTII,S$GLB,, | Performed by: INTERNAL MEDICINE

## 2022-08-03 PROCEDURE — 3078F PR MOST RECENT DIASTOLIC BLOOD PRESSURE < 80 MM HG: ICD-10-PCS | Mod: CPTII,S$GLB,, | Performed by: INTERNAL MEDICINE

## 2022-08-03 PROCEDURE — 1126F PR PAIN SEVERITY QUANTIFIED, NO PAIN PRESENT: ICD-10-PCS | Mod: CPTII,S$GLB,, | Performed by: INTERNAL MEDICINE

## 2022-08-03 PROCEDURE — 99499 RISK ADDL DX/OHS AUDIT: ICD-10-PCS | Mod: S$GLB,,, | Performed by: INTERNAL MEDICINE

## 2022-08-03 PROCEDURE — 1159F PR MEDICATION LIST DOCUMENTED IN MEDICAL RECORD: ICD-10-PCS | Mod: CPTII,S$GLB,, | Performed by: INTERNAL MEDICINE

## 2022-08-03 PROCEDURE — 99499 UNLISTED E&M SERVICE: CPT | Mod: S$GLB,,, | Performed by: INTERNAL MEDICINE

## 2022-08-03 PROCEDURE — 3078F DIAST BP <80 MM HG: CPT | Mod: CPTII,S$GLB,, | Performed by: INTERNAL MEDICINE

## 2022-08-03 PROCEDURE — 1159F MED LIST DOCD IN RCRD: CPT | Mod: CPTII,S$GLB,, | Performed by: INTERNAL MEDICINE

## 2022-08-03 PROCEDURE — 1160F RVW MEDS BY RX/DR IN RCRD: CPT | Mod: CPTII,S$GLB,, | Performed by: INTERNAL MEDICINE

## 2022-08-03 PROCEDURE — 99999 PR PBB SHADOW E&M-EST. PATIENT-LVL III: CPT | Mod: PBBFAC,,, | Performed by: INTERNAL MEDICINE

## 2022-08-03 PROCEDURE — 3288F FALL RISK ASSESSMENT DOCD: CPT | Mod: CPTII,S$GLB,, | Performed by: INTERNAL MEDICINE

## 2022-08-03 PROCEDURE — 1101F PR PT FALLS ASSESS DOC 0-1 FALLS W/OUT INJ PAST YR: ICD-10-PCS | Mod: CPTII,S$GLB,, | Performed by: INTERNAL MEDICINE

## 2022-08-03 PROCEDURE — 3074F PR MOST RECENT SYSTOLIC BLOOD PRESSURE < 130 MM HG: ICD-10-PCS | Mod: CPTII,S$GLB,, | Performed by: INTERNAL MEDICINE

## 2022-08-03 PROCEDURE — 1126F AMNT PAIN NOTED NONE PRSNT: CPT | Mod: CPTII,S$GLB,, | Performed by: INTERNAL MEDICINE

## 2022-08-03 PROCEDURE — 99204 PR OFFICE/OUTPT VISIT, NEW, LEVL IV, 45-59 MIN: ICD-10-PCS | Mod: S$GLB,,, | Performed by: INTERNAL MEDICINE

## 2022-08-03 PROCEDURE — 99999 PR PBB SHADOW E&M-EST. PATIENT-LVL III: ICD-10-PCS | Mod: PBBFAC,,, | Performed by: INTERNAL MEDICINE

## 2022-08-03 PROCEDURE — 3074F SYST BP LT 130 MM HG: CPT | Mod: CPTII,S$GLB,, | Performed by: INTERNAL MEDICINE

## 2022-08-03 NOTE — PROGRESS NOTES
Filipe Agustin Jr. is a 76 y.o. male     HPI:    He was noted to have slightly elevated creatinine and decreased EGFR on routine laboratory studies.  Nephrology has been consulted for evaluation.  In the clinic today he is doing well and has no specific complaints.  His laboratory studies and medications were reviewed.  All Nephrology related questions were answered to his satisfaction.  On review of his labs for the past 3 years his creatinine has ranged between 1.3 and around 1.8.  He has history of prostate cancer and was treated with radiation therapy.    PAST MEDICAL HISTORY:  He  has a past medical history of Acute coronary syndrome, Cancer of prostate with intermediate recurrence risk (stage T2b-c or Jonathon 7 or PSA 10-20) (1/8/2018), CKD (chronic kidney disease) stage 3, GFR 30-59 ml/min (9/24/2015), Coronary artery disease, Elevated PSA (6/27/2017), History of coronary angioplasty (9/19/2014), Hyperlipidemia, Hypertension, Myocardial infarction (2008), Obesity, Class II, BMI 35.0-39.9, with comorbidity (see actual BMI) (6/6/2014), Polio, and Tobacco dependence.    PAST SURGICAL HISTORY:  He  has a past surgical history that includes Coronary stent placement (2008); Cardiac catheterization (2008); Coronary angioplasty (2008); Coronary Artery Bypass Graft (CABG) (N/A, 12/18/2018); Endoscopic harvest of vein (Bilateral, 12/18/2018); and Left heart catheterization (Left, 12/15/2018).    SOCIAL HISTORY:  He  reports that he quit smoking about 14 years ago. His smoking use included cigarettes. He has a 40.00 pack-year smoking history. He has never used smokeless tobacco. He reports current alcohol use. He reports that he does not use drugs.      FAMILY MEDICAL HISTORY:  His family history includes Cancer in his brother; Heart disease in his brother and father.    Review of patient's allergies indicates:   Allergen Reactions    Paragoric Other (See Comments)     sneeze           Prior to Admission medications   "  Medication Sig Start Date End Date Taking? Authorizing Provider   aspirin (ECOTRIN) 81 MG EC tablet Take 81 mg by mouth once daily.   Yes Historical Provider   atorvastatin (LIPITOR) 40 MG tablet TAKE 1 TABLET EVERY DAY 1/25/22  Yes Jojo Duque DO   clopidogreL (PLAVIX) 75 mg tablet TAKE 1 TABLET EVERY DAY 1/26/22  Yes Jojo Duque DO   cyanocobalamin (VITAMIN B-12) 100 MCG tablet Take 100 mcg by mouth once daily.   Yes Historical Provider   fenofibrate 160 MG Tab TAKE 1 TABLET EVERY OTHER DAY 12/15/21  Yes Jojo Duque DO   fish oil-omega-3 fatty acids 300-1,000 mg capsule Take 2 g by mouth once daily.   Yes Historical Provider   influenza (FLUZONE HIGH-DOSE) 180 mcg/0.5 mL vaccine ADMINISTERED BY [facility] 9/6/18  Yes Daniel Garcia, PharmD   metoprolol tartrate (LOPRESSOR) 50 MG tablet TAKE 1 TABLET TWICE DAILY 1/25/22  Yes Jojo Duque DO   tamsulosin (FLOMAX) 0.4 mg Cap Take 1 capsule (0.4 mg total) by mouth once daily. For bladder 3/21/22  Yes Aron Tovar MD   vitamin D 1000 units Tab Take 2,000 Units by mouth once daily.    Yes Historical Provider        REVIEW OF SYSTEMS:  Patient has no fever, fatigue, visual changes, chest pain, edema, cough, dyspnea, nausea, vomiting, constipation, diarrhea, arthralgias, pruritis, dizziness, weakness, depression, confusion.        PHYSICAL EXAM:   height is 5' 9" (1.753 m) and weight is 95.6 kg (210 lb 12.2 oz). His blood pressure is 124/70 and his pulse is 74.   Gen: WDWN male in no apparent distress  Psych: Normal mood and affect  Skin: No rashes or ulcers  Eyes: Normal conjunctiva and lids, PERRLA  ENT: Normal hearing with no oropharyngeal lesions  Neck: No JVD  Chest: Clear with no rales, rhonchi, wheezing with normal effort  CV: Regular with no murmurs, gallops or rubs  Abd: Soft, nontender, no distension, positive bowel sounds  Ext: No cyanosis, clubbing or edema          IMPRESSION AND RECOMMENDATIONS:    1. CKD stage 3:  EGFR is essentially normal " for his age.  His urinalysis is bland without evidence of proteinuria.  His creatinine has shown quite a bit of fluctuation ranging between 1.3 and 1.8 over the past several years.  Most recent creatinine has been very stable 1.6.    Given his history of prostate cancer there may have been a component of bladder outlet obstruction.  He is going to follow-up with his urologist.    There is no evidence of volume overload.  Potassium is stable 4.7.  Bicarbonate is stable at 29.     2. Hypertension:  Blood pressure is well controlled on current regimen.    Approximately 45 minutes was spent in face-to-face conversation and chart review.

## 2022-08-25 ENCOUNTER — PES CALL (OUTPATIENT)
Dept: ADMINISTRATIVE | Facility: CLINIC | Age: 77
End: 2022-08-25
Payer: MEDICARE

## 2022-10-03 ENCOUNTER — TELEPHONE (OUTPATIENT)
Dept: INTERNAL MEDICINE | Facility: CLINIC | Age: 77
End: 2022-10-03
Payer: MEDICARE

## 2022-10-03 NOTE — TELEPHONE ENCOUNTER
----- Message from Amada Vasquez sent at 10/3/2022  4:08 PM CDT -----  Contact: Josefina(Wife)/[phone]  Josefina is calling in regards to pt medication, she states pharmacy said her medication is in but pt is not and he needs it because they both have Covid. Please give her a call back at [phone]. Thank you s/g

## 2022-10-04 ENCOUNTER — PATIENT MESSAGE (OUTPATIENT)
Dept: INTERNAL MEDICINE | Facility: CLINIC | Age: 77
End: 2022-10-04
Payer: MEDICARE

## 2022-10-04 DIAGNOSIS — U07.1 COVID: ICD-10-CM

## 2022-10-04 DIAGNOSIS — U07.1 COVID-19: Primary | ICD-10-CM

## 2022-10-06 ENCOUNTER — INFUSION (OUTPATIENT)
Dept: INFECTIOUS DISEASES | Facility: HOSPITAL | Age: 77
End: 2022-10-06
Attending: INTERNAL MEDICINE
Payer: MEDICARE

## 2022-10-06 VITALS
RESPIRATION RATE: 16 BRPM | HEART RATE: 63 BPM | SYSTOLIC BLOOD PRESSURE: 144 MMHG | TEMPERATURE: 98 F | OXYGEN SATURATION: 97 % | DIASTOLIC BLOOD PRESSURE: 76 MMHG

## 2022-10-06 DIAGNOSIS — U07.1 COVID-19: Primary | ICD-10-CM

## 2022-10-06 PROCEDURE — M0222 HC IV INJECTION, BEBTELOVIMAB, INCL POST ADMIN MONIT: HCPCS | Performed by: INTERNAL MEDICINE

## 2022-10-06 PROCEDURE — 63600175 PHARM REV CODE 636 W HCPCS: Performed by: INTERNAL MEDICINE

## 2022-10-06 RX ORDER — ONDANSETRON 4 MG/1
4 TABLET, ORALLY DISINTEGRATING ORAL
Status: ACTIVE | OUTPATIENT
Start: 2022-10-06 | End: 2022-10-07

## 2022-10-06 RX ORDER — ALBUTEROL SULFATE 90 UG/1
2 AEROSOL, METERED RESPIRATORY (INHALATION)
Status: ACTIVE | OUTPATIENT
Start: 2022-10-06 | End: 2022-10-09

## 2022-10-06 RX ORDER — ACETAMINOPHEN 325 MG/1
650 TABLET ORAL
Status: ACTIVE | OUTPATIENT
Start: 2022-10-06 | End: 2022-10-07

## 2022-10-06 RX ORDER — BEBTELOVIMAB 87.5 MG/ML
175 INJECTION, SOLUTION INTRAVENOUS
Status: COMPLETED | OUTPATIENT
Start: 2022-10-06 | End: 2022-10-06

## 2022-10-06 RX ORDER — DIPHENHYDRAMINE HYDROCHLORIDE 50 MG/ML
25 INJECTION INTRAMUSCULAR; INTRAVENOUS
Status: ACTIVE | OUTPATIENT
Start: 2022-10-06 | End: 2022-10-07

## 2022-10-06 RX ORDER — EPINEPHRINE 0.3 MG/.3ML
0.3 INJECTION SUBCUTANEOUS
Status: ACTIVE | OUTPATIENT
Start: 2022-10-06 | End: 2022-10-09

## 2022-10-06 RX ADMIN — BEBTELOVIMAB 175 MG: 87.5 INJECTION, SOLUTION INTRAVENOUS at 11:10

## 2022-10-06 NOTE — PROGRESS NOTES
If you have any further COVID related symptoms that have not improved or feel you're having a reaction to your infusion please notify your primary care physician, go to the nearest emergency room or call 911. Patient discharged via wheelchair with escort to front door of hospital in no apparent distress. Tolerated infusion well. Instructed patient to notify primary care physician if symptoms do not resolve or worsen and to continue to quarantine for the full 10 day period. Also, instructed to wait 90 days post-infusion to receive Covid vaccine. Patient verbalized intent to comply.

## 2022-11-02 ENCOUNTER — IMMUNIZATION (OUTPATIENT)
Dept: PRIMARY CARE CLINIC | Facility: CLINIC | Age: 77
End: 2022-11-02
Payer: MEDICARE

## 2022-11-02 DIAGNOSIS — Z23 NEED FOR VACCINATION: Primary | ICD-10-CM

## 2022-11-02 PROCEDURE — 91312 COVID-19, MRNA, LNP-S, BIVALENT BOOSTER, PF, 30 MCG/0.3 ML DOSE: CPT | Mod: PBBFAC | Performed by: FAMILY MEDICINE

## 2022-11-02 PROCEDURE — 0124A COVID-19, MRNA, LNP-S, BIVALENT BOOSTER, PF, 30 MCG/0.3 ML DOSE: CPT | Mod: CV19,PBBFAC | Performed by: FAMILY MEDICINE

## 2022-11-14 ENCOUNTER — LAB VISIT (OUTPATIENT)
Dept: LAB | Facility: HOSPITAL | Age: 77
End: 2022-11-14
Attending: INTERNAL MEDICINE
Payer: MEDICARE

## 2022-11-14 DIAGNOSIS — E78.5 HYPERLIPIDEMIA LDL GOAL <70: ICD-10-CM

## 2022-11-14 DIAGNOSIS — I10 ESSENTIAL HYPERTENSION: ICD-10-CM

## 2022-11-14 DIAGNOSIS — N18.30 CKD (CHRONIC KIDNEY DISEASE) STAGE 3, GFR 30-59 ML/MIN: ICD-10-CM

## 2022-11-14 LAB
ALBUMIN SERPL BCP-MCNC: 3.7 G/DL (ref 3.5–5.2)
ALP SERPL-CCNC: 33 U/L (ref 55–135)
ALT SERPL W/O P-5'-P-CCNC: 16 U/L (ref 10–44)
ANION GAP SERPL CALC-SCNC: 7 MMOL/L (ref 8–16)
AST SERPL-CCNC: 17 U/L (ref 10–40)
BILIRUB SERPL-MCNC: 0.9 MG/DL (ref 0.1–1)
BUN SERPL-MCNC: 13 MG/DL (ref 8–23)
CALCIUM SERPL-MCNC: 9.5 MG/DL (ref 8.7–10.5)
CHLORIDE SERPL-SCNC: 104 MMOL/L (ref 95–110)
CHOLEST SERPL-MCNC: 139 MG/DL (ref 120–199)
CHOLEST/HDLC SERPL: 2.4 {RATIO} (ref 2–5)
CO2 SERPL-SCNC: 29 MMOL/L (ref 23–29)
CREAT SERPL-MCNC: 1.4 MG/DL (ref 0.5–1.4)
EST. GFR  (NO RACE VARIABLE): 52.4 ML/MIN/1.73 M^2
GLUCOSE SERPL-MCNC: 99 MG/DL (ref 70–110)
HDLC SERPL-MCNC: 59 MG/DL (ref 40–75)
HDLC SERPL: 42.4 % (ref 20–50)
LDLC SERPL CALC-MCNC: 67.8 MG/DL (ref 63–159)
NONHDLC SERPL-MCNC: 80 MG/DL
POTASSIUM SERPL-SCNC: 4.3 MMOL/L (ref 3.5–5.1)
PROT SERPL-MCNC: 6.8 G/DL (ref 6–8.4)
SODIUM SERPL-SCNC: 140 MMOL/L (ref 136–145)
TRIGL SERPL-MCNC: 61 MG/DL (ref 30–150)

## 2022-11-14 PROCEDURE — 80061 LIPID PANEL: CPT | Performed by: INTERNAL MEDICINE

## 2022-11-14 PROCEDURE — 80053 COMPREHEN METABOLIC PANEL: CPT | Performed by: INTERNAL MEDICINE

## 2022-11-14 PROCEDURE — 36415 COLL VENOUS BLD VENIPUNCTURE: CPT | Performed by: INTERNAL MEDICINE

## 2022-11-21 ENCOUNTER — OFFICE VISIT (OUTPATIENT)
Dept: INTERNAL MEDICINE | Facility: CLINIC | Age: 77
End: 2022-11-21
Payer: MEDICARE

## 2022-11-21 ENCOUNTER — PATIENT MESSAGE (OUTPATIENT)
Dept: INTERNAL MEDICINE | Facility: CLINIC | Age: 77
End: 2022-11-21

## 2022-11-21 ENCOUNTER — LAB VISIT (OUTPATIENT)
Dept: LAB | Facility: HOSPITAL | Age: 77
End: 2022-11-21
Attending: INTERNAL MEDICINE
Payer: MEDICARE

## 2022-11-21 VITALS
BODY MASS INDEX: 31.06 KG/M2 | HEIGHT: 69 IN | RESPIRATION RATE: 20 BRPM | DIASTOLIC BLOOD PRESSURE: 78 MMHG | TEMPERATURE: 98 F | OXYGEN SATURATION: 98 % | SYSTOLIC BLOOD PRESSURE: 138 MMHG | HEART RATE: 76 BPM | WEIGHT: 209.69 LBS

## 2022-11-21 DIAGNOSIS — I10 ESSENTIAL HYPERTENSION: Primary | Chronic | ICD-10-CM

## 2022-11-21 DIAGNOSIS — I10 ESSENTIAL HYPERTENSION: Chronic | ICD-10-CM

## 2022-11-21 DIAGNOSIS — E78.5 HYPERLIPIDEMIA LDL GOAL <70: ICD-10-CM

## 2022-11-21 DIAGNOSIS — R39.89 ABNORMAL URINE COLOR: ICD-10-CM

## 2022-11-21 DIAGNOSIS — N18.30 STAGE 3 CHRONIC KIDNEY DISEASE, UNSPECIFIED WHETHER STAGE 3A OR 3B CKD: ICD-10-CM

## 2022-11-21 DIAGNOSIS — I10 ESSENTIAL HYPERTENSION: ICD-10-CM

## 2022-11-21 DIAGNOSIS — E78.5 HYPERLIPIDEMIA LDL GOAL <70: Chronic | ICD-10-CM

## 2022-11-21 DIAGNOSIS — Z11.59 NEED FOR HEPATITIS C SCREENING TEST: ICD-10-CM

## 2022-11-21 DIAGNOSIS — N18.30 STAGE 3 CHRONIC KIDNEY DISEASE, UNSPECIFIED WHETHER STAGE 3A OR 3B CKD: Chronic | ICD-10-CM

## 2022-11-21 DIAGNOSIS — C61 PROSTATE CANCER: Primary | ICD-10-CM

## 2022-11-21 LAB
BASOPHILS # BLD AUTO: 0.06 K/UL (ref 0–0.2)
BASOPHILS NFR BLD: 1 % (ref 0–1.9)
BILIRUB UR QL STRIP: NEGATIVE
CAOX CRY UR QL COMP ASSIST: ABNORMAL
CLARITY UR REFRACT.AUTO: ABNORMAL
COLOR UR AUTO: YELLOW
DIFFERENTIAL METHOD: ABNORMAL
EOSINOPHIL # BLD AUTO: 0.1 K/UL (ref 0–0.5)
EOSINOPHIL NFR BLD: 1.8 % (ref 0–8)
ERYTHROCYTE [DISTWIDTH] IN BLOOD BY AUTOMATED COUNT: 15.4 % (ref 11.5–14.5)
GLUCOSE UR QL STRIP: NEGATIVE
HCT VFR BLD AUTO: 44.8 % (ref 40–54)
HGB BLD-MCNC: 13.7 G/DL (ref 14–18)
HGB UR QL STRIP: ABNORMAL
IMM GRANULOCYTES # BLD AUTO: 0.02 K/UL (ref 0–0.04)
IMM GRANULOCYTES NFR BLD AUTO: 0.3 % (ref 0–0.5)
KETONES UR QL STRIP: NEGATIVE
LEUKOCYTE ESTERASE UR QL STRIP: NEGATIVE
LYMPHOCYTES # BLD AUTO: 1.6 K/UL (ref 1–4.8)
LYMPHOCYTES NFR BLD: 27 % (ref 18–48)
MCH RBC QN AUTO: 29.7 PG (ref 27–31)
MCHC RBC AUTO-ENTMCNC: 30.6 G/DL (ref 32–36)
MCV RBC AUTO: 97 FL (ref 82–98)
MICROSCOPIC COMMENT: ABNORMAL
MONOCYTES # BLD AUTO: 0.3 K/UL (ref 0.3–1)
MONOCYTES NFR BLD: 5.6 % (ref 4–15)
NEUTROPHILS # BLD AUTO: 3.9 K/UL (ref 1.8–7.7)
NEUTROPHILS NFR BLD: 64.3 % (ref 38–73)
NITRITE UR QL STRIP: NEGATIVE
NRBC BLD-RTO: 0 /100 WBC
PH UR STRIP: 5 [PH] (ref 5–8)
PLATELET # BLD AUTO: 213 K/UL (ref 150–450)
PMV BLD AUTO: 12.6 FL (ref 9.2–12.9)
PROT UR QL STRIP: NEGATIVE
RBC # BLD AUTO: 4.62 M/UL (ref 4.6–6.2)
RBC #/AREA URNS AUTO: >100 /HPF (ref 0–4)
SP GR UR STRIP: 1.02 (ref 1–1.03)
SQUAMOUS #/AREA URNS AUTO: 1 /HPF
TSH SERPL DL<=0.005 MIU/L-ACNC: 1.24 UIU/ML (ref 0.4–4)
URN SPEC COLLECT METH UR: ABNORMAL
WBC # BLD AUTO: 6.07 K/UL (ref 3.9–12.7)
WBC #/AREA URNS AUTO: 2 /HPF (ref 0–5)

## 2022-11-21 PROCEDURE — 85025 COMPLETE CBC W/AUTO DIFF WBC: CPT | Performed by: INTERNAL MEDICINE

## 2022-11-21 PROCEDURE — 1126F PR PAIN SEVERITY QUANTIFIED, NO PAIN PRESENT: ICD-10-PCS | Mod: CPTII,S$GLB,, | Performed by: INTERNAL MEDICINE

## 2022-11-21 PROCEDURE — 1159F PR MEDICATION LIST DOCUMENTED IN MEDICAL RECORD: ICD-10-PCS | Mod: CPTII,S$GLB,, | Performed by: INTERNAL MEDICINE

## 2022-11-21 PROCEDURE — 99999 PR PBB SHADOW E&M-EST. PATIENT-LVL IV: ICD-10-PCS | Mod: PBBFAC,,, | Performed by: INTERNAL MEDICINE

## 2022-11-21 PROCEDURE — 3288F PR FALLS RISK ASSESSMENT DOCUMENTED: ICD-10-PCS | Mod: CPTII,S$GLB,, | Performed by: INTERNAL MEDICINE

## 2022-11-21 PROCEDURE — 99999 PR PBB SHADOW E&M-EST. PATIENT-LVL IV: CPT | Mod: PBBFAC,,, | Performed by: INTERNAL MEDICINE

## 2022-11-21 PROCEDURE — 3288F FALL RISK ASSESSMENT DOCD: CPT | Mod: CPTII,S$GLB,, | Performed by: INTERNAL MEDICINE

## 2022-11-21 PROCEDURE — 81001 URINALYSIS AUTO W/SCOPE: CPT | Performed by: INTERNAL MEDICINE

## 2022-11-21 PROCEDURE — 99214 OFFICE O/P EST MOD 30 MIN: CPT | Mod: S$GLB,,, | Performed by: INTERNAL MEDICINE

## 2022-11-21 PROCEDURE — 3078F PR MOST RECENT DIASTOLIC BLOOD PRESSURE < 80 MM HG: ICD-10-PCS | Mod: CPTII,S$GLB,, | Performed by: INTERNAL MEDICINE

## 2022-11-21 PROCEDURE — 99214 PR OFFICE/OUTPT VISIT, EST, LEVL IV, 30-39 MIN: ICD-10-PCS | Mod: S$GLB,,, | Performed by: INTERNAL MEDICINE

## 2022-11-21 PROCEDURE — 3075F PR MOST RECENT SYSTOLIC BLOOD PRESS GE 130-139MM HG: ICD-10-PCS | Mod: CPTII,S$GLB,, | Performed by: INTERNAL MEDICINE

## 2022-11-21 PROCEDURE — 1159F MED LIST DOCD IN RCRD: CPT | Mod: CPTII,S$GLB,, | Performed by: INTERNAL MEDICINE

## 2022-11-21 PROCEDURE — 84443 ASSAY THYROID STIM HORMONE: CPT | Performed by: INTERNAL MEDICINE

## 2022-11-21 PROCEDURE — 86803 HEPATITIS C AB TEST: CPT | Performed by: INTERNAL MEDICINE

## 2022-11-21 PROCEDURE — 36415 COLL VENOUS BLD VENIPUNCTURE: CPT | Performed by: INTERNAL MEDICINE

## 2022-11-21 PROCEDURE — 1101F PR PT FALLS ASSESS DOC 0-1 FALLS W/OUT INJ PAST YR: ICD-10-PCS | Mod: CPTII,S$GLB,, | Performed by: INTERNAL MEDICINE

## 2022-11-21 PROCEDURE — 1160F PR REVIEW ALL MEDS BY PRESCRIBER/CLIN PHARMACIST DOCUMENTED: ICD-10-PCS | Mod: CPTII,S$GLB,, | Performed by: INTERNAL MEDICINE

## 2022-11-21 PROCEDURE — 1126F AMNT PAIN NOTED NONE PRSNT: CPT | Mod: CPTII,S$GLB,, | Performed by: INTERNAL MEDICINE

## 2022-11-21 PROCEDURE — 1101F PT FALLS ASSESS-DOCD LE1/YR: CPT | Mod: CPTII,S$GLB,, | Performed by: INTERNAL MEDICINE

## 2022-11-21 PROCEDURE — 3075F SYST BP GE 130 - 139MM HG: CPT | Mod: CPTII,S$GLB,, | Performed by: INTERNAL MEDICINE

## 2022-11-21 PROCEDURE — 1160F RVW MEDS BY RX/DR IN RCRD: CPT | Mod: CPTII,S$GLB,, | Performed by: INTERNAL MEDICINE

## 2022-11-21 PROCEDURE — 3078F DIAST BP <80 MM HG: CPT | Mod: CPTII,S$GLB,, | Performed by: INTERNAL MEDICINE

## 2022-11-21 NOTE — PROGRESS NOTES
Filipe Agustin Jr.  76 y.o. White male  [id number]    Chief Complaint:  Chief Complaint   Patient presents with    Follow-up       History of Present Illness:  HTN--stable.   CKD III--stable and denies symptoms.   HLD--compliant with atorvastatin.   Lab Results   Component Value Date    LDLCALC 67.8 11/14/2022      CHOL 139 11/14/2022    TRIG 61 11/14/2022    HDL 59 11/14/2022    ALT 16 11/14/2022    AST 17 11/14/2022     11/14/2022    K 4.3 11/14/2022     11/14/2022    CREATININE 1.4 11/14/2022    BUN 13 11/14/2022    CO2 29 11/14/2022    TSH 1.396 12/20/2016     He states his urine has an abnormal color. He denies dysuria.     History:  Past Medical History:   Diagnosis Date    Acute coronary syndrome     Cancer of prostate with intermediate recurrence risk (stage T2b-c or [place] 7 or PSA 10-20) 1/8/2018    CKD (chronic kidney disease) stage 3, GFR 30-59 ml/min 9/24/2015    Coronary artery disease     Elevated PSA 6/27/2017    History of coronary angioplasty 9/19/2014    Hyperlipidemia     Hypertension     Myocardial infarction 2008    Obesity, Class II, BMI 35.0-39.9, with comorbidity (see actual BMI) 6/6/2014    Polio     as a child    Tobacco dependence     resolved       Medications:  Current Outpatient Medications on File Prior to Visit   Medication Sig Dispense Refill    aspirin (ECOTRIN) 81 MG EC tablet Take 81 mg by mouth once daily.      atorvastatin (LIPITOR) 40 MG tablet TAKE 1 TABLET EVERY DAY 90 tablet 0    clopidogreL (PLAVIX) 75 mg tablet TAKE 1 TABLET EVERY DAY 90 tablet 2    cyanocobalamin (VITAMIN B-12) 100 MCG tablet Take 100 mcg by mouth once daily.      fenofibrate 160 MG Tab TAKE 1 TABLET EVERY OTHER DAY 45 tablet 2    fish oil-omega-3 fatty acids 300-1,000 mg capsule Take 2 g by mouth once daily.      metoprolol tartrate (LOPRESSOR) 50 MG tablet TAKE 1 TABLET TWICE DAILY 180 tablet 0    tamsulosin (FLOMAX) 0.4 mg Cap Take 1 capsule (0.4 mg total) by mouth once daily. For bladder 30  capsule 11    vitamin D 1000 units Tab Take 2,000 Units by mouth once daily.          Allergies:  Review of patient's allergies indicates:   Allergen Reactions    Paragoric Other (See Comments)     sneeze       Review of Systems   Constitutional:  Negative for fever.   Respiratory:  Negative for cough and shortness of breath.    Cardiovascular:  Negative for chest pain and leg swelling.   Gastrointestinal:  Negative for abdominal pain.   Genitourinary:  Negative for dysuria.   Neurological:  Negative for dizziness and headaches.     Exam:  Vitals:    11/21/22 1307   BP: 138/78   Pulse: 76   Resp: 20   Temp: 98.1 °F (36.7 °C)     Weight: 95.1 kg (209 lb 10.5 oz)   Body mass index is 30.96 kg/m².      Physical Exam  Vitals reviewed.   Constitutional:       General: He is not in acute distress.     Appearance: He is well-developed. He is not ill-appearing.   Eyes:      General: No scleral icterus.  Cardiovascular:      Rate and Rhythm: Normal rate and regular rhythm.      Heart sounds: Normal heart sounds.   Pulmonary:      Effort: Pulmonary effort is normal. No respiratory distress.      Breath sounds: Normal breath sounds.   Skin:     General: Skin is warm and dry.   Neurological:      Mental Status: He is alert and oriented to person, place, and time.   Psychiatric:         Behavior: Behavior normal.       Assessment:  The primary encounter diagnosis was Essential hypertension. Diagnoses of Stage 3 chronic kidney disease, unspecified whether stage 3a or 3b CKD, Hyperlipidemia LDL goal <70, Abnormal urine color, and Need for hepatitis C screening test were also pertinent to this visit.    Plan:  Essential hypertension  -     Continue metoprolol  -     CBC Auto Differential; Future; Expected date: 11/21/2022  -     TSH; Future    Stage 3 chronic kidney disease, unspecified whether stage 3a or 3b CKD  -     monitor  -     avoid NSAID's    Hyperlipidemia LDL goal <70  -     continue atorvastatin    Abnormal urine  color  -     Urinalysis    Need for hepatitis C screening test  -     HEPATITIS C ANTIBODY; Future; Expected date: 11/21/2022    Labs today.     Follow up in about 6 months (around 5/21/2023).

## 2022-11-22 ENCOUNTER — TELEPHONE (OUTPATIENT)
Dept: INTERNAL MEDICINE | Facility: CLINIC | Age: 77
End: 2022-11-22
Payer: MEDICARE

## 2022-11-22 DIAGNOSIS — I10 ESSENTIAL HYPERTENSION: Primary | ICD-10-CM

## 2022-11-22 DIAGNOSIS — C61 PROSTATE CANCER: ICD-10-CM

## 2022-11-22 DIAGNOSIS — E78.5 HYPERLIPIDEMIA LDL GOAL <70: ICD-10-CM

## 2022-11-22 LAB — HCV AB SERPL QL IA: NORMAL

## 2022-11-23 ENCOUNTER — OFFICE VISIT (OUTPATIENT)
Dept: CARDIOLOGY | Facility: CLINIC | Age: 77
End: 2022-11-23
Payer: MEDICARE

## 2022-11-23 ENCOUNTER — HOSPITAL ENCOUNTER (OUTPATIENT)
Dept: CARDIOLOGY | Facility: HOSPITAL | Age: 77
Discharge: HOME | End: 2022-11-23
Attending: STUDENT IN AN ORGANIZED HEALTH CARE EDUCATION/TRAINING PROGRAM
Payer: MEDICARE

## 2022-11-23 VITALS
WEIGHT: 207.69 LBS | BODY MASS INDEX: 30.76 KG/M2 | HEIGHT: 69 IN | SYSTOLIC BLOOD PRESSURE: 132 MMHG | OXYGEN SATURATION: 98 % | HEART RATE: 56 BPM | DIASTOLIC BLOOD PRESSURE: 70 MMHG

## 2022-11-23 DIAGNOSIS — Z95.1 S/P CABG X 4: ICD-10-CM

## 2022-11-23 DIAGNOSIS — I10 ESSENTIAL HYPERTENSION: ICD-10-CM

## 2022-11-23 DIAGNOSIS — I10 ESSENTIAL HYPERTENSION: Chronic | ICD-10-CM

## 2022-11-23 DIAGNOSIS — E78.5 HYPERLIPIDEMIA LDL GOAL <70: Primary | Chronic | ICD-10-CM

## 2022-11-23 DIAGNOSIS — I70.0 AORTIC ATHEROSCLEROSIS: ICD-10-CM

## 2022-11-23 DIAGNOSIS — R94.30 EJECTION FRACTION < 50%: ICD-10-CM

## 2022-11-23 DIAGNOSIS — I25.10 CAD, MULTIPLE VESSEL: ICD-10-CM

## 2022-11-23 PROCEDURE — 99999 PR PBB SHADOW E&M-EST. PATIENT-LVL III: CPT | Mod: PBBFAC,,, | Performed by: STUDENT IN AN ORGANIZED HEALTH CARE EDUCATION/TRAINING PROGRAM

## 2022-11-23 PROCEDURE — 1126F PR PAIN SEVERITY QUANTIFIED, NO PAIN PRESENT: ICD-10-PCS | Mod: CPTII,S$GLB,, | Performed by: STUDENT IN AN ORGANIZED HEALTH CARE EDUCATION/TRAINING PROGRAM

## 2022-11-23 PROCEDURE — 1159F MED LIST DOCD IN RCRD: CPT | Mod: CPTII,S$GLB,, | Performed by: STUDENT IN AN ORGANIZED HEALTH CARE EDUCATION/TRAINING PROGRAM

## 2022-11-23 PROCEDURE — 1126F AMNT PAIN NOTED NONE PRSNT: CPT | Mod: CPTII,S$GLB,, | Performed by: STUDENT IN AN ORGANIZED HEALTH CARE EDUCATION/TRAINING PROGRAM

## 2022-11-23 PROCEDURE — 3075F PR MOST RECENT SYSTOLIC BLOOD PRESS GE 130-139MM HG: ICD-10-PCS | Mod: CPTII,S$GLB,, | Performed by: STUDENT IN AN ORGANIZED HEALTH CARE EDUCATION/TRAINING PROGRAM

## 2022-11-23 PROCEDURE — 99999 PR PBB SHADOW E&M-EST. PATIENT-LVL III: ICD-10-PCS | Mod: PBBFAC,,, | Performed by: STUDENT IN AN ORGANIZED HEALTH CARE EDUCATION/TRAINING PROGRAM

## 2022-11-23 PROCEDURE — 1101F PR PT FALLS ASSESS DOC 0-1 FALLS W/OUT INJ PAST YR: ICD-10-PCS | Mod: CPTII,S$GLB,, | Performed by: STUDENT IN AN ORGANIZED HEALTH CARE EDUCATION/TRAINING PROGRAM

## 2022-11-23 PROCEDURE — 99214 PR OFFICE/OUTPT VISIT, EST, LEVL IV, 30-39 MIN: ICD-10-PCS | Mod: S$GLB,,, | Performed by: STUDENT IN AN ORGANIZED HEALTH CARE EDUCATION/TRAINING PROGRAM

## 2022-11-23 PROCEDURE — 3288F PR FALLS RISK ASSESSMENT DOCUMENTED: ICD-10-PCS | Mod: CPTII,S$GLB,, | Performed by: STUDENT IN AN ORGANIZED HEALTH CARE EDUCATION/TRAINING PROGRAM

## 2022-11-23 PROCEDURE — 93010 EKG 12-LEAD: ICD-10-PCS | Mod: ,,, | Performed by: INTERNAL MEDICINE

## 2022-11-23 PROCEDURE — 3075F SYST BP GE 130 - 139MM HG: CPT | Mod: CPTII,S$GLB,, | Performed by: STUDENT IN AN ORGANIZED HEALTH CARE EDUCATION/TRAINING PROGRAM

## 2022-11-23 PROCEDURE — 99499 UNLISTED E&M SERVICE: CPT | Mod: S$GLB,,, | Performed by: STUDENT IN AN ORGANIZED HEALTH CARE EDUCATION/TRAINING PROGRAM

## 2022-11-23 PROCEDURE — 3288F FALL RISK ASSESSMENT DOCD: CPT | Mod: CPTII,S$GLB,, | Performed by: STUDENT IN AN ORGANIZED HEALTH CARE EDUCATION/TRAINING PROGRAM

## 2022-11-23 PROCEDURE — 99214 OFFICE O/P EST MOD 30 MIN: CPT | Mod: S$GLB,,, | Performed by: STUDENT IN AN ORGANIZED HEALTH CARE EDUCATION/TRAINING PROGRAM

## 2022-11-23 PROCEDURE — 1159F PR MEDICATION LIST DOCUMENTED IN MEDICAL RECORD: ICD-10-PCS | Mod: CPTII,S$GLB,, | Performed by: STUDENT IN AN ORGANIZED HEALTH CARE EDUCATION/TRAINING PROGRAM

## 2022-11-23 PROCEDURE — 93010 ELECTROCARDIOGRAM REPORT: CPT | Mod: ,,, | Performed by: INTERNAL MEDICINE

## 2022-11-23 PROCEDURE — 3078F PR MOST RECENT DIASTOLIC BLOOD PRESSURE < 80 MM HG: ICD-10-PCS | Mod: CPTII,S$GLB,, | Performed by: STUDENT IN AN ORGANIZED HEALTH CARE EDUCATION/TRAINING PROGRAM

## 2022-11-23 PROCEDURE — 93005 ELECTROCARDIOGRAM TRACING: CPT

## 2022-11-23 PROCEDURE — 3078F DIAST BP <80 MM HG: CPT | Mod: CPTII,S$GLB,, | Performed by: STUDENT IN AN ORGANIZED HEALTH CARE EDUCATION/TRAINING PROGRAM

## 2022-11-23 PROCEDURE — 1101F PT FALLS ASSESS-DOCD LE1/YR: CPT | Mod: CPTII,S$GLB,, | Performed by: STUDENT IN AN ORGANIZED HEALTH CARE EDUCATION/TRAINING PROGRAM

## 2022-11-23 PROCEDURE — 99499 RISK ADDL DX/OHS AUDIT: ICD-10-PCS | Mod: S$GLB,,, | Performed by: STUDENT IN AN ORGANIZED HEALTH CARE EDUCATION/TRAINING PROGRAM

## 2022-11-23 NOTE — PROGRESS NOTES
Section of Cardiology                  Cardiac Clinic Note    Chief Complaint/Reason for consultation:  Follow-up      HPI:   Filipe Agustin Jr. is a 76 y.o. male with h/o CAD s/p 4vCABG 2018, HLD, hypertension, CKD, obesity who comes in for follow-up.    5/12/2022  Was seen Dr. Camarillo in Cardiology clinic  Doing well, no complaints  Does not exercise regularly  Still taking ASA and plavix  Not very active at home     Denies orthopnea, PND, chest pain, LE swelling, syncope.     11/23/22  Has not been exercising  Active at home, does not do much work outside in the yard  Still taking ASA and plavix  Has not had stress test in a while - but reports diarrhea last time after the nucellar   Lost 6 lbs since 5/22    Denies orthopnea, PND, chest pain, LE swelling, syncope.     EkG 11/23/22 NSR, RBBB, sinus arrhythmia   EKG 11/21 NSR, RBBB, qtc 422 ms     ECHO  2019    CONCLUSIONS     1 - Mildly depressed left ventricular systolic function (EF 45-50%).     2 - Impaired LV relaxation, normal LAP (grade 1 diastolic dysfunction).     3 - Normal right ventricular systolic function .     4 - Wall motion abnormalities.     STRESS TEST    [facility]      ROS: All 10 systems reviewed. Please refer to the HPI for pertinent positives. All other systems negative.     Past Medical History  Past Medical History:   Diagnosis Date    Acute coronary syndrome     Cancer of prostate with intermediate recurrence risk (stage T2b-c or [place] 7 or PSA 10-20) 1/8/2018    CKD (chronic kidney disease) stage 3, GFR 30-59 ml/min 9/24/2015    Coronary artery disease     Elevated PSA 6/27/2017    History of coronary angioplasty 9/19/2014    Hyperlipidemia     Hypertension     Myocardial infarction 2008    Obesity, Class II, BMI 35.0-39.9, with comorbidity (see actual BMI) 6/6/2014    Polio     as a child    Tobacco dependence     resolved       Surgical History  Past Surgical History:   Procedure Laterality Date    CARDIAC CATHETERIZATION       CORONARY ANGIOPLASTY      acute PCI- RCA- RUI    CORONARY ARTERY BYPASS GRAFT (CABG) N/A 2018    Procedure: CORONARY ARTERY BYPASS GRAFT (CABG);  Surgeon: Rg Johnson MD;  Location: [facility];  Service: Cardiothoracic;  Laterality: N/A;    CORONARY STENT PLACEMENT      ENDOSCOPIC HARVEST OF VEIN Bilateral 2018    Procedure: SURGICAL PROCUREMENT, VEIN, ENDOSCOPIC;  Surgeon: Rg Johnson MD;  Location: [facility];  Service: Cardiothoracic;  Laterality: Bilateral;    LEFT HEART CATHETERIZATION Left 12/15/2018    Procedure: CATHETERIZATION, HEART, LEFT;  Surgeon: Jose Armando Monroe MD;  Location: [facility];  Service: Cardiology;  Laterality: Left;          Allergies:   Review of patient's allergies indicates:   Allergen Reactions    Paragoric Other (See Comments)     sneeze       Social History:  Social History     Socioeconomic History    Marital status:     Number of children: 3    Highest education level: High school graduate   Occupational History    Occupation: Retired   Tobacco Use    Smoking status: Former     Packs/day: 2.00     Years: 20.00     Pack years: 40.00     Types: Cigarettes     Quit date: 2008     Years since quittin.3    Smokeless tobacco: Never   Substance and Sexual Activity    Alcohol use: Yes     Comment: rarely    Drug use: No    Sexual activity: Not Currently     Partners: Female       Family History:  family history includes Cancer in his brother; Heart disease in his brother and father.    Home Medications:  Current Outpatient Medications on File Prior to Visit   Medication Sig Dispense Refill    aspirin (ECOTRIN) 81 MG EC tablet Take 81 mg by mouth once daily.      atorvastatin (LIPITOR) 40 MG tablet TAKE 1 TABLET EVERY DAY 90 tablet 0    clopidogreL (PLAVIX) 75 mg tablet TAKE 1 TABLET EVERY DAY 90 tablet 2    cyanocobalamin (VITAMIN B-12) 100 MCG tablet Take 100 mcg by mouth once daily.      fenofibrate 160 MG Tab TAKE 1 TABLET EVERY  "OTHER DAY 45 tablet 2    fish oil-omega-3 fatty acids 300-1,000 mg capsule Take 2 g by mouth once daily.      metoprolol tartrate (LOPRESSOR) 50 MG tablet TAKE 1 TABLET TWICE DAILY 180 tablet 0    tamsulosin (FLOMAX) 0.4 mg Cap Take 1 capsule (0.4 mg total) by mouth once daily. For bladder 30 capsule 11    vitamin D 1000 units Tab Take 2,000 Units by mouth once daily.        No current facility-administered medications on file prior to visit.       Physical exam:  /70 (BP Location: Left arm, Patient Position: Sitting, BP Method: Medium (Manual))   Pulse (!) 56   Ht 5' 9" (1.753 m)   Wt 94.2 kg (207 lb 10.8 oz)   SpO2 98%   BMI 30.67 kg/m²         General: Pt is a 76 y.o. year old male who is AAOx3, in NAD, is pleasant, well nourished, looks stated age  HEENT: PERRL, EOMI, Oral mucosa pink & moist  CVS  No abnormal cardiac pulsations noted on inspection. JVP not raised. The apical impulse is normal on palpation, and is located in the left 5th intercostal space in the mid - clavicular line. No palpable thrills or abnormal pulsations noted. RR, S1 - S2 heard, no murmurs, rubs or gallops appreciated.   PUL : CTA B/L. No wheezes/crackles heard   ABD : BS +, soft. No tenderness elicited   LE : No C/C/E. Distal Pulses palpable B/L         LABS:    Chemistry:   Lab Results   Component Value Date     11/14/2022    K 4.3 11/14/2022     11/14/2022    CO2 29 11/14/2022    BUN 13 11/14/2022    CREATININE 1.4 11/14/2022    CALCIUM 9.5 11/14/2022     Cardiac Markers:   Lab Results   Component Value Date    TROPONINI 1.379 (H) 12/15/2018     Cardiac Markers (Last 3):   Lab Results   Component Value Date    TROPONINI 1.379 (H) 12/15/2018     CBC:   Lab Results   Component Value Date    WBC 6.07 11/21/2022    HGB 13.7 (L) 11/21/2022    HCT 44.8 11/21/2022    HCT 22 (L) 12/18/2018    MCV 97 11/21/2022     11/21/2022     Lipids:   Lab Results   Component Value Date    CHOL 139 11/14/2022    TRIG 61 " 11/14/2022    HDL 59 11/14/2022     Coagulation:   Lab Results   Component Value Date    INR 1.0 12/19/2018    APTT 24.2 12/19/2018           Assessment      1. Hyperlipidemia LDL goal <70    2. Essential hypertension    3. S/P CABG x 4    4. CAD, multiple vessel    5. Aortic atherosclerosis    6. Ejection fraction < 50%         Plan:    Mildly reduced EF  Asymptomatic  Will obtain repeat echo     HLD  LDL 68 as of 11/22  Continue statin and fenofibrate    Hypertension  Stable  Continue Lopressor    CAD s/p CABG  Continue aspirin, statin, Plavix  Will consider stress test in the future (around 5 years post CABG if remains asymptomatic)    Obesity, BMI 30-34.9  Low-salt, low-fat diet  Exercise as tolerated, at least 30 minutes daily  Weight loss encouraged      This note was prepared using voice recognition system and is likely to have sound alike errors that may have been overlooked even after proofreading.     I have reviewed all pertinent chart information.  Plans and recommendations have been formulated under my direct supervision. All questions answered and patient voiced understanding.   If symptoms persist go to the ED.    RTC in 6 months         Shant Díaz MD  Cardiology

## 2022-11-25 ENCOUNTER — TELEPHONE (OUTPATIENT)
Dept: INTERNAL MEDICINE | Facility: CLINIC | Age: 77
End: 2022-11-25
Payer: MEDICARE

## 2022-11-25 DIAGNOSIS — R82.90 ABNORMAL URINALYSIS: Primary | ICD-10-CM

## 2022-11-25 DIAGNOSIS — R31.9 HEMATURIA, UNSPECIFIED TYPE: ICD-10-CM

## 2022-11-28 ENCOUNTER — LAB VISIT (OUTPATIENT)
Dept: LAB | Facility: HOSPITAL | Age: 77
End: 2022-11-28
Attending: INTERNAL MEDICINE
Payer: MEDICARE

## 2022-11-28 DIAGNOSIS — R31.9 HEMATURIA, UNSPECIFIED TYPE: ICD-10-CM

## 2022-11-28 DIAGNOSIS — R82.90 ABNORMAL URINALYSIS: ICD-10-CM

## 2022-11-28 PROCEDURE — 87086 URINE CULTURE/COLONY COUNT: CPT | Performed by: INTERNAL MEDICINE

## 2022-11-30 LAB — BACTERIA UR CULT: NORMAL

## 2022-12-05 ENCOUNTER — PATIENT MESSAGE (OUTPATIENT)
Dept: CARDIOLOGY | Facility: CLINIC | Age: 77
End: 2022-12-05
Payer: MEDICARE

## 2022-12-06 ENCOUNTER — PATIENT MESSAGE (OUTPATIENT)
Dept: CARDIOLOGY | Facility: CLINIC | Age: 77
End: 2022-12-06
Payer: MEDICARE

## 2022-12-13 ENCOUNTER — HOSPITAL ENCOUNTER (OUTPATIENT)
Dept: CARDIOLOGY | Facility: HOSPITAL | Age: 77
Discharge: HOME | End: 2022-12-13
Attending: STUDENT IN AN ORGANIZED HEALTH CARE EDUCATION/TRAINING PROGRAM
Payer: MEDICARE

## 2022-12-13 VITALS
WEIGHT: 207 LBS | HEIGHT: 69 IN | DIASTOLIC BLOOD PRESSURE: 70 MMHG | BODY MASS INDEX: 30.66 KG/M2 | SYSTOLIC BLOOD PRESSURE: 132 MMHG

## 2022-12-13 DIAGNOSIS — I25.10 CAD, MULTIPLE VESSEL: ICD-10-CM

## 2022-12-13 DIAGNOSIS — R94.30 EJECTION FRACTION < 50%: ICD-10-CM

## 2022-12-13 DIAGNOSIS — I70.0 AORTIC ATHEROSCLEROSIS: ICD-10-CM

## 2022-12-13 DIAGNOSIS — E78.5 HYPERLIPIDEMIA LDL GOAL <70: ICD-10-CM

## 2022-12-13 DIAGNOSIS — I10 ESSENTIAL HYPERTENSION: ICD-10-CM

## 2022-12-13 DIAGNOSIS — Z95.1 S/P CABG X 4: ICD-10-CM

## 2022-12-13 PROCEDURE — 93306 TTE W/DOPPLER COMPLETE: CPT

## 2022-12-13 PROCEDURE — 93306 TTE W/DOPPLER COMPLETE: CPT | Mod: 26,,, | Performed by: STUDENT IN AN ORGANIZED HEALTH CARE EDUCATION/TRAINING PROGRAM

## 2022-12-13 PROCEDURE — 93306 ECHO (CUPID ONLY): ICD-10-PCS | Mod: 26,,, | Performed by: STUDENT IN AN ORGANIZED HEALTH CARE EDUCATION/TRAINING PROGRAM

## 2022-12-14 LAB
AORTIC ROOT ANNULUS: 3.09 CM
ASCENDING AORTA: 3.46 CM
AV INDEX (PROSTH): 0.82
AV MEAN GRADIENT: 3 MMHG
AV PEAK GRADIENT: 6 MMHG
AV VALVE AREA: 2.49 CM2
AV VELOCITY RATIO: 0.85
BSA FOR ECHO PROCEDURE: 2.14 M2
CV ECHO LV RWT: 0.43 CM
DOP CALC AO PEAK VEL: 1.25 M/S
DOP CALC AO VTI: 29.3 CM
DOP CALC LVOT AREA: 3 CM2
DOP CALC LVOT DIAMETER: 1.97 CM
DOP CALC LVOT PEAK VEL: 1.06 M/S
DOP CALC LVOT STROKE VOLUME: 72.81 CM3
DOP CALC RVOT PEAK VEL: 0.76 M/S
DOP CALC RVOT VTI: 18.4 CM
DOP CALCLVOT PEAK VEL VTI: 23.9 CM
E WAVE DECELERATION TIME: 255.01 MSEC
E/A RATIO: 1.34
E/E' RATIO: 11.44 M/S
ECHO LV POSTERIOR WALL: 1.05 CM (ref 0.6–1.1)
EJECTION FRACTION: 45 %
FRACTIONAL SHORTENING: 20 % (ref 28–44)
INTERVENTRICULAR SEPTUM: 0.99 CM (ref 0.6–1.1)
IVC DIAMETER: 1.22 CM
IVRT: 68.51 MSEC
LA MAJOR: 6.25 CM
LA MINOR: 5.61 CM
LEFT ATRIUM SIZE: 3.77 CM
LEFT ATRIUM VOLUME INDEX MOD: 18.2 ML/M2
LEFT ATRIUM VOLUME MOD: 38.12 CM3
LEFT INTERNAL DIMENSION IN SYSTOLE: 3.94 CM (ref 2.1–4)
LEFT VENTRICLE DIASTOLIC VOLUME INDEX: 54.22 ML/M2
LEFT VENTRICLE DIASTOLIC VOLUME: 113.86 ML
LEFT VENTRICLE MASS INDEX: 87 G/M2
LEFT VENTRICLE SYSTOLIC VOLUME INDEX: 32.1 ML/M2
LEFT VENTRICLE SYSTOLIC VOLUME: 67.51 ML
LEFT VENTRICULAR INTERNAL DIMENSION IN DIASTOLE: 4.92 CM (ref 3.5–6)
LEFT VENTRICULAR MASS: 182.02 G
LV LATERAL E/E' RATIO: 11.44 M/S
LV SEPTAL E/E' RATIO: 11.44 M/S
LVOT MG: 2.5 MMHG
LVOT MV: 0.74 CM/S
MV PEAK A VEL: 0.77 M/S
MV PEAK E VEL: 1.03 M/S
PISA TR MAX VEL: 2.71 M/S
PULM VEIN S/D RATIO: 0.73
PV MEAN GRADIENT: 1.28 MMHG
PV PEAK D VEL: 0.9 M/S
PV PEAK S VEL: 0.66 M/S
PV PEAK VELOCITY: 0.99 CM/S
RA MAJOR: 5.66 CM
RA PRESSURE: 3 MMHG
RA WIDTH: 4.66 CM
RIGHT VENTRICULAR END-DIASTOLIC DIMENSION: 3.9 CM
SINUS: 2.82 CM
STJ: 2.98 CM
TDI LATERAL: 0.09 M/S
TDI SEPTAL: 0.09 M/S
TDI: 0.09 M/S
TR MAX PG: 29 MMHG
TRICUSPID ANNULAR PLANE SYSTOLIC EXCURSION: 1.81 CM
TV REST PULMONARY ARTERY PRESSURE: 32 MMHG

## 2023-02-07 DIAGNOSIS — Z00.00 ENCOUNTER FOR MEDICARE ANNUAL WELLNESS EXAM: ICD-10-CM

## 2023-02-09 DIAGNOSIS — Z00.00 ENCOUNTER FOR MEDICARE ANNUAL WELLNESS EXAM: ICD-10-CM

## 2023-05-29 ENCOUNTER — OFFICE VISIT (OUTPATIENT)
Dept: INTERNAL MEDICINE | Facility: CLINIC | Age: 78
End: 2023-05-29
Payer: MEDICARE

## 2023-05-29 VITALS
HEART RATE: 66 BPM | OXYGEN SATURATION: 98 % | TEMPERATURE: 97 F | DIASTOLIC BLOOD PRESSURE: 72 MMHG | BODY MASS INDEX: 31.28 KG/M2 | RESPIRATION RATE: 20 BRPM | SYSTOLIC BLOOD PRESSURE: 124 MMHG | WEIGHT: 211.19 LBS | HEIGHT: 69 IN

## 2023-05-29 DIAGNOSIS — I25.10 CAD, MULTIPLE VESSEL: ICD-10-CM

## 2023-05-29 DIAGNOSIS — I10 ESSENTIAL HYPERTENSION: Primary | Chronic | ICD-10-CM

## 2023-05-29 DIAGNOSIS — N18.30 STAGE 3 CHRONIC KIDNEY DISEASE, UNSPECIFIED WHETHER STAGE 3A OR 3B CKD: Chronic | ICD-10-CM

## 2023-05-29 DIAGNOSIS — E78.5 HYPERLIPIDEMIA LDL GOAL <70: Chronic | ICD-10-CM

## 2023-05-29 PROCEDURE — 1159F PR MEDICATION LIST DOCUMENTED IN MEDICAL RECORD: ICD-10-PCS | Mod: HCNC,CPTII,S$GLB, | Performed by: INTERNAL MEDICINE

## 2023-05-29 PROCEDURE — 3288F PR FALLS RISK ASSESSMENT DOCUMENTED: ICD-10-PCS | Mod: HCNC,CPTII,S$GLB, | Performed by: INTERNAL MEDICINE

## 2023-05-29 PROCEDURE — 3078F DIAST BP <80 MM HG: CPT | Mod: HCNC,CPTII,S$GLB, | Performed by: INTERNAL MEDICINE

## 2023-05-29 PROCEDURE — 1126F PR PAIN SEVERITY QUANTIFIED, NO PAIN PRESENT: ICD-10-PCS | Mod: HCNC,CPTII,S$GLB, | Performed by: INTERNAL MEDICINE

## 2023-05-29 PROCEDURE — 99999 PR PBB SHADOW E&M-EST. PATIENT-LVL IV: ICD-10-PCS | Mod: PBBFAC,HCNC,, | Performed by: INTERNAL MEDICINE

## 2023-05-29 PROCEDURE — 99214 OFFICE O/P EST MOD 30 MIN: CPT | Mod: HCNC,S$GLB,, | Performed by: INTERNAL MEDICINE

## 2023-05-29 PROCEDURE — 3074F PR MOST RECENT SYSTOLIC BLOOD PRESSURE < 130 MM HG: ICD-10-PCS | Mod: HCNC,CPTII,S$GLB, | Performed by: INTERNAL MEDICINE

## 2023-05-29 PROCEDURE — 99999 PR PBB SHADOW E&M-EST. PATIENT-LVL IV: CPT | Mod: PBBFAC,HCNC,, | Performed by: INTERNAL MEDICINE

## 2023-05-29 PROCEDURE — 3288F FALL RISK ASSESSMENT DOCD: CPT | Mod: HCNC,CPTII,S$GLB, | Performed by: INTERNAL MEDICINE

## 2023-05-29 PROCEDURE — 3078F PR MOST RECENT DIASTOLIC BLOOD PRESSURE < 80 MM HG: ICD-10-PCS | Mod: HCNC,CPTII,S$GLB, | Performed by: INTERNAL MEDICINE

## 2023-05-29 PROCEDURE — 1101F PR PT FALLS ASSESS DOC 0-1 FALLS W/OUT INJ PAST YR: ICD-10-PCS | Mod: HCNC,CPTII,S$GLB, | Performed by: INTERNAL MEDICINE

## 2023-05-29 PROCEDURE — 1159F MED LIST DOCD IN RCRD: CPT | Mod: HCNC,CPTII,S$GLB, | Performed by: INTERNAL MEDICINE

## 2023-05-29 PROCEDURE — 99214 PR OFFICE/OUTPT VISIT, EST, LEVL IV, 30-39 MIN: ICD-10-PCS | Mod: HCNC,S$GLB,, | Performed by: INTERNAL MEDICINE

## 2023-05-29 PROCEDURE — 1101F PT FALLS ASSESS-DOCD LE1/YR: CPT | Mod: HCNC,CPTII,S$GLB, | Performed by: INTERNAL MEDICINE

## 2023-05-29 PROCEDURE — 1126F AMNT PAIN NOTED NONE PRSNT: CPT | Mod: HCNC,CPTII,S$GLB, | Performed by: INTERNAL MEDICINE

## 2023-05-29 PROCEDURE — 1160F PR REVIEW ALL MEDS BY PRESCRIBER/CLIN PHARMACIST DOCUMENTED: ICD-10-PCS | Mod: HCNC,CPTII,S$GLB, | Performed by: INTERNAL MEDICINE

## 2023-05-29 PROCEDURE — 1160F RVW MEDS BY RX/DR IN RCRD: CPT | Mod: HCNC,CPTII,S$GLB, | Performed by: INTERNAL MEDICINE

## 2023-05-29 PROCEDURE — 3074F SYST BP LT 130 MM HG: CPT | Mod: HCNC,CPTII,S$GLB, | Performed by: INTERNAL MEDICINE

## 2023-05-29 NOTE — PROGRESS NOTES
Filipe Agustin .  77 y.o. White male  [id number]    Chief Complaint:  Chief Complaint   Patient presents with    Follow-up       History of Present Illness:  HTN--stable.   CKD III--denies symptoms.   HLD--compliant with medication.   CAD--denies symptoms.     Laboratory:  Lab Results   Component Value Date    WBC 6.07 11/21/2022    HGB 13.7 (L) 11/21/2022    HCT 44.8 11/21/2022     11/21/2022    CHOL 139 11/14/2022    TRIG 61 11/14/2022    HDL 59 11/14/2022    ALT 16 11/14/2022    AST 17 11/14/2022     11/14/2022    K 4.3 11/14/2022     11/14/2022    CREATININE 1.4 11/14/2022    BUN 13 11/14/2022    CO2 29 11/14/2022    TSH 1.242 11/21/2022    PSA 7.0 (H) 09/06/2017    INR 1.0 12/19/2018    HGBA1C 5.4 12/17/2018     Lab Results   Component Value Date    LDLCALC 67.8 11/14/2022     History:  Past Medical History:   Diagnosis Date    Acute coronary syndrome     Cancer of prostate with intermediate recurrence risk (stage T2b-c or [place] 7 or PSA 10-20) 1/8/2018    CKD (chronic kidney disease) stage 3, GFR 30-59 ml/min 9/24/2015    Coronary artery disease     Elevated PSA 6/27/2017    History of coronary angioplasty 9/19/2014    Hyperlipidemia     Hypertension     Myocardial infarction 2008    Obesity, Class II, BMI 35.0-39.9, with comorbidity (see actual BMI) 6/6/2014    Polio     as a child    Tobacco dependence     resolved       Medications:  Current Outpatient Medications on File Prior to Visit   Medication Sig Dispense Refill    aspirin (ECOTRIN) 81 MG EC tablet Take 81 mg by mouth once daily.      atorvastatin (LIPITOR) 40 MG tablet TAKE 1 TABLET EVERY DAY 90 tablet 3    clopidogreL (PLAVIX) 75 mg tablet TAKE 1 TABLET EVERY DAY 90 tablet 2    cyanocobalamin (VITAMIN B-12) 100 MCG tablet Take 100 mcg by mouth once daily.      fenofibrate 160 MG Tab TAKE 1 TABLET EVERY OTHER DAY 45 tablet 2    fish oil-omega-3 fatty acids 300-1,000 mg capsule Take 2 g by mouth once daily.      metoprolol tartrate  (LOPRESSOR) 50 MG tablet TAKE 1 TABLET TWICE DAILY 180 tablet 2    tamsulosin (FLOMAX) 0.4 mg Cap TAKE 1 CAPSULE (0.4 MG TOTAL) BY MOUTH ONCE DAILY  FOR BLADDER 90 capsule 3    vitamin D 1000 units Tab Take 2,000 Units by mouth once daily.        No current facility-administered medications on file prior to visit.       Allergies:  Review of patient's allergies indicates:   Allergen Reactions    Paragoric Other (See Comments)     sneeze       Review of Systems   Constitutional:  Negative for fever.   Respiratory:  Negative for shortness of breath.    Cardiovascular:  Negative for chest pain and leg swelling.   Gastrointestinal:  Negative for abdominal pain.   Genitourinary:  Negative for dysuria.   Neurological:  Negative for dizziness and headaches.     Exam:  Vitals:    05/29/23 1318   BP: 124/72   Pulse: 66   Resp: 20   Temp: 96.7 °F (35.9 °C)     Weight: 95.8 kg (211 lb 3.2 oz)   Body mass index is 31.19 kg/m².      Physical Exam  Vitals reviewed.   Constitutional:       General: He is not in acute distress.     Appearance: He is well-developed. He is not ill-appearing.   Eyes:      General: No scleral icterus.  Cardiovascular:      Rate and Rhythm: Normal rate and regular rhythm.      Heart sounds: Normal heart sounds.   Pulmonary:      Effort: Pulmonary effort is normal. No respiratory distress.      Breath sounds: Normal breath sounds.   Skin:     General: Skin is warm and dry.   Neurological:      Mental Status: He is alert and oriented to person, place, and time.   Psychiatric:         Behavior: Behavior normal.       Assessment:  The primary encounter diagnosis was Essential hypertension. Diagnoses of Stage 3 chronic kidney disease, unspecified whether stage 3a or 3b CKD, Hyperlipidemia LDL goal <70, and CAD, multiple vessel were also pertinent to this visit.    Plan:  Essential hypertension  -     continue metoprolol    Stage 3 chronic kidney disease, unspecified whether stage 3a or 3b CKD  -     check  kidney function    Hyperlipidemia LDL goal <70  -     continue atorvastatin and fenofibrate     CAD, multiple vessel  -     continue aspirin, clopidogrel, metoprolol and atorvastatin    Schedule labs.     Follow up in about 6 months (around 11/29/2023).

## 2023-06-05 ENCOUNTER — LAB VISIT (OUTPATIENT)
Dept: LAB | Facility: HOSPITAL | Age: 78
End: 2023-06-05
Attending: INTERNAL MEDICINE
Payer: MEDICARE

## 2023-06-05 DIAGNOSIS — I10 ESSENTIAL HYPERTENSION: ICD-10-CM

## 2023-06-05 DIAGNOSIS — C61 PROSTATE CANCER: ICD-10-CM

## 2023-06-05 DIAGNOSIS — N18.30 STAGE 3 CHRONIC KIDNEY DISEASE, UNSPECIFIED WHETHER STAGE 3A OR 3B CKD: ICD-10-CM

## 2023-06-05 DIAGNOSIS — E78.5 HYPERLIPIDEMIA LDL GOAL <70: ICD-10-CM

## 2023-06-05 LAB
ALBUMIN SERPL BCP-MCNC: 3.5 G/DL (ref 3.5–5.2)
ALBUMIN SERPL BCP-MCNC: 3.5 G/DL (ref 3.5–5.2)
ALP SERPL-CCNC: 24 U/L (ref 55–135)
ALP SERPL-CCNC: 24 U/L (ref 55–135)
ALT SERPL W/O P-5'-P-CCNC: 16 U/L (ref 10–44)
ALT SERPL W/O P-5'-P-CCNC: 16 U/L (ref 10–44)
ANION GAP SERPL CALC-SCNC: 8 MMOL/L (ref 8–16)
ANION GAP SERPL CALC-SCNC: 8 MMOL/L (ref 8–16)
AST SERPL-CCNC: 17 U/L (ref 10–40)
AST SERPL-CCNC: 17 U/L (ref 10–40)
BILIRUB SERPL-MCNC: 0.7 MG/DL (ref 0.1–1)
BILIRUB SERPL-MCNC: 0.7 MG/DL (ref 0.1–1)
BUN SERPL-MCNC: 16 MG/DL (ref 8–23)
BUN SERPL-MCNC: 16 MG/DL (ref 8–23)
CALCIUM SERPL-MCNC: 9.1 MG/DL (ref 8.7–10.5)
CALCIUM SERPL-MCNC: 9.1 MG/DL (ref 8.7–10.5)
CHLORIDE SERPL-SCNC: 109 MMOL/L (ref 95–110)
CHLORIDE SERPL-SCNC: 109 MMOL/L (ref 95–110)
CHOLEST SERPL-MCNC: 131 MG/DL (ref 120–199)
CHOLEST SERPL-MCNC: 131 MG/DL (ref 120–199)
CHOLEST/HDLC SERPL: 2.3 {RATIO} (ref 2–5)
CHOLEST/HDLC SERPL: 2.3 {RATIO} (ref 2–5)
CO2 SERPL-SCNC: 26 MMOL/L (ref 23–29)
CO2 SERPL-SCNC: 26 MMOL/L (ref 23–29)
COMPLEXED PSA SERPL-MCNC: 0.02 NG/ML (ref 0–4)
CREAT SERPL-MCNC: 1.5 MG/DL (ref 0.5–1.4)
CREAT SERPL-MCNC: 1.5 MG/DL (ref 0.5–1.4)
EST. GFR  (NO RACE VARIABLE): 48 ML/MIN/1.73 M^2
EST. GFR  (NO RACE VARIABLE): 48 ML/MIN/1.73 M^2
GLUCOSE SERPL-MCNC: 91 MG/DL (ref 70–110)
GLUCOSE SERPL-MCNC: 91 MG/DL (ref 70–110)
HDLC SERPL-MCNC: 58 MG/DL (ref 40–75)
HDLC SERPL-MCNC: 58 MG/DL (ref 40–75)
HDLC SERPL: 44.3 % (ref 20–50)
HDLC SERPL: 44.3 % (ref 20–50)
LDLC SERPL CALC-MCNC: 63.2 MG/DL (ref 63–159)
LDLC SERPL CALC-MCNC: 63.2 MG/DL (ref 63–159)
NONHDLC SERPL-MCNC: 73 MG/DL
NONHDLC SERPL-MCNC: 73 MG/DL
POTASSIUM SERPL-SCNC: 4.1 MMOL/L (ref 3.5–5.1)
POTASSIUM SERPL-SCNC: 4.1 MMOL/L (ref 3.5–5.1)
PROSTATE SPECIFIC ANTIGEN, TOTAL: 0.02 NG/ML (ref 0–4)
PROT SERPL-MCNC: 6.6 G/DL (ref 6–8.4)
PROT SERPL-MCNC: 6.6 G/DL (ref 6–8.4)
PSA FREE MFR SERPL: NORMAL %
PSA FREE SERPL-MCNC: <0.1 NG/ML (ref 0–1.5)
SODIUM SERPL-SCNC: 143 MMOL/L (ref 136–145)
SODIUM SERPL-SCNC: 143 MMOL/L (ref 136–145)
TRIGL SERPL-MCNC: 49 MG/DL (ref 30–150)
TRIGL SERPL-MCNC: 49 MG/DL (ref 30–150)

## 2023-06-05 PROCEDURE — 84153 ASSAY OF PSA TOTAL: CPT | Performed by: INTERNAL MEDICINE

## 2023-06-05 PROCEDURE — 36415 COLL VENOUS BLD VENIPUNCTURE: CPT | Mod: HCNC | Performed by: INTERNAL MEDICINE

## 2023-06-05 PROCEDURE — 80053 COMPREHEN METABOLIC PANEL: CPT | Mod: HCNC | Performed by: INTERNAL MEDICINE

## 2023-06-05 PROCEDURE — 84153 ASSAY OF PSA TOTAL: CPT | Mod: 91,HCNC | Performed by: INTERNAL MEDICINE

## 2023-06-05 PROCEDURE — 80061 LIPID PANEL: CPT | Mod: HCNC | Performed by: INTERNAL MEDICINE

## 2023-07-26 DIAGNOSIS — E78.5 HYPERLIPIDEMIA LDL GOAL <70: Chronic | ICD-10-CM

## 2023-07-26 RX ORDER — FENOFIBRATE 160 MG/1
TABLET ORAL
Qty: 45 TABLET | Refills: 3 | Status: SHIPPED | OUTPATIENT
Start: 2023-07-26

## 2023-07-26 NOTE — TELEPHONE ENCOUNTER
Refill Decision Note   Filipe Lachoender  is requesting a refill authorization.  Brief Assessment and Rationale for Refill:  Approve     Medication Therapy Plan:  Labs reviewed, no change in therapy needed      Extended chart review required: Yes   Comments:     Note composed:11:02 AM 07/26/2023

## 2023-07-26 NOTE — TELEPHONE ENCOUNTER
Refill Routing Note   Medication(s) are not appropriate for processing by [facility] for the following reason(s):      Required labs abnormal    [facility] action(s):  Defer Care Due:  None identified            Appointments  past 12m or future 3m with PCP    Date Provider   Last Visit   5/29/2023 Jojo Duque, DO   Next Visit   12/11/2023 Jojo Duque, DO   ED visits in past 90 days: 0        Note composed:9:59 AM 07/26/2023

## 2023-07-26 NOTE — TELEPHONE ENCOUNTER
No care due was identified.  [facility] Embedded Care Due Messages. Reference number: [id number].   7/26/2023 2:37:13 AM CDT

## 2023-09-20 NOTE — HOSPITAL COURSE
12/15 - Admitted to ICU post LHC, sheath removed in lab and no CP.    12/18 - CVT consulted on patient and all agreed to proceed with CABG. Today taken to OR undergoing 4-vessel CABG with no reported complications.  Admitted to ICU post op from OR on [facility] ventilation.  12/19 extubated, sitting up in chair - no new complaints  12/20 out of bed in chair , tired but no new c/o  12/21 stable out of bed in chair , no new complaints, transfused with 2 units   Improving Melatonin 6mg HS  Continue trazodone 25mg HS  Sleep log

## 2023-10-06 ENCOUNTER — PATIENT MESSAGE (OUTPATIENT)
Dept: ADMINISTRATIVE | Facility: CLINIC | Age: 78
End: 2023-10-06
Payer: MEDICARE
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
## 2023-12-04 ENCOUNTER — LAB VISIT (OUTPATIENT)
Dept: LAB | Facility: HOSPITAL | Age: 78
End: 2023-12-04
Attending: INTERNAL MEDICINE
Payer: MEDICARE

## 2023-12-04 DIAGNOSIS — E78.5 HYPERLIPIDEMIA LDL GOAL <70: ICD-10-CM

## 2023-12-04 DIAGNOSIS — N18.30 STAGE 3 CHRONIC KIDNEY DISEASE, UNSPECIFIED WHETHER STAGE 3A OR 3B CKD: ICD-10-CM

## 2023-12-04 DIAGNOSIS — I10 ESSENTIAL HYPERTENSION: ICD-10-CM

## 2023-12-04 LAB
ALBUMIN SERPL BCP-MCNC: 3.3 G/DL (ref 3.5–5.2)
ALP SERPL-CCNC: 24 U/L (ref 55–135)
ALT SERPL W/O P-5'-P-CCNC: 16 U/L (ref 10–44)
ANION GAP SERPL CALC-SCNC: 10 MMOL/L (ref 8–16)
AST SERPL-CCNC: 19 U/L (ref 10–40)
BILIRUB SERPL-MCNC: 1.1 MG/DL (ref 0.1–1)
BUN SERPL-MCNC: 13 MG/DL (ref 8–23)
CALCIUM SERPL-MCNC: 9.2 MG/DL (ref 8.7–10.5)
CHLORIDE SERPL-SCNC: 108 MMOL/L (ref 95–110)
CHOLEST SERPL-MCNC: 118 MG/DL (ref 120–199)
CHOLEST/HDLC SERPL: 2.4 {RATIO} (ref 2–5)
CO2 SERPL-SCNC: 25 MMOL/L (ref 23–29)
CREAT SERPL-MCNC: 1.4 MG/DL (ref 0.5–1.4)
EST. GFR  (NO RACE VARIABLE): 52.1 ML/MIN/1.73 M^2
GLUCOSE SERPL-MCNC: 85 MG/DL (ref 70–110)
HDLC SERPL-MCNC: 49 MG/DL (ref 40–75)
HDLC SERPL: 41.5 % (ref 20–50)
LDLC SERPL CALC-MCNC: 55.2 MG/DL (ref 63–159)
NONHDLC SERPL-MCNC: 69 MG/DL
POTASSIUM SERPL-SCNC: 4.1 MMOL/L (ref 3.5–5.1)
PROT SERPL-MCNC: 6.5 G/DL (ref 6–8.4)
SODIUM SERPL-SCNC: 143 MMOL/L (ref 136–145)
TRIGL SERPL-MCNC: 69 MG/DL (ref 30–150)

## 2023-12-04 PROCEDURE — 80061 LIPID PANEL: CPT | Mod: HCNC | Performed by: INTERNAL MEDICINE

## 2023-12-04 PROCEDURE — 36415 COLL VENOUS BLD VENIPUNCTURE: CPT | Mod: HCNC | Performed by: INTERNAL MEDICINE

## 2023-12-04 PROCEDURE — 80053 COMPREHEN METABOLIC PANEL: CPT | Mod: HCNC | Performed by: INTERNAL MEDICINE

## 2023-12-08 DIAGNOSIS — I10 ESSENTIAL HYPERTENSION: Primary | Chronic | ICD-10-CM

## 2023-12-08 DIAGNOSIS — I25.10 CAD, MULTIPLE VESSEL: ICD-10-CM

## 2023-12-11 ENCOUNTER — HOSPITAL ENCOUNTER (OUTPATIENT)
Dept: CARDIOLOGY | Facility: HOSPITAL | Age: 78
Discharge: HOME | End: 2023-12-11
Attending: STUDENT IN AN ORGANIZED HEALTH CARE EDUCATION/TRAINING PROGRAM
Payer: MEDICARE

## 2023-12-11 ENCOUNTER — OFFICE VISIT (OUTPATIENT)
Dept: INTERNAL MEDICINE | Facility: CLINIC | Age: 78
End: 2023-12-11
Payer: MEDICARE

## 2023-12-11 ENCOUNTER — OFFICE VISIT (OUTPATIENT)
Dept: CARDIOLOGY | Facility: CLINIC | Age: 78
End: 2023-12-11
Payer: MEDICARE

## 2023-12-11 VITALS
BODY MASS INDEX: 30.4 KG/M2 | HEIGHT: 69 IN | WEIGHT: 205.25 LBS | OXYGEN SATURATION: 98 % | HEART RATE: 64 BPM | TEMPERATURE: 96 F | SYSTOLIC BLOOD PRESSURE: 130 MMHG | DIASTOLIC BLOOD PRESSURE: 72 MMHG | RESPIRATION RATE: 20 BRPM

## 2023-12-11 VITALS
BODY MASS INDEX: 30.36 KG/M2 | WEIGHT: 205 LBS | DIASTOLIC BLOOD PRESSURE: 72 MMHG | HEIGHT: 69 IN | HEART RATE: 54 BPM | SYSTOLIC BLOOD PRESSURE: 130 MMHG

## 2023-12-11 DIAGNOSIS — R94.30 EJECTION FRACTION < 50%: ICD-10-CM

## 2023-12-11 DIAGNOSIS — I10 ESSENTIAL HYPERTENSION: Chronic | ICD-10-CM

## 2023-12-11 DIAGNOSIS — C61 PROSTATE CANCER: ICD-10-CM

## 2023-12-11 DIAGNOSIS — I25.10 CAD, MULTIPLE VESSEL: ICD-10-CM

## 2023-12-11 DIAGNOSIS — N18.30 STAGE 3 CHRONIC KIDNEY DISEASE, UNSPECIFIED WHETHER STAGE 3A OR 3B CKD: Chronic | ICD-10-CM

## 2023-12-11 DIAGNOSIS — I70.0 AORTIC ATHEROSCLEROSIS: ICD-10-CM

## 2023-12-11 DIAGNOSIS — R41.3 MEMORY LOSS: ICD-10-CM

## 2023-12-11 DIAGNOSIS — Z00.00 ANNUAL PHYSICAL EXAM: Primary | ICD-10-CM

## 2023-12-11 DIAGNOSIS — E78.5 HYPERLIPIDEMIA LDL GOAL <70: Primary | Chronic | ICD-10-CM

## 2023-12-11 DIAGNOSIS — E66.9 OBESITY (BMI 30.0-34.9): ICD-10-CM

## 2023-12-11 DIAGNOSIS — Z95.1 S/P CABG X 4: ICD-10-CM

## 2023-12-11 DIAGNOSIS — E78.5 HYPERLIPIDEMIA LDL GOAL <70: Chronic | ICD-10-CM

## 2023-12-11 PROCEDURE — 99999 PR PBB SHADOW E&M-EST. PATIENT-LVL IV: CPT | Mod: PBBFAC,,, | Performed by: INTERNAL MEDICINE

## 2023-12-11 PROCEDURE — 3078F PR MOST RECENT DIASTOLIC BLOOD PRESSURE < 80 MM HG: ICD-10-PCS | Mod: CPTII,S$GLB,, | Performed by: INTERNAL MEDICINE

## 2023-12-11 PROCEDURE — 3288F FALL RISK ASSESSMENT DOCD: CPT | Mod: CPTII,S$GLB,, | Performed by: STUDENT IN AN ORGANIZED HEALTH CARE EDUCATION/TRAINING PROGRAM

## 2023-12-11 PROCEDURE — 99214 PR OFFICE/OUTPT VISIT, EST, LEVL IV, 30-39 MIN: ICD-10-PCS | Mod: S$GLB,,, | Performed by: INTERNAL MEDICINE

## 2023-12-11 PROCEDURE — 99214 PR OFFICE/OUTPT VISIT, EST, LEVL IV, 30-39 MIN: ICD-10-PCS | Mod: S$GLB,,, | Performed by: STUDENT IN AN ORGANIZED HEALTH CARE EDUCATION/TRAINING PROGRAM

## 2023-12-11 PROCEDURE — 3075F PR MOST RECENT SYSTOLIC BLOOD PRESS GE 130-139MM HG: ICD-10-PCS | Mod: CPTII,S$GLB,, | Performed by: INTERNAL MEDICINE

## 2023-12-11 PROCEDURE — 3288F FALL RISK ASSESSMENT DOCD: CPT | Mod: CPTII,S$GLB,, | Performed by: INTERNAL MEDICINE

## 2023-12-11 PROCEDURE — 1126F AMNT PAIN NOTED NONE PRSNT: CPT | Mod: CPTII,S$GLB,, | Performed by: STUDENT IN AN ORGANIZED HEALTH CARE EDUCATION/TRAINING PROGRAM

## 2023-12-11 PROCEDURE — 1159F MED LIST DOCD IN RCRD: CPT | Mod: CPTII,S$GLB,, | Performed by: INTERNAL MEDICINE

## 2023-12-11 PROCEDURE — 1101F PT FALLS ASSESS-DOCD LE1/YR: CPT | Mod: CPTII,S$GLB,, | Performed by: INTERNAL MEDICINE

## 2023-12-11 PROCEDURE — 3078F PR MOST RECENT DIASTOLIC BLOOD PRESSURE < 80 MM HG: ICD-10-PCS | Mod: CPTII,S$GLB,, | Performed by: STUDENT IN AN ORGANIZED HEALTH CARE EDUCATION/TRAINING PROGRAM

## 2023-12-11 PROCEDURE — 1126F PR PAIN SEVERITY QUANTIFIED, NO PAIN PRESENT: ICD-10-PCS | Mod: CPTII,S$GLB,, | Performed by: STUDENT IN AN ORGANIZED HEALTH CARE EDUCATION/TRAINING PROGRAM

## 2023-12-11 PROCEDURE — 3288F PR FALLS RISK ASSESSMENT DOCUMENTED: ICD-10-PCS | Mod: CPTII,S$GLB,, | Performed by: INTERNAL MEDICINE

## 2023-12-11 PROCEDURE — 1126F AMNT PAIN NOTED NONE PRSNT: CPT | Mod: CPTII,S$GLB,, | Performed by: INTERNAL MEDICINE

## 2023-12-11 PROCEDURE — 3075F SYST BP GE 130 - 139MM HG: CPT | Mod: CPTII,S$GLB,, | Performed by: STUDENT IN AN ORGANIZED HEALTH CARE EDUCATION/TRAINING PROGRAM

## 2023-12-11 PROCEDURE — 1126F PR PAIN SEVERITY QUANTIFIED, NO PAIN PRESENT: ICD-10-PCS | Mod: CPTII,S$GLB,, | Performed by: INTERNAL MEDICINE

## 2023-12-11 PROCEDURE — 99999 PR PBB SHADOW E&M-EST. PATIENT-LVL III: ICD-10-PCS | Mod: PBBFAC,,, | Performed by: STUDENT IN AN ORGANIZED HEALTH CARE EDUCATION/TRAINING PROGRAM

## 2023-12-11 PROCEDURE — 1159F MED LIST DOCD IN RCRD: CPT | Mod: CPTII,S$GLB,, | Performed by: STUDENT IN AN ORGANIZED HEALTH CARE EDUCATION/TRAINING PROGRAM

## 2023-12-11 PROCEDURE — 99214 OFFICE O/P EST MOD 30 MIN: CPT | Mod: S$GLB,,, | Performed by: STUDENT IN AN ORGANIZED HEALTH CARE EDUCATION/TRAINING PROGRAM

## 2023-12-11 PROCEDURE — 1160F PR REVIEW ALL MEDS BY PRESCRIBER/CLIN PHARMACIST DOCUMENTED: ICD-10-PCS | Mod: CPTII,S$GLB,, | Performed by: INTERNAL MEDICINE

## 2023-12-11 PROCEDURE — 1101F PR PT FALLS ASSESS DOC 0-1 FALLS W/OUT INJ PAST YR: ICD-10-PCS | Mod: CPTII,S$GLB,, | Performed by: INTERNAL MEDICINE

## 2023-12-11 PROCEDURE — 1159F PR MEDICATION LIST DOCUMENTED IN MEDICAL RECORD: ICD-10-PCS | Mod: CPTII,S$GLB,, | Performed by: STUDENT IN AN ORGANIZED HEALTH CARE EDUCATION/TRAINING PROGRAM

## 2023-12-11 PROCEDURE — 99999 PR PBB SHADOW E&M-EST. PATIENT-LVL IV: ICD-10-PCS | Mod: PBBFAC,,, | Performed by: INTERNAL MEDICINE

## 2023-12-11 PROCEDURE — 1160F RVW MEDS BY RX/DR IN RCRD: CPT | Mod: CPTII,S$GLB,, | Performed by: INTERNAL MEDICINE

## 2023-12-11 PROCEDURE — 3078F DIAST BP <80 MM HG: CPT | Mod: CPTII,S$GLB,, | Performed by: STUDENT IN AN ORGANIZED HEALTH CARE EDUCATION/TRAINING PROGRAM

## 2023-12-11 PROCEDURE — 99214 OFFICE O/P EST MOD 30 MIN: CPT | Mod: S$GLB,,, | Performed by: INTERNAL MEDICINE

## 2023-12-11 PROCEDURE — 99999 PR PBB SHADOW E&M-EST. PATIENT-LVL III: CPT | Mod: PBBFAC,,, | Performed by: STUDENT IN AN ORGANIZED HEALTH CARE EDUCATION/TRAINING PROGRAM

## 2023-12-11 PROCEDURE — 3075F SYST BP GE 130 - 139MM HG: CPT | Mod: CPTII,S$GLB,, | Performed by: INTERNAL MEDICINE

## 2023-12-11 PROCEDURE — 3078F DIAST BP <80 MM HG: CPT | Mod: CPTII,S$GLB,, | Performed by: INTERNAL MEDICINE

## 2023-12-11 PROCEDURE — 1159F PR MEDICATION LIST DOCUMENTED IN MEDICAL RECORD: ICD-10-PCS | Mod: CPTII,S$GLB,, | Performed by: INTERNAL MEDICINE

## 2023-12-11 PROCEDURE — 3288F PR FALLS RISK ASSESSMENT DOCUMENTED: ICD-10-PCS | Mod: CPTII,S$GLB,, | Performed by: STUDENT IN AN ORGANIZED HEALTH CARE EDUCATION/TRAINING PROGRAM

## 2023-12-11 PROCEDURE — 1101F PR PT FALLS ASSESS DOC 0-1 FALLS W/OUT INJ PAST YR: ICD-10-PCS | Mod: CPTII,S$GLB,, | Performed by: STUDENT IN AN ORGANIZED HEALTH CARE EDUCATION/TRAINING PROGRAM

## 2023-12-11 PROCEDURE — 1101F PT FALLS ASSESS-DOCD LE1/YR: CPT | Mod: CPTII,S$GLB,, | Performed by: STUDENT IN AN ORGANIZED HEALTH CARE EDUCATION/TRAINING PROGRAM

## 2023-12-11 PROCEDURE — 3075F PR MOST RECENT SYSTOLIC BLOOD PRESS GE 130-139MM HG: ICD-10-PCS | Mod: CPTII,S$GLB,, | Performed by: STUDENT IN AN ORGANIZED HEALTH CARE EDUCATION/TRAINING PROGRAM

## 2023-12-11 NOTE — PROGRESS NOTES
Filipe GARCÍA Agustin .  77 y.o. White male  [id number]    Chief Complaint:  Chief Complaint   Patient presents with    Annual Exam       History of Present Illness:  Here with his wife and daughter.   HTN--stable.   HLD--stable.   CKD III--compliant with atorvastatin.   CAD--asymptomatic. Compliant with medication.   Prostate cancer--has not seen urology in a while.   Concerned about short term memory loss. Not getting lost or concerns with safety. Took a home assessment tool that was essentially normal.   There are concerns regarding increasing difficulty getting to the clinic for labs and office visit. They would like to discuss any at home services.     Laboratory   Lab Results   Component Value Date    WBC 6.07 11/21/2022    HGB 13.7 (L) 11/21/2022    HCT 44.8 11/21/2022     11/21/2022    CHOL 118 (L) 12/04/2023    TRIG 69 12/04/2023    HDL 49 12/04/2023    ALT 16 12/04/2023    AST 19 12/04/2023     12/04/2023    K 4.1 12/04/2023     12/04/2023    CREATININE 1.4 12/04/2023    BUN 13 12/04/2023    CO2 25 12/04/2023    TSH 1.242 11/21/2022    PSA 7.0 (H) 09/06/2017    INR 1.0 12/19/2018    HGBA1C 5.4 12/17/2018     Review of Systems   Constitutional:  Negative for fever.   Respiratory:  Negative for shortness of breath.    Cardiovascular:  Negative for chest pain.   Gastrointestinal:  Negative for abdominal pain.   Neurological:  Negative for dizziness and headaches.   Psychiatric/Behavioral:  Positive for memory loss.        The following were reviewed: Active problem list, medication list, allergies, family history, social history, and Health Maintenance.     History:  Past Medical History:   Diagnosis Date    Acute coronary syndrome     Cancer of prostate with intermediate recurrence risk (stage T2b-c or [place] 7 or PSA 10-20) 1/8/2018    CKD (chronic kidney disease) stage 3, GFR 30-59 ml/min 9/24/2015    Coronary artery disease     Elevated PSA 6/27/2017    History of coronary angioplasty 9/19/2014     Hyperlipidemia     Hypertension     Myocardial infarction 2008    Obesity, Class II, BMI 35.0-39.9, with comorbidity (see actual BMI) 6/6/2014    Polio     as a child    Tobacco dependence     resolved     Past Surgical History:   Procedure Laterality Date    CARDIAC CATHETERIZATION  2008    CORONARY ANGIOPLASTY  2008    acute PCI- RCA- RUI    CORONARY ARTERY BYPASS GRAFT (CABG) N/A 12/18/2018    Procedure: CORONARY ARTERY BYPASS GRAFT (CABG);  Surgeon: Rg Johnson MD;  Location: [facility] OR;  Service: Cardiothoracic;  Laterality: N/A;    CORONARY STENT PLACEMENT  2008    ENDOSCOPIC HARVEST OF VEIN Bilateral 12/18/2018    Procedure: SURGICAL PROCUREMENT, VEIN, ENDOSCOPIC;  Surgeon: Rg Johnson MD;  Location: [facility] OR;  Service: Cardiothoracic;  Laterality: Bilateral;    LEFT HEART CATHETERIZATION Left 12/15/2018    Procedure: CATHETERIZATION, HEART, LEFT;  Surgeon: Jose Armando Monroe MD;  Location: [facility] CATH LAB;  Service: Cardiology;  Laterality: Left;     Family History   Problem Relation Age of Onset    Heart disease Father         MI    Cancer Brother     Heart disease Brother     Diabetes Neg Hx     Kidney disease Neg Hx     Stroke Neg Hx      Social History     Socioeconomic History    Marital status:     Number of children: 3    Highest education level: High school graduate   Occupational History    Occupation: Retired   Tobacco Use    Smoking status: Former     Current packs/day: 0.00     Average packs/day: 2.0 packs/day for 20.0 years (40.0 ttl pk-yrs)     Types: Cigarettes     Start date: 7/12/1988     Quit date: 7/12/2008     Years since quitting: 15.4    Smokeless tobacco: Never   Substance and Sexual Activity    Alcohol use: Yes     Comment: rarely    Drug use: No    Sexual activity: Not Currently     Partners: Female     Social Determinants of Health     Financial Resource Strain: Low Risk  (11/10/2020)    Overall Financial Resource Strain (CARDIA)     Difficulty of Paying Living  Expenses: Not hard at all   Food Insecurity: No Food Insecurity (3/20/2020)    Hunger Vital Sign     Worried About Running Out of Food in the Last Year: Never true     Ran Out of Food in the Last Year: Never true   Transportation Needs: No Transportation Needs (3/20/2020)    PRAPARE - Transportation     Lack of Transportation (Medical): No     Lack of Transportation (Non-Medical): No   Physical Activity: Inactive (11/10/2020)    Exercise Vital Sign     Days of Exercise per Week: 0 days     Minutes of Exercise per Session: 0 min   Stress: No Stress Concern Present (10/19/2020)    [place] of Occupational [employer] - Occupational Stress Questionnaire     Feeling of Stress : Not at all   Social Connections: Unknown (11/10/2020)    Social Connection and Isolation Panel [NHANES]     Frequency of Communication with Friends and Family: More than three times a week     Frequency of Social Gatherings with Friends and Family: Three times a week   Housing Stability: Low Risk  (11/10/2020)    Housing Stability Vital Sign     Unable to Pay for Housing in the Last Year: No     Number of Places Lived in the Last Year: 1     Unstable Housing in the Last Year: No     Patient Active Problem List   Diagnosis    CAD, multiple vessel    CKD (chronic kidney disease) stage 3, GFR 30-59 ml/min    Tricuspid regurgitation    Hyperlipidemia LDL goal <70    Cancer of prostate with intermediate recurrence risk (stage T2b-c or [place] 7 or PSA 10-20)    S/P CABG x 4    Obesity (BMI 30.0-34.9)    Atelectasis, left    Essential hypertension    Aortic atherosclerosis    Acute cystitis without hematuria    COVID     Review of patient's allergies indicates:   Allergen Reactions    Paragoric Other (See Comments)     sneeze       Health Maintenance  Health Maintenance Topics with due status: Not Due       Topic Last Completion Date    Lipid Panel 12/04/2023    Aspirin/Antiplatelet Therapy 12/11/2023     Health Maintenance Due   Topic Date Due     TETANUS VACCINE  Never done    Shingles Vaccine (1 of 2) Never done    RSV Vaccine (Age 60+ and Pregnant patients) (1 - 1-dose 60+ series) Never done    Influenza Vaccine (1) 09/01/2023    COVID-19 Vaccine (5 - 2023-24 season) 09/01/2023       Medications:  Current Outpatient Medications on File Prior to Visit   Medication Sig Dispense Refill    aspirin (ECOTRIN) 81 MG EC tablet Take 81 mg by mouth once daily.      atorvastatin (LIPITOR) 40 MG tablet TAKE 1 TABLET EVERY DAY 90 tablet 3    clopidogreL (PLAVIX) 75 mg tablet TAKE 1 TABLET EVERY DAY 90 tablet 1    cyanocobalamin (VITAMIN B-12) 100 MCG tablet Take 100 mcg by mouth once daily.      fenofibrate 160 MG Tab TAKE 1 TABLET EVERY OTHER DAY 45 tablet 3    fish oil-omega-3 fatty acids 300-1,000 mg capsule Take 2 g by mouth once daily.      metoprolol tartrate (LOPRESSOR) 50 MG tablet TAKE 1 TABLET TWICE DAILY 180 tablet 2    tamsulosin (FLOMAX) 0.4 mg Cap TAKE 1 CAPSULE (0.4 MG TOTAL) BY MOUTH ONCE DAILY  FOR BLADDER 90 capsule 3    vitamin D 1000 units Tab Take 2,000 Units by mouth once daily.        No current facility-administered medications on file prior to visit.       Medications have been reviewed and reconciled with patient at visit today.    Exam:  Vitals:    12/11/23 1333   BP: 130/72   Pulse: 64   Resp: 20   Temp: 96.1 °F (35.6 °C)     Weight: 93.1 kg (205 lb 4 oz)   Body mass index is 30.31 kg/m².      Physical Exam  Vitals reviewed.   Constitutional:       General: He is not in acute distress.     Appearance: He is well-developed. He is not ill-appearing.   Eyes:      General: No scleral icterus.  Cardiovascular:      Rate and Rhythm: Normal rate.   Pulmonary:      Effort: Pulmonary effort is normal. No respiratory distress.   Neurological:      Mental Status: He is alert and oriented to person, place, and time.   Psychiatric:         Behavior: Behavior normal.       Assessment:  The primary encounter diagnosis was Annual physical exam. Diagnoses of  Essential hypertension, Hyperlipidemia LDL goal <70, Stage 3 chronic kidney disease, unspecified whether stage 3a or 3b CKD, and Memory loss were also pertinent to this visit.    Plan:  Annual physical exam  -     Counseled regarding age appropriate screenings and immunizations  -     Counseled regarding lifestyle modifications    Essential hypertension  -     [facility]; Future; Expected date: 12/18/2023    Hyperlipidemia LDL goal <70  -     Ambulatory referral/consult to [facility] - Medical & Palliative; Future; Expected date: 12/18/2023    Stage 3 chronic kidney disease, unspecified whether stage 3a or 3b CKD  -     Ambulatory referral/consult to [facility] - Medical & Palliative; Future; Expected date: 12/18/2023    CAD  -     f/u with cardiology    Prostate cancer  -     f/u with urology    Memory loss  -     Vitamin B12; Future; Expected date: 12/11/2023  -     TSH; Future      Follow up in about 6 months (around 6/11/2024).

## 2023-12-11 NOTE — PROGRESS NOTES
Section of Cardiology                  Cardiac Clinic Note    Chief Complaint/Reason for consultation:  Follow-up      HPI:   Filipe Agustin Jr. is a 77 y.o. male with h/o CAD s/p 4vCABG 2018, HLD, hypertension, CKD, obesity who comes in for follow-up.    5/12/2022  Was seen Dr. Camarillo in Cardiology clinic  Doing well, no complaints  Does not exercise regularly  Still taking ASA and plavix  Not very active at home     Denies orthopnea, PND, chest pain, LE swelling, syncope.     11/23/22  Has not been exercising  Active at home, does not do much work outside in the yard  Still taking ASA and plavix  Has not had stress test in a while - but reports diarrhea last time after the nucellar   Lost 6 lbs since 5/22    Denies orthopnea, PND, chest pain, LE swelling, syncope.       12/11/23  Comes in with wife and daughter  Meds are stable  Lost 6 lbs since 5/23  Active at home, does not do yard work  Does not exercise  Watching his diet  BP stable  Did not have a good experience with nuclear stress test in the past- bad side effects   Transport is difficult for him- wanting to just keep seeing PCP and have a home nurse       EKG 12/11/23 SB, RBBB, sinus arrhythmia   EkG 11/23/22 NSR, RBBB, sinus arrhythmia   EKG 11/21 NSR, RBBB, qtc 422 ms       Results for orders placed during the hospital encounter of 12/13/22    Echo    Interpretation Summary  · The left ventricle is normal in size with concentric remodeling and mildly decreased systolic function.  · Grade I left ventricular diastolic dysfunction.  · The estimated PA systolic pressure is 32 mmHg.  · Normal right ventricular size with normal right ventricular systolic function.  · Normal central venous pressure (3 mmHg).  · Mild mitral regurgitation.  · Mild tricuspid regurgitation.  · Mild pulmonic regurgitation.  · The estimated ejection fraction is 45%.      ECHO  2019    CONCLUSIONS     1 - Mildly depressed left ventricular systolic function (EF  45-50%).     2 - Impaired LV relaxation, normal LAP (grade 1 diastolic dysfunction).     3 - Normal right ventricular systolic function .     4 - Wall motion abnormalities.     STRESS TEST    [facility]      ROS: All 10 systems reviewed. Please refer to the HPI for pertinent positives. All other systems negative.     Past Medical History  Past Medical History:   Diagnosis Date    Acute coronary syndrome     Cancer of prostate with intermediate recurrence risk (stage T2b-c or [place] 7 or PSA 10-20) 1/8/2018    CKD (chronic kidney disease) stage 3, GFR 30-59 ml/min 9/24/2015    Coronary artery disease     Elevated PSA 6/27/2017    History of coronary angioplasty 9/19/2014    Hyperlipidemia     Hypertension     Myocardial infarction 2008    Obesity, Class II, BMI 35.0-39.9, with comorbidity (see actual BMI) 6/6/2014    Polio     as a child    Tobacco dependence     resolved       Surgical History  Past Surgical History:   Procedure Laterality Date    CARDIAC CATHETERIZATION  2008    CORONARY ANGIOPLASTY  2008    acute PCI- RCA- RUI    CORONARY ARTERY BYPASS GRAFT (CABG) N/A 12/18/2018    Procedure: CORONARY ARTERY BYPASS GRAFT (CABG);  Surgeon: Rg Johnson MD;  Location: [facility] OR;  Service: Cardiothoracic;  Laterality: N/A;    CORONARY STENT PLACEMENT  2008    ENDOSCOPIC HARVEST OF VEIN Bilateral 12/18/2018    Procedure: SURGICAL PROCUREMENT, VEIN, ENDOSCOPIC;  Surgeon: gR Johnson MD;  Location: [facility] OR;  Service: Cardiothoracic;  Laterality: Bilateral;    LEFT HEART CATHETERIZATION Left 12/15/2018    Procedure: CATHETERIZATION, HEART, LEFT;  Surgeon: Jose Armando Monroe MD;  Location: [facility] CATH LAB;  Service: Cardiology;  Laterality: Left;          Allergies:   Review of patient's allergies indicates:   Allergen Reactions    Paragoric Other (See Comments)     sneeze       Social History:  Social History     Socioeconomic History    Marital status:     Number of children: 3    Highest education level: High  school graduate   Occupational History    Occupation: Retired   Tobacco Use    Smoking status: Former     Current packs/day: 0.00     Average packs/day: 2.0 packs/day for 20.0 years (40.0 ttl pk-yrs)     Types: Cigarettes     Start date: 7/12/1988     Quit date: 7/12/2008     Years since quitting: 15.4    Smokeless tobacco: Never   Substance and Sexual Activity    Alcohol use: Yes     Comment: rarely    Drug use: No    Sexual activity: Not Currently     Partners: Female     Social Determinants of Health     Financial Resource Strain: Low Risk  (11/10/2020)    Overall Financial Resource Strain (CARDIA)     Difficulty of Paying Living Expenses: Not hard at all   Food Insecurity: No Food Insecurity (3/20/2020)    Hunger Vital Sign     Worried About Running Out of Food in the Last Year: Never true     Ran Out of Food in the Last Year: Never true   Transportation Needs: No Transportation Needs (3/20/2020)    PRAPARE - Transportation     Lack of Transportation (Medical): No     Lack of Transportation (Non-Medical): No   Physical Activity: Inactive (11/10/2020)    Exercise Vital Sign     Days of Exercise per Week: 0 days     Minutes of Exercise per Session: 0 min   Stress: No Stress Concern Present (10/19/2020)    Citizen of [place] of Occupational Health - Occupational Stress Questionnaire     Feeling of Stress : Not at all   Social Connections: Unknown (11/10/2020)    Social Connection and Isolation Panel [NHANES]     Frequency of Communication with Friends and Family: More than three times a week     Frequency of Social Gatherings with Friends and Family: Three times a week   Housing Stability: Low Risk  (11/10/2020)    Housing Stability Vital Sign     Unable to Pay for Housing in the Last Year: No     Number of Places Lived in the Last Year: 1     Unstable Housing in the Last Year: No       Family History:  family history includes Cancer in his brother; Heart disease in his brother and father.    Home  "Medications:  Current Outpatient Medications on File Prior to Visit   Medication Sig Dispense Refill    aspirin (ECOTRIN) 81 MG EC tablet Take 81 mg by mouth once daily.      atorvastatin (LIPITOR) 40 MG tablet TAKE 1 TABLET EVERY DAY 90 tablet 3    clopidogreL (PLAVIX) 75 mg tablet TAKE 1 TABLET EVERY DAY 90 tablet 1    cyanocobalamin (VITAMIN B-12) 100 MCG tablet Take 100 mcg by mouth once daily.      fenofibrate 160 MG Tab TAKE 1 TABLET EVERY OTHER DAY 45 tablet 3    fish oil-omega-3 fatty acids 300-1,000 mg capsule Take 2 g by mouth once daily.      metoprolol tartrate (LOPRESSOR) 50 MG tablet TAKE 1 TABLET TWICE DAILY 180 tablet 2    tamsulosin (FLOMAX) 0.4 mg Cap TAKE 1 CAPSULE (0.4 MG TOTAL) BY MOUTH ONCE DAILY  FOR BLADDER 90 capsule 3    vitamin D 1000 units Tab Take 2,000 Units by mouth once daily.        No current facility-administered medications on file prior to visit.       Physical exam:  /72 (BP Location: Left arm, Patient Position: Sitting, BP Method: Medium (Manual))   Pulse (!) 54   Ht 5' 9" (1.753 m)   Wt 93 kg (205 lb)   BMI 30.27 kg/m²         General: Pt is a 77 y.o. year old male who is AAOx3, in NAD, is pleasant, well nourished, looks stated age  HEENT: PERRL, EOMI, Oral mucosa pink & moist  CVS  No abnormal cardiac pulsations noted on inspection. JVP not raised. The apical impulse is normal on palpation, and is located in the left 5th intercostal space in the mid - clavicular line. No palpable thrills or abnormal pulsations noted. RR, S1 - S2 heard, no murmurs, rubs or gallops appreciated.   PUL : CTA B/L. No wheezes/crackles heard   ABD : BS +, soft. No tenderness elicited   LE : No C/C/E. Distal Pulses palpable B/L         LABS:    Chemistry:   Lab Results   Component Value Date     12/04/2023    K 4.1 12/04/2023     12/04/2023    CO2 25 12/04/2023    BUN 13 12/04/2023    CREATININE 1.4 12/04/2023    CALCIUM 9.2 12/04/2023     Cardiac Markers:   Lab Results "   Component Value Date    TROPONINI 1.379 (H) 12/15/2018     Cardiac Markers (Last 3):   Lab Results   Component Value Date    TROPONINI 1.379 (H) 12/15/2018     CBC:   Lab Results   Component Value Date    WBC 6.07 11/21/2022    HGB 13.7 (L) 11/21/2022    HCT 44.8 11/21/2022    HCT 22 (L) 12/18/2018    MCV 97 11/21/2022     11/21/2022     Lipids:   Lab Results   Component Value Date    CHOL 118 (L) 12/04/2023    TRIG 69 12/04/2023    HDL 49 12/04/2023     Coagulation:   Lab Results   Component Value Date    INR 1.0 12/19/2018    APTT 24.2 12/19/2018           Assessment      1. Hyperlipidemia LDL goal <70    2. S/P CABG x 4    3. Stage 3 chronic kidney disease, unspecified whether stage 3a or 3b CKD    4. Essential hypertension    5. CAD, multiple vessel    6. Aortic atherosclerosis    7. Obesity (BMI 30.0-34.9)    8. Ejection fraction < 50%         Plan:    Mildly reduced EF  Asymptomatic  Echo 12/22 EF 45%, mild TR/PI, g 1 dd    HLD  LDL 55 as of 11/23  Continue statin and fenofibrate    Hypertension  Stable  Continue Lopressor    CKD  stable    CAD s/p CABG  Continue aspirin, statin, Plavix  Declines stress test due to side effects in the past     Obesity, BMI 30-34.9  Low-salt, low-fat diet  Exercise as tolerated, at least 30 minutes daily  Weight loss encouraged      This note was prepared using voice recognition system and is likely to have sound alike errors that may have been overlooked even after proofreading.     I have reviewed all pertinent chart information.  Plans and recommendations have been formulated under my direct supervision. All questions answered and patient voiced understanding.   If symptoms persist go to the ED.    RTC prn- will like to see PCP only, and have nurse come out. Will see cardiology prn if symptoms arise         Shant Díaz MD  Cardiology

## 2023-12-12 ENCOUNTER — PES CALL (OUTPATIENT)
Dept: HOME HEALTH SERVICES | Facility: CLINIC | Age: 78
End: 2023-12-12
Payer: MEDICARE

## 2023-12-28 ENCOUNTER — CARE AT HOME (OUTPATIENT)
Dept: HOME HEALTH SERVICES | Facility: CLINIC | Age: 78
End: 2023-12-28
Payer: MEDICARE

## 2023-12-28 VITALS
SYSTOLIC BLOOD PRESSURE: 121 MMHG | RESPIRATION RATE: 18 BRPM | HEART RATE: 59 BPM | DIASTOLIC BLOOD PRESSURE: 75 MMHG | OXYGEN SATURATION: 97 %

## 2023-12-28 DIAGNOSIS — N18.30 STAGE 3 CHRONIC KIDNEY DISEASE, UNSPECIFIED WHETHER STAGE 3A OR 3B CKD: Chronic | ICD-10-CM

## 2023-12-28 DIAGNOSIS — I10 ESSENTIAL HYPERTENSION: Chronic | ICD-10-CM

## 2023-12-28 DIAGNOSIS — E78.5 HYPERLIPIDEMIA LDL GOAL <70: Chronic | ICD-10-CM

## 2023-12-28 PROCEDURE — 3074F SYST BP LT 130 MM HG: CPT | Mod: CPTII,S$GLB,,

## 2023-12-28 PROCEDURE — 3078F DIAST BP <80 MM HG: CPT | Mod: CPTII,S$GLB,,

## 2023-12-28 PROCEDURE — 99347 HOME/RES VST EST SF MDM 20: CPT | Mod: ,,,

## 2023-12-28 PROCEDURE — 3078F PR MOST RECENT DIASTOLIC BLOOD PRESSURE < 80 MM HG: ICD-10-PCS | Mod: CPTII,S$GLB,,

## 2023-12-28 PROCEDURE — 3074F PR MOST RECENT SYSTOLIC BLOOD PRESSURE < 130 MM HG: ICD-10-PCS | Mod: CPTII,S$GLB,,

## 2023-12-28 PROCEDURE — 99347 PR HOME VISIT,ESTAB PATIENT,LEVEL I: ICD-10-PCS | Mod: ,,,

## 2023-12-28 NOTE — PROGRESS NOTES
"[facility]  Medical Chronic Care Home Visit    Encounter Provider: Stanislaw Hernández   PCP: Jojo Duque DO  Consult Requested By: Dr. Jojo Duque    HISTORY OF PRESENT ILLNESS      Patient ID: Filipe Agustin Jr. is a 78 y.o. male is being seen in the home due to physical debility that presents a taxing effort to leave the home, to mitigate high risk of hospital readmission and/or due to the limited availability of reliable or safe options for transportation to the point of access to the provider. Prior to treatment on this visit the chart was reviewed and patient verbal consent was obtained.    Chronic medical conditions synopsis:  Filipe Agustin Jr. is a 77 y.o. male being seen today as a referral. He has medical diagnoses including CAD s/p 4vCABG 2018, HLD, hypertension, CKD, and obesity. Chronic medical conditions stable. Concerns with labs before annual visit next year. Coordinate with PCP to draw labs prior to or after annual visit.  Denies any complaints or concerns. Questions elicited. Time allowed for questions, all issues addressed. Contact info given for any concerns or changes.     DECISION MAKING TODAY       Assessment & Plan:  1. Essential hypertension  Assessment & Plan:  Stable  Continue current medication      Orders:  -     Ambulatory referral/consult to [facility] - Medical & Palliative    2. Hyperlipidemia LDL goal <70  Assessment & Plan:  Recent LDL 55.2  Continue medication    Orders:  -     Ambulatory referral/consult to [facility] - Medical & Palliative    3. Stage 3 chronic kidney disease, unspecified whether stage 3a or 3b CKD  Overview:  Comprehensive metabolic panel   Order: [id number]   Status:  Final result   Visible to patient:  Yes (Patient Portal) Next appt:  Today at 08:30 AM in Lab (LABORATORY, [facility]) < 140/90" class="rz_6 hlt-1">Dx:  < 140/90" class="rz_7 hlt1">Hyperlipidemia; CKD (chronic kidney d...     Ref Range & Units 6mo ago  (12/13/16) " 12mo ago  (6/21/16) 1yr ago  (12/17/15) 2yr ago  (6/16/15) 2yr ago  (12/29/14) 2yr ago  (12/19/14) 2yr ago  (12/12/14)    Sodium 136 - 145 mmol/L 143 143 142 144 143 141     Potassium 3.5 - 5.1 mmol/L 4.7 4.1 4.1 5.2  4.7 5.7CM  5.6CM     Chloride 95 - 110 mmol/L 107 107 106 107 106 103     CO2 23 - 29 mmol/L 27 29 28 29 27 29     Glucose 70 - 110 mg/dL 94 93 89 104 96 98     BUN, Bld 8 - 23 mg/dL 11 12 17 16 17 26      Creatinine 0.5 - 1.4 mg/dL 1.4 1.4 1.6  1.4 1.4 1.7      Calcium 8.7 - 10.5 mg/dL 9.0 9.2 9.3 9.6 9.7 9.8     Total Protein 6.0 - 8.4 g/dL 7.2 6.9 6.9 7.3       Albumin 3.5 - 5.2 g/dL 3.6 3.7 3.6 3.8       Total Bilirubin 0.1 - 1.0 mg/dL 0.5 0.6CM 0.4CM 0.6CM      Comments: For infants and newborns, interpretation of results should be based   on gestational age, weight and in agreement with clinical   observations.   Premature Infant recommended reference ranges:   Up to 24 hours.............<8.0 mg/dL   Up to 48 hours............<12.0 mg/dL   3-5 days..................<15.0 mg/dL   6-29 days.................<15.0 mg/dL     Alkaline Phosphatase 55 - 135 U/L 31  30  31  30        AST 10 - 40 U/L 21 21 15 27       ALT 10 - 44 U/L 23 25 19 40       Anion Gap 8 - 16 mmol/L 9 7  8 8 10 9     eGFR if African American >60 mL/min/1.73 m^2 58.8  58.8  50.4  59.3  59.3  47.2      eGFR if non African American >60 mL/min/1.73 m^2 50.9  50.9CM  43.6CM  51.3CM  51.3CM  40.8CM     Comments: Calculation used to obtain the estimated glomerular filtration   rate (eGFR) is the CKD-EPI equation. Since race is unknown   in our information system, the eGFR values for   -American and Non--American patients are given   for each creatinine result.    Resulting Agency  [facility]      Specimen Collected: 12/13/16 10:03 Last Resulted: 12/13/16 16:24 Lab Flowsheet Order Details View Encounter Lab and Collection Details Routing Result History      CM=Additional comments                     Assessment & Plan:  Stable  Continue current medication  Renally dose meds, avoid nephrotoxins      Orders:  -     Ambulatory referral/consult to [facility] - [facility] & Palliative           ENVIRONMENT OF CARE   Family and/or Caregiver present at visit?  Yes  Name of Caregiver: Josefina  History provided by: patient    Advance Care Planning   Advanced Care Planning Status:  Patient has had an ACP conversation  Living Will: No  Power of : No  [place]: No    Does Caregiver have HCPoA: No  Changes today: None     Impression upon entering the home:  Physical Dwelling: single family home   Appearance of home environment: cleaniness: clean, walking pathways: clear, lighting: adequate, and home structure: sound structure  Functional Status: independent  Mobility: ambulatory  Nutritional access: adequate intake and access  Home Health: No, and does not need it at this time   DME/Supplies: none     Disease/illness education: Take all medication as prescribed. Activity as tolerated. Keep all upcoming appts.   Establishment or re-establishment of referral orders for community resources: No other necessary community resources.   Discussion with other health care providers: No discussion with other health care providers necessary.   Does patient have a PCP at [facility]? Yes   Repatriation plan with PCP? follow-up with PCP  Does patient have an ostomy (ileostomy, colostomy, suprapubic catheter, nephrostomy tube, tracheostomy, PEG tube, pleurex catheter, cholecystostomy, etc)? No    Social History     Socioeconomic History    Marital status:     Number of children: 3    Highest education level: High school graduate   Occupational History    Occupation: Retired   Tobacco Use    Smoking status: Former     Current packs/day: 0.00     Average packs/day: 2.0 packs/day for 20.0 years (40.0 ttl pk-yrs)     Types: Cigarettes     Start date: 7/12/1988     Quit date: 7/12/2008     Years since quitting: 15.4     Smokeless tobacco: Never   Substance and Sexual Activity    Alcohol use: Yes     Comment: rarely    Drug use: No    Sexual activity: Not Currently     Partners: Female     Social Determinants of Health     Financial Resource Strain: Low Risk  (11/10/2020)    Overall Financial Resource Strain (CARDIA)     Difficulty of Paying Living Expenses: Not hard at all   Food Insecurity: No Food Insecurity (3/20/2020)    Hunger Vital Sign     Worried About Running Out of Food in the Last Year: Never true     Ran Out of Food in the Last Year: Never true   Transportation Needs: No Transportation Needs (3/20/2020)    PRAPARE - Transportation     Lack of Transportation (Medical): No     Lack of Transportation (Non-Medical): No   Physical Activity: Inactive (11/10/2020)    Exercise Vital Sign     Days of Exercise per Week: 0 days     Minutes of Exercise per Session: 0 min   Stress: No Stress Concern Present (10/19/2020)    [employer] - Occupational Stress Questionnaire     Feeling of Stress : Not at all   Social Connections: Unknown (11/10/2020)    Social Connection and Isolation Panel [NHANES]     Frequency of Communication with Friends and Family: More than three times a week     Frequency of Social Gatherings with Friends and Family: Three times a week   Housing Stability: Low Risk  (11/10/2020)    Housing Stability Vital Sign     Unable to Pay for Housing in the Last Year: No     Number of Places Lived in the Last Year: 1     Unstable Housing in the Last Year: No         OBJECTIVE:     Vital Signs:  Vitals:    12/28/23 1152   BP: 121/75   Pulse: (!) 59   Resp: 18       Review of Systems   Constitutional: Negative.    HENT: Negative.     Eyes: Negative.    Respiratory: Negative.  Negative for chest tightness.    Cardiovascular: Negative.  Negative for leg swelling.   Gastrointestinal: Negative.    Endocrine: Negative.    Genitourinary: Negative.    Musculoskeletal: Negative.    Skin: Negative.     Allergic/Immunologic: Negative.    Neurological: Negative.    Hematological: Negative.    Psychiatric/Behavioral: Negative.  Negative for agitation.    All other systems reviewed and are negative.      Physical Exam:  Physical Exam  Vitals reviewed.   Constitutional:       General: He is not in acute distress.     Appearance: Normal appearance. He is well-developed.   HENT:      Head: Normocephalic and atraumatic.      Nose: Nose normal.      Mouth/Throat:      Mouth: Mucous membranes are dry.      Pharynx: Oropharynx is clear.   Eyes:      Pupils: Pupils are equal, round, and reactive to light.   Cardiovascular:      Rate and Rhythm: Normal rate and regular rhythm.      Pulses: Normal pulses.      Heart sounds: Normal heart sounds.   Pulmonary:      Effort: Pulmonary effort is normal.      Breath sounds: Normal breath sounds.   Abdominal:      General: Bowel sounds are normal.      Palpations: Abdomen is soft.   Musculoskeletal:         General: Normal range of motion.      Cervical back: Normal range of motion and neck supple.   Skin:     General: Skin is warm and dry.   Neurological:      General: No focal deficit present.      Mental Status: He is alert and oriented to person, place, and time. Mental status is at baseline.   Psychiatric:         Mood and Affect: Mood normal.         Behavior: Behavior normal.         Thought Content: Thought content normal.         Judgment: Judgment normal.       INSTRUCTIONS FOR PATIENT:   - Continue all medications, treatments and therapies as ordered.   - Follow all instructions, recommendations as discussed.  - Maintain Safety Precautions at all times.  - Attend all medical appointments as scheduled.  - For worsening symptoms: call Primary Care Physician or Nurse Practitioner.  - For emergencies, call 911 or immediately report to the nearest emergency room.   Scheduled Follow-up, Appts Reviewed with Modifications if Needed: Yes  No future appointments.      Current  Outpatient Medications:     aspirin (ECOTRIN) 81 MG EC tablet, Take 81 mg by mouth once daily., Disp: , Rfl:     atorvastatin (LIPITOR) 40 MG tablet, TAKE 1 TABLET EVERY DAY, Disp: 90 tablet, Rfl: 3    clopidogreL (PLAVIX) 75 mg tablet, TAKE 1 TABLET EVERY DAY, Disp: 90 tablet, Rfl: 1    cyanocobalamin (VITAMIN B-12) 100 MCG tablet, Take 100 mcg by mouth once daily., Disp: , Rfl:     fenofibrate 160 MG Tab, TAKE 1 TABLET EVERY OTHER DAY, Disp: 45 tablet, Rfl: 3    fish oil-omega-3 fatty acids 300-1,000 mg capsule, Take 2 g by mouth once daily., Disp: , Rfl:     metoprolol tartrate (LOPRESSOR) 50 MG tablet, TAKE 1 TABLET TWICE DAILY, Disp: 180 tablet, Rfl: 2    tamsulosin (FLOMAX) 0.4 mg Cap, TAKE 1 CAPSULE (0.4 MG TOTAL) BY MOUTH ONCE DAILY  FOR BLADDER, Disp: 90 capsule, Rfl: 3    vitamin D 1000 units Tab, Take 2,000 Units by mouth once daily. , Disp: , Rfl:     Medication Reconciliation:  Were medications changed during this appointment? No  Were medications in the home? Yes  Is the patient taking the medications as directed? Yes  Does updated med list accurately reflects meds patient is currently taking? Yes    Signature: Stanislaw Hernández NP

## 2024-01-31 DIAGNOSIS — I25.10 CAD, MULTIPLE VESSEL: ICD-10-CM

## 2024-01-31 DIAGNOSIS — E78.5 HYPERLIPIDEMIA LDL GOAL <70: Chronic | ICD-10-CM

## 2024-01-31 RX ORDER — CLOPIDOGREL BISULFATE 75 MG/1
TABLET ORAL
Qty: 90 TABLET | Refills: 2 | Status: SHIPPED | OUTPATIENT
Start: 2024-01-31 | End: 2024-06-08 | Stop reason: SDUPTHER

## 2024-01-31 RX ORDER — ATORVASTATIN CALCIUM 40 MG/1
TABLET, FILM COATED ORAL
Qty: 90 TABLET | Refills: 3 | Status: SHIPPED | OUTPATIENT
Start: 2024-01-31 | End: 2024-06-08 | Stop reason: SDUPTHER

## 2024-01-31 NOTE — TELEPHONE ENCOUNTER
Refill Routing Note   Medication(s) are not appropriate for processing by [facility] for the following reason(s):        Required labs outdated    [facility] action(s):  Defer  Approve     Requires labs : Yes             Appointments  past 12m or future 3m with PCP    Date Provider   Last Visit   12/11/2023 Jojo Duque DO   Next Visit   Visit date not found Jojo Duque DO   ED visits in past 90 days: 0        Note composed:10:22 AM 01/31/2024

## 2024-01-31 NOTE — TELEPHONE ENCOUNTER
Care Due:                  Date            Visit Type   Department     Provider  --------------------------------------------------------------------------------                                EP -                              PRIMARY      [facility] INTERNAL  Last Visit: 12-      CARE ([facility])   MEDICINE       Jojo Duque  Next Visit: None Scheduled  None         None Found                                                            Last  Test          Frequency    Reason                     Performed    Due Date  --------------------------------------------------------------------------------    CBC.........  12 months..  fenofibrate..............  11- 11-    Health [facility] Embedded Care Due Messages. Reference number: [id number].   1/31/2024 10:21:13 AM CST

## 2024-05-01 NOTE — Clinical Note
Your patient was seen today for AWV. Abnormalities bolded. Please review.   Thank you,   BARRINGTON Doe    To get better and follow your care plan as instructed.

## 2024-06-08 DIAGNOSIS — E78.5 HYPERLIPIDEMIA LDL GOAL <70: Chronic | ICD-10-CM

## 2024-06-08 DIAGNOSIS — I25.10 CAD, MULTIPLE VESSEL: ICD-10-CM

## 2024-06-11 RX ORDER — FENOFIBRATE 160 MG/1
160 TABLET ORAL EVERY OTHER DAY
Qty: 45 TABLET | Refills: 3 | Status: SHIPPED | OUTPATIENT
Start: 2024-06-11

## 2024-06-11 RX ORDER — ATORVASTATIN CALCIUM 40 MG/1
40 TABLET, FILM COATED ORAL DAILY
Qty: 90 TABLET | Refills: 3 | Status: SHIPPED | OUTPATIENT
Start: 2024-06-11

## 2024-06-11 RX ORDER — CLOPIDOGREL BISULFATE 75 MG/1
75 TABLET ORAL DAILY
Qty: 90 TABLET | Refills: 2 | Status: SHIPPED | OUTPATIENT
Start: 2024-06-11

## 2024-07-27 DIAGNOSIS — E78.5 HYPERLIPIDEMIA LDL GOAL <70: Chronic | ICD-10-CM

## 2024-07-27 NOTE — TELEPHONE ENCOUNTER
No care due was identified.  Health [facility] Embedded Care Due Messages. Reference number: [id number].   7/27/2024 12:50:48 AM CDT

## 2024-07-28 NOTE — TELEPHONE ENCOUNTER
Refill Routing Note   Medication(s) are not appropriate for processing by [facility] for the following reason(s):        Required labs outdated    [facility] action(s):  Defer             Appointments  past 12m or future 3m with PCP    Date Provider   Last Visit   12/11/2023 Jojo Duque DO   Next Visit   9/30/2024 Jojo Duque DO   ED visits in past 90 days: 0        Note composed:10:55 AM 07/28/2024

## 2024-07-30 RX ORDER — FENOFIBRATE 160 MG/1
160 TABLET ORAL EVERY OTHER DAY
Qty: 45 TABLET | Refills: 1 | Status: SHIPPED | OUTPATIENT
Start: 2024-07-30

## 2024-08-20 NOTE — ASSESSMENT & PLAN NOTE
Patient presented to [facility]- due to chest pain  EKG revealed inferior ST elevation  NTG SL x 1 given in ED  Repeat EKG revealed persistently elevation in inferior leads  IV heparin 5,000 units given in ED  No BB due to Bradycardia in ED  TTE pending  FLP pending  Patient taken to Cath Lab for emergent LHC per Dr. Monroe.  Aggrastat bolus and Infusion to follow  Further recs to follow    12/16  -CABG recommended, CVT consulted and plan for CABG on 12/18  -Continue IV heparin gtt, ASA, Statin, BB  -Start low dose ARB today  -Transfer to telemetry today  -No chest pain on exam today.   -FLP not drawn, will reorder for AM  -ECHO revealed EF 60-65%, -WMA's, normal Bi-V function.    12/17/18  -Stable overnight  -No chest pain or SOB  -Continue IV heparin  -Continue ASA, BB, statin, ARB  -CABG planned for AM    12/18/18  -s/p CABG x 4  -Hemodynamically stable  -Continue current post-op care  -ASA, Plavix, statin, BB     50.8

## 2024-09-03 ENCOUNTER — TELEPHONE (OUTPATIENT)
Dept: INTERNAL MEDICINE | Facility: CLINIC | Age: 79
End: 2024-09-03
Payer: MEDICARE

## 2024-09-03 DIAGNOSIS — E78.5 HYPERLIPIDEMIA LDL GOAL <70: ICD-10-CM

## 2024-09-03 DIAGNOSIS — I10 ESSENTIAL HYPERTENSION: Primary | ICD-10-CM

## 2024-09-03 NOTE — TELEPHONE ENCOUNTER
----- Message from Uli Angelo sent at 9/3/2024  4:21 PM CDT -----  Contact: Josefina/wife  Contact: First and Last Name if other than the patient involved: Josefina/wife   Requests an order for:  annual blood work  Reason for request:  for appt on 9/30/24  Callback expected: yes  [phone] (home)   Best time to call back: any time   Patient is wanting the lab work scheduled for 9/16/24 a week before the appointment.  Thanks   Am

## 2024-09-05 DIAGNOSIS — C61 CANCER OF PROSTATE WITH INTERMEDIATE RECURRENCE RISK (STAGE T2B-C OR GLEASON 7 OR PSA 10-20): ICD-10-CM

## 2024-09-05 RX ORDER — TAMSULOSIN HYDROCHLORIDE 0.4 MG/1
CAPSULE ORAL
Qty: 90 CAPSULE | Refills: 0 | OUTPATIENT
Start: 2024-09-05

## 2024-09-05 NOTE — TELEPHONE ENCOUNTER
Care Due:                  Date            Visit Type   Department     Provider  --------------------------------------------------------------------------------                                EP -                              PRIMARY      [facility] INTER[facility]  Last Visit: 12-      CARE ([facility])   RIN Duque                              MYCHART                              FOLLOWUP/OF  [facility] INTERNAL  Next Visit: 09-      [facility] VISIT   RIN Duque                                                            Last  Test          Frequency    Reason                     Performed    Due Date  --------------------------------------------------------------------------------    CBC.........  12 months..  clopidogreL, fenofibrate.  11- 11-    CMP.........  12 months..  atorvastatin, fenofibrate  12- 11-    Lipid Panel.  12 months..  atorvastatin, fenofibrate  12- 11-    [facility] Embedded Care Due Messages. Reference number: [id number].   9/05/2024 5:47:04 PM CDT

## 2024-09-06 DIAGNOSIS — C61 CANCER OF PROSTATE WITH INTERMEDIATE RECURRENCE RISK (STAGE T2B-C OR GLEASON 7 OR PSA 10-20): ICD-10-CM

## 2024-09-06 RX ORDER — TAMSULOSIN HYDROCHLORIDE 0.4 MG/1
0.4 CAPSULE ORAL DAILY
Qty: 90 CAPSULE | Refills: 3 | OUTPATIENT
Start: 2024-09-06

## 2024-09-06 NOTE — TELEPHONE ENCOUNTER
"  Provider Staff - Action is required     If a refill request needs assessment, please pend appropriate medication(s) for patient utilizing either Order Search or the "reorder" symbol next to the appropriate medication on the Medication Management folder within the encounter. If the medication is not pended as a Refill Request (Rx Request), information essential to ensuring the refill is appropriate will not appear. This can cause significant delay in processing refills. This request will be forwarded back to the staff pool. Please pend the requested medication(s) and route the Rx Request to the Centralized Refill Staff Pool.        If medication is already pended in a previous encounter, please alyce the initial request as High Priority and send both to the Centralized Refill Staff Pool.       Thank you for your assistance!   [facility]     Note composed: 9:19 AM 09/06/2024          "

## 2024-09-06 NOTE — TELEPHONE ENCOUNTER
----- Message from Karsten Chaudhari sent at 9/5/2024  5:26 PM CDT -----  Type:  RX Refill Request    Who Called: [facility]  Refill or New Rx:refill  RX Name and Strength:tamsulosin (FLOMAX) 0.4 mg Cap  How is the patient currently taking it? (ex. 1XDay):1xday  Is this a 30 day or 90 day RX:90  Preferred Pharmacy with phone number:[facility] [address] [facility]   Phone: [phone]  Fax: [phone]  Local or Mail Order:local  Ordering Provider:Guy   Would the patient rather a call back or a response via [facility]? Call back  Best Call Back Number:[phone]   Additional Information: Pt is out of this medicine. [facility] Is requesting the prescription to fill medication. The company advised they have reached out via fax twice requesting script.

## 2024-09-06 NOTE — TELEPHONE ENCOUNTER
No care due was identified.  Health [facility] Embedded Care Due Messages. Reference number: [id number].   9/06/2024 9:22:20 AM CDT

## 2024-09-06 NOTE — TELEPHONE ENCOUNTER
Refill Decision Note   Filipe Agustin  is requesting a refill authorization.  Brief Assessment and Rationale for Refill:  Quick Discontinue     Medication Therapy Plan: Refill refused on 8/26-PT NEEDS APPT      Comments:     Note composed:1:44 PM 09/06/2024

## 2024-09-06 NOTE — TELEPHONE ENCOUNTER
Refill Decision Note   Filipe Agustin  is requesting a refill authorization.  Brief Assessment and Rationale for Refill:  Quick Discontinue     Medication Therapy Plan:    Pharmacy is requesting new scripts for the following medications without required information, (sig/ frequency/qty/etc)      Medication Reconciliation Completed: No     Comments: Pharmacies have been requesting medications for patients without required information, (sig, frequency, qty, etc.). In addition, requests are sent for medication(s) pt. are currently not taking, and medications patients have never taken.    We have spoken to the pharmacies about these request types and advised their teams previously that we are unable to assess these New Script requests and require all details for these requests. This is a known issue and has been reported.     Note composed:10:37 PM 09/05/2024

## 2024-09-10 ENCOUNTER — PATIENT MESSAGE (OUTPATIENT)
Dept: UROLOGY | Facility: CLINIC | Age: 79
End: 2024-09-10
Payer: MEDICARE

## 2024-09-16 ENCOUNTER — OFFICE VISIT (OUTPATIENT)
Dept: UROLOGY | Facility: CLINIC | Age: 79
End: 2024-09-16
Payer: MEDICARE

## 2024-09-16 ENCOUNTER — LAB VISIT (OUTPATIENT)
Dept: LAB | Facility: HOSPITAL | Age: 79
End: 2024-09-16
Attending: INTERNAL MEDICINE
Payer: MEDICARE

## 2024-09-16 VITALS
HEART RATE: 49 BPM | DIASTOLIC BLOOD PRESSURE: 84 MMHG | SYSTOLIC BLOOD PRESSURE: 131 MMHG | HEIGHT: 69 IN | BODY MASS INDEX: 29.84 KG/M2 | RESPIRATION RATE: 16 BRPM | WEIGHT: 201.5 LBS

## 2024-09-16 DIAGNOSIS — R39.198 SLOW URINARY STREAM: ICD-10-CM

## 2024-09-16 DIAGNOSIS — R31.29 MICROHEMATURIA: ICD-10-CM

## 2024-09-16 DIAGNOSIS — C61 CANCER OF PROSTATE WITH INTERMEDIATE RECURRENCE RISK (STAGE T2B-C OR GLEASON 7 OR PSA 10-20): Primary | ICD-10-CM

## 2024-09-16 DIAGNOSIS — I10 ESSENTIAL HYPERTENSION: ICD-10-CM

## 2024-09-16 DIAGNOSIS — E78.5 HYPERLIPIDEMIA LDL GOAL <70: ICD-10-CM

## 2024-09-16 LAB
ALBUMIN SERPL BCP-MCNC: 3.3 G/DL (ref 3.5–5.2)
ALP SERPL-CCNC: 25 U/L (ref 55–135)
ALT SERPL W/O P-5'-P-CCNC: 18 U/L (ref 10–44)
ANION GAP SERPL CALC-SCNC: 10 MMOL/L (ref 8–16)
AST SERPL-CCNC: 22 U/L (ref 10–40)
BACTERIA #/AREA URNS AUTO: ABNORMAL /HPF
BILIRUB SERPL-MCNC: 0.8 MG/DL (ref 0.1–1)
BILIRUB UR QL STRIP: NEGATIVE
BUN SERPL-MCNC: 15 MG/DL (ref 8–23)
CALCIUM SERPL-MCNC: 9.4 MG/DL (ref 8.7–10.5)
CAOX CRY UR QL COMP ASSIST: ABNORMAL
CHLORIDE SERPL-SCNC: 109 MMOL/L (ref 95–110)
CHOLEST SERPL-MCNC: 119 MG/DL (ref 120–199)
CHOLEST/HDLC SERPL: 2.3 {RATIO} (ref 2–5)
CO2 SERPL-SCNC: 25 MMOL/L (ref 23–29)
CREAT SERPL-MCNC: 1.4 MG/DL (ref 0.5–1.4)
EST. GFR  (NO RACE VARIABLE): 51.8 ML/MIN/1.73 M^2
GLUCOSE SERPL-MCNC: 85 MG/DL (ref 70–110)
GLUCOSE UR QL STRIP: NEGATIVE
HDLC SERPL-MCNC: 52 MG/DL (ref 40–75)
HDLC SERPL: 43.7 % (ref 20–50)
KETONES UR QL STRIP: NEGATIVE
LDLC SERPL CALC-MCNC: 56 MG/DL (ref 63–159)
LEUKOCYTE ESTERASE UR QL STRIP: NEGATIVE
MICROSCOPIC COMMENT: ABNORMAL
NONHDLC SERPL-MCNC: 67 MG/DL
PH, POC UA: 5.5
POC BLOOD, URINE: POSITIVE
POC NITRATES, URINE: NEGATIVE
POTASSIUM SERPL-SCNC: 4.2 MMOL/L (ref 3.5–5.1)
PROT SERPL-MCNC: 6.4 G/DL (ref 6–8.4)
PROT UR QL STRIP: POSITIVE
RBC #/AREA URNS AUTO: >100 /HPF (ref 0–4)
SODIUM SERPL-SCNC: 144 MMOL/L (ref 136–145)
SP GR UR STRIP: 1.03 (ref 1–1.03)
TRIGL SERPL-MCNC: 55 MG/DL (ref 30–150)
UROBILINOGEN UR STRIP-ACNC: 2 (ref 0.3–2.2)
WBC #/AREA URNS AUTO: 15 /HPF (ref 0–5)

## 2024-09-16 PROCEDURE — 81003 URINALYSIS AUTO W/O SCOPE: CPT | Mod: QW,S$GLB,, | Performed by: UROLOGY

## 2024-09-16 PROCEDURE — 1101F PT FALLS ASSESS-DOCD LE1/YR: CPT | Mod: CPTII,S$GLB,, | Performed by: UROLOGY

## 2024-09-16 PROCEDURE — 3079F DIAST BP 80-89 MM HG: CPT | Mod: CPTII,S$GLB,, | Performed by: UROLOGY

## 2024-09-16 PROCEDURE — 3075F SYST BP GE 130 - 139MM HG: CPT | Mod: CPTII,S$GLB,, | Performed by: UROLOGY

## 2024-09-16 PROCEDURE — 81001 URINALYSIS AUTO W/SCOPE: CPT | Performed by: UROLOGY

## 2024-09-16 PROCEDURE — 36415 COLL VENOUS BLD VENIPUNCTURE: CPT | Mod: HCNC | Performed by: INTERNAL MEDICINE

## 2024-09-16 PROCEDURE — 3288F FALL RISK ASSESSMENT DOCD: CPT | Mod: CPTII,S$GLB,, | Performed by: UROLOGY

## 2024-09-16 PROCEDURE — 80061 LIPID PANEL: CPT | Mod: HCNC | Performed by: INTERNAL MEDICINE

## 2024-09-16 PROCEDURE — 87086 URINE CULTURE/COLONY COUNT: CPT | Performed by: UROLOGY

## 2024-09-16 PROCEDURE — 99999 PR PBB SHADOW E&M-EST. PATIENT-LVL III: CPT | Mod: PBBFAC,HCNC,, | Performed by: UROLOGY

## 2024-09-16 PROCEDURE — 80053 COMPREHEN METABOLIC PANEL: CPT | Mod: HCNC | Performed by: INTERNAL MEDICINE

## 2024-09-16 PROCEDURE — 99204 OFFICE O/P NEW MOD 45 MIN: CPT | Mod: S$GLB,,, | Performed by: UROLOGY

## 2024-09-16 PROCEDURE — 1159F MED LIST DOCD IN RCRD: CPT | Mod: CPTII,S$GLB,, | Performed by: UROLOGY

## 2024-09-16 PROCEDURE — 1126F AMNT PAIN NOTED NONE PRSNT: CPT | Mod: CPTII,S$GLB,, | Performed by: UROLOGY

## 2024-09-16 RX ORDER — TAMSULOSIN HYDROCHLORIDE 0.4 MG/1
0.4 CAPSULE ORAL DAILY
Qty: 90 CAPSULE | Refills: 3 | Status: SHIPPED | OUTPATIENT
Start: 2024-09-16 | End: 2025-09-16

## 2024-09-16 RX ORDER — TAMSULOSIN HYDROCHLORIDE 0.4 MG/1
0.4 CAPSULE ORAL DAILY
Qty: 30 CAPSULE | Refills: 0 | Status: SHIPPED | OUTPATIENT
Start: 2024-09-16

## 2024-09-16 NOTE — PROGRESS NOTES
Chief Complaint:   Encounter Diagnoses   Name Primary?    Cancer of prostate with intermediate recurrence risk (stage T2b-c or [place] 7 or PSA 10-20) Yes    Slow urinary stream     Microhematuria        HPI:  HPI Filipe Agustin Jr. ady 78 y.o. male who presents with follow-up of prostate cancer.  He has been lost to follow up for 3 years.  He has a known history of prostate cancer treated with radiation therapy in 2018.  He had [place] 7 disease 27 g gland and pretreatment PSA of 6.7.  His most recent PSA was about a year ago was 0.02.  He comes in today stating he has a slow urinary stream and ran out of his Flomax about a week ago.  He states his slow stream has been going on for about a month.  In reviewing his prior urinalyses he does have chronic microhematuria that has not been completely evaluated.  His urine does show a significant amount of blood in his urine today as well.  He denies any gross hematuria.    History:  Social History     Tobacco Use    Smoking status: Former     Current packs/day: 0.00     Average packs/day: 2.0 packs/day for 20.0 years (40.0 ttl pk-yrs)     Types: Cigarettes     Start date: 1988     Quit date: 2008     Years since quittin.1    Smokeless tobacco: Never   Substance Use Topics    Alcohol use: Yes     Comment: rarely    Drug use: No     Past Medical History:   Diagnosis Date    Acute coronary syndrome     Cancer of prostate with intermediate recurrence risk (stage T2b-c or [place] 7 or PSA 10-20) 2018    CKD (chronic kidney disease) stage 3, GFR 30-59 ml/min 2015    Coronary artery disease     Elevated PSA 2017    History of coronary angioplasty 2014    Hyperlipidemia     Hypertension     Myocardial infarction     Obesity, Class II, BMI 35.0-39.9, with comorbidity (see actual BMI) 2014    Polio     as a child    Tobacco dependence     resolved     Past Surgical History:   Procedure Laterality Date    CARDIAC CATHETERIZATION       CORONARY ANGIOPLASTY  2008    acute PCI- RCA- RUI    CORONARY ARTERY BYPASS GRAFT (CABG) N/A 12/18/2018    Procedure: CORONARY ARTERY BYPASS GRAFT (CABG);  Surgeon: Rg Johnson MD;  Location: [facility] OR;  Service: Cardiothoracic;  Laterality: N/A;    CORONARY STENT PLACEMENT  2008    ENDOSCOPIC HARVEST OF VEIN Bilateral 12/18/2018    Procedure: SURGICAL PROCUREMENT, VEIN, ENDOSCOPIC;  Surgeon: Rg Johnson MD;  Location: [facility] OR;  Service: Cardiothoracic;  Laterality: Bilateral;    LEFT HEART CATHETERIZATION Left 12/15/2018    Procedure: CATHETERIZATION, HEART, LEFT;  Surgeon: Jose Armando Monroe MD;  Location: [facility] CATH LAB;  Service: Cardiology;  Laterality: Left;     Family History   Problem Relation Name Age of Onset    Heart disease Father          MI    Cancer Brother      Heart disease Brother      Diabetes Neg Hx      Kidney disease Neg Hx      Stroke Neg Hx         Current Outpatient Medications on File Prior to Visit   Medication Sig Dispense Refill    aspirin (ECOTRIN) 81 MG EC tablet Take 81 mg by mouth once daily.      atorvastatin (LIPITOR) 40 MG tablet Take 1 tablet (40 mg total) by mouth once daily. 90 tablet 3    clopidogreL (PLAVIX) 75 mg tablet Take 1 tablet (75 mg total) by mouth once daily. 90 tablet 2    cyanocobalamin (VITAMIN B-12) 100 MCG tablet Take 100 mcg by mouth once daily.      fenofibrate 160 MG Tab TAKE 1 TABLET EVERY OTHER DAY 45 tablet 1    fish oil-omega-3 fatty acids 300-1,000 mg capsule Take 2 g by mouth once daily.      metoprolol tartrate (LOPRESSOR) 50 MG tablet Take 1 tablet (50 mg total) by mouth 2 (two) times daily. 180 tablet 1    vitamin D 1000 units Tab Take 2,000 Units by mouth once daily.       [DISCONTINUED] tamsulosin (FLOMAX) 0.4 mg Cap TAKE 1 CAPSULE (0.4 MG TOTAL) BY MOUTH ONCE DAILY  FOR BLADDER 90 capsule 3     No current facility-administered medications on file prior to visit.        Objective:     Vitals:    09/16/24 1009   BP: 131/84   BP Location:  "Left arm   Patient Position: Sitting   Pulse: (!) 49   Resp: 16   Weight: 91.4 kg (201 lb 8 oz)   Height: 5' 9" (1.753 m)      BMI Readings from Last 1 Encounters:   09/16/24 29.76 kg/m²          Physical Exam    No acute distress alert and oriented   Respirations even unlabored   Abdomen is soft nontender    Urinalysis shows large blood moderate protein.      Lab Results   Component Value Date    PSADIAG 0.02 06/05/2023    PSADIAG 0.04 05/17/2021    PSADIAG 0.05 10/13/2020    PSADIAG 0.12 01/17/2020    PSADIAG 0.18 10/01/2019    PSADIAG 0.26 07/01/2019    PSADIAG 0.39 03/07/2019    PSADIAG 0.33 01/22/2019    PSADIAG 0.58 10/15/2018    PSADIAG 2.7 06/01/2018    PSAFREEPCT Unable to calculate 06/05/2023    PSAFREEPCT Unable to calculate 05/12/2022    PSA 7.0 (H) 09/06/2017    PSA 6.7 (H) 06/27/2017    PSA 6.3 (H) 12/20/2016        Lab Results   Component Value Date    CREATININE 1.4 12/04/2023      Assessment:       1. Cancer of prostate with intermediate recurrence risk (stage T2b-c or [place] 7 or PSA 10-20)    2. Slow urinary stream    3. Microhematuria        Plan:     1. Cancer of prostate with intermediate recurrence risk (stage T2b-c or [place] 7 or PSA 10-20)    2. Slow urinary stream    3. Microhematuria       Orders Placed This Encounter    CULTURE, URINE    CT Urogram Abd Pelvis W WO    Prostate Specific Antigen, Diagnostic    Urinalysis Microscopic    POCT Urinalysis, Dipstick, Automated, W/O Scope    tamsulosin (FLOMAX) 0.4 mg Cap    tamsulosin (FLOMAX) 0.4 mg Cap      Prostate cancer status post radiation.  Microscopic hematuria.  Recommend workup with CT urogram and cystoscopy.  Given patient's symptoms and hematuria he may have a urethral stricture.  We will restart his tamsulosin.  Recommend PSA as it has been more than a year.  Did  him on proceeding with annual urologic follow up.  I will see him back after his CT to proceed with cystoscopy.  "

## 2024-09-17 LAB — BACTERIA UR CULT: NORMAL

## 2024-09-26 ENCOUNTER — PATIENT MESSAGE (OUTPATIENT)
Dept: UROLOGY | Facility: CLINIC | Age: 79
End: 2024-09-26
Payer: MEDICARE

## 2024-09-30 ENCOUNTER — OFFICE VISIT (OUTPATIENT)
Dept: INTERNAL MEDICINE | Facility: CLINIC | Age: 79
End: 2024-09-30
Payer: MEDICARE

## 2024-09-30 ENCOUNTER — TELEPHONE (OUTPATIENT)
Dept: HOME HEALTH SERVICES | Facility: CLINIC | Age: 79
End: 2024-09-30
Payer: MEDICARE

## 2024-09-30 ENCOUNTER — HOSPITAL ENCOUNTER (OUTPATIENT)
Dept: RADIOLOGY | Facility: HOSPITAL | Age: 79
Discharge: HOME | End: 2024-09-30
Attending: UROLOGY
Payer: MEDICARE

## 2024-09-30 VITALS
WEIGHT: 198.88 LBS | HEIGHT: 69 IN | SYSTOLIC BLOOD PRESSURE: 124 MMHG | RESPIRATION RATE: 20 BRPM | TEMPERATURE: 98 F | DIASTOLIC BLOOD PRESSURE: 72 MMHG | BODY MASS INDEX: 29.46 KG/M2 | OXYGEN SATURATION: 98 % | HEART RATE: 70 BPM

## 2024-09-30 DIAGNOSIS — E78.5 HYPERLIPIDEMIA LDL GOAL <70: Chronic | ICD-10-CM

## 2024-09-30 DIAGNOSIS — I10 ESSENTIAL HYPERTENSION: Primary | Chronic | ICD-10-CM

## 2024-09-30 DIAGNOSIS — I25.10 CAD, MULTIPLE VESSEL: ICD-10-CM

## 2024-09-30 DIAGNOSIS — R31.29 MICROHEMATURIA: ICD-10-CM

## 2024-09-30 DIAGNOSIS — I70.0 AORTIC ATHEROSCLEROSIS: ICD-10-CM

## 2024-09-30 DIAGNOSIS — N18.30 STAGE 3 CHRONIC KIDNEY DISEASE, UNSPECIFIED WHETHER STAGE 3A OR 3B CKD: Chronic | ICD-10-CM

## 2024-09-30 DIAGNOSIS — Z23 NEED FOR VACCINATION: ICD-10-CM

## 2024-09-30 DIAGNOSIS — N18.31 STAGE 3A CHRONIC KIDNEY DISEASE: ICD-10-CM

## 2024-09-30 PROCEDURE — 3288F FALL RISK ASSESSMENT DOCD: CPT | Mod: CPTII,S$GLB,, | Performed by: INTERNAL MEDICINE

## 2024-09-30 PROCEDURE — 99214 OFFICE O/P EST MOD 30 MIN: CPT | Mod: S$GLB,,, | Performed by: INTERNAL MEDICINE

## 2024-09-30 PROCEDURE — 99999 PR PBB SHADOW E&M-EST. PATIENT-LVL IV: CPT | Mod: PBBFAC,,, | Performed by: INTERNAL MEDICINE

## 2024-09-30 PROCEDURE — 1159F MED LIST DOCD IN RCRD: CPT | Mod: CPTII,S$GLB,, | Performed by: INTERNAL MEDICINE

## 2024-09-30 PROCEDURE — 1126F AMNT PAIN NOTED NONE PRSNT: CPT | Mod: CPTII,S$GLB,, | Performed by: INTERNAL MEDICINE

## 2024-09-30 PROCEDURE — 74178 CT ABD&PLV WO CNTR FLWD CNTR: CPT | Mod: 26,,, | Performed by: RADIOLOGY

## 2024-09-30 PROCEDURE — 90653 IIV ADJUVANT VACCINE IM: CPT | Mod: S$GLB,,, | Performed by: INTERNAL MEDICINE

## 2024-09-30 PROCEDURE — G2211 COMPLEX E/M VISIT ADD ON: HCPCS | Mod: S$GLB,,, | Performed by: INTERNAL MEDICINE

## 2024-09-30 PROCEDURE — 1160F RVW MEDS BY RX/DR IN RCRD: CPT | Mod: CPTII,S$GLB,, | Performed by: INTERNAL MEDICINE

## 2024-09-30 PROCEDURE — 25500020 PHARM REV CODE 255: Performed by: UROLOGY

## 2024-09-30 PROCEDURE — 1101F PT FALLS ASSESS-DOCD LE1/YR: CPT | Mod: CPTII,S$GLB,, | Performed by: INTERNAL MEDICINE

## 2024-09-30 PROCEDURE — G0008 ADMIN INFLUENZA VIRUS VAC: HCPCS | Mod: S$GLB,,, | Performed by: INTERNAL MEDICINE

## 2024-09-30 PROCEDURE — 74178 CT ABD&PLV WO CNTR FLWD CNTR: CPT | Mod: TC

## 2024-09-30 PROCEDURE — 3078F DIAST BP <80 MM HG: CPT | Mod: CPTII,S$GLB,, | Performed by: INTERNAL MEDICINE

## 2024-09-30 PROCEDURE — 3074F SYST BP LT 130 MM HG: CPT | Mod: CPTII,S$GLB,, | Performed by: INTERNAL MEDICINE

## 2024-09-30 RX ADMIN — IOHEXOL 100 ML: 350 INJECTION, SOLUTION INTRAVENOUS at 03:09

## 2024-09-30 NOTE — PROGRESS NOTES
Subjective     Patient ID: Filipe Agustin Jr. is a 78 y.o. male.    Chief Complaint: Follow-up (6 month/)    Last week had diarrhea/vomiting/decreased appetite. No fever/cough/cold-like symptoms.   No longer having diarrhea/vomiting but continues to have a slightly decreased appetite.  Recently met with Urology. Concerns with latest PSA and UA/urine m/c/s. CT urogram abdo pelvis scheduled later today.  Denies LUTS, fever, hematuria, or flank pain.      Current Outpatient Medications on File Prior to Visit   Medication Sig Dispense Refill    aspirin (ECOTRIN) 81 MG EC tablet Take 81 mg by mouth once daily.      atorvastatin (LIPITOR) 40 MG tablet Take 1 tablet (40 mg total) by mouth once daily. 90 tablet 3    clopidogreL (PLAVIX) 75 mg tablet Take 1 tablet (75 mg total) by mouth once daily. 90 tablet 2    cyanocobalamin (VITAMIN B-12) 100 MCG tablet Take 100 mcg by mouth once daily.      fenofibrate 160 MG Tab TAKE 1 TABLET EVERY OTHER DAY 45 tablet 1    fish oil-omega-3 fatty acids 300-1,000 mg capsule Take 2 g by mouth once daily.      metoprolol tartrate (LOPRESSOR) 50 MG tablet Take 1 tablet (50 mg total) by mouth 2 (two) times daily. 180 tablet 1    tamsulosin (FLOMAX) 0.4 mg Cap Take 1 capsule (0.4 mg total) by mouth once daily. For bladder 30 capsule 0    tamsulosin (FLOMAX) 0.4 mg Cap Take 1 capsule (0.4 mg total) by mouth once daily. 90 capsule 3    vitamin D 1000 units Tab Take 2,000 Units by mouth once daily.        No current facility-administered medications on file prior to visit.      Review of Systems   Constitutional:  Positive for appetite change. Negative for fatigue and fever.   HENT:  Negative for nasal congestion and sore throat.    Respiratory:  Negative for cough, shortness of breath and wheezing.    Cardiovascular:  Negative for chest pain, palpitations and leg swelling.   Gastrointestinal:  Positive for diarrhea and vomiting. Negative for abdominal distention, abdominal pain, blood in stool  and nausea.   Genitourinary:  Negative for decreased urine volume, difficulty urinating, dysuria, frequency and urgency.   Musculoskeletal:  Negative for arthralgias, back pain and myalgias.   Integumentary:  Negative for rash.   Neurological:  Negative for dizziness, seizures, syncope, weakness and headaches.   Psychiatric/Behavioral:  Negative for depressed mood. The patient is not nervous/anxious.      Past Medical History:   Diagnosis Date    Acute coronary syndrome     Cancer of prostate with intermediate recurrence risk (stage T2b-c or [place] 7 or PSA 10-20) 1/8/2018    CKD (chronic kidney disease) stage 3, GFR 30-59 ml/min 9/24/2015    Coronary artery disease     Elevated PSA 6/27/2017    History of coronary angioplasty 9/19/2014    Hyperlipidemia     Hypertension     Myocardial infarction 2008    Obesity, Class II, BMI 35.0-39.9, with comorbidity (see actual BMI) 6/6/2014    Polio     as a child    Tobacco dependence     resolved        Objective     Physical Exam  Vitals reviewed.   Constitutional:       General: He is not in acute distress.     Appearance: Normal appearance. He is not ill-appearing.   HENT:      Head: Normocephalic.      Nose: Nose normal.      Mouth/Throat:      Mouth: Mucous membranes are moist.   Cardiovascular:      Rate and Rhythm: Normal rate and regular rhythm.      Pulses: Normal pulses.      Heart sounds: Normal heart sounds.   Pulmonary:      Effort: Pulmonary effort is normal.      Breath sounds: Normal breath sounds.   Abdominal:      General: Abdomen is flat. Bowel sounds are normal.      Palpations: Abdomen is soft.   Skin:     General: Skin is warm.      Capillary Refill: Capillary refill takes less than 2 seconds.   Neurological:      General: No focal deficit present.      Mental Status: He is alert and oriented to person, place, and time. Mental status is at baseline.   Psychiatric:         Mood and Affect: Mood normal.          Assessment and Plan     1. Essential  hypertension  - continue medications    2. Hyperlipidemia LDL goal <70  - continue medications    3. CAD s/p quadruple bypass    4. Stage 3b chronic kidney disease    5. Prostate cancer with moderate risk of reoccurrence   - continue tamsulosin  - f/u urology  - CT urogram abdo pelvis scheduled today    Ant Haskins, MS4    I hereby acknowledge that I am relying upon documentation authored by a medical student working under my supervision and further I hereby attest that I have verified the student documentation or findings by personally performing the physical exam and medical decision making activities of the Evaluation and Management service to be billed.     Jojo Duque D.O.

## 2024-10-03 ENCOUNTER — CARE AT HOME (OUTPATIENT)
Dept: HOME HEALTH SERVICES | Facility: CLINIC | Age: 79
End: 2024-10-03
Payer: MEDICARE

## 2024-10-03 VITALS
SYSTOLIC BLOOD PRESSURE: 126 MMHG | RESPIRATION RATE: 18 BRPM | OXYGEN SATURATION: 97 % | DIASTOLIC BLOOD PRESSURE: 68 MMHG | HEART RATE: 61 BPM

## 2024-10-03 DIAGNOSIS — N18.30 STAGE 3 CHRONIC KIDNEY DISEASE, UNSPECIFIED WHETHER STAGE 3A OR 3B CKD: Chronic | ICD-10-CM

## 2024-10-03 DIAGNOSIS — Z85.46 HISTORY OF PROSTATE CANCER: Primary | ICD-10-CM

## 2024-10-03 DIAGNOSIS — E78.5 HYPERLIPIDEMIA LDL GOAL <70: Chronic | ICD-10-CM

## 2024-10-03 DIAGNOSIS — I10 ESSENTIAL HYPERTENSION: Chronic | ICD-10-CM

## 2024-10-03 PROCEDURE — 3074F SYST BP LT 130 MM HG: CPT | Mod: CPTII,S$GLB,,

## 2024-10-03 PROCEDURE — 99349 HOME/RES VST EST MOD MDM 40: CPT | Mod: S$GLB,,,

## 2024-10-03 PROCEDURE — 1126F AMNT PAIN NOTED NONE PRSNT: CPT | Mod: CPTII,S$GLB,,

## 2024-10-03 PROCEDURE — 3078F DIAST BP <80 MM HG: CPT | Mod: CPTII,S$GLB,,

## 2024-10-03 NOTE — PROGRESS NOTES
[facility]  Medical Chronic Care Home Visit    Encounter Provider: Stanislaw Hernández   PCP: Jojo Duque DO  Consult Requested By: Sri Myers    HISTORY OF PRESENT ILLNESS      Patient ID: Filipe Agustin Jr. is a 78 y.o. male is being seen in the home due to physical debility that presents a taxing effort to leave the home, to mitigate high risk of hospital readmission and/or due to the limited availability of reliable or safe options for transportation to the point of access to the provider. Prior to treatment on this visit the chart was reviewed and patient verbal consent was obtained.    Chronic medical conditions synopsis:    iFlipe Agustin is a 78 y.o. male being seen today to [facility] care. Recently had diarrhea, vomiting and decreased appetite. Reports these symptoms have resolved. Recent visit with PCP. Labs look well.  Reports compliance with medications. Denies further complaints or concerns at this time. Upcoming appointment with urology. Will follow up in 3-4 months.     DECISION MAKING TODAY       Assessment & Plan:  1. History of prostate cancer  Assessment & Plan:  Chronic, stable  PSA 0.01  F/U with urology      2. Essential hypertension  Assessment & Plan:  Chronic, stable  Continue current medication    Orders:  -     Ambulatory referral/consult to [facility]    3. Hyperlipidemia LDL goal <70  Assessment & Plan:  Stable on current medication  Continue lipitor    Orders:  -     Ambulatory referral/consult to [facility]    4. Stage 3 chronic kidney disease, unspecified whether stage 3a or 3b CKD  Comments:  Recent eGFR 51.8, Cr 1.4  Continue current medications  Renally dose meds, avoid nephrotoxins  Orders:  -     Ambulatory referral/consult to [facility]           ENVIRONMENT OF CARE      Family and/or Caregiver present at visit?  Yes  Name of Caregiver: marguerite  History provided by: patient    4Ms for Medical Decision-Making  in Older Adults    Last Completed EAWV: 11/10/2020    MOBILITY:  Get Up and Go:      11/10/2020     2:44 PM 2018     2:24 PM   Get Up and Go   Trial 1 11 seconds 11 seconds     Activities of Daily Livin/10/2020     2:51 PM   Activities of Daily Living   Ambulation Independent   Dressing Independent   Transfers Independent   Toileting Continent of bladder;Incontinent of bowel   Feeding Independent   Cleaning home/Chores Independent   Telephone use Independent   Shopping Independent   Paying bills Independent   Taking meds Independent     Whisper Test:      11/10/2020     2:45 PM   Whisper Test   Whisper Test Normal     Disability Status:      11/10/2020     2:53 PM   Disability Status   Are you deaf or do you have serious difficulty hearing? N   Are you blind or do you have serious difficulty seeing, even when wearing glasses? N   Because of a physical, mental, or emotional condition, do you have serious difficulty concentrating, remembering, or making decisions? N   Do you have serious difficulty walking or climbing stairs? Y   Do you have difficulty dressing or bathing? N   Because of a physical, mental, or emotional condition, do you have difficulty doing errands alone such as visiting a doctor's office or shopping? N     Nutrition Screenin/10/2020     2:50 PM   Nutrition Screening   Has food intake declined over the past three months due to loss of appetite, digestive problems, chewing or swallowing difficulties? No decrease in food intake   Involuntary weight loss during the last 3 months? Weight loss between 1 and 3 kg (2.2 and 6.6 pounds)   Mobility? Goes out   Has the patient suffered psychological stress or acute disease in the past three months? No   Neuropsychological problems? No psychological problems   Body Mass Index (BMI)?  BMI 23 or greater   Screening Score 13    Screening Score: 0-7 Malnourished, 8-11 At Risk, 12-14 Normal  Fall Risk:      2024     1:20 PM 2024      2:30 PM 2023     2:00 PM   Fall Risk Assessment - Outpatient   Mobility Status Ambulatory Ambulatory Ambulatory   Number of falls 0 0 0   Identified as fall risk False False False           MENTATION:   Depression Patient Health Questionnaire:      2024     1:10 PM   Depression Patient Health Questionnaire   Over the last two weeks how often have you been bothered by little interest or pleasure in doing things Not at all   Over the last two weeks how often have you been bothered by feeling down, depressed or hopeless Not at all   PHQ-2 Total Score 0     Has Dementia Dx: No  Has Anxiety Dx: No    Cognitive Function Screenin/10/2020     2:46 PM   Cognitive Function Screening   Clock Drawing Test 1   Mini-Cog 3 Minute Recall 2   Cognitive Function Screening 3     Cognitive Function Screening Total - Less than 4 = Abnormal,  Greater than or equal to 4 = Normal        MEDICATIONS:  High Risk Medications:  Total Active Medications: 0  This patient does not have an active medication from one of the medication groupers.    WHAT MATTERS MOST:  Advance Care Planning   ACP Status:   Patient has had an ACP conversation  Living Will: No  Power of : No  [place]: No    What is most important right now is to focus on remaining as independent as possible    Accordingly, we have decided that the best plan to meet the patient's goals includes continuing with treatment      What matters most to patient today is: establishing care           Is patient hospice appropriate: No  (If needed, use PPS <30 or FAST score >7)  Was referral to hospice placed: No    Impression upon entering the home:  Physical Dwelling: single family home   Appearance of home environment: cleaniness: clean, walking pathways: clear, lighting: adequate, and home structure: sound structure  Functional Status: independent  Mobility: ambulatory  Nutritional access: adequate intake and access  Home Health: No, and does not need it at this  time   DME/Supplies: none     Disease/illness education: Take all medication as prescribed. Activity as tolerated. Keep all upcoming appts.   Establishment or re-establishment of referral orders for community resources: No other necessary community resources.   Discussion with other health care providers: No discussion with other health care providers necessary.   Does patient have a PCP at [facility]? Yes   Repatriation plan with PCP? [facility] at [facility] reason: transportation   Does patient have an ostomy (ileostomy, colostomy, suprapubic catheter, nephrostomy tube, tracheostomy, PEG tube, pleurex catheter, cholecystostomy, etc)? No  Were BPAs reviewed? Yes    Social History     Socioeconomic History    Marital status:     Number of children: 3    Highest education level: High school graduate   Occupational History    Occupation: Retired   Tobacco Use    Smoking status: Former     Current packs/day: 0.00     Average packs/day: 2.0 packs/day for 20.0 years (40.0 ttl pk-yrs)     Types: Cigarettes     Start date: 1988     Quit date: 2008     Years since quittin.2    Smokeless tobacco: Never   Substance and Sexual Activity    Alcohol use: Yes     Comment: rarely    Drug use: No    Sexual activity: Not Currently     Partners: Female     Social Drivers of Health     Financial Resource Strain: Low Risk  (11/10/2020)    Overall Financial Resource Strain (CARDIA)     Difficulty of Paying Living Expenses: Not hard at all   Food Insecurity: No Food Insecurity (3/20/2020)    Hunger Vital Sign     Worried About Running Out of Food in the Last Year: Never true     Ran Out of Food in the Last Year: Never true   Transportation Needs: No Transportation Needs (3/20/2020)    PRAPARE - Transportation     Lack of Transportation (Medical): No     Lack of Transportation (Non-Medical): No   Physical Activity: Inactive (11/10/2020)    Exercise Vital Sign     Days of Exercise per Week: 0 days     Minutes of Exercise per Session: 0  min   Stress: No Stress Concern Present (10/19/2020)    [employer] [place] [employer] - Occupational Stress Questionnaire     Feeling of Stress : Not at all   Housing Stability: Low Risk  (11/10/2020)    Housing Stability Vital Sign     Unable to Pay for Housing in the Last Year: No     Number of Places Lived in the Last Year: 1     Unstable Housing in the Last Year: No         OBJECTIVE:     Vital Signs:  Vitals:    10/03/24 1249   BP: 126/68   Pulse: 61   Resp: 18       Review of Systems   Constitutional: Negative.    HENT: Negative.     Eyes: Negative.    Respiratory: Negative.  Negative for chest tightness.    Cardiovascular: Negative.  Negative for leg swelling.   Gastrointestinal: Negative.    Endocrine: Negative.    Genitourinary: Negative.    Musculoskeletal: Negative.    Skin: Negative.    Allergic/Immunologic: Negative.    Neurological: Negative.    Hematological: Negative.    Psychiatric/Behavioral: Negative.  Negative for agitation.    All other systems reviewed and are negative.      Physical Exam:  Physical Exam  Vitals reviewed.   Constitutional:       General: He is not in acute distress.     Appearance: Normal appearance. He is well-developed.   HENT:      Head: Normocephalic and atraumatic.      Nose: Nose normal.      Mouth/Throat:      Mouth: Mucous membranes are dry.   Eyes:      Pupils: Pupils are equal, round, and reactive to light.   Cardiovascular:      Rate and Rhythm: Normal rate and regular rhythm.      Pulses: Normal pulses.      Heart sounds: Normal heart sounds.   Pulmonary:      Effort: Pulmonary effort is normal.      Breath sounds: Normal breath sounds.   Abdominal:      General: Bowel sounds are normal.      Palpations: Abdomen is soft.   Musculoskeletal:         General: Normal range of motion.      Cervical back: Normal range of motion and neck supple.   Skin:     General: Skin is warm and dry.   Neurological:      General: No focal deficit present.      Mental Status:  He is alert and oriented to person, place, and time. Mental status is at baseline.   Psychiatric:         Behavior: Behavior normal.         Thought Content: Thought content normal.         Judgment: Judgment normal.         INSTRUCTIONS FOR PATIENT:   - Continue all medications, treatments and therapies as ordered.   - Follow all instructions, recommendations as discussed.  - Maintain Safety Precautions at all times.  - Attend all medical appointments as scheduled.  - For worsening symptoms: call Primary Care Physician or Nurse Practitioner.  - For emergencies, call 911 or immediately report to the nearest emergency room.   Scheduled Follow-up, Appts Reviewed with Modifications if Needed: Yes  Future Appointments   Date Time Provider Department Center   4/7/2025  1:15 PM [facility] XR1- Count includes the [facility]   4/7/2025  2:00 PM Mario Mcdaniel MD ON UROLOGY  Medical          Current Outpatient Medications:     aspirin (ECOTRIN) 81 MG EC tablet, Take 81 mg by mouth once daily., Disp: , Rfl:     atorvastatin (LIPITOR) 40 MG tablet, Take 1 tablet (40 mg total) by mouth once daily., Disp: 90 tablet, Rfl: 3    clopidogreL (PLAVIX) 75 mg tablet, Take 1 tablet (75 mg total) by mouth once daily., Disp: 90 tablet, Rfl: 2    cyanocobalamin (VITAMIN B-12) 100 MCG tablet, Take 100 mcg by mouth once daily., Disp: , Rfl:     fenofibrate 160 MG Tab, TAKE 1 TABLET EVERY OTHER DAY, Disp: 45 tablet, Rfl: 1    fish oil-omega-3 fatty acids 300-1,000 mg capsule, Take 2 g by mouth once daily., Disp: , Rfl:     metoprolol tartrate (LOPRESSOR) 50 MG tablet, Take 1 tablet (50 mg total) by mouth 2 (two) times daily., Disp: 180 tablet, Rfl: 1    tamsulosin (FLOMAX) 0.4 mg Cap, Take 1 capsule (0.4 mg total) by mouth once daily. For bladder, Disp: 30 capsule, Rfl: 0    tamsulosin (FLOMAX) 0.4 mg Cap, Take 1 capsule (0.4 mg total) by mouth once daily., Disp: 90 capsule, Rfl: 3    vitamin D 1000 units Tab, Take 2,000 Units by mouth once daily. ,  Disp: , Rfl:   No current facility-administered medications for this visit.    Medication Reconciliation:  Were medications changed during this appointment? Yes  Were medications in the home? Yes  Is the patient taking the medications as directed? Yes  Does the patient/caregiver understand the medications and changes? Yes  Does updated med list accurately reflects meds patient is currently taking? Yes    I spent a total of 40 minutes on the day of the visit.This includes face to face time and non-face to face time preparing to see the patient (eg, review of tests), obtaining and/or reviewing separately obtained history, documenting clinical information in the electronic or other health record, independently interpreting results and communicating results to the patient/family/caregiver, or care coordinator.         Signature: Stanislaw Hernández NP

## 2024-10-07 ENCOUNTER — PROCEDURE VISIT (OUTPATIENT)
Dept: UROLOGY | Facility: CLINIC | Age: 79
End: 2024-10-07
Payer: MEDICARE

## 2024-10-07 DIAGNOSIS — C61 CANCER OF PROSTATE WITH INTERMEDIATE RECURRENCE RISK (STAGE T2B-C OR GLEASON 7 OR PSA 10-20): Primary | ICD-10-CM

## 2024-10-07 DIAGNOSIS — R31.0 GROSS HEMATURIA: ICD-10-CM

## 2024-10-07 DIAGNOSIS — R31.29 MICROHEMATURIA: ICD-10-CM

## 2024-10-07 DIAGNOSIS — N20.0 KIDNEY STONE: ICD-10-CM

## 2024-10-07 PROBLEM — Z85.46 HISTORY OF PROSTATE CANCER: Status: ACTIVE | Noted: 2024-10-07

## 2024-10-07 LAB
BILIRUB UR QL STRIP: NEGATIVE
GLUCOSE UR QL STRIP: NEGATIVE
KETONES UR QL STRIP: NEGATIVE
LEUKOCYTE ESTERASE UR QL STRIP: NEGATIVE
PH, POC UA: 5.5
POC BLOOD, URINE: POSITIVE
POC NITRATES, URINE: NEGATIVE
PROT UR QL STRIP: NEGATIVE
SP GR UR STRIP: 1.03 (ref 1–1.03)
UROBILINOGEN UR STRIP-ACNC: 1 (ref 0.3–2.2)

## 2024-10-07 PROCEDURE — 81003 URINALYSIS AUTO W/O SCOPE: CPT | Mod: QW,HCNC,S$GLB, | Performed by: UROLOGY

## 2024-10-07 PROCEDURE — 52000 CYSTOURETHROSCOPY: CPT | Mod: HCNC,S$GLB,, | Performed by: UROLOGY

## 2024-10-07 RX ORDER — CIPROFLOXACIN 500 MG/1
500 TABLET ORAL
Status: COMPLETED | OUTPATIENT
Start: 2024-10-07 | End: 2024-10-07

## 2024-10-07 RX ORDER — LIDOCAINE HYDROCHLORIDE 20 MG/ML
JELLY TOPICAL
Status: COMPLETED | OUTPATIENT
Start: 2024-10-07 | End: 2024-10-07

## 2024-10-07 RX ADMIN — LIDOCAINE HYDROCHLORIDE 11 ML: 20 JELLY TOPICAL at 01:10

## 2024-10-07 RX ADMIN — CIPROFLOXACIN 500 MG: 500 TABLET ORAL at 01:10

## 2024-10-07 NOTE — PROCEDURES
Cystoscopy    Date/Time: 10/7/2024 1:15 PM    Performed by: Mario Mcdaniel MD  Authorized by: Mario Mcdaniel MD    Consent Done?:  Yes (Written)  Anesthesia:  Intraurethral instillation  Local anesthetic:  Lidocaine 2% topical gel  Indications: hematuria    Position:  Supine  Anesthesia:  Intraurethral instillation  Scope type:  Flexible cystoscope  Comments:        After informed consent, and preoperative antibiotic positioned in supine position.  Genitalia prepped and draped sterilely.  A 17 [place] flexible cystoscope was passed through the urethra into the bladder.  Systematic examination revealed mild trabeculation.  Bilateral ureteral orifices were seen.  No urothelial lesion was seen.  Patient did have glomerulations along the bladder neck when the scope was retroflexed.  This is noted to be a prominent around the bladder neck and trigone.  However there was no evidence of this and the remainder of the bladder.  Scope was retroflexed and mild BPH was noted.  Prostate were noted to be minimally enlarged with an approximate length of 3.5 cm. The scope was removed.  He tolerated procedure well.      Discussed finding of large right renal stone in his kidney.  He was asymptomatic from this.  I do not think this was the source of his bleeding.  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  Understands that he may have recurrent bleeding.  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  He was maintained on Plavix and aspirin.  Given the risks and benefits of treatment of his stone at this time we will place him on observation.  I recommend repeat KUB in 6 months.  Have counseled him on stone prevention.  Plan proceeding with metabolic workup with 24 hour urine.  Recommend repeat KUB in 6 months.

## 2024-10-08 DIAGNOSIS — C61 CANCER OF PROSTATE WITH INTERMEDIATE RECURRENCE RISK (STAGE T2B-C OR GLEASON 7 OR PSA 10-20): ICD-10-CM

## 2024-10-08 RX ORDER — TAMSULOSIN HYDROCHLORIDE 0.4 MG/1
0.4 CAPSULE ORAL DAILY
Qty: 30 CAPSULE | Refills: 0 | Status: SHIPPED | OUTPATIENT
Start: 2024-10-08

## 2024-11-14 DIAGNOSIS — I25.10 CAD, MULTIPLE VESSEL: ICD-10-CM

## 2024-11-14 DIAGNOSIS — I10 ESSENTIAL HYPERTENSION: Chronic | ICD-10-CM

## 2024-11-14 RX ORDER — METOPROLOL TARTRATE 50 MG/1
50 TABLET ORAL 2 TIMES DAILY
Qty: 180 TABLET | Refills: 3 | Status: SHIPPED | OUTPATIENT
Start: 2024-11-14

## 2024-11-14 NOTE — TELEPHONE ENCOUNTER
Refill Routing Note   Medication(s) are not appropriate for processing by [facility] for the following reason(s):        Allergy or intolerance    [facility] action(s):  Defer   Requires labs : Yes      Medication Therapy Plan: Potential inactive DRUG CLASS MATCH with COVID-19 VAC, BV (MODERNA)(PF) (Class: POLYETHYLENE GLYCOL ANALOGUES).    Pharmacist review requested: Yes     Appointments  past 12m or future 3m with PCP    Date Provider   Last Visit   9/30/2024 Jojo Duque DO   Next Visit   Visit date not found Jojo Duque DO   ED visits in past 90 days: 0        Note composed:10:58 AM 11/14/2024

## 2024-11-14 NOTE — TELEPHONE ENCOUNTER
Care Due:                  Date            Visit Type   Department     Provider  --------------------------------------------------------------------------------                                MYCHART                              FOLLOWUP/OF  [facility] INTERNAL  Last Visit: 09-      FICE VISIT   MEDICINE       Jojo Duque  Next Visit: None Scheduled  None         None Found                                                            Last  Test          Frequency    Reason                     Performed    Due Date  --------------------------------------------------------------------------------    CBC.........  12 months..  clopidogreL, fenofibrate.  11- 11-    [facility] Embedded Care Due Messages. Reference number: [id number].   11/14/2024 10:06:12 AM CST

## 2024-11-14 NOTE — TELEPHONE ENCOUNTER
Refill Decision Note   Filipe Agustin  is requesting a refill authorization.  Brief Assessment and Rationale for Refill:  Approve     Medication Therapy Plan:        Alert overridden per protocol: Yes   Pharmacist review requested: Yes   Comments:     Note composed:12:01 PM 11/14/2024

## 2024-12-16 RX ORDER — TAMSULOSIN HYDROCHLORIDE 0.4 MG/1
CAPSULE ORAL
Qty: 30 CAPSULE | Refills: 5 | Status: SHIPPED | OUTPATIENT
Start: 2024-12-16

## 2025-01-25 DIAGNOSIS — I25.10 CAD, MULTIPLE VESSEL: ICD-10-CM

## 2025-01-25 NOTE — TELEPHONE ENCOUNTER
Care Due:                  Date            Visit Type   Department     Provider  --------------------------------------------------------------------------------                                MYCHART                              FOLLOWUP/OF  [facility] INTERNAL  Last Visit: 09-      FICE VISIT   [facility]       Jojo Duque                              [facility]                              ANNUAL                              CHECKUP/PHY  [facility] INTERNAL  Next Visit: 09-      [facility]       Jojo Duque                                                            Last  Test          Frequency    Reason                     Performed    Due Date  --------------------------------------------------------------------------------    CBC.........  12 months..  clopidogreL, fenofibrate.  Not Found    Overdue    [facility] [employer] Embedded Care Due Messages. Reference number: [id number].   1/25/2025 1:10:17 AM CST

## 2025-01-25 NOTE — TELEPHONE ENCOUNTER
Refill Routing Note   Medication(s) are not appropriate for processing by [facility] for the following reason(s):        Required labs outdated    [facility] action(s):  Defer     Requires labs : Yes             Appointments  past 12m or future 3m with PCP    Date Provider   Last Visit   9/30/2024 Jojo Duque DO   Next Visit   Visit date not found Jojo Duque DO   ED visits in past 90 days: 0        Note composed:11:39 AM 01/25/2025

## 2025-01-29 RX ORDER — CLOPIDOGREL BISULFATE 75 MG/1
75 TABLET ORAL
Qty: 90 TABLET | Refills: 2 | Status: SHIPPED | OUTPATIENT
Start: 2025-01-29

## 2025-02-24 DIAGNOSIS — Z00.00 ENCOUNTER FOR MEDICARE ANNUAL WELLNESS EXAM: ICD-10-CM

## 2025-03-31 DIAGNOSIS — I10 ESSENTIAL HYPERTENSION: Chronic | ICD-10-CM

## 2025-03-31 DIAGNOSIS — I25.10 CAD, MULTIPLE VESSEL: ICD-10-CM

## 2025-03-31 DIAGNOSIS — E78.5 HYPERLIPIDEMIA LDL GOAL <70: Chronic | ICD-10-CM

## 2025-03-31 NOTE — TELEPHONE ENCOUNTER
Care Due:                  Date            Visit Type   Department     Provider  --------------------------------------------------------------------------------                                MYCHART                              FOLLOWUP/OF  [facility] INTERNAL  Last Visit: 09-      FICE VISIT   [facility]       Jojo Duque                              [facility]                              ANNUAL                              CHECKUP/PHY  [facility] INTERNAL  Next Visit: 09-      [facility]       Jojo Duque                                                            Last  Test          Frequency    Reason                     Performed    Due Date  --------------------------------------------------------------------------------    CBC.........  12 months..  clopidogreL, fenofibrate.  Not Found    Overdue    [employer] Embedded Care Due Messages. Reference number: [id number].   3/31/2025 11:45:18 AM CDT

## 2025-03-31 NOTE — TELEPHONE ENCOUNTER
No care due was identified.  [facility] Embedded Care Due Messages. Reference number: [id number].   3/31/2025 11:52:58 AM CDT

## 2025-03-31 NOTE — TELEPHONE ENCOUNTER
No care due was identified.  [facility] Embedded Care Due Messages. Reference number: [id number].   3/31/2025 11:52:41 AM CDT

## 2025-03-31 NOTE — TELEPHONE ENCOUNTER
MA received medication refill for tamsulosin (FLOMAX) 0.4 mg Cap. Last office visit was 9/16/24 with instructions to Take 1 capsule (0.4 mg total) by mouth once daily. Follow up shea scheduled for 4/7/25.Last prescription for tamsulosin (FLOMAX) 0.4 mg Cap was sent to the pharmacy on 12/16/24 with 5 refills.   Please see the attached refill request.

## 2025-04-01 RX ORDER — TAMSULOSIN HYDROCHLORIDE 0.4 MG/1
0.4 CAPSULE ORAL DAILY
Qty: 90 CAPSULE | Refills: 0 | Status: SHIPPED | OUTPATIENT
Start: 2025-04-01 | End: 2026-04-01

## 2025-04-01 NOTE — TELEPHONE ENCOUNTER
Refill Routing Note   Medication(s) are not appropriate for processing by [facility] for the following reason(s):        Required labs outdated    [facility] action(s):  Defer             Appointments  past 12m or future 3m with PCP    Date Provider   Last Visit   9/30/2024 Jojo Duque DO   Next Visit   Visit date not found Jojo Duque DO   ED visits in past 90 days: 0        Note composed:11:07 PM 03/31/2025

## 2025-04-01 NOTE — TELEPHONE ENCOUNTER
Refill Routing Note   Medication(s) are not appropriate for processing by [facility] for the following reason(s):        Allergy or intolerance    [facility] action(s):  Defer   Requires labs : Yes             Appointments  past 12m or future 3m with PCP    Date Provider   Last Visit   9/30/2024 Jojo Duque, DO   Next Visit   3/31/2025 Jojo Duque, DO   ED visits in past 90 days: 0        Note composed:10:03 PM 03/31/2025

## 2025-04-01 NOTE — TELEPHONE ENCOUNTER
Refill Routing Note   Medication(s) are not appropriate for processing by [facility] for the following reason(s):        Required labs outdated    [facility] action(s):  Defer             Appointments  past 12m or future 3m with PCP    Date Provider   Last Visit   9/30/2024 Jojo Duque DO   Next Visit   Visit date not found Jojo Duque DO   ED visits in past 90 days: 0        Note composed:11:13 PM 03/31/2025

## 2025-04-02 RX ORDER — CLOPIDOGREL BISULFATE 75 MG/1
75 TABLET ORAL DAILY
Qty: 90 TABLET | Refills: 1 | Status: SHIPPED | OUTPATIENT
Start: 2025-04-02

## 2025-04-02 RX ORDER — METOPROLOL TARTRATE 50 MG/1
50 TABLET ORAL 2 TIMES DAILY
Qty: 180 TABLET | Refills: 1 | Status: SHIPPED | OUTPATIENT
Start: 2025-04-02

## 2025-04-02 RX ORDER — FENOFIBRATE 160 MG/1
160 TABLET ORAL EVERY OTHER DAY
Qty: 45 TABLET | Refills: 1 | Status: SHIPPED | OUTPATIENT
Start: 2025-04-02

## 2025-04-12 DIAGNOSIS — I25.10 CAD, MULTIPLE VESSEL: ICD-10-CM

## 2025-04-12 DIAGNOSIS — E78.5 HYPERLIPIDEMIA LDL GOAL <70: Chronic | ICD-10-CM

## 2025-04-12 RX ORDER — ATORVASTATIN CALCIUM 40 MG/1
40 TABLET, FILM COATED ORAL
Qty: 90 TABLET | Refills: 1 | Status: SHIPPED | OUTPATIENT
Start: 2025-04-12

## 2025-04-12 NOTE — TELEPHONE ENCOUNTER
Refill Decision Note   Filipe Lachoender  is requesting a refill authorization.  Brief Assessment and Rationale for Refill:  Approve     Medication Therapy Plan:        Comments:     Note composed:1:08 PM 04/12/2025

## 2025-04-12 NOTE — TELEPHONE ENCOUNTER
No care due was identified.  [facility] Embedded Care Due Messages. Reference number: [id number].   4/12/2025 1:19:29 AM CDT

## 2025-04-15 ENCOUNTER — PATIENT MESSAGE (OUTPATIENT)
Dept: NEUROLOGY | Facility: CLINIC | Age: 80
End: 2025-04-15
Payer: MEDICARE

## 2025-05-12 RX ORDER — TAMSULOSIN HYDROCHLORIDE 0.4 MG/1
CAPSULE ORAL
Qty: 90 CAPSULE | Refills: 3 | OUTPATIENT
Start: 2025-05-12

## 2025-05-12 NOTE — TELEPHONE ENCOUNTER
MA called to schedule the patient an appointment, patient put in a refill request, was last seen 10/07/2024 for a procedure visit, instructions were to follow up wioth a 6 month follow up, however patient wasn't seen for the 6 month follow up and doesn't have a future visit scheduled, Patient is requesting medication refills.

## 2025-06-09 ENCOUNTER — OFFICE VISIT (OUTPATIENT)
Dept: UROLOGY | Facility: CLINIC | Age: 80
End: 2025-06-09
Payer: MEDICARE

## 2025-06-09 ENCOUNTER — HOSPITAL ENCOUNTER (OUTPATIENT)
Dept: RADIOLOGY | Facility: HOSPITAL | Age: 80
Discharge: HOME | End: 2025-06-09
Attending: UROLOGY
Payer: MEDICARE

## 2025-06-09 VITALS
DIASTOLIC BLOOD PRESSURE: 75 MMHG | WEIGHT: 206.13 LBS | BODY MASS INDEX: 30.53 KG/M2 | SYSTOLIC BLOOD PRESSURE: 141 MMHG | RESPIRATION RATE: 18 BRPM | HEART RATE: 48 BPM | HEIGHT: 69 IN

## 2025-06-09 DIAGNOSIS — N20.0 KIDNEY STONE: ICD-10-CM

## 2025-06-09 DIAGNOSIS — R35.0 URINARY FREQUENCY: ICD-10-CM

## 2025-06-09 DIAGNOSIS — C61 CANCER OF PROSTATE WITH INTERMEDIATE RECURRENCE RISK (STAGE T2B-C OR GLEASON 7 OR PSA 10-20): ICD-10-CM

## 2025-06-09 DIAGNOSIS — R31.29 MICROHEMATURIA: Primary | ICD-10-CM

## 2025-06-09 DIAGNOSIS — R31.0 GROSS HEMATURIA: ICD-10-CM

## 2025-06-09 PROCEDURE — 74018 RADEX ABDOMEN 1 VIEW: CPT | Mod: TC,HCNC

## 2025-06-09 PROCEDURE — 1160F RVW MEDS BY RX/DR IN RCRD: CPT | Mod: CPTII,HCNC,S$GLB, | Performed by: UROLOGY

## 2025-06-09 PROCEDURE — 1159F MED LIST DOCD IN RCRD: CPT | Mod: CPTII,HCNC,S$GLB, | Performed by: UROLOGY

## 2025-06-09 PROCEDURE — 1126F AMNT PAIN NOTED NONE PRSNT: CPT | Mod: CPTII,HCNC,S$GLB, | Performed by: UROLOGY

## 2025-06-09 PROCEDURE — 99999 PR PBB SHADOW E&M-EST. PATIENT-LVL IV: CPT | Mod: PBBFAC,HCNC,, | Performed by: UROLOGY

## 2025-06-09 PROCEDURE — 74018 RADEX ABDOMEN 1 VIEW: CPT | Mod: 26,HCNC,, | Performed by: RADIOLOGY

## 2025-06-09 PROCEDURE — 99214 OFFICE O/P EST MOD 30 MIN: CPT | Mod: HCNC,S$GLB,, | Performed by: UROLOGY

## 2025-06-09 PROCEDURE — 3288F FALL RISK ASSESSMENT DOCD: CPT | Mod: CPTII,HCNC,S$GLB, | Performed by: UROLOGY

## 2025-06-09 PROCEDURE — 3078F DIAST BP <80 MM HG: CPT | Mod: CPTII,HCNC,S$GLB, | Performed by: UROLOGY

## 2025-06-09 PROCEDURE — 3077F SYST BP >= 140 MM HG: CPT | Mod: CPTII,HCNC,S$GLB, | Performed by: UROLOGY

## 2025-06-09 PROCEDURE — 1101F PT FALLS ASSESS-DOCD LE1/YR: CPT | Mod: CPTII,HCNC,S$GLB, | Performed by: UROLOGY

## 2025-06-09 RX ORDER — TAMSULOSIN HYDROCHLORIDE 0.4 MG/1
0.4 CAPSULE ORAL DAILY
Qty: 90 CAPSULE | Refills: 1 | Status: SHIPPED | OUTPATIENT
Start: 2025-06-09

## 2025-06-09 NOTE — PROGRESS NOTES
Chief Complaint:   Encounter Diagnoses   Name Primary?    Cancer of prostate with intermediate recurrence risk (stage T2b-c or [place] 7 or PSA 10-20)     Microhematuria Yes    Urinary frequency     Kidney stone        HPI:  HPI Filipe Agustin Jr. ady 79 y.o. male who presents with follow up from microhematuria.  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  He has not had any further blood in his urine.  He does have a large right renal stone that is on observation.  He does have some frequency and urgency and occasional urge incontinence.  He is taking tamsulosin.  He states this helps with that.  His PSA is very low after treatment of prostate cancer with radiation in 2018.    History:  Social History[1]  Past Medical History:   Diagnosis Date    Acute coronary syndrome     Cancer of prostate with intermediate recurrence risk (stage T2b-c or [place] 7 or PSA 10-20) 1/8/2018    CKD (chronic kidney disease) stage 3, GFR 30-59 ml/min 9/24/2015    Coronary artery disease     Elevated PSA 6/27/2017    History of coronary angioplasty 9/19/2014    Hyperlipidemia     Hypertension     Myocardial infarction 2008    Obesity, Class II, BMI 35.0-39.9, with comorbidity (see actual BMI) 6/6/2014    Polio     as a child    Tobacco dependence     resolved     Past Surgical History:   Procedure Laterality Date    CARDIAC CATHETERIZATION  2008    CORONARY ANGIOPLASTY  2008    acute PCI- RCA- RUI    CORONARY ARTERY BYPASS GRAFT (CABG) N/A 12/18/2018    Procedure: CORONARY ARTERY BYPASS GRAFT (CABG);  Surgeon: Rg Johnson MD;  Location: [facility] OR;  Service: Cardiothoracic;  Laterality: N/A;    CORONARY STENT PLACEMENT  2008    ENDOSCOPIC HARVEST OF VEIN Bilateral 12/18/2018    Procedure: SURGICAL PROCUREMENT, VEIN, ENDOSCOPIC;  Surgeon: Rg Johnson MD;  Location: [facility] OR;  Service: Cardiothoracic;  Laterality: Bilateral;    LEFT HEART CATHETERIZATION Left 12/15/2018    Procedure: CATHETERIZATION, HEART,  "LEFT;  Surgeon: Jose Armando Monroe MD;  Location: [facility] CATH LAB;  Service: Cardiology;  Laterality: Left;     Family History   Problem Relation Name Age of Onset    Heart disease Father          MI    Cancer Brother      Heart disease Brother      Diabetes Neg Hx      Kidney disease Neg Hx      Stroke Neg Hx         Medications Ordered Prior to Encounter[2]     Objective:     Vitals:    06/09/25 1402   BP: (!) 141/75   Pulse: (!) 48   Resp: 18   Weight: 93.5 kg (206 lb 2.1 oz)   Height: 5' 9" (1.753 m)      BMI Readings from Last 1 Encounters:   06/09/25 30.44 kg/m²          Physical Exam    Lab Results   Component Value Date    PSADIAG 0.01 09/16/2024    PSADIAG 0.02 06/05/2023    PSADIAG 0.04 05/17/2021    PSADIAG 0.05 10/13/2020    PSADIAG 0.12 01/17/2020    PSADIAG 0.18 10/01/2019    PSADIAG 0.26 07/01/2019    PSADIAG 0.39 03/07/2019    PSADIAG 0.33 01/22/2019    PSADIAG 0.58 10/15/2018    PSADIAG 2.7 06/01/2018    PSAFREEPCT Unable to calculate 06/05/2023    PSAFREEPCT Unable to calculate 05/12/2022    PSA 7.0 (H) 09/06/2017    PSA 6.7 (H) 06/27/2017    PSA 6.3 (H) 12/20/2016        Lab Results   Component Value Date    CREATININE 1.4 09/16/2024      Assessment:       1. Microhematuria    2. Cancer of prostate with intermediate recurrence risk (stage T2b-c or [place] 7 or PSA 10-20)    3. Urinary frequency    4. Kidney stone        Plan:     1. Microhematuria    2. Cancer of prostate with intermediate recurrence risk (stage T2b-c or [place] 7 or PSA 10-20)    3. Urinary frequency    4. Kidney stone       Orders Placed This Encounter    tamsulosin (FLOMAX) 0.4 mg Cap      Discussed continue current treatment.  We will recommend PSA which is due in September.  We will check a KUB to monitor his renal stone.  Refilled his Flomax and we will see him back in 6 months.       [1]   Social History  Tobacco Use    Smoking status: Former     Current packs/day: 0.00     Average packs/day: 2.0 packs/day for 20.0 years " (40.0 ttl pk-yrs)     Types: Cigarettes     Start date: 1988     Quit date: 2008     Years since quittin.9    Smokeless tobacco: Never   Substance Use Topics    Alcohol use: Yes     Comment: rarely    Drug use: No   [2]   Current Outpatient Medications on File Prior to Visit   Medication Sig Dispense Refill    aspirin (ECOTRIN) 81 MG EC tablet Take 81 mg by mouth once daily.      atorvastatin (LIPITOR) 40 MG tablet TAKE 1 TABLET ONE TIME DAILY 90 tablet 1    clopidogreL (PLAVIX) 75 mg tablet Take 1 tablet (75 mg total) by mouth once daily. 90 tablet 1    cyanocobalamin (VITAMIN B-12) 100 MCG tablet Take 100 mcg by mouth once daily.      fenofibrate 160 MG Tab Take 1 tablet (160 mg total) by mouth every other day. 45 tablet 1    fish oil-omega-3 fatty acids 300-1,000 mg capsule Take 2 g by mouth once daily.      metoprolol tartrate (LOPRESSOR) 50 MG tablet Take 1 tablet (50 mg total) by mouth 2 (two) times daily. 180 tablet 1    vitamin D 1000 units Tab Take 2,000 Units by mouth once daily.       [DISCONTINUED] tamsulosin (FLOMAX) 0.4 mg Cap Take 1 capsule (0.4 mg total) by mouth once daily. For bladder 30 capsule 0    [DISCONTINUED] tamsulosin (FLOMAX) 0.4 mg Cap TAKE 1 CAPSULE ONE TIME DAILY FOR BLADDER 30 capsule 5    [DISCONTINUED] tamsulosin (FLOMAX) 0.4 mg Cap Take 1 capsule (0.4 mg total) by mouth once daily. 90 capsule 0     No current facility-administered medications on file prior to visit.

## 2025-06-25 ENCOUNTER — TELEPHONE (OUTPATIENT)
Dept: HOME HEALTH SERVICES | Facility: CLINIC | Age: 80
End: 2025-06-25
Payer: MEDICARE

## 2025-06-25 NOTE — TELEPHONE ENCOUNTER
Contacted pt to schedule a follow-up care at home visit with the nurse practitioner. Spk with spouse Josefina    Patient has been scheduled

## 2025-06-27 ENCOUNTER — CARE AT HOME (OUTPATIENT)
Dept: HOME HEALTH SERVICES | Facility: CLINIC | Age: 80
End: 2025-06-27
Payer: MEDICARE

## 2025-06-27 VITALS
HEART RATE: 94 BPM | SYSTOLIC BLOOD PRESSURE: 114 MMHG | OXYGEN SATURATION: 96 % | TEMPERATURE: 98 F | RESPIRATION RATE: 18 BRPM | DIASTOLIC BLOOD PRESSURE: 60 MMHG

## 2025-06-27 DIAGNOSIS — Z85.46 HISTORY OF PROSTATE CANCER: Primary | ICD-10-CM

## 2025-06-27 DIAGNOSIS — N18.31 STAGE 3A CHRONIC KIDNEY DISEASE: ICD-10-CM

## 2025-06-27 NOTE — PROGRESS NOTES
"[facility]  Medical Chronic Care Home Visit    Encounter Provider: Jazmine Jean   PCP: Jojo Duque DO  Consult Requested By: No ref. provider found    HISTORY OF PRESENT ILLNESS      Patient ID: Filipe Agustin Jr. is a 79 y.o. male is being seen in the home due to physical debility that presents a taxing effort to leave the home, to mitigate high risk of hospital readmission and/or due to the limited availability of reliable or safe options for transportation to the point of access to the provider. Prior to treatment on this visit the chart was reviewed and patient verbal consent was obtained.    Chronic medical conditions synopsis:    1) Patient is a 79 year old male with diagnoses of S/P CABG x 4, HLD, CAD, CKD Stage 3a, Hx of Prostate cancer, and obesity. Patient reports no issues at this time. He is alert and oriented and on no distress.   2) Recent developments: no new issues   3) Reason for consult and visit: care at home     DECISION MAKING TODAY       Assessment & Plan:  1. History of prostate cancer  Comments:  Follow up with Oncology Urology as instructed  continue tamsulosin 0.4mg PO QD  Last PSA 9/2024 was 0.01    2. Stage 3a chronic kidney disease  Comments:  chronic and stable  Last labs on 9/24 BUN/Creat. 15/1.4 and eGFR 51.8  follow up with PCP as instructed  Overview:  Comprehensive metabolic panel   Order: [id number]   Status:  Final result   Visible to patient:  Yes (Patient Portal) Next appt:  Today at 08:30 AM in Lab (LABORATORY, [facility]) < 140/90" class="rz_6 hlt-1">Dx:  < 140/90" class="rz_7 hlt1">Hyperlipidemia; CKD (chronic kidney d...     Ref Range & Units 6mo ago  (12/13/16) 12mo ago  (6/21/16) 1yr ago  (12/17/15) 2yr ago  (6/16/15) 2yr ago  (12/29/14) 2yr ago  (12/19/14) 2yr ago  (12/12/14)    Sodium 136 - 145 mmol/L 143 143 142 144 143 141     Potassium 3.5 - 5.1 mmol/L 4.7 4.1 4.1 5.2  4.7 5.7CM  5.6CM     Chloride 95 - 110 mmol/L 107 107 106 107 106 103     CO2 " 23 - 29 mmol/L 27 29 28 29 27 29     Glucose 70 - 110 mg/dL 94 93 89 104 96 98     BUN, Bld 8 - 23 mg/dL 11 12 17 16 17 26      Creatinine 0.5 - 1.4 mg/dL 1.4 1.4 1.6  1.4 1.4 1.7      Calcium 8.7 - 10.5 mg/dL 9.0 9.2 9.3 9.6 9.7 9.8     Total Protein 6.0 - 8.4 g/dL 7.2 6.9 6.9 7.3       Albumin 3.5 - 5.2 g/dL 3.6 3.7 3.6 3.8       Total Bilirubin 0.1 - 1.0 mg/dL 0.5 0.6CM 0.4CM 0.6CM      Comments: For infants and newborns, interpretation of results should be based   on gestational age, weight and in agreement with clinical   observations.   Premature Infant recommended reference ranges:   Up to 24 hours.............<8.0 mg/dL   Up to 48 hours............<12.0 mg/dL   3-5 days..................<15.0 mg/dL   6-29 days.................<15.0 mg/dL     Alkaline Phosphatase 55 - 135 U/L 31  30  31  30        AST 10 - 40 U/L 21 21 15 27       ALT 10 - 44 U/L 23 25 19 40       Anion Gap 8 - 16 mmol/L 9 7  8 8 10 9     eGFR if African American >60 mL/min/1.73 m^2 58.8  58.8  50.4  59.3  59.3  47.2      eGFR if non African American >60 mL/min/1.73 m^2 50.9  50.9CM  43.6CM  51.3CM  51.3CM  40.8CM     Comments: Calculation used to obtain the estimated glomerular filtration   rate (eGFR) is the CKD-EPI equation. Since race is unknown   in our information system, the eGFR values for   -American and Non--American patients are given   for each creatinine result.    Resulting Agency  [facility]      Specimen Collected: 12/13/16 10:03 Last Resulted: 12/13/16 16:24 Lab Flowsheet Order Details View Encounter Lab and Collection Details Routing Result History      CM=Additional comments                             ENVIRONMENT OF CARE      Family and/or Caregiver present at visit?  Yes  Name of Caregiver: Josefina  History provided by: patient and caregiver    4Ms for Medical Decision-Making in Older Adults    Last Completed EAWV: 11/10/2020    MEDICATIONS:  High Risk Medications:  Total Active  Medications: 0  This patient does not have an active medication from one of the medication groupers.    MOBILITY:  Activities of Daily Livin/10/2020     2:51 PM   Activities of Daily Living   Ambulation Independent   Dressing Independent   Transfers Independent   Toileting Continent of bladder;Incontinent of bowel   Feeding Independent   Cleaning home/Chores Independent   Telephone use Independent   Shopping Independent   Paying bills Independent   Taking meds Independent     Fall Risk:      2025     2:00 PM 2024     1:20 PM 2024     2:30 PM   Fall Risk Assessment - Outpatient   Mobility Status Ambulatory w/ assistance Ambulatory Ambulatory   Number of falls 1 0 0   Identified as fall risk True False False     Disability Status:      11/10/2020     2:53 PM   Disability Status   Are you deaf or do you have serious difficulty hearing? N   Are you blind or do you have serious difficulty seeing, even when wearing glasses? N   Because of a physical, mental, or emotional condition, do you have serious difficulty concentrating, remembering, or making decisions? N   Do you have serious difficulty walking or climbing stairs? Y   Do you have difficulty dressing or bathing? N   Because of a physical, mental, or emotional condition, do you have difficulty doing errands alone such as visiting a doctor's office or shopping? N     Nutrition Screenin/10/2020     2:50 PM   Nutrition Screening   Has food intake declined over the past three months due to loss of appetite, digestive problems, chewing or swallowing difficulties? No decrease in food intake   Involuntary weight loss during the last 3 months? Weight loss between 1 and 3 kg (2.2 and 6.6 pounds)   Mobility? Goes out   Has the patient suffered psychological stress or acute disease in the past three months? No   Neuropsychological problems? No psychological problems   Body Mass Index (BMI)?  BMI 23 or greater   Screening Score 13    Screening  Score: 0-7 Malnourished, 8-11 At Risk, 12-14 Normal  Get Up and Go:      11/10/2020     2:44 PM 2018     2:24 PM   Get Up and Go   Trial 1 11 seconds 11 seconds     Whisper Test:      11/10/2020     2:45 PM   Whisper Test   Whisper Test Normal       MENTATION:   Has Dementia Dx: No  Has Anxiety Dx: No    Depression Patient Health Questionnaire:      2024     1:10 PM   Depression Patient Health Questionnaire   Over the last two weeks how often have you been bothered by little interest or pleasure in doing things Not at all   Over the last two weeks how often have you been bothered by feeling down, depressed or hopeless Not at all   PHQ-2 Total Score 0     Cognitive Function Screenin/10/2020     2:46 PM   Cognitive Function Screening   Clock Drawing Test 1    Mini-Cog 3 Minute Recall 2   Cognitive Function Screening 3       Data saved with a previous flowsheet row definition     Cognitive Function Screening Total - Less than 4 = Abnormal,  Greater than or equal to 4 = Normal        WHAT MATTERS MOST:  Advance Care Planning   ACP Status:   Patient has had an ACP conversation  Living Will: No  Power of : No  [place]: No    What is most important right now is to focus on remaining as independent as possible    Accordingly, we have decided that the best plan to meet the patient's goals includes continuing with treatment             Is patient hospice appropriate: No  (If needed, use PPS <30 or FAST score >7)  Was referral to hospice placed: No    Impression upon entering the home:  Physical Dwelling: single family home   Appearance of home environment: cleaniness: clean, walking pathways: clear, lighting: adequate, and home structure: sound structure  Functional Status: minimal assistance  Mobility: ambulatory  Nutritional access: adequate intake and access  Home Health: No, and does not need it at this time   DME/Supplies: none     Diagnostic tests reviewed/disposition: No diagnosic tests  pending after this hospitalization.  Disease/illness education: Cancer  Establishment or re-establishment of referral orders for community resources: No other necessary community resources.   Discussion with other health care providers: No discussion with other health care providers necessary.   Does patient have a PCP at [facility]? Yes   Repatriation plan with PCP? Care at Home reason: transportation   Does patient have an ostomy (ileostomy, colostomy, suprapubic catheter, nephrostomy tube, tracheostomy, PEG tube, pleurex catheter, cholecystostomy, etc)? No  Were BPAs reviewed? Yes    Social History     Socioeconomic History    Marital status:     Number of children: 3    Highest education level: High school graduate   Occupational History    Occupation: Retired   Tobacco Use    Smoking status: Former     Current packs/day: 0.00     Average packs/day: 2.0 packs/day for 20.0 years (40.0 ttl pk-yrs)     Types: Cigarettes     Start date: 1988     Quit date: 2008     Years since quittin.9    Smokeless tobacco: Never   Substance and Sexual Activity    Alcohol use: Yes     Comment: rarely    Drug use: No    Sexual activity: Not Currently     Partners: Female     Social Drivers of Health     Financial Resource Strain: Low Risk  (11/10/2020)    Overall Financial Resource Strain (CARDIA)     Difficulty of Paying Living Expenses: Not hard at all   Food Insecurity: No Food Insecurity (3/20/2020)    Hunger Vital Sign     Worried About Running Out of Food in the Last Year: Never true     Ran Out of Food in the Last Year: Never true   Transportation Needs: No Transportation Needs (3/20/2020)    PRAPARE - Transportation     Lack of Transportation (Medical): No     Lack of Transportation (Non-Medical): No   Physical Activity: Inactive (11/10/2020)    Exercise Vital Sign     Days of Exercise per Week: 0 days     Minutes of Exercise per Session: 0 min   Stress: No Stress Concern Present (10/19/2020)    [employer] - Occupational Stress Questionnaire     Feeling of Stress : Not at all   Housing Stability: Low Risk  (11/10/2020)    Housing Stability Vital Sign     Unable to Pay for Housing in the Last Year: No     Number of Places Lived in the Last Year: 1     Unstable Housing in the Last Year: No         OBJECTIVE:     Vital Signs:  Vitals:    06/27/25 1209   BP: 114/60   Pulse: 94   Resp: 18   Temp: 98.1 °F (36.7 °C)       Review of Systems   Genitourinary:  Positive for frequency (on tamsulosin which helps) and urgency (on tamsulosin). Negative for difficulty urinating (takes tamsulosin).   All other systems reviewed and are negative.      Physical Exam:  Physical Exam  Vitals reviewed.   Constitutional:       General: He is not in acute distress.     Appearance: He is obese.   HENT:      Head: Normocephalic and atraumatic.      Nose: Nose normal.      Mouth/Throat:      Mouth: Mucous membranes are moist.      Pharynx: Oropharynx is clear.   Cardiovascular:      Rate and Rhythm: Normal rate and regular rhythm.      Pulses: Normal pulses.      Heart sounds: Normal heart sounds.   Pulmonary:      Effort: Pulmonary effort is normal.      Breath sounds: Normal breath sounds.   Abdominal:      General: Bowel sounds are normal.      Palpations: Abdomen is soft.   Musculoskeletal:         General: Normal range of motion.      Cervical back: Neck supple.   Skin:     General: Skin is warm and dry.   Neurological:      Mental Status: He is alert and oriented to person, place, and time.   Psychiatric:         Mood and Affect: Mood normal.         Behavior: Behavior normal.         INSTRUCTIONS FOR PATIENT:     Scheduled Follow-up, Appts Reviewed with Modifications if Needed: Yes  Future Appointments   Date Time Provider Department Center   9/29/2025  1:00 PM Jojo Duque DO [facility] IM [facility]   12/9/2025  2:00 PM Mario Mcdaniel MD [facility] UROLOGY [facility]   12/12/2025 To Be Determined Winnie  SABIHA Lucero [facility] [place] [id number] [place]       Current Medications[1]    Medication Reconciliation:  Were medications changed during this appointment? No  Were medications in the home? Yes  Is the patient taking the medications as directed? Yes  Does the patient/caregiver understand the medications and changes? Yes  Does updated med list accurately reflects meds patient is currently taking? Yes    Chronic and stable. Continue current management. See med list above. Follow up with PCP.     Signature: Jazmine Jean NP         [1]   Current Outpatient Medications:     aspirin (ECOTRIN) 81 MG EC tablet, Take 81 mg by mouth once daily., Disp: , Rfl:     atorvastatin (LIPITOR) 40 MG tablet, TAKE 1 TABLET ONE TIME DAILY, Disp: 90 tablet, Rfl: 1    clopidogreL (PLAVIX) 75 mg tablet, Take 1 tablet (75 mg total) by mouth once daily., Disp: 90 tablet, Rfl: 1    cyanocobalamin (VITAMIN B-12) 100 MCG tablet, Take 100 mcg by mouth once daily., Disp: , Rfl:     fenofibrate 160 MG Tab, Take 1 tablet (160 mg total) by mouth every other day., Disp: 45 tablet, Rfl: 1    fish oil-omega-3 fatty acids 300-1,000 mg capsule, Take 2 g by mouth once daily., Disp: , Rfl:     metoprolol tartrate (LOPRESSOR) 50 MG tablet, Take 1 tablet (50 mg total) by mouth 2 (two) times daily., Disp: 180 tablet, Rfl: 1    tamsulosin (FLOMAX) 0.4 mg Cap, Take 1 capsule (0.4 mg total) by mouth once daily. For bladder, Disp: 90 capsule, Rfl: 1    vitamin D 1000 units Tab, Take 2,000 Units by mouth once daily. , Disp: , Rfl:

## 2025-07-02 DIAGNOSIS — E78.5 HYPERLIPIDEMIA LDL GOAL <70: Chronic | ICD-10-CM

## 2025-07-02 DIAGNOSIS — I25.10 CAD, MULTIPLE VESSEL: ICD-10-CM

## 2025-07-02 RX ORDER — ATORVASTATIN CALCIUM 40 MG/1
40 TABLET, FILM COATED ORAL DAILY
Qty: 90 TABLET | Refills: 0 | Status: SHIPPED | OUTPATIENT
Start: 2025-07-02

## 2025-07-02 NOTE — TELEPHONE ENCOUNTER
Refill Routing Note   Medication(s) are not appropriate for processing by [facility] for the following reason(s):        Required labs outdated  Allergy or intolerance  Drug-disease interaction: clopidogreL and Gross hematuria     [facility] action(s):  Defer     Requires labs : Yes           Pharmacist review requested: Yes     Appointments  past 12m or future 3m with PCP    Date Provider   Last Visit   9/30/2024 Jojo Duque DO   Next Visit   7/2/2025 Jojo Duque DO   ED visits in past 90 days: 0        Note composed:10:43 AM 07/02/2025

## 2025-07-02 NOTE — TELEPHONE ENCOUNTER
Care Due:                  Date            Visit Type   Department     Provider  --------------------------------------------------------------------------------                                MYCHART                              FOLLOWUP/OF  [facility] INTERNAL  Last Visit: 09-      FICE VISIT   [facility]       Jojo Duque                              [facility]                              ANNUAL                              CHECKUP/PHY  [facility] INTERNAL  Next Visit: 09-      [facility]       Jojo Duque                                                            Last  Test          Frequency    Reason                     Performed    Due Date  --------------------------------------------------------------------------------    CBC.........  12 months..  clopidogreL, fenofibrate.  Not Found    Overdue    CMP.........  12 months..  atorvastatin, fenofibrate  09- 09-    Lipid Panel.  12 months..  atorvastatin, fenofibrate  09- 09-    [facility] Embedded Care Due Messages. Reference number: [id number].   7/02/2025 12:55:50 AM CDT

## 2025-07-02 NOTE — TELEPHONE ENCOUNTER
No care due was identified.  Health [facility] Embedded Care Due Messages. Reference number: [id number].   7/02/2025 12:56:13 AM CDT

## 2025-07-02 NOTE — TELEPHONE ENCOUNTER
Refill Routing Note   Medication(s) are not appropriate for processing by [facility] for the following reason(s):        Required labs outdated    [facility] action(s):  Defer             Appointments  past 12m or future 3m with PCP    Date Provider   Last Visit   9/30/2024 Jojo Duque, DO   Next Visit   9/29/2025 Jojo Duque DO   ED visits in past 90 days: 0        Note composed:6:58 AM 07/02/2025

## 2025-07-02 NOTE — TELEPHONE ENCOUNTER
Refill Routing Note   Medication(s) are not appropriate for processing by [facility] for the following reason(s):        Required labs outdated: CBC    [facility] action(s):  Defer  Approve   Requires labs : Yes - CMP & lipid panel          Pharmacist review requested: Yes   Extended chart review required: Yes     Appointments  past 12m or future 3m with PCP    Date Provider   Last Visit   9/30/2024 Jojo Duque DO   Next Visit   7/2/2025 Jojo Duque DO   ED visits in past 90 days: 0        Note composed:11:20 AM 07/02/2025

## 2025-07-07 RX ORDER — FENOFIBRATE 160 MG/1
160 TABLET ORAL EVERY OTHER DAY
Qty: 45 TABLET | Refills: 0 | Status: SHIPPED | OUTPATIENT
Start: 2025-07-07

## 2025-07-07 RX ORDER — CLOPIDOGREL BISULFATE 75 MG/1
75 TABLET ORAL DAILY
Qty: 90 TABLET | Refills: 0 | Status: SHIPPED | OUTPATIENT
Start: 2025-07-07

## 2025-07-08 ENCOUNTER — PATIENT OUTREACH (OUTPATIENT)
Dept: ADMINISTRATIVE | Facility: HOSPITAL | Age: 80
End: 2025-07-08
Payer: MEDICARE

## 2025-07-08 ENCOUNTER — TELEPHONE (OUTPATIENT)
Dept: ADMINISTRATIVE | Facility: HOSPITAL | Age: 80
End: 2025-07-08
Payer: MEDICARE

## 2025-07-08 VITALS — DIASTOLIC BLOOD PRESSURE: 77 MMHG | SYSTOLIC BLOOD PRESSURE: 119 MMHG

## (undated) DEVICE — PUNCH AORTIC 4.0MM 6/CASE

## (undated) DEVICE — TAPE MEDIPORE 4IN X 2YDS

## (undated) DEVICE — SET SUCTION ANTICOAGULANT

## (undated) DEVICE — SUT VICRYL 1 OB 36 CTX

## (undated) DEVICE — RETRACTOR OCTOBASE INSERT HOLD

## (undated) DEVICE — SEE MEDLINE ITEM 146308

## (undated) DEVICE — SOL 9P NACL IRR PIC IL

## (undated) DEVICE — LIGATING CLIP LARGE

## (undated) DEVICE — SEE MEDLINE ITEM 152622

## (undated) DEVICE — GOWN SURG 2XL DISP TIE BACK

## (undated) DEVICE — CLIPS LIGATING TITANIUM WDE SM

## (undated) DEVICE — CANNULA 21FR 7MM SOFT FLOW EXT

## (undated) DEVICE — DRAIN CHANNEL ROUND 19FR

## (undated) DEVICE — SUT 8/0 24IN PROLENE BL MON

## (undated) DEVICE — DRAPE SLUSH WARMER WITH DISC

## (undated) DEVICE — SYS VEIN HARVESTING

## (undated) DEVICE — BLADE KNIFE UNITOME 4.0MM

## (undated) DEVICE — GAUZE SPONGE 4X4 12PLY

## (undated) DEVICE — ORGNZR TUBING CLR W/CLIPS

## (undated) DEVICE — SUT MONOCRYL 4-0 PS-1 UND

## (undated) DEVICE — CONTAINER SPECIMEN STRL 4OZ

## (undated) DEVICE — SPONGE LAP 18X18 PREWASHED

## (undated) DEVICE — SEE MEDLINE ITEM 157117

## (undated) DEVICE — Device

## (undated) DEVICE — SEE MEDLINE ITEM 146231

## (undated) DEVICE — WIRE TEMP PACING 0 SH SKS-3

## (undated) DEVICE — CABLE PACEMAKER CARD 6FT BLUE

## (undated) DEVICE — NDL SAFETY 22G X 1.5 ECLIPSE

## (undated) DEVICE — CATH ALL PUR URTHL RR 18FR

## (undated) DEVICE — COUNTER BDL BLADEGUARD LI DBL

## (undated) DEVICE — BLADE SCALP OPHTL BEVEL STR

## (undated) DEVICE — ADHESIVE MASTISOL VIAL 48/BX

## (undated) DEVICE — KIT ANTIFOG

## (undated) DEVICE — SUT SILK 2-0 STRANDS 30IN

## (undated) DEVICE — SYR 20ML BLUE LUER LOCK

## (undated) DEVICE — TRAY FOLEY SURESTEP 16FR

## (undated) DEVICE — CLIP LIGATING MEDIUM

## (undated) DEVICE — CANNULA IMA 1MM

## (undated) DEVICE — STOPCOCK 1 WAY PLASTIC

## (undated) DEVICE — INSERTS STEALTH FIBRA SIZE 5

## (undated) DEVICE — SUT VICRYL 2-0 36 CT-1

## (undated) DEVICE — SUT PROLENE 6-0 C-1 30IN BL

## (undated) DEVICE — SOL NS 1000CC

## (undated) DEVICE — DRAIN CHAN RND HUBLS 8MM 24FR

## (undated) DEVICE — CANNULA AG CARDPLG 14G FLANGE

## (undated) DEVICE — KIT SURGIFLO HEMOSTATIC MATRIX

## (undated) DEVICE — SYR 30CC LUER LOCK

## (undated) DEVICE — CLOSURE SKIN 1/4INX1-1/2IN

## (undated) DEVICE — SUT 7/0 24IN PROLENE BL MO

## (undated) DEVICE — EVACUATOR WOUND BULB 100CC

## (undated) DEVICE — DRAPE INCISE IOBAN 2 23X17IN

## (undated) DEVICE — SUT SILK 1 6-30 LABYRINTH

## (undated) DEVICE — SET DECANTER MEDICHOICE

## (undated) DEVICE — PACK OPEN HEART BR

## (undated) DEVICE — SOL ISOLYTE S PH 7.4 1000ML

## (undated) DEVICE — SET PERFUSION

## (undated) DEVICE — SEE MEDLINE ITEM 146347

## (undated) DEVICE — DRAIN CHEST DRY SUCTION

## (undated) DEVICE — DRESSING ABSRBNT ISLAND 3.6X8

## (undated) DEVICE — SUT PROLENE 4-0 SH BLU 36IN

## (undated) DEVICE — RESERVOIR CARDIOTOMY.

## (undated) DEVICE — TUBING HEATED INSUFFLATOR

## (undated) DEVICE — SHEET DRAPE FAN-FOLDED 3/4

## (undated) DEVICE — CANNULA PERF RCSP 15FR SINUS

## (undated) DEVICE — BOOT SUTURE AID

## (undated) DEVICE — SUT STEEL 7 MONO B&S18 CCS

## (undated) DEVICE — TUBING PRESSURE MALE/FEMALE

## (undated) DEVICE — KIT PREVENA PLUS

## (undated) DEVICE — DRESSING TRANS 2X2 TEGADERM

## (undated) DEVICE — SUT PERMA HAND SILK BLK 0-0

## (undated) DEVICE — BLADE STERNUM SYSTEM 6

## (undated) DEVICE — SUT PROLENE 4-0 RB-1 BL MO

## (undated) DEVICE — SEE MEDLINE ITEM 147518

## (undated) DEVICE — SEE MEDLINE ITEM 146417

## (undated) DEVICE — CANNULA VENOUS MC2X 29FR

## (undated) DEVICE — SET ARTERIAL CATH 20GX4.25IN

## (undated) DEVICE — BLOWER MISTER

## (undated) DEVICE — KIT NEEDLE GUIDE 18G

## (undated) DEVICE — FILTER FASTSTART KIT 40 MICRON

## (undated) DEVICE — DRESSING MEPORE ISLAND 31/2X4

## (undated) DEVICE — SOL WATER STRL IRR 1000ML

## (undated) DEVICE — SEE MEDLINE ITEM 152739

## (undated) DEVICE — ELECTRODE BLADE E-Z CLEAN 4IN

## (undated) DEVICE — CANNULA VSL W/DUCKBILL

## (undated) DEVICE — DRESSING TRANS 4X4 TEGADERM

## (undated) DEVICE — ELECTRODE REM PLYHSV RETURN 9

## (undated) DEVICE — COVER SURG LIGHT HANDLE

## (undated) DEVICE — SUT SILK 2-0 SH 18IN BLACK